# Patient Record
Sex: FEMALE | Race: WHITE | Employment: UNEMPLOYED | URBAN - METROPOLITAN AREA
[De-identification: names, ages, dates, MRNs, and addresses within clinical notes are randomized per-mention and may not be internally consistent; named-entity substitution may affect disease eponyms.]

---

## 2017-03-21 ENCOUNTER — OFFICE VISIT (OUTPATIENT)
Dept: FAMILY MEDICINE CLINIC | Age: 57
End: 2017-03-21
Payer: COMMERCIAL

## 2017-03-21 VITALS
RESPIRATION RATE: 22 BRPM | TEMPERATURE: 98.2 F | DIASTOLIC BLOOD PRESSURE: 80 MMHG | OXYGEN SATURATION: 96 % | BODY MASS INDEX: 24.8 KG/M2 | WEIGHT: 135.6 LBS | SYSTOLIC BLOOD PRESSURE: 110 MMHG | HEART RATE: 81 BPM

## 2017-03-21 DIAGNOSIS — Z13.220 SCREENING FOR LIPOID DISORDERS: ICD-10-CM

## 2017-03-21 DIAGNOSIS — G89.29 CHRONIC PAIN OF RIGHT KNEE: ICD-10-CM

## 2017-03-21 DIAGNOSIS — Z11.59 NEED FOR HEPATITIS C SCREENING TEST: ICD-10-CM

## 2017-03-21 DIAGNOSIS — M25.561 CHRONIC PAIN OF RIGHT KNEE: ICD-10-CM

## 2017-03-21 DIAGNOSIS — Z12.11 COLON CANCER SCREENING: ICD-10-CM

## 2017-03-21 DIAGNOSIS — Z86.59 HISTORY OF DEPRESSION: ICD-10-CM

## 2017-03-21 DIAGNOSIS — Z79.899 LONG TERM USE OF DRUG: ICD-10-CM

## 2017-03-21 DIAGNOSIS — F41.9 ANXIETY: Primary | ICD-10-CM

## 2017-03-21 PROCEDURE — 99213 OFFICE O/P EST LOW 20 MIN: CPT | Performed by: NURSE PRACTITIONER

## 2017-03-21 RX ORDER — DULOXETIN HYDROCHLORIDE 30 MG/1
CAPSULE, DELAYED RELEASE ORAL
Qty: 90 CAPSULE | Refills: 1 | Status: SHIPPED | OUTPATIENT
Start: 2017-03-21 | End: 2017-05-22 | Stop reason: SDUPTHER

## 2017-03-21 RX ORDER — ALPRAZOLAM 0.5 MG/1
0.5 TABLET ORAL NIGHTLY PRN
Qty: 30 TABLET | Refills: 1 | Status: SHIPPED | OUTPATIENT
Start: 2017-03-21 | End: 2017-06-21 | Stop reason: SDUPTHER

## 2017-03-21 RX ORDER — LEG BRACE
1 EACH MISCELLANEOUS DAILY
Qty: 1 EACH | Refills: 0 | Status: SHIPPED | OUTPATIENT
Start: 2017-03-21 | End: 2019-10-11 | Stop reason: ALTCHOICE

## 2017-03-21 ASSESSMENT — ENCOUNTER SYMPTOMS
NAUSEA: 0
DIARRHEA: 0
EYE PAIN: 0
EYE ITCHING: 0
WHEEZING: 0
COLOR CHANGE: 0
SORE THROAT: 0
SINUS PRESSURE: 0
CONSTIPATION: 0
COUGH: 0
SHORTNESS OF BREATH: 0
BLOOD IN STOOL: 0
CHEST TIGHTNESS: 0
ABDOMINAL DISTENTION: 0
ABDOMINAL PAIN: 0

## 2017-03-21 ASSESSMENT — PATIENT HEALTH QUESTIONNAIRE - PHQ9
2. FEELING DOWN, DEPRESSED OR HOPELESS: 0
SUM OF ALL RESPONSES TO PHQ9 QUESTIONS 1 & 2: 0
SUM OF ALL RESPONSES TO PHQ QUESTIONS 1-9: 0
1. LITTLE INTEREST OR PLEASURE IN DOING THINGS: 0

## 2017-03-22 ENCOUNTER — HOSPITAL ENCOUNTER (OUTPATIENT)
Age: 57
Setting detail: SPECIMEN
Discharge: HOME | End: 2017-03-22
Payer: COMMERCIAL

## 2017-03-22 DIAGNOSIS — Z79.899 LONG TERM USE OF DRUG: ICD-10-CM

## 2017-03-22 DIAGNOSIS — Z13.220 SCREENING FOR LIPOID DISORDERS: ICD-10-CM

## 2017-03-22 DIAGNOSIS — Z11.59 NEED FOR HEPATITIS C SCREENING TEST: ICD-10-CM

## 2017-03-22 LAB
ALBUMIN SERPL-MCNC: 4.7 G/DL (ref 3.5–5.2)
ALBUMIN/GLOBULIN RATIO: 1.8 (ref 1–2.5)
ALP BLD-CCNC: 94 U/L (ref 35–104)
ALT SERPL-CCNC: 13 U/L (ref 5–33)
ANION GAP SERPL CALCULATED.3IONS-SCNC: 12 MMOL/L (ref 9–17)
AST SERPL-CCNC: 12 U/L
BILIRUB SERPL-MCNC: 0.3 MG/DL (ref 0.3–1.2)
BUN BLDV-MCNC: 10 MG/DL (ref 6–20)
BUN/CREAT BLD: NORMAL (ref 9–20)
CALCIUM SERPL-MCNC: 9.3 MG/DL (ref 8.6–10.4)
CHLORIDE BLD-SCNC: 102 MMOL/L (ref 98–107)
CHOLESTEROL/HDL RATIO: 4.3
CHOLESTEROL: 221 MG/DL
CO2: 28 MMOL/L (ref 20–31)
CREAT SERPL-MCNC: 0.66 MG/DL (ref 0.5–0.9)
GFR AFRICAN AMERICAN: >60 ML/MIN
GFR NON-AFRICAN AMERICAN: >60 ML/MIN
GFR SERPL CREATININE-BSD FRML MDRD: NORMAL ML/MIN/{1.73_M2}
GFR SERPL CREATININE-BSD FRML MDRD: NORMAL ML/MIN/{1.73_M2}
GLUCOSE BLD-MCNC: 90 MG/DL (ref 70–99)
HDLC SERPL-MCNC: 52 MG/DL
HEPATITIS C ANTIBODY: NONREACTIVE
LDL CHOLESTEROL: 145 MG/DL (ref 0–130)
POTASSIUM SERPL-SCNC: 4.1 MMOL/L (ref 3.7–5.3)
SODIUM BLD-SCNC: 142 MMOL/L (ref 135–144)
TOTAL PROTEIN: 7.3 G/DL (ref 6.4–8.3)
TRIGL SERPL-MCNC: 118 MG/DL
VLDLC SERPL CALC-MCNC: ABNORMAL MG/DL (ref 1–30)

## 2017-06-21 ENCOUNTER — OFFICE VISIT (OUTPATIENT)
Dept: FAMILY MEDICINE CLINIC | Age: 57
End: 2017-06-21
Payer: COMMERCIAL

## 2017-06-21 VITALS
WEIGHT: 137.8 LBS | RESPIRATION RATE: 16 BRPM | DIASTOLIC BLOOD PRESSURE: 78 MMHG | HEART RATE: 78 BPM | TEMPERATURE: 98 F | OXYGEN SATURATION: 98 % | BODY MASS INDEX: 25.2 KG/M2 | SYSTOLIC BLOOD PRESSURE: 128 MMHG

## 2017-06-21 DIAGNOSIS — E03.9 ACQUIRED HYPOTHYROIDISM: Primary | ICD-10-CM

## 2017-06-21 DIAGNOSIS — B35.3 TINEA PEDIS OF RIGHT FOOT: ICD-10-CM

## 2017-06-21 DIAGNOSIS — Z86.59 HISTORY OF DEPRESSION: ICD-10-CM

## 2017-06-21 DIAGNOSIS — F41.9 ANXIETY: ICD-10-CM

## 2017-06-21 PROCEDURE — 99214 OFFICE O/P EST MOD 30 MIN: CPT | Performed by: NURSE PRACTITIONER

## 2017-06-21 RX ORDER — DULOXETIN HYDROCHLORIDE 30 MG/1
30 CAPSULE, DELAYED RELEASE ORAL DAILY
Qty: 90 CAPSULE | Refills: 1 | Status: SHIPPED | OUTPATIENT
Start: 2017-06-21 | End: 2018-02-06 | Stop reason: SDUPTHER

## 2017-06-21 RX ORDER — LEVOTHYROXINE SODIUM 175 UG/1
175 TABLET ORAL DAILY
Qty: 30 TABLET | Refills: 5 | Status: SHIPPED | OUTPATIENT
Start: 2017-06-21 | End: 2017-07-05 | Stop reason: SDUPTHER

## 2017-06-21 RX ORDER — NITROFURANTOIN 25; 75 MG/1; MG/1
CAPSULE ORAL
COMMUNITY
Start: 2017-06-16 | End: 2017-06-21 | Stop reason: ALTCHOICE

## 2017-06-21 RX ORDER — CLOTRIMAZOLE AND BETAMETHASONE DIPROPIONATE 10; .64 MG/G; MG/G
CREAM TOPICAL
Qty: 45 G | Refills: 0 | Status: SHIPPED | OUTPATIENT
Start: 2017-06-21 | End: 2018-07-20

## 2017-06-21 RX ORDER — ALPRAZOLAM 0.5 MG/1
0.5 TABLET ORAL NIGHTLY PRN
Qty: 90 TABLET | Refills: 1 | Status: SHIPPED | OUTPATIENT
Start: 2017-06-21 | End: 2018-02-06 | Stop reason: SDUPTHER

## 2017-07-04 ASSESSMENT — ENCOUNTER SYMPTOMS
SINUS PRESSURE: 0
EYE PAIN: 0
NAUSEA: 0
CHEST TIGHTNESS: 0
DIARRHEA: 0
COLOR CHANGE: 0
CONSTIPATION: 0
SORE THROAT: 0
SHORTNESS OF BREATH: 0
ABDOMINAL DISTENTION: 0
WHEEZING: 0
COUGH: 0
BLOOD IN STOOL: 0
ABDOMINAL PAIN: 0
EYE ITCHING: 0

## 2017-07-05 DIAGNOSIS — E03.9 ACQUIRED HYPOTHYROIDISM: ICD-10-CM

## 2017-07-05 RX ORDER — LEVOTHYROXINE SODIUM 175 UG/1
175 TABLET ORAL DAILY
Qty: 30 TABLET | Refills: 5 | Status: SHIPPED | OUTPATIENT
Start: 2017-07-05 | End: 2017-07-07 | Stop reason: SDUPTHER

## 2017-07-07 DIAGNOSIS — E03.9 ACQUIRED HYPOTHYROIDISM: ICD-10-CM

## 2017-07-07 RX ORDER — LEVOTHYROXINE SODIUM 175 UG/1
175 TABLET ORAL DAILY
Qty: 30 TABLET | Refills: 5 | Status: SHIPPED | OUTPATIENT
Start: 2017-07-07 | End: 2017-07-31 | Stop reason: SDUPTHER

## 2017-07-31 DIAGNOSIS — E03.9 ACQUIRED HYPOTHYROIDISM: ICD-10-CM

## 2017-07-31 DIAGNOSIS — E89.0 POSTOPERATIVE HYPOTHYROIDISM: Primary | ICD-10-CM

## 2017-07-31 RX ORDER — LEVOTHYROXINE SODIUM 175 UG/1
175 TABLET ORAL DAILY
Qty: 30 TABLET | Refills: 5 | Status: SHIPPED | OUTPATIENT
Start: 2017-07-31 | End: 2017-08-04 | Stop reason: DRUGHIGH

## 2017-08-04 ENCOUNTER — HOSPITAL ENCOUNTER (OUTPATIENT)
Age: 57
Setting detail: SPECIMEN
Discharge: HOME | End: 2017-08-04
Payer: COMMERCIAL

## 2017-08-04 DIAGNOSIS — E89.0 POSTOPERATIVE HYPOTHYROIDISM: ICD-10-CM

## 2017-08-04 LAB — TSH SERPL DL<=0.05 MIU/L-ACNC: <0.01 MIU/L (ref 0.3–5)

## 2017-08-04 RX ORDER — LEVOTHYROXINE SODIUM 0.12 MG/1
125 TABLET ORAL DAILY
Qty: 30 TABLET | Refills: 5 | Status: SHIPPED | OUTPATIENT
Start: 2017-08-04 | End: 2017-09-21 | Stop reason: SDUPTHER

## 2017-09-21 ENCOUNTER — OFFICE VISIT (OUTPATIENT)
Dept: FAMILY MEDICINE CLINIC | Age: 57
End: 2017-09-21
Payer: COMMERCIAL

## 2017-09-21 VITALS
OXYGEN SATURATION: 96 % | TEMPERATURE: 98 F | DIASTOLIC BLOOD PRESSURE: 76 MMHG | RESPIRATION RATE: 16 BRPM | BODY MASS INDEX: 25.62 KG/M2 | HEIGHT: 62 IN | WEIGHT: 139.2 LBS | HEART RATE: 80 BPM | SYSTOLIC BLOOD PRESSURE: 128 MMHG

## 2017-09-21 DIAGNOSIS — E89.0 POSTOPERATIVE HYPOTHYROIDISM: Primary | ICD-10-CM

## 2017-09-21 PROCEDURE — 99213 OFFICE O/P EST LOW 20 MIN: CPT | Performed by: NURSE PRACTITIONER

## 2017-09-21 RX ORDER — LEVOTHYROXINE SODIUM 0.12 MG/1
125 TABLET ORAL DAILY
Qty: 90 TABLET | Refills: 1 | Status: SHIPPED | OUTPATIENT
Start: 2017-09-21 | End: 2018-03-21 | Stop reason: SDUPTHER

## 2017-09-21 RX ORDER — LEVOTHYROXINE SODIUM 175 MCG
TABLET ORAL
COMMUNITY
Start: 2017-09-07 | End: 2017-09-21 | Stop reason: CLARIF

## 2017-09-21 ASSESSMENT — PATIENT HEALTH QUESTIONNAIRE - PHQ9
SUM OF ALL RESPONSES TO PHQ QUESTIONS 1-9: 0
1. LITTLE INTEREST OR PLEASURE IN DOING THINGS: 0
2. FEELING DOWN, DEPRESSED OR HOPELESS: 0
SUM OF ALL RESPONSES TO PHQ9 QUESTIONS 1 & 2: 0

## 2017-09-21 NOTE — PROGRESS NOTES
Visit Information    Have you changed or started any medications since your last visit including any over-the-counter medicines, vitamins, or herbal medicines? no   Are you having any side effects from any of your medications? -  no  Have you stopped taking any of your medications? Is so, why? -  no    Have you seen any other physician or provider since your last visit? No  Have you had any other diagnostic tests since your last visit? No  Have you been seen in the emergency room and/or had an admission to a hospital since we last saw you? No  Have you had your routine dental cleaning in the past 6 months? no    Have you activated your [employer] account? If not, what are your barriers?  No: declined      Patient Care Team:  Queenie Tijerina CNP as PCP - General (Nurse Practitioner)  Tosin Franklin DO as Consulting Physician (Obstetrics & Gynecology)    Medical History Review      Health Maintenance   Topic Date Due    Colon cancer screen colonoscopy  02/23/2010    Cervical cancer screen  12/21/2017 (Originally 5/20/2015)    Flu vaccine (1) 12/21/2017 (Originally 9/1/2017)    Pneumococcal med risk (1 of 1 - PPSV23) 12/21/2017 (Originally 2/23/1979)    DTaP/Tdap/Td vaccine (1 - Tdap) 12/21/2021 (Originally 2/23/1979)    HIV screen  12/21/2026 (Originally 2/23/1975)    Breast cancer screen  12/27/2018    Lipid screen  03/22/2022    Hepatitis C screen  Completed

## 2017-09-21 NOTE — MR AVS SNAPSHOT
your BMI, the greater your risk of heart disease, high blood pressure, type 2 diabetes, stroke, gallstones, arthritis, sleep apnea, and certain cancers. BMI is not perfect. It may overestimate body fat in athletes and people who are more muscular. If your body fat is high you can improve your BMI by decreasing your calorie intake and becoming more physically active. Learn more at: [employer]             Where to Get Your Medications      These medications were sent to [facility], [address] [phone] - f [phone]  [place], [address] [place], [place] [id number]     Phone:  [phone]     levothyroxine 125 MCG tablet         Your Current Medications Are              levothyroxine (LEVOTHROID) 125 MCG tablet Take 1 tablet by mouth daily    diclofenac (VOLTAREN) 50 MG EC tablet Take 1 tablet by mouth 3 times daily (with meals)    DULoxetine (CYMBALTA) 30 MG extended release capsule Take 1 capsule by mouth daily    ALPRAZolam (XANAX) 0.5 MG tablet Take 1 tablet by mouth nightly as needed for Sleep    clotrimazole-betamethasone (LOTRISONE) 1-0.05 % cream Apply topically 2 times daily.     Elastic Bandages & Supports ([address]) MISC 1 Units by Does not apply route daily      Allergies              Sulfa Antibiotics Other (See Comments)    bruise         Additional Information        Basic Information     Date Of Birth Sex Race Ethnicity Preferred Language    1960 Female White Non-/Non  English      Problem List as of 9/21/2017  Date Reviewed: 9/21/2017                Neck pain    Acute recurrent maxillary sinusitis    History of thyroid cancer    Anxiety    Panic attacks    History of depression      Preventive Care        Date Due    Colonoscopy 2/23/2010    Pap Smear 12/21/2017 (Originally 5/20/2015)    Yearly Flu Vaccine (1) 12/21/2017 (Originally 9/1/2017)

## 2017-10-01 ASSESSMENT — ENCOUNTER SYMPTOMS
SINUS PRESSURE: 0
EYE ITCHING: 0
CONSTIPATION: 0
ABDOMINAL PAIN: 0
NAUSEA: 0
DIARRHEA: 0
COLOR CHANGE: 0
SORE THROAT: 0
EYE PAIN: 0
SHORTNESS OF BREATH: 0
COUGH: 0
WHEEZING: 0
ABDOMINAL DISTENTION: 0
BLOOD IN STOOL: 0
CHEST TIGHTNESS: 0

## 2017-10-27 ENCOUNTER — OFFICE VISIT (OUTPATIENT)
Dept: FAMILY MEDICINE CLINIC | Age: 57
End: 2017-10-27
Payer: COMMERCIAL

## 2017-10-27 VITALS
BODY MASS INDEX: 26.13 KG/M2 | OXYGEN SATURATION: 96 % | HEART RATE: 84 BPM | RESPIRATION RATE: 20 BRPM | HEIGHT: 62 IN | WEIGHT: 142 LBS | SYSTOLIC BLOOD PRESSURE: 126 MMHG | DIASTOLIC BLOOD PRESSURE: 82 MMHG | TEMPERATURE: 98.3 F

## 2017-10-27 DIAGNOSIS — E89.0 POSTOPERATIVE HYPOTHYROIDISM: ICD-10-CM

## 2017-10-27 PROCEDURE — 99213 OFFICE O/P EST LOW 20 MIN: CPT | Performed by: NURSE PRACTITIONER

## 2017-10-27 RX ORDER — ARM BRACE
1 EACH MISCELLANEOUS CONTINUOUS
Qty: 1 EACH | Refills: 0 | Status: SHIPPED | OUTPATIENT
Start: 2017-10-27 | End: 2019-10-11 | Stop reason: ALTCHOICE

## 2017-10-27 RX ORDER — IBUPROFEN 800 MG/1
800 TABLET ORAL EVERY 8 HOURS PRN
Qty: 60 TABLET | Refills: 0 | Status: SHIPPED | OUTPATIENT
Start: 2017-10-27 | End: 2018-07-20 | Stop reason: ALTCHOICE

## 2017-10-27 NOTE — PROGRESS NOTES
Visit Information    Have you changed or started any medications since your last visit including any over-the-counter medicines, vitamins, or herbal medicines? no   Are you having any side effects from any of your medications? -  no  Have you stopped taking any of your medications? Is so, why? -  no    Have you seen any other physician or provider since your last visit? No  Have you had any other diagnostic tests since your last visit? No  Have you been seen in the emergency room and/or had an admission to a hospital since we last saw you? No  Have you had your routine dental cleaning in the past 6 months? no    Have you activated your [employer] account? If not, what are your barriers?  No: declined      Patient Care Team:  Rodriguez Harrison CNP as PCP - General (Nurse Practitioner)  Melia Gentile DO as Consulting Physician (Obstetrics & Gynecology)        Health Maintenance   Topic Date Due    Colon cancer screen colonoscopy  02/23/2010    Cervical cancer screen  12/21/2017 (Originally 5/20/2015)    Flu vaccine (1) 12/21/2017 (Originally 9/1/2017)    Pneumococcal med risk (1 of 1 - PPSV23) 12/21/2017 (Originally 2/23/1979)    DTaP/Tdap/Td vaccine (1 - Tdap) 12/21/2021 (Originally 2/23/1979)    HIV screen  12/21/2026 (Originally 2/23/1975)    Breast cancer screen  12/27/2018    Lipid screen  03/22/2022    Hepatitis C screen  Completed

## 2017-10-29 ASSESSMENT — ENCOUNTER SYMPTOMS
EYE ITCHING: 0
EYE PAIN: 0
ABDOMINAL PAIN: 0
COLOR CHANGE: 0
COUGH: 0
NAUSEA: 0
SINUS PRESSURE: 0
CHEST TIGHTNESS: 0
ABDOMINAL DISTENTION: 0
DIARRHEA: 0
BLOOD IN STOOL: 0
WHEEZING: 0
SHORTNESS OF BREATH: 0
CONSTIPATION: 0
SORE THROAT: 0

## 2017-10-30 NOTE — PROGRESS NOTES
Encounters:   10/27/17 126/82   17 128/76   17 128/78          (goal 120/80)    Past Medical History:   Diagnosis Date    Anxiety     History of depression     History of thyroid cancer     Panic attacks       Past Surgical History:   Procedure Laterality Date    BUNIONECTOMY       SECTION      LT x3    HERNIA REPAIR      THYROIDECTOMY      cancer       Family History   Problem Relation Age of Onset    Rheum Arthritis Mother     Hypertension Mother     Mult Sclerosis Father     Diabetes Father     Arthritis Sister     Diabetes Maternal Grandmother     Cancer Maternal Grandmother      unsure of type    Cancer Paternal Grandfather      unsure of type    Diabetes Paternal Grandfather        Social History   Substance Use Topics    Smoking status: Current Every Day Smoker     Packs/day: 1.00     Years: 25.00     Types: Cigarettes    Smokeless tobacco: Never Used      Comment: but will not take chantix    Alcohol use No      Current Outpatient Prescriptions   Medication Sig Dispense Refill    Elastic Bandages & Supports ([address]) MISC 1 each by Does not apply route continuous 1 each 0    ibuprofen (ADVIL;MOTRIN) 800 MG tablet Take 1 tablet by mouth every 8 hours as needed for Pain 60 tablet 0    levothyroxine (LEVOTHROID) 125 MCG tablet Take 1 tablet by mouth daily 90 tablet 1    diclofenac (VOLTAREN) 50 MG EC tablet Take 1 tablet by mouth 3 times daily (with meals) 270 tablet 1    DULoxetine (CYMBALTA) 30 MG extended release capsule Take 1 capsule by mouth daily 90 capsule 1    ALPRAZolam (XANAX) 0.5 MG tablet Take 1 tablet by mouth nightly as needed for Sleep 90 tablet 1    clotrimazole-betamethasone (LOTRISONE) 1-0.05 % cream Apply topically 2 times daily. 45 g 0    Elastic Bandages & Supports (FUTURO KNEE BRACE) MISC 1 Units by Does not apply route daily 1 each 0     No current facility-administered medications for this visit.       Allergies   Allergen Reactions    Sulfa Antibiotics Other (See Comments)     bruise    Flexeril [Cyclobenzaprine] Nausea And Vomiting       Health Maintenance   Topic Date Due    Colon cancer screen colonoscopy  02/23/2010    Cervical cancer screen  12/21/2017 (Originally 5/20/2015)    Flu vaccine (1) 12/21/2017 (Originally 9/1/2017)    Pneumococcal med risk (1 of 1 - PPSV23) 12/21/2017 (Originally 2/23/1979)    DTaP/Tdap/Td vaccine (1 - Tdap) 12/21/2021 (Originally 2/23/1979)    HIV screen  12/21/2026 (Originally 2/23/1975)    Breast cancer screen  12/27/2018    Lipid screen  03/22/2022    Hepatitis C screen  Completed       Subjective:      Review of Systems   Constitutional: Negative for activity change and appetite change. HENT: Negative for congestion, ear pain, hearing loss, sinus pressure, sore throat and tinnitus. Eyes: Negative for pain, itching and visual disturbance. Respiratory: Negative for cough, chest tightness, shortness of breath and wheezing. Cardiovascular: Negative for chest pain, palpitations and leg swelling. Gastrointestinal: Negative for abdominal distention, abdominal pain, blood in stool, constipation, diarrhea and nausea. Endocrine: Negative for cold intolerance, heat intolerance, polydipsia, polyphagia and polyuria. Genitourinary: Negative for dysuria, flank pain, frequency and urgency. Musculoskeletal: Positive for myalgias (left arm pain with movement). Negative for arthralgias and gait problem. Skin: Negative for color change, rash and wound. Allergic/Immunologic: Negative for environmental allergies and food allergies. Neurological: Negative for speech difficulty, weakness, light-headedness, numbness and headaches. Hematological: Does not bruise/bleed easily. Psychiatric/Behavioral: Negative for decreased concentration and sleep disturbance. The patient is not nervous/anxious.         Objective:     /82 (Site: Right Arm, Position: Sitting, Cuff Size: Medium

## 2017-10-31 ENCOUNTER — HOSPITAL ENCOUNTER (OUTPATIENT)
Age: 57
Setting detail: SPECIMEN
Discharge: HOME | End: 2017-10-31
Payer: COMMERCIAL

## 2017-10-31 DIAGNOSIS — E89.0 POSTOPERATIVE HYPOTHYROIDISM: ICD-10-CM

## 2017-10-31 LAB — TSH SERPL DL<=0.05 MIU/L-ACNC: 0.1 MIU/L (ref 0.3–5)

## 2018-02-06 DIAGNOSIS — F41.9 ANXIETY: ICD-10-CM

## 2018-02-06 DIAGNOSIS — Z86.59 HISTORY OF DEPRESSION: ICD-10-CM

## 2018-02-06 RX ORDER — DULOXETIN HYDROCHLORIDE 30 MG/1
30 CAPSULE, DELAYED RELEASE ORAL DAILY
Qty: 90 CAPSULE | Refills: 0 | Status: SHIPPED | OUTPATIENT
Start: 2018-02-06 | End: 2018-02-28 | Stop reason: SDUPTHER

## 2018-02-06 RX ORDER — ALPRAZOLAM 0.5 MG/1
0.5 TABLET ORAL NIGHTLY PRN
Qty: 90 TABLET | Refills: 0 | Status: SHIPPED | OUTPATIENT
Start: 2018-02-06 | End: 2018-05-07

## 2018-03-21 ENCOUNTER — OFFICE VISIT (OUTPATIENT)
Dept: FAMILY MEDICINE CLINIC | Age: 58
End: 2018-03-21
Payer: COMMERCIAL

## 2018-03-21 VITALS
TEMPERATURE: 98 F | DIASTOLIC BLOOD PRESSURE: 64 MMHG | HEIGHT: 62 IN | SYSTOLIC BLOOD PRESSURE: 122 MMHG | HEART RATE: 81 BPM | WEIGHT: 139 LBS | RESPIRATION RATE: 18 BRPM | OXYGEN SATURATION: 93 % | BODY MASS INDEX: 25.58 KG/M2

## 2018-03-21 DIAGNOSIS — E89.0 POSTOPERATIVE HYPOTHYROIDISM: Primary | ICD-10-CM

## 2018-03-21 DIAGNOSIS — Z79.899 LONG TERM USE OF DRUG: ICD-10-CM

## 2018-03-21 DIAGNOSIS — Z86.59 HISTORY OF DEPRESSION: ICD-10-CM

## 2018-03-21 DIAGNOSIS — Z13.1 SCREENING FOR DIABETES MELLITUS (DM): ICD-10-CM

## 2018-03-21 DIAGNOSIS — Z13.220 SCREENING FOR LIPOID DISORDERS: ICD-10-CM

## 2018-03-21 LAB — HBA1C MFR BLD: 5.5 %

## 2018-03-21 PROCEDURE — 99214 OFFICE O/P EST MOD 30 MIN: CPT | Performed by: NURSE PRACTITIONER

## 2018-03-21 PROCEDURE — 83036 HEMOGLOBIN GLYCOSYLATED A1C: CPT | Performed by: NURSE PRACTITIONER

## 2018-03-21 RX ORDER — LEVOTHYROXINE SODIUM 125 MCG
TABLET ORAL
Qty: 90 TABLET | Refills: 1 | Status: SHIPPED | OUTPATIENT
Start: 2018-03-21 | End: 2018-03-23 | Stop reason: DRUGHIGH

## 2018-03-21 RX ORDER — DULOXETIN HYDROCHLORIDE 60 MG/1
60 CAPSULE, DELAYED RELEASE ORAL DAILY
Qty: 30 CAPSULE | Refills: 5 | Status: SHIPPED | OUTPATIENT
Start: 2018-03-21 | End: 2018-03-29 | Stop reason: SDUPTHER

## 2018-03-21 ASSESSMENT — ENCOUNTER SYMPTOMS
CONSTIPATION: 0
DIARRHEA: 0
COUGH: 0
ABDOMINAL DISTENTION: 0
COLOR CHANGE: 0
CHEST TIGHTNESS: 0
ABDOMINAL PAIN: 0
NAUSEA: 0
EYE ITCHING: 0
BLOOD IN STOOL: 0
SORE THROAT: 0
SHORTNESS OF BREATH: 0
EYE PAIN: 0
WHEEZING: 0
SINUS PRESSURE: 0

## 2018-03-21 ASSESSMENT — PATIENT HEALTH QUESTIONNAIRE - PHQ9
2. FEELING DOWN, DEPRESSED OR HOPELESS: 0
1. LITTLE INTEREST OR PLEASURE IN DOING THINGS: 0
SUM OF ALL RESPONSES TO PHQ9 QUESTIONS 1 & 2: 0
SUM OF ALL RESPONSES TO PHQ QUESTIONS 1-9: 0

## 2018-03-21 NOTE — PROGRESS NOTES
[facility], [address] [address]  Dept: [phone]  Dept Fax: [phone]    Qiana Singh is a 62 y.o. female who presents today for her medical conditions/complaints as noted below. Qiana Singh is c/o of Thyroid Problem (6 month check up ) and Medication Refill    Tereso Darden received counseling on the following healthy behaviors: nutrition, exercise and medication adherence  Reviewed prior labs and health maintenance  Continue current medications, diet and exercise. Discussed use, benefit, and side effects of prescribed medications. Barriers to medication compliance addressed. Patient given educational materials - see patient instructions  Was a self-tracking handout given in paper form or via [employer]? Yes    Requested Prescriptions     Signed Prescriptions Disp Refills    DULoxetine (CYMBALTA) 60 MG extended release capsule 30 capsule 5     Sig: Take 1 capsule by mouth daily       All patient questions answered. Patient voiced understanding. Quality Measures    Body mass index is 25.42 kg/m². Elevated. Weight control planned discussed Healthy diet and regular exercise. BP: 122/64 Blood pressure is normal. Treatment plan consists of No treatment change needed. Lab Results   Component Value Date    LDLCHOLESTEROL 145 (H) 03/22/2017    (goal LDL reduction with dx if diabetes is 50% LDL reduction)      PHQ Scores 3/21/2018 10/27/2017 9/21/2017 3/21/2017 4/7/2015 3/10/2015   PHQ2 Score 0 0 0 0 2 6   PHQ9 Score 0 0 0 0 2 22     Interpretation of Total Score Depression Severity: 1-4 = Minimal depression, 5-9 = Mild depression, 10-14 = Moderate depression, 15-19 = Moderately severe depression, 20-27 = Severe depression          HPI:     Mental Health Problem   Primary symptoms comment: depression. The current episode started more than 1 month ago. This is a chronic problem.    The onset of the illness is precipitated by a stressful event and emotional stress Topics    Smoking status: Current Every Day Smoker     Packs/day: 1.00     Years: 25.00     Types: Cigarettes    Smokeless tobacco: Never Used      Comment: but will not take chantix    Alcohol use No      Current Outpatient Prescriptions   Medication Sig Dispense Refill    DULoxetine (CYMBALTA) 60 MG extended release capsule Take 1 capsule by mouth daily 30 capsule 5    ALPRAZolam (XANAX) 0.5 MG tablet Take 1 tablet by mouth nightly as needed for Sleep for up to 90 days. 90 tablet 0    Elastic Bandages & Supports (FUTURO [address] BRACE) MISC 1 each by Does not apply route continuous 1 each 0    ibuprofen (ADVIL;MOTRIN) 800 MG tablet Take 1 tablet by mouth every 8 hours as needed for Pain 60 tablet 0    diclofenac (VOLTAREN) 50 MG EC tablet Take 1 tablet by mouth 3 times daily (with meals) 270 tablet 1    clotrimazole-betamethasone (LOTRISONE) 1-0.05 % cream Apply topically 2 times daily. 45 g 0    Elastic Bandages & Supports (FUTURO KNEE BRACE) MISC 1 Units by Does not apply route daily 1 each 0    SYNTHROID 125 MCG tablet TAKE 1 TABLET DAILY 90 tablet 1     No current facility-administered medications for this visit. Allergies   Allergen Reactions    Sulfa Antibiotics Other (See Comments)     bruise    Flexeril [Cyclobenzaprine] Nausea And Vomiting       Health Maintenance   Topic Date Due    TSH testing  1960    Pneumococcal med risk (1 of 1 - PPSV23) 02/23/1979    Shingles Vaccine (1 of 2 - 2 Dose Series) 02/23/2010    Colon cancer screen colonoscopy  02/23/2010    Smoker: low dose lung CT screening  02/23/2015    Cervical cancer screen  05/20/2015    Flu vaccine (1) 09/01/2017    DTaP/Tdap/Td vaccine (1 - Tdap) 12/21/2021 (Originally 2/23/1979)    HIV screen  12/21/2026 (Originally 2/23/1975)    Breast cancer screen  12/27/2018    Lipid screen  03/22/2022    Hepatitis C screen  Completed       Subjective:      Review of Systems   Constitutional: Positive for fatigue.  Negative Future     Standing Expiration Date:   3/22/2019    Lipid Panel     Standing Status:   Future     Standing Expiration Date:   3/22/2019     Order Specific Question:   Is Patient Fasting?/# of Hours     Answer:   15    TSH without Reflex     Standing Status:   Future     Standing Expiration Date:   3/22/2019    POCT glycosylated hemoglobin (Hb A1C)     Orders Placed This Encounter   Medications    DULoxetine (CYMBALTA) 60 MG extended release capsule     Sig: Take 1 capsule by mouth daily     Dispense:  30 capsule     Refill:  5        Return for depression. Patient given educational materials - see patient instructions. Discussed use, benefit, and side effects of prescribed medications. All patient questions answered. Pt voiced understanding. Reviewed health maintenance. Instructed to continue current medications, diet and exercise. Patient agreed with treatment plan. Follow up as directed.      Electronically signed by Kavon Thakkar CNP on 3/21/2018

## 2018-03-23 ENCOUNTER — HOSPITAL ENCOUNTER (OUTPATIENT)
Age: 58
Setting detail: SPECIMEN
Discharge: HOME | End: 2018-03-23
Payer: COMMERCIAL

## 2018-03-23 DIAGNOSIS — E89.0 POSTOPERATIVE HYPOTHYROIDISM: ICD-10-CM

## 2018-03-23 DIAGNOSIS — Z79.899 LONG TERM USE OF DRUG: ICD-10-CM

## 2018-03-23 DIAGNOSIS — Z13.220 SCREENING FOR LIPOID DISORDERS: ICD-10-CM

## 2018-03-23 LAB
ALBUMIN SERPL-MCNC: 4.4 G/DL (ref 3.5–5.2)
ALBUMIN/GLOBULIN RATIO: 1.8 (ref 1–2.5)
ALP BLD-CCNC: 77 U/L (ref 35–104)
ALT SERPL-CCNC: 11 U/L (ref 5–33)
ANION GAP SERPL CALCULATED.3IONS-SCNC: 13 MMOL/L (ref 9–17)
AST SERPL-CCNC: 12 U/L
BILIRUB SERPL-MCNC: 0.34 MG/DL (ref 0.3–1.2)
BUN BLDV-MCNC: 12 MG/DL (ref 6–20)
BUN/CREAT BLD: NORMAL (ref 9–20)
CALCIUM SERPL-MCNC: 9.1 MG/DL (ref 8.6–10.4)
CHLORIDE BLD-SCNC: 104 MMOL/L (ref 98–107)
CHOLESTEROL/HDL RATIO: 4.3
CHOLESTEROL: 205 MG/DL
CO2: 27 MMOL/L (ref 20–31)
CREAT SERPL-MCNC: 0.6 MG/DL (ref 0.5–0.9)
GFR AFRICAN AMERICAN: >60 ML/MIN
GFR NON-AFRICAN AMERICAN: >60 ML/MIN
GFR SERPL CREATININE-BSD FRML MDRD: NORMAL ML/MIN/{1.73_M2}
GFR SERPL CREATININE-BSD FRML MDRD: NORMAL ML/MIN/{1.73_M2}
GLUCOSE BLD-MCNC: 84 MG/DL (ref 70–99)
HDLC SERPL-MCNC: 48 MG/DL
LDL CHOLESTEROL: 130 MG/DL (ref 0–130)
POTASSIUM SERPL-SCNC: 3.8 MMOL/L (ref 3.7–5.3)
SODIUM BLD-SCNC: 144 MMOL/L (ref 135–144)
TOTAL PROTEIN: 6.8 G/DL (ref 6.4–8.3)
TRIGL SERPL-MCNC: 133 MG/DL
TSH SERPL DL<=0.05 MIU/L-ACNC: 0.07 MIU/L (ref 0.3–5)
VLDLC SERPL CALC-MCNC: ABNORMAL MG/DL (ref 1–30)

## 2018-03-23 RX ORDER — LEVOTHYROXINE SODIUM 112 UG/1
112 TABLET ORAL DAILY
Qty: 90 TABLET | Refills: 1 | Status: SHIPPED | OUTPATIENT
Start: 2018-03-23 | End: 2018-03-26 | Stop reason: SDUPTHER

## 2018-03-26 RX ORDER — LEVOTHYROXINE SODIUM 112 UG/1
112 TABLET ORAL DAILY
Qty: 90 TABLET | Refills: 1 | Status: SHIPPED | OUTPATIENT
Start: 2018-03-26 | End: 2018-07-20 | Stop reason: SDUPTHER

## 2018-03-29 DIAGNOSIS — Z86.59 HISTORY OF DEPRESSION: ICD-10-CM

## 2018-03-29 RX ORDER — DULOXETIN HYDROCHLORIDE 60 MG/1
60 CAPSULE, DELAYED RELEASE ORAL DAILY
Qty: 90 CAPSULE | Refills: 1 | Status: CANCELLED | OUTPATIENT
Start: 2018-03-29

## 2018-03-29 RX ORDER — DULOXETIN HYDROCHLORIDE 60 MG/1
60 CAPSULE, DELAYED RELEASE ORAL DAILY
Qty: 90 CAPSULE | Refills: 1 | Status: SHIPPED | OUTPATIENT
Start: 2018-03-29 | End: 2018-05-08 | Stop reason: DRUGHIGH

## 2018-05-08 DIAGNOSIS — Z86.59 HISTORY OF DEPRESSION: ICD-10-CM

## 2018-05-08 RX ORDER — DULOXETIN HYDROCHLORIDE 30 MG/1
30 CAPSULE, DELAYED RELEASE ORAL DAILY
Qty: 90 CAPSULE | Refills: 0 | Status: SHIPPED | OUTPATIENT
Start: 2018-05-08 | End: 2018-07-20 | Stop reason: DRUGHIGH

## 2018-07-20 ENCOUNTER — OFFICE VISIT (OUTPATIENT)
Dept: FAMILY MEDICINE CLINIC | Age: 58
End: 2018-07-20
Payer: COMMERCIAL

## 2018-07-20 VITALS
DIASTOLIC BLOOD PRESSURE: 76 MMHG | HEIGHT: 62 IN | TEMPERATURE: 98.4 F | RESPIRATION RATE: 16 BRPM | OXYGEN SATURATION: 94 % | WEIGHT: 140.6 LBS | SYSTOLIC BLOOD PRESSURE: 122 MMHG | BODY MASS INDEX: 25.88 KG/M2 | HEART RATE: 80 BPM

## 2018-07-20 DIAGNOSIS — E89.0 POSTOPERATIVE HYPOTHYROIDISM: ICD-10-CM

## 2018-07-20 DIAGNOSIS — Z86.59 HISTORY OF DEPRESSION: Primary | ICD-10-CM

## 2018-07-20 PROCEDURE — 99213 OFFICE O/P EST LOW 20 MIN: CPT | Performed by: NURSE PRACTITIONER

## 2018-07-20 RX ORDER — LEVOTHYROXINE SODIUM 112 UG/1
112 TABLET ORAL DAILY
Qty: 90 TABLET | Refills: 1 | Status: SHIPPED | OUTPATIENT
Start: 2018-07-20 | End: 2019-01-25 | Stop reason: DRUGHIGH

## 2018-07-20 RX ORDER — DULOXETIN HYDROCHLORIDE 60 MG/1
CAPSULE, DELAYED RELEASE ORAL
COMMUNITY
Start: 2018-06-20 | End: 2018-07-20 | Stop reason: SDUPTHER

## 2018-07-20 RX ORDER — DULOXETIN HYDROCHLORIDE 60 MG/1
60 CAPSULE, DELAYED RELEASE ORAL DAILY
Qty: 90 CAPSULE | Refills: 1 | Status: SHIPPED | OUTPATIENT
Start: 2018-07-20 | End: 2018-08-06 | Stop reason: SDUPTHER

## 2018-07-20 ASSESSMENT — ENCOUNTER SYMPTOMS
SHORTNESS OF BREATH: 0
EYE PAIN: 0
BLOOD IN STOOL: 0
EYE ITCHING: 0
ABDOMINAL DISTENTION: 0
SINUS PRESSURE: 0
COLOR CHANGE: 0
WHEEZING: 0
COUGH: 0
DIARRHEA: 0
SORE THROAT: 0
ABDOMINAL PAIN: 0
CONSTIPATION: 0
CHEST TIGHTNESS: 0
NAUSEA: 0

## 2018-07-20 NOTE — PROGRESS NOTES
[facility], [address] [address]  Dept: [phone]  Dept Fax: [phone]    Kiesha Thompson is a 62 y.o. female who presents today for her medical conditions/complaints as noted below. Kiesha Thompson is c/o of Depression (4 month f/u )    Al Vyas received counseling on the following healthy behaviors: nutrition, exercise and medication adherence  Reviewed prior labs and health maintenance  Continue current medications, diet and exercise. Discussed use, benefit, and side effects of prescribed medications. Barriers to medication compliance addressed. Patient given educational materials - see patient instructions  Was a self-tracking handout given in paper form or via [employer]? Yes    Requested Prescriptions     Signed Prescriptions Disp Refills    DULoxetine (CYMBALTA) 60 MG extended release capsule 90 capsule 1     Sig: Take 1 capsule by mouth daily    levothyroxine (SYNTHROID) 112 MCG tablet 90 tablet 1     Sig: Take 1 tablet by mouth Daily       All patient questions answered. Patient voiced understanding. Quality Measures    Body mass index is 25.72 kg/m². Normal. Weight control planned discussed Healthy diet and regular exercise. BP: 122/76 Blood pressure is normal. Treatment plan consists of No treatment change needed. Lab Results   Component Value Date    LDLCHOLESTEROL 130 03/23/2018    (goal LDL reduction with dx if diabetes is 50% LDL reduction)      PHQ Scores 7/20/2018 3/21/2018 10/27/2017 9/21/2017 3/21/2017 4/7/2015 3/10/2015   PHQ2 Score 0 0 0 0 0 2 6   PHQ9 Score 0 0 0 0 0 2 22     Interpretation of Total Score Depression Severity: 1-4 = Minimal depression, 5-9 = Mild depression, 10-14 = Moderate depression, 15-19 = Moderately severe depression, 20-27 = Severe depression        HPI:     Mental Health Problem   Primary symptoms comment: depression. The current episode started more than 1 month ago. This is a chronic problem.    The onset of the illness is precipitated by a stressful event and emotional stress. Additional symptoms of the illness include fatigue, agitation and feelings of worthlessness. Additional symptoms of the illness do not include appetite change, headaches or abdominal pain. She does not admit to suicidal ideas. She does not have a plan to commit suicide. She does not contemplate harming herself. She has not already injured self. She does not contemplate injuring another person. She has not already  injured another person. States is feeling much better since increasing Cymbalta to 60mg daily. Says it has gotten her through her mother's quick sickness and death. Hypothyroidism  This is a chronic problem that has been ongoing for several years. Currently takes levothyroxine daily to keep thyroid hormones within normal range. Denies any muscle cramping, hair loss, increased fatigue, intolerance to cold, memory problems or dry skin.      Hemoglobin A1C (%)   Date Value   2018 5.5             ( goal A1C is < 7)   No results found for: LABMICR  LDL Cholesterol (mg/dL)   Date Value   2018 130   2017 145 (H)       (goal LDL is <100)   AST (U/L)   Date Value   2018 12     ALT (U/L)   Date Value   2018 11     BUN (mg/dL)   Date Value   2018 12     BP Readings from Last 3 Encounters:   18 122/76   18 122/64   10/27/17 126/82          (goal 120/80)    Past Medical History:   Diagnosis Date    Anxiety     History of depression     History of thyroid cancer     Panic attacks     Postoperative hypothyroidism 3/21/2018      Past Surgical History:   Procedure Laterality Date    BUNIONECTOMY       SECTION      LT x3    HERNIA REPAIR      THYROIDECTOMY      cancer       Family History   Problem Relation Age of Onset    Rheum Arthritis Mother     Hypertension Mother     Mult Sclerosis Father     Diabetes Father     Arthritis Sister     Diabetes Maternal Grandmother     Cancer sounds are normal. She exhibits no distension. There is no splenomegaly or hepatomegaly. There is no tenderness. No hernia. Musculoskeletal: Normal range of motion. She exhibits no edema or tenderness. Lymphadenopathy:     She has no cervical adenopathy. Neurological: She is alert and oriented to person, place, and time. Coordination and gait normal.   Skin: Skin is warm and dry. No rash noted. No erythema. Psychiatric: She has a normal mood and affect. Her speech is normal and behavior is normal. Judgment and thought content normal. Cognition and memory are normal.   Nursing note and vitals reviewed. Assessment:      1. History of depression    2. Postoperative hypothyroidism                     Plan:     Depression:  Take Cymbalta prescribed  Medication may take 3-6 weeks to get full benefit, most benefit occurs in 6 months  Learn ways to relax, such as yoga or meditation  Exercise for at least 30 minutes 3 times a week  Eat a healthy diet  Do not drink a lot of caffeine  Get 7-9 hours of sleep a night  Talk to family and friends  Do not abuse alcohol or drugs  Hypothyroidism:  Take levothyroxine as prescribed  Get TSH done to recheck since dosage change  Keep follow up appointment  If symptoms such as fatigue, dry skin, weight gain, muscle weakness, depression, slowed heart rate, or coarse dry hair occur, contact office  RTO as directed    Orders Placed This Encounter   Procedures    TSH without Reflex     Standing Status:   Future     Standing Expiration Date:   7/21/2019     Orders Placed This Encounter   Medications    DULoxetine (CYMBALTA) 60 MG extended release capsule     Sig: Take 1 capsule by mouth daily     Dispense:  90 capsule     Refill:  1    levothyroxine (SYNTHROID) 112 MCG tablet     Sig: Take 1 tablet by mouth Daily     Dispense:  90 tablet     Refill:  1        Return in about 6 months (around 1/20/2019) for depression.              Patient given educational materials - see

## 2018-07-27 ENCOUNTER — HOSPITAL ENCOUNTER (OUTPATIENT)
Age: 58
Setting detail: SPECIMEN
Discharge: HOME | End: 2018-07-27
Payer: COMMERCIAL

## 2018-07-27 DIAGNOSIS — E89.0 POSTOPERATIVE HYPOTHYROIDISM: ICD-10-CM

## 2018-07-27 LAB — TSH SERPL DL<=0.05 MIU/L-ACNC: 1.31 MIU/L (ref 0.3–5)

## 2019-01-24 ENCOUNTER — OFFICE VISIT (OUTPATIENT)
Dept: FAMILY MEDICINE CLINIC | Age: 59
End: 2019-01-24
Payer: COMMERCIAL

## 2019-01-24 ENCOUNTER — HOSPITAL ENCOUNTER (OUTPATIENT)
Age: 59
Discharge: HOME | End: 2019-01-24
Payer: COMMERCIAL

## 2019-01-24 VITALS
HEART RATE: 100 BPM | DIASTOLIC BLOOD PRESSURE: 88 MMHG | WEIGHT: 138 LBS | SYSTOLIC BLOOD PRESSURE: 120 MMHG | TEMPERATURE: 97.8 F | BODY MASS INDEX: 23.56 KG/M2 | HEIGHT: 64 IN

## 2019-01-24 DIAGNOSIS — R29.898 WEAKNESS OF BOTH LEGS: ICD-10-CM

## 2019-01-24 DIAGNOSIS — E89.0 POSTOPERATIVE HYPOTHYROIDISM: ICD-10-CM

## 2019-01-24 DIAGNOSIS — F32.4 MAJOR DEPRESSIVE DISORDER WITH SINGLE EPISODE, IN PARTIAL REMISSION (HCC): ICD-10-CM

## 2019-01-24 DIAGNOSIS — G89.29 CHRONIC LEFT SHOULDER PAIN: Primary | ICD-10-CM

## 2019-01-24 DIAGNOSIS — M25.512 CHRONIC LEFT SHOULDER PAIN: Primary | ICD-10-CM

## 2019-01-24 LAB — TSH SERPL DL<=0.05 MIU/L-ACNC: 0.07 MIU/L (ref 0.3–5)

## 2019-01-24 PROCEDURE — 36415 COLL VENOUS BLD VENIPUNCTURE: CPT

## 2019-01-24 PROCEDURE — 84443 ASSAY THYROID STIM HORMONE: CPT

## 2019-01-24 PROCEDURE — 99214 OFFICE O/P EST MOD 30 MIN: CPT | Performed by: FAMILY MEDICINE

## 2019-01-24 RX ORDER — ALPRAZOLAM 0.5 MG/1
0.5 TABLET ORAL NIGHTLY PRN
COMMUNITY
End: 2019-01-24 | Stop reason: ALTCHOICE

## 2019-01-24 ASSESSMENT — ENCOUNTER SYMPTOMS
NAUSEA: 0
ABDOMINAL PAIN: 0
SHORTNESS OF BREATH: 0
SORE THROAT: 0
CONSTIPATION: 0

## 2019-01-25 RX ORDER — LEVOTHYROXINE SODIUM 0.1 MG/1
100 TABLET ORAL DAILY
Qty: 30 TABLET | Refills: 2 | OUTPATIENT
Start: 2019-01-25 | End: 2019-04-26 | Stop reason: SDUPTHER

## 2019-03-13 ENCOUNTER — OFFICE VISIT (OUTPATIENT)
Dept: FAMILY MEDICINE CLINIC | Age: 59
End: 2019-03-13
Payer: COMMERCIAL

## 2019-03-13 VITALS
SYSTOLIC BLOOD PRESSURE: 137 MMHG | HEIGHT: 62 IN | OXYGEN SATURATION: 99 % | DIASTOLIC BLOOD PRESSURE: 85 MMHG | WEIGHT: 140.2 LBS | BODY MASS INDEX: 25.8 KG/M2 | HEART RATE: 90 BPM

## 2019-03-13 DIAGNOSIS — Z12.31 ENCOUNTER FOR SCREENING MAMMOGRAM FOR BREAST CANCER: ICD-10-CM

## 2019-03-13 DIAGNOSIS — H66.001 ACUTE SUPPURATIVE OTITIS MEDIA OF RIGHT EAR WITHOUT SPONTANEOUS RUPTURE OF TYMPANIC MEMBRANE, RECURRENCE NOT SPECIFIED: Primary | ICD-10-CM

## 2019-03-13 DIAGNOSIS — H90.11 CONDUCTIVE HEARING LOSS OF RIGHT EAR, UNSPECIFIED HEARING STATUS ON CONTRALATERAL SIDE: ICD-10-CM

## 2019-03-13 DIAGNOSIS — F40.01 AGORAPHOBIA WITH PANIC ATTACKS: ICD-10-CM

## 2019-03-13 PROCEDURE — 99213 OFFICE O/P EST LOW 20 MIN: CPT | Performed by: FAMILY MEDICINE

## 2019-03-13 RX ORDER — ALPRAZOLAM 0.5 MG/1
0.5 TABLET ORAL NIGHTLY PRN
COMMUNITY
End: 2019-03-18 | Stop reason: ALTCHOICE

## 2019-03-13 RX ORDER — BUSPIRONE HYDROCHLORIDE 10 MG/1
10 TABLET ORAL 3 TIMES DAILY
Qty: 90 TABLET | Refills: 0 | Status: SHIPPED | OUTPATIENT
Start: 2019-03-13 | End: 2019-04-04 | Stop reason: SDUPTHER

## 2019-03-13 RX ORDER — CEFUROXIME AXETIL 500 MG/1
500 TABLET ORAL EVERY 12 HOURS
Qty: 20 TABLET | Refills: 0 | Status: SHIPPED | OUTPATIENT
Start: 2019-03-13 | End: 2019-03-23

## 2019-03-13 ASSESSMENT — ENCOUNTER SYMPTOMS
COUGH: 0
SINUS PRESSURE: 0
SINUS PAIN: 0
SHORTNESS OF BREATH: 0

## 2019-03-18 PROBLEM — H90.11 CONDUCTIVE HEARING LOSS OF RIGHT EAR: Status: ACTIVE | Noted: 2019-03-18

## 2019-03-18 PROBLEM — H66.001 ACUTE SUPPURATIVE OTITIS MEDIA OF RIGHT EAR WITHOUT SPONTANEOUS RUPTURE OF TYMPANIC MEMBRANE: Status: ACTIVE | Noted: 2019-03-18

## 2019-03-18 PROBLEM — F40.01 AGORAPHOBIA WITH PANIC ATTACKS: Status: ACTIVE | Noted: 2019-03-18

## 2019-03-18 ASSESSMENT — ENCOUNTER SYMPTOMS: VOICE CHANGE: 1

## 2019-04-04 DIAGNOSIS — F40.01 AGORAPHOBIA WITH PANIC ATTACKS: ICD-10-CM

## 2019-04-04 RX ORDER — BUSPIRONE HYDROCHLORIDE 10 MG/1
TABLET ORAL
Qty: 90 TABLET | Refills: 0 | Status: SHIPPED | OUTPATIENT
Start: 2019-04-04 | End: 2019-05-07 | Stop reason: SDUPTHER

## 2019-04-04 NOTE — TELEPHONE ENCOUNTER
Please Approve or Refuse.   Send to Pharmacy per Pt's Request:      Next Visit Date:  5/3/2019   Last Visit Date: 3/13/2019    Hemoglobin A1C (%)   Date Value   03/21/2018 5.5             ( goal A1C is < 7)   BP Readings from Last 3 Encounters:   03/13/19 137/85   01/24/19 120/88   07/20/18 122/76          (goal 120/80)  BUN   Date Value Ref Range Status   03/23/2018 12 6 - 20 mg/dL Final     CREATININE   Date Value Ref Range Status   03/23/2018 0.60 0.50 - 0.90 mg/dL Final     Potassium   Date Value Ref Range Status   03/23/2018 3.8 3.7 - 5.3 mmol/L Final

## 2019-04-22 DIAGNOSIS — E89.0 POSTOPERATIVE HYPOTHYROIDISM: Primary | ICD-10-CM

## 2019-04-26 DIAGNOSIS — E89.0 POSTOPERATIVE HYPOTHYROIDISM: ICD-10-CM

## 2019-04-26 DIAGNOSIS — Z85.850 HISTORY OF THYROID CANCER: Primary | ICD-10-CM

## 2019-04-26 RX ORDER — LEVOTHYROXINE SODIUM 0.05 MG/1
50 TABLET ORAL
Qty: 30 TABLET | Refills: 0 | Status: SHIPPED | OUTPATIENT
Start: 2019-04-26 | End: 2019-05-03 | Stop reason: SDUPTHER

## 2019-04-26 NOTE — TELEPHONE ENCOUNTER
Me   to [id number]          9:42 AM   Note      I'll place new order for TSH, needs to do it asap.     How much Synthroid is she taking? (was supposed to take 1/2 tab=50 mcg!)     FYI    Notes recorded by Zachary Curran MD on 1/25/2019 at 1:18 PM EST  call patient and tell her that she needs to cut her Synthroid 100 µg call in the prescription and in 2 months she needs to repeat the TSH

## 2019-04-30 ENCOUNTER — HOSPITAL ENCOUNTER (OUTPATIENT)
Age: 59
Setting detail: SPECIMEN
Discharge: HOME | End: 2019-04-30
Payer: COMMERCIAL

## 2019-04-30 DIAGNOSIS — E89.0 POSTOPERATIVE HYPOTHYROIDISM: ICD-10-CM

## 2019-05-01 LAB — TSH SERPL DL<=0.05 MIU/L-ACNC: 0.4 MIU/L (ref 0.3–5)

## 2019-05-01 NOTE — RESULT ENCOUNTER NOTE
Mychart comment sent to patient.   Normal TSH continue current treatment we will discuss at her appointment 5/3/19

## 2019-05-03 ENCOUNTER — HOSPITAL ENCOUNTER (OUTPATIENT)
Age: 59
Setting detail: SPECIMEN
Discharge: HOME | End: 2019-05-03
Payer: COMMERCIAL

## 2019-05-03 ENCOUNTER — OFFICE VISIT (OUTPATIENT)
Dept: FAMILY MEDICINE CLINIC | Age: 59
End: 2019-05-03
Payer: COMMERCIAL

## 2019-05-03 VITALS
HEART RATE: 87 BPM | BODY MASS INDEX: 25.95 KG/M2 | OXYGEN SATURATION: 98 % | SYSTOLIC BLOOD PRESSURE: 125 MMHG | WEIGHT: 141 LBS | DIASTOLIC BLOOD PRESSURE: 83 MMHG | HEIGHT: 62 IN

## 2019-05-03 DIAGNOSIS — R22.33 NODULE OF FINGER OF BOTH HANDS: ICD-10-CM

## 2019-05-03 DIAGNOSIS — Z12.31 ENCOUNTER FOR SCREENING MAMMOGRAM FOR BREAST CANCER: ICD-10-CM

## 2019-05-03 DIAGNOSIS — Z85.850 HISTORY OF THYROID CANCER: ICD-10-CM

## 2019-05-03 DIAGNOSIS — Z12.11 SCREENING FOR COLON CANCER: ICD-10-CM

## 2019-05-03 DIAGNOSIS — E78.5 HYPERLIPIDEMIA WITH TARGET LDL LESS THAN 100: ICD-10-CM

## 2019-05-03 DIAGNOSIS — F33.1 MODERATE EPISODE OF RECURRENT MAJOR DEPRESSIVE DISORDER (HCC): ICD-10-CM

## 2019-05-03 DIAGNOSIS — E89.0 POSTOPERATIVE HYPOTHYROIDISM: Primary | ICD-10-CM

## 2019-05-03 DIAGNOSIS — R53.82 CHRONIC FATIGUE: ICD-10-CM

## 2019-05-03 DIAGNOSIS — R06.09 DOE (DYSPNEA ON EXERTION): ICD-10-CM

## 2019-05-03 DIAGNOSIS — F41.9 ANXIETY: ICD-10-CM

## 2019-05-03 DIAGNOSIS — Z82.61 FAMILY HISTORY OF RHEUMATOID ARTHRITIS: ICD-10-CM

## 2019-05-03 DIAGNOSIS — Z13.31 POSITIVE DEPRESSION SCREENING: ICD-10-CM

## 2019-05-03 DIAGNOSIS — Z23 NEED FOR PROPHYLACTIC VACCINATION AND INOCULATION AGAINST VARICELLA: ICD-10-CM

## 2019-05-03 LAB
ALBUMIN SERPL-MCNC: 4.8 G/DL (ref 3.5–5.2)
ALBUMIN/GLOBULIN RATIO: NORMAL (ref 1–2.5)
ALP BLD-CCNC: 75 U/L (ref 35–104)
ALT SERPL-CCNC: 11 U/L (ref 5–33)
ANION GAP SERPL CALCULATED.3IONS-SCNC: 12 MMOL/L (ref 9–17)
AST SERPL-CCNC: 13 U/L
BILIRUB SERPL-MCNC: 0.32 MG/DL (ref 0.3–1.2)
BUN BLDV-MCNC: 9 MG/DL (ref 6–20)
BUN/CREAT BLD: NORMAL (ref 9–20)
CALCIUM SERPL-MCNC: 9.2 MG/DL (ref 8.6–10.4)
CCP IGG ANTIBODIES: <1.5 U/ML
CHLORIDE BLD-SCNC: 103 MMOL/L (ref 98–107)
CHOLESTEROL/HDL RATIO: 4.1
CHOLESTEROL: 227 MG/DL
CO2: 26 MMOL/L (ref 20–31)
CREAT SERPL-MCNC: 0.57 MG/DL (ref 0.5–0.9)
GFR AFRICAN AMERICAN: >60 ML/MIN
GFR NON-AFRICAN AMERICAN: >60 ML/MIN
GFR SERPL CREATININE-BSD FRML MDRD: NORMAL ML/MIN/{1.73_M2}
GFR SERPL CREATININE-BSD FRML MDRD: NORMAL ML/MIN/{1.73_M2}
GLUCOSE BLD-MCNC: 88 MG/DL (ref 70–99)
HCT VFR BLD CALC: 45.9 % (ref 36–46)
HDLC SERPL-MCNC: 56 MG/DL
HEMOGLOBIN: 15.4 G/DL (ref 12–16)
LDL CHOLESTEROL: 148 MG/DL (ref 0–130)
MCH RBC QN AUTO: 30.9 PG (ref 26–34)
MCHC RBC AUTO-ENTMCNC: 33.6 G/DL (ref 31–37)
MCV RBC AUTO: 91.8 FL (ref 80–100)
NRBC AUTOMATED: NORMAL PER 100 WBC
PDW BLD-RTO: 13.4 % (ref 11.5–14.9)
PLATELET # BLD: 256 K/UL (ref 150–450)
PMV BLD AUTO: 8.7 FL (ref 6–12)
POTASSIUM SERPL-SCNC: 4.3 MMOL/L (ref 3.7–5.3)
RBC # BLD: 5 M/UL (ref 4–5.2)
RHEUMATOID FACTOR: <10 IU/ML
SODIUM BLD-SCNC: 141 MMOL/L (ref 135–144)
TOTAL PROTEIN: 7.5 G/DL (ref 6.4–8.3)
TRIGL SERPL-MCNC: 113 MG/DL
URIC ACID: 3.5 MG/DL (ref 2.4–5.7)
VITAMIN D 25-HYDROXY: 8.3 NG/ML (ref 30–100)
VLDLC SERPL CALC-MCNC: ABNORMAL MG/DL (ref 1–30)
WBC # BLD: 6.1 K/UL (ref 3.5–11)

## 2019-05-03 PROCEDURE — 36415 COLL VENOUS BLD VENIPUNCTURE: CPT

## 2019-05-03 PROCEDURE — G8431 POS CLIN DEPRES SCRN F/U DOC: HCPCS | Performed by: FAMILY MEDICINE

## 2019-05-03 PROCEDURE — 80053 COMPREHEN METABOLIC PANEL: CPT

## 2019-05-03 PROCEDURE — 86200 CCP ANTIBODY: CPT

## 2019-05-03 PROCEDURE — 86235 NUCLEAR ANTIGEN ANTIBODY: CPT

## 2019-05-03 PROCEDURE — 82306 VITAMIN D 25 HYDROXY: CPT

## 2019-05-03 PROCEDURE — 86431 RHEUMATOID FACTOR QUANT: CPT

## 2019-05-03 PROCEDURE — 86225 DNA ANTIBODY NATIVE: CPT

## 2019-05-03 PROCEDURE — 84550 ASSAY OF BLOOD/URIC ACID: CPT

## 2019-05-03 PROCEDURE — 85027 COMPLETE CBC AUTOMATED: CPT

## 2019-05-03 PROCEDURE — G0444 DEPRESSION SCREEN ANNUAL: HCPCS | Performed by: FAMILY MEDICINE

## 2019-05-03 PROCEDURE — 80061 LIPID PANEL: CPT

## 2019-05-03 PROCEDURE — 99214 OFFICE O/P EST MOD 30 MIN: CPT | Performed by: FAMILY MEDICINE

## 2019-05-03 PROCEDURE — 86038 ANTINUCLEAR ANTIBODIES: CPT

## 2019-05-03 RX ORDER — LEVOTHYROXINE SODIUM 0.1 MG/1
100 TABLET ORAL
Qty: 30 TABLET | Refills: 1 | Status: SHIPPED | OUTPATIENT
Start: 2019-05-03 | End: 2019-05-29 | Stop reason: SDUPTHER

## 2019-05-03 RX ORDER — DULOXETIN HYDROCHLORIDE 30 MG/1
30 CAPSULE, DELAYED RELEASE ORAL DAILY
Qty: 90 CAPSULE | Refills: 3 | Status: SHIPPED | OUTPATIENT
Start: 2019-05-03 | End: 2019-10-11 | Stop reason: DRUGHIGH

## 2019-05-03 RX ORDER — TRAZODONE HYDROCHLORIDE 50 MG/1
50 TABLET ORAL NIGHTLY PRN
Qty: 30 TABLET | Refills: 0 | Status: SHIPPED | OUTPATIENT
Start: 2019-05-03 | End: 2019-05-29 | Stop reason: SDUPTHER

## 2019-05-03 ASSESSMENT — ENCOUNTER SYMPTOMS
CHEST TIGHTNESS: 0
CONSTIPATION: 0
SHORTNESS OF BREATH: 1
NAUSEA: 0
WHEEZING: 0
ABDOMINAL PAIN: 0
COUGH: 1
ABDOMINAL DISTENTION: 0
DIARRHEA: 0
VOMITING: 0

## 2019-05-03 ASSESSMENT — ANXIETY QUESTIONNAIRES
GAD7 TOTAL SCORE: 7
4. TROUBLE RELAXING: 1-SEVERAL DAYS
5. BEING SO RESTLESS THAT IT IS HARD TO SIT STILL: 1-SEVERAL DAYS
6. BECOMING EASILY ANNOYED OR IRRITABLE: 2-OVER HALF THE DAYS
1. FEELING NERVOUS, ANXIOUS, OR ON EDGE: 1-SEVERAL DAYS
3. WORRYING TOO MUCH ABOUT DIFFERENT THINGS: 1-SEVERAL DAYS
2. NOT BEING ABLE TO STOP OR CONTROL WORRYING: 1-SEVERAL DAYS
7. FEELING AFRAID AS IF SOMETHING AWFUL MIGHT HAPPEN: 0-NOT AT ALL SURE

## 2019-05-03 ASSESSMENT — PATIENT HEALTH QUESTIONNAIRE - PHQ9
5. POOR APPETITE OR OVEREATING: 3
1. LITTLE INTEREST OR PLEASURE IN DOING THINGS: 1
2. FEELING DOWN, DEPRESSED OR HOPELESS: 1
3. TROUBLE FALLING OR STAYING ASLEEP: 3
SUM OF ALL RESPONSES TO PHQ QUESTIONS 1-9: 13
9. THOUGHTS THAT YOU WOULD BE BETTER OFF DEAD, OR OF HURTING YOURSELF: 0
8. MOVING OR SPEAKING SO SLOWLY THAT OTHER PEOPLE COULD HAVE NOTICED. OR THE OPPOSITE, BEING SO FIGETY OR RESTLESS THAT YOU HAVE BEEN MOVING AROUND A LOT MORE THAN USUAL: 0
6. FEELING BAD ABOUT YOURSELF - OR THAT YOU ARE A FAILURE OR HAVE LET YOURSELF OR YOUR FAMILY DOWN: 1
7. TROUBLE CONCENTRATING ON THINGS, SUCH AS READING THE NEWSPAPER OR WATCHING TELEVISION: 1
SUM OF ALL RESPONSES TO PHQ QUESTIONS 1-9: 13
SUM OF ALL RESPONSES TO PHQ9 QUESTIONS 1 & 2: 2
10. IF YOU CHECKED OFF ANY PROBLEMS, HOW DIFFICULT HAVE THESE PROBLEMS MADE IT FOR YOU TO DO YOUR WORK, TAKE CARE OF THINGS AT HOME, OR GET ALONG WITH OTHER PEOPLE: 2
4. FEELING TIRED OR HAVING LITTLE ENERGY: 3

## 2019-05-03 NOTE — PROGRESS NOTES
Chief Complaint   Patient presents with    Thyroid Problem     100 mcg Synthroid     Anxiety    Nodule     on hands for a few years getting bigger now    Fatigue    Hyperlipidemia         Bernardino Cagle    here today for follow up on chronic medical problems, go over labs and/or diagnostic studies, and medication refills. Thyroid Problem (100 mcg Synthroid ); Anxiety; Nodule (on hands for a few years getting bigger now); Fatigue; and Hyperlipidemia      HPI      Hypothyroidism: Recent symptoms: fatigue and anxiety. She denies weight gain, weight loss, cold intolerance, heat intolerance, hair loss, dry skin, constipation, diarrhea, edema, tremor, palpitations and dysphagia. Patient is  taking her medication consistently on an empty stomach. Was taking 112 mcg , then was cut down to 50 mcg, but kept taking 100 mcg  [facility] [facility]      Had thyroid cancer in 2012, had total thyroidectomy . He is agreeable to see Endocrinology     No results found for: [facility]  Lab Results   Component Value Date    TSH 0.40 04/30/2019    TSH 0.07 (L) 01/24/2019    TSH 1.31 07/27/2018      unintentional weight gain     Wt Readings from Last 3 Encounters:   05/03/19 141 lb (64 kg)   03/13/19 140 lb 3.2 oz (63.6 kg)   01/24/19 138 lb (62.6 kg)       Depression and Anxiety. Patient complains of depression. She complains of anhedonia, depressed mood, difficulty concentrating, fatigue, feelings of worthlessness/guilt, hopelessness, insomnia and weight gain. Patient complains of anxiety disorder, panic attacks and sleep disturbance. She has the following anxiety symptoms: difficulty concentrating, fatigue, insomnia, irritable, racing thoughts, shortness of breath. Has difficulty falling asleep and staying asleep. Just started Buspar and not sure if it's helping or not. Denies suicidal ideation, plan or intent.      10-14 = Moderate depression       PHQ Scores 5/3/2019 7/20/2018 3/21/2018 10/27/2017 9/21/2017 3/21/2017 4/7/2015   PHQ2 Score 2 0 0 0 0 0 2   PHQ9 Score 13 0 0 0 0 0 2     Mild anxiety    PETE 7 SCORE 5/3/2019   PETE-7 Total Score 7     Interpretation of PETE-7 score: 5-9 = mild anxiety, 10-14 = moderate anxiety, 15+ = severe anxiety. Recommend referral to behavioral health for scores 10 or greater. [facility] PETE-7 5/3/2019   Feeling nervous, anxious, or on edge 1-Several days   Not able to stop or control worrying 1-Several days   Worrying too much about different things 1-Several days   Trouble relaxing 1-Several days   Being so restless that it's hard to sit still 1-Several days   Becoming easily annoyed or irritable 2-Over half the days   Feeling afraid as if something awful might happen 0-Not at all sure   PETE-7 Total Score 7     Calixto Montague complains of of nodules on her fingers and she thinks she has osteoarthritis. The nodules are on the Right dorsum of the hand nodule, this has been there \"for  Larrie Mohs"  Has a newer one on the Left thumb, on the dorsal aspect, for about  1 yr. The nodules seem to get bigger. They don't hurt. She says she feels tired all the time, not getting better, onset several months ago or longer. She reports to Stiffness in her hands for 30 mins or less in the mornings. Denies color changes in her hands. Denies unusual rashes   She reminds me her Sister and her mother  Both have Rheumatoid Arthritis/ RA. Calixto Montague complains of dyspnea on exertion and Cough since had a the cold. I've seen her on 3/13/19, and we gave her Cefuroxime. Denies hemoptysis or chest pain     Nicotine dependence. Smoker, counseling given to quit smoking. Ready to quit: No  Counseling given: Yes  Comment: but will not take chantix      Hyperlipidemia:  Patient is not following a low fat, low cholesterol diet. She is not exercising regularly, but staying active, . OTC Supplements: none.     Lab Results   Component Value Date    CHOL 227 (H) 05/03/2019    TRIG 113 05/03/2019    HDL 56 activity:     Days per week: Not on file     Minutes per session: Not on file    Stress: Not on file   Relationships    Social connections:     Talks on phone: Not on file     Gets together: Not on file     Attends Synagogue service: Not on file     Active member of club or organization: Not on file     Attends meetings of clubs or organizations: Not on file     Relationship status: Not on file    Intimate partner violence:     Fear of current or ex partner: Not on file     Emotionally abused: Not on file     Physically abused: Not on file     Forced sexual activity: Not on file   Other Topics Concern    Not on file   Social History Narrative    Not on file     Ready to quit: No  Counseling given: Yes  Comment: but will not take chantix        Family History   Problem Relation Age of Onset    Rheum Arthritis Mother     Hypertension Mother    Moore Mult Sclerosis Father     Diabetes Father     Arthritis Sister     Rheum Arthritis Sister     Diabetes Maternal Grandmother     Cancer Maternal Grandmother         unsure of type    Cancer Paternal Grandfather         unsure of type    Diabetes Paternal Grandfather              -rest of complaints with corresponding details per ROS    The patient's past medical,surgical, social, and family history as well as her current medications and allergies were reviewed as documented in today's encounter. Review of Systems   Constitutional: Positive for fatigue and unexpected weight change. Negative for activity change, appetite change, chills, diaphoresis and fever. Respiratory: Positive for cough (mild) and shortness of breath (RIZVI). Negative for chest tightness and wheezing. Cardiovascular: Negative for chest pain, palpitations and leg swelling. Gastrointestinal: Negative for abdominal distention, abdominal pain, constipation, diarrhea, nausea and vomiting. Endocrine: Negative for cold intolerance, heat intolerance, polydipsia, polyphagia and polyuria. Musculoskeletal: Positive for arthralgias (hands) and joint swelling. Neurological: Negative for weakness and numbness. Psychiatric/Behavioral: Positive for decreased concentration, dysphoric mood and sleep disturbance. Negative for self-injury and suicidal ideas. The patient is nervous/anxious.            -vital signs stable and within normal limits except mildly Overweight per BMI. /83   Pulse 87   Ht 5' 2\" (1.575 m)   Wt 141 lb (64 kg)   LMP 06/04/2015   SpO2 98%   BMI 25.79 kg/m²      Physical Exam   Constitutional: She is oriented to person, place, and time. She appears well-developed and well-nourished. No distress. HENT:   Head: Normocephalic and atraumatic. Right Ear: External ear normal.   Left Ear: External ear normal.   Nose: Nose normal.   Mouth/Throat: Oropharynx is clear and moist. No oropharyngeal exudate. Eyes: Conjunctivae and EOM are normal. Right eye exhibits no discharge. Left eye exhibits no discharge. No scleral icterus. Neck: Normal range of motion. Neck supple. No thyromegaly present. Cardiovascular: Normal rate, regular rhythm, normal heart sounds and intact distal pulses. Pulmonary/Chest: Effort normal. No respiratory distress. She has decreased breath sounds in the right lower field and the left lower field. She has no wheezes. She has no rales. She exhibits no tenderness. Abdominal: Soft. Bowel sounds are normal. She exhibits no distension. There is no tenderness. Musculoskeletal: Normal range of motion. She exhibits tenderness and deformity. She exhibits no edema. On the right dorsum of the hand, there is a nodule, 8 mm, hard, nontender, irregular surface  On the dorsum of the Left thumb, there is a similar  nodule 1.5 cm, hard, lobulated surface, nontender   Neurological: She is alert and oriented to person, place, and time. No cranial nerve deficit. She exhibits normal muscle tone. Skin: Skin is warm and dry.  Capillary refill takes less than 2 seconds. No rash noted. She is not diaphoretic. Psychiatric: Her behavior is normal. Judgment and thought content normal. Her mood appears anxious. Nursing note and vitals reviewed. I personally reviewed testing . Discussed testing with the patient and all questions fully answered. Mild Polycythemia   hyperlipidemia     Otherwise labs within normal limits    No results found for: Derek     No results found for: [facility] Outpatient Visit on 04/30/2019   Component Date Value Ref Range Status    TSH 04/30/2019 0.40  0.30 - 5.00 mIU/L Final       Lab Results   Component Value Date    WBC 5.9 09/12/2014    HGB 15.8 10/09/2015    HCT 48.3 (A) 10/09/2015    MCV 87.9 09/12/2014     09/12/2014       Lab Results   Component Value Date     03/23/2018    K 3.8 03/23/2018     03/23/2018    CO2 27 03/23/2018    BUN 12 03/23/2018    CREATININE 0.60 03/23/2018    GLUCOSE 84 03/23/2018    CALCIUM 9.1 03/23/2018        Lab Results   Component Value Date    ALT 11 03/23/2018    AST 12 03/23/2018    ALKPHOS 77 03/23/2018    BILITOT 0.34 03/23/2018       Lab Results   Component Value Date    TSH 0.40 04/30/2019       Lab Results   Component Value Date    CHOL 205 (H) 03/23/2018    CHOL 221 (H) 03/22/2017     Lab Results   Component Value Date    TRIG 133 03/23/2018    TRIG 118 03/22/2017     Lab Results   Component Value Date    HDL 48 03/23/2018    HDL 52 03/22/2017     Lab Results   Component Value Date    LDLCHOLESTEROL 130 03/23/2018    LDLCHOLESTEROL 145 (H) 03/22/2017       Lab Results   Component Value Date    CHOLHDLRATIO 4.3 03/23/2018    CHOLHDLRATIO 4.3 03/22/2017       Lab Results   Component Value Date    LABA1C 5.5 03/21/2018       No results found for: IQEBDUKM29    No results found for: FOLATE    No results found for: IRON, TIBC, FERRITIN    No results found for: VITD25        ASSESSMENT AND PLAN      1.  Postoperative hypothyroidism  Well controlled  Has history of thyroid cancer, needs to see Endocrinology   Lab Results   Component Value Date    TSH 0.40 04/30/2019       - levothyroxine (SYNTHROID) 100 MCG tablet; Take 1 tablet by mouth every morning (before breakfast)  Dispense: 30 tablet; Refill: 1  - [facility]    2. Moderate episode of recurrent major depressive disorder (HCC)  worsening     PHQ Scores 5/3/2019 7/20/2018 3/21/2018 10/27/2017 9/21/2017 3/21/2017 4/7/2015   PHQ2 Score 2 0 0 0 0 0 2   PHQ9 Score 13 0 0 0 0 0 2     Interpretation of Total Score Depression Severity: 1-4 = Minimal depression, 5-9 = Mild depression, 10-14 = Moderate depression, 15-19 = Moderately severe depression, 20-27 = Severe depression    -continue DULoxetine (CYMBALTA) 30 MG extended release capsule; Take 1 capsule by mouth daily  Dispense: 90 capsule; Refill: 3  - start traZODone (DESYREL) 50 MG tablet; Take 1 tablet by mouth nightly as needed for Sleep Call for refills or dose adjustment. Dispense: 30 tablet; Refill: 0    3. Nodule of finger of both hands  worsening  We need to rule out rheumatologic conditions or gout    - Cyclic Citrul Peptide Antibody, IgG; Future  - Rheumatoid Factor; Future  - AMAIRANI Screen with Reflex; Future  - Uric Acid; Future    4. Chronic fatigue  Failing to change as expected. - CBC; Future  - Comprehensive Metabolic Panel; Future  - Vitamin D 25 Hydroxy; Future    5. History of thyroid cancer    - levothyroxine (SYNTHROID) 100 MCG tablet; Take 1 tablet by mouth every morning (before breakfast)  Dispense: 30 tablet; Refill: 1  - [facility]    6. Family history of rheumatoid arthritis  We need to rule out Rheumatologic disorders   - Cyclic Citrul Peptide Antibody, IgG; Future  - Rheumatoid Factor; Future  - AMAIRANI Screen with Reflex; Future    7. RIZVI (dyspnea on exertion)  Failing to change as expected. We need to rule out pulmonary or cardiac conditions    - EKG 12 lead unit performed;  Future  - XR CHEST STANDARD (2 VW); Future  Smoking cessation counseling given. 8. Anxiety  Mild   Failing to change as expected. PETE 7 SCORE 5/3/2019   PETE-7 Total Score 7     Interpretation of PETE-7 score: 5-9 = mild anxiety, 10-14 = moderate anxiety, 15+ = severe anxiety. Recommend referral to behavioral health for scores 10 or greater.    - DULoxetine (CYMBALTA) 30 MG extended release capsule; Take 1 capsule by mouth daily  Dispense: 90 capsule; Refill: 3  - start traZODone (DESYREL) 50 MG tablet; Take 1 tablet by mouth nightly as needed for Sleep Call for refills or dose adjustment. Dispense: 30 tablet; Refill: 0  I suggested to start cognitive behavioral therapy with our psychologist    9. Hyperlipidemia with target LDL less than 100  Inadequately controlled   - Lipid Panel; Future  If still high, will need statin  Low carb, low fat diet, increase fruits and vegetables, and exercise 4-5 times a week 30-40 minutes a day, or walk 1-2 hours per day, or wear a pedometer and get at least 10,000 steps per day. 10. Encounter for screening mammogram for breast cancer  - [facility] Digital Screen Bilateral [[id number]]; Future    11. Screening for colon cancer    - POCT FECAL IMMUNOCHEMICAL TEST (FIT); Future    12. Need for prophylactic vaccination and inoculation against varicella    - zoster recombinant adjuvanted vaccine [facility]) 50 MCG/0.5ML SUSR injection; Inject 0.5 mLs into the muscle once for 1 dose 50 MCG IM then repeat 2-6 months. Dispense: 1 each; Refill: 1    13. Positive depression screening  - Positive Screen for Clinical Depression with a Documented Follow-up Plan       On the basis of positive PHQ-9 screening (PHQ-9 Total Score: 13), the following plan was implemented: referral for psychotherapy provided to address external stressors and medication prescribed: Cymbalta- 30 mg and sleep aid- Trazodone- patient will call for any significant medication side effects or worsening symptoms of depression.   Patient will follow-up in 3 month(s) with PCP.         Place on wait list for psychologist for anxiety and depression     Orders Placed This Encounter   Procedures    [facility] Digital Screen Bilateral [[id number]]     Standing Status:   Future     Standing Expiration Date:   5/2/2020    XR CHEST STANDARD (2 VW)     Standing Status:   Future     Standing Expiration Date:   5/2/2020     Order Specific Question:   Reason for exam:     Answer:   RIZVI    CBC     Standing Status:   Future     Number of Occurrences:   1     Standing Expiration Date:   12/3/2019    Comprehensive Metabolic Panel     Standing Status:   Future     Number of Occurrences:   1     Standing Expiration Date:   12/3/2019    Vitamin D 25 Hydroxy     Standing Status:   Future     Number of Occurrences:   1     Standing Expiration Date:   12/3/2019    Lipid Panel     Standing Status:   Future     Number of Occurrences:   1     Standing Expiration Date:   12/3/2019     Order Specific Question:   Is Patient Fasting?/# of Hours     Answer:   yes, 2-24 hours    Cyclic Citrul Peptide Antibody, IgG     Standing Status:   Future     Number of Occurrences:   1     Standing Expiration Date:   12/3/2019    Rheumatoid Factor     Standing Status:   Future     Number of Occurrences:   1     Standing Expiration Date:   12/3/2019    AMAIRANI Screen with Reflex     Standing Status:   Future     Number of Occurrences:   1     Standing Expiration Date:   12/3/2019    Uric Acid     Standing Status:   Future     Number of Occurrences:   1     Standing Expiration Date:   12/3/2019   Brenda Rollins MD, Endocrinology, [facility]     Referral Priority:   Routine     Referral Type:   Eval and Treat     Referral Reason:   Specialty Services Required     Referred to Provider:   Macey Soliz MD     Requested Specialty:   Endocrinology     Number of Visits Requested:   1    POCT FECAL IMMUNOCHEMICAL TEST (FIT)     Standing Status:   Future     Standing Expiration Date:   5/3/2020    EKG 12

## 2019-05-03 NOTE — PROGRESS NOTES
Visit Information    Have you changed or started any medications since your last visit including any over-the-counter medicines, vitamins, or herbal medicines? no   Are you having any side effects from any of your medications? -  no  Have you stopped taking any of your medications? Is so, why? -  no    Have you seen any other physician or provider since your last visit? No  Have you had any other diagnostic tests since your last visit? Yes - Records Obtained  Have you been seen in the emergency room and/or had an admission to a hospital since we last saw you? No  Have you had your routine dental cleaning in the past 6 months? no    Have you activated your [employer] account? If not, what are your barriers?  Yes     Patient Care Team:  Melissa Becerril MD as PCP - General (Family Medicine)  Chinmay Reynolds DO as Consulting Physician (Obstetrics & Gynecology)  Kristi Allison (Certified Nurse Practitioner)    Medical History Review  Past Medical, Family, and Social History reviewed and does contribute to the patient presenting condition    Health Maintenance   Topic Date Due    Shingles Vaccine (1 of 2) 02/23/2010    Colon cancer screen colonoscopy  02/23/2010    Cervical cancer screen  05/20/2015    Breast cancer screen  12/27/2018    Pneumococcal 0-64 years Vaccine (1 of 1 - PPSV23) 05/10/2019 (Originally 2/23/1966)    Flu vaccine (Season Ended) 03/13/2020 (Originally 9/1/2019)    HIV screen  12/21/2026 (Originally 2/23/1975)    TSH testing  04/30/2020    Lipid screen  03/23/2023    DTaP/Tdap/Td vaccine (2 - Td) 07/31/2027    Hepatitis C screen  Completed

## 2019-05-03 NOTE — PATIENT INSTRUCTIONS

## 2019-05-05 DIAGNOSIS — E55.9 VITAMIN D DEFICIENCY: Primary | ICD-10-CM

## 2019-05-05 RX ORDER — ERGOCALCIFEROL 1.25 MG/1
50000 CAPSULE ORAL WEEKLY
Qty: 12 CAPSULE | Refills: 0 | Status: SHIPPED | OUTPATIENT
Start: 2019-05-05 | End: 2019-07-27 | Stop reason: SDUPTHER

## 2019-05-06 DIAGNOSIS — R76.8 ANA POSITIVE: Primary | ICD-10-CM

## 2019-05-06 DIAGNOSIS — E78.5 HYPERLIPIDEMIA WITH TARGET LDL LESS THAN 100: Primary | ICD-10-CM

## 2019-05-06 PROBLEM — Z82.61 FAMILY HISTORY OF RHEUMATOID ARTHRITIS: Status: ACTIVE | Noted: 2019-05-06

## 2019-05-06 PROBLEM — R06.09 DOE (DYSPNEA ON EXERTION): Status: ACTIVE | Noted: 2019-05-06

## 2019-05-06 PROBLEM — R22.33 NODULE OF FINGER OF BOTH HANDS: Status: ACTIVE | Noted: 2019-05-06

## 2019-05-06 PROBLEM — H66.001 ACUTE SUPPURATIVE OTITIS MEDIA OF RIGHT EAR WITHOUT SPONTANEOUS RUPTURE OF TYMPANIC MEMBRANE: Status: RESOLVED | Noted: 2019-03-18 | Resolved: 2019-05-06

## 2019-05-06 PROBLEM — F33.1 MODERATE EPISODE OF RECURRENT MAJOR DEPRESSIVE DISORDER (HCC): Status: ACTIVE | Noted: 2019-05-06

## 2019-05-06 PROBLEM — R53.82 CHRONIC FATIGUE: Status: ACTIVE | Noted: 2019-05-06

## 2019-05-06 LAB
ANA REFERENCE RANGE:: ABNORMAL
ANTI DNA DOUBLE STRANDED: 19 IU/ML
ANTI JO-1 IGG: 16 U/ML
ANTI RNP AB: 237 U/ML
ANTI SSA: 385 U/ML
ANTI SSB: 23 U/ML
ANTI-CENTROMERE: 16 U/ML
ANTI-NUCLEAR ANTIBODY (ANA): POSITIVE
ANTI-SCLERODERMA: 21 U/ML
ANTI-SMITH: 41 U/ML
HISTONE ANTIBODY: 43 U/ML

## 2019-05-06 RX ORDER — PRAVASTATIN SODIUM 40 MG
40 TABLET ORAL EVERY EVENING
Qty: 90 TABLET | Refills: 3 | Status: SHIPPED | OUTPATIENT
Start: 2019-05-06 | End: 2019-10-11 | Stop reason: SDUPTHER

## 2019-05-07 DIAGNOSIS — F40.01 AGORAPHOBIA WITH PANIC ATTACKS: ICD-10-CM

## 2019-05-07 RX ORDER — BUSPIRONE HYDROCHLORIDE 10 MG/1
TABLET ORAL
Qty: 90 TABLET | Refills: 5 | Status: SHIPPED | OUTPATIENT
Start: 2019-05-07 | End: 2019-10-11 | Stop reason: SDUPTHER

## 2019-05-07 NOTE — TELEPHONE ENCOUNTER
Please Approve or Refuse.   Send to Pharmacy per Pt's Request:      Next Visit Date:  8/8/2019   Last Visit Date: 5/3/2019    Hemoglobin A1C (%)   Date Value   03/21/2018 5.5             ( goal A1C is < 7)   BP Readings from Last 3 Encounters:   05/03/19 125/83   03/13/19 137/85   01/24/19 120/88          (goal 120/80)  BUN   Date Value Ref Range Status   05/03/2019 9 6 - 20 mg/dL Final     CREATININE   Date Value Ref Range Status   05/03/2019 0.57 0.50 - 0.90 mg/dL Final     Potassium   Date Value Ref Range Status   05/03/2019 4.3 3.7 - 5.3 mmol/L Final

## 2019-05-29 DIAGNOSIS — F41.9 ANXIETY: ICD-10-CM

## 2019-05-29 DIAGNOSIS — F33.1 MODERATE EPISODE OF RECURRENT MAJOR DEPRESSIVE DISORDER (HCC): ICD-10-CM

## 2019-05-29 DIAGNOSIS — Z85.850 HISTORY OF THYROID CANCER: ICD-10-CM

## 2019-05-29 DIAGNOSIS — E89.0 POSTOPERATIVE HYPOTHYROIDISM: ICD-10-CM

## 2019-05-29 RX ORDER — TRAZODONE HYDROCHLORIDE 50 MG/1
50 TABLET ORAL NIGHTLY PRN
Qty: 90 TABLET | Refills: 1 | Status: SHIPPED | OUTPATIENT
Start: 2019-05-29 | End: 2019-10-11 | Stop reason: ALTCHOICE

## 2019-05-29 RX ORDER — LEVOTHYROXINE SODIUM 0.1 MG/1
TABLET ORAL
Qty: 90 TABLET | Refills: 1 | Status: SHIPPED | OUTPATIENT
Start: 2019-05-29 | End: 2019-10-11 | Stop reason: SDUPTHER

## 2019-06-12 ENCOUNTER — HOSPITAL ENCOUNTER (OUTPATIENT)
Age: 59
Discharge: HOME | End: 2019-06-12
Payer: COMMERCIAL

## 2019-06-12 LAB
THYROXINE, FREE: 1.52 NG/DL (ref 0.93–1.7)
TSH SERPL DL<=0.05 MIU/L-ACNC: 0.38 MIU/L (ref 0.3–5)

## 2019-06-12 PROCEDURE — 84432 ASSAY OF THYROGLOBULIN: CPT

## 2019-06-12 PROCEDURE — 36415 COLL VENOUS BLD VENIPUNCTURE: CPT

## 2019-06-12 PROCEDURE — 84443 ASSAY THYROID STIM HORMONE: CPT

## 2019-06-12 PROCEDURE — 86800 THYROGLOBULIN ANTIBODY: CPT

## 2019-06-12 PROCEDURE — 84439 ASSAY OF FREE THYROXINE: CPT

## 2019-06-13 LAB
THYROGLOBULIN AB: <20 IU/ML (ref 0–40)
THYROGLOBULIN: <0.2 NG/ML (ref 0–63.4)

## 2019-06-20 ENCOUNTER — HOSPITAL ENCOUNTER (OUTPATIENT)
Dept: WOMENS IMAGING | Age: 59
Discharge: HOME | End: 2019-06-22
Payer: COMMERCIAL

## 2019-06-20 DIAGNOSIS — C73 THYROID CANCER (HCC): ICD-10-CM

## 2019-06-20 DIAGNOSIS — Z85.850 HX OF THYROID CANCER: ICD-10-CM

## 2019-06-20 DIAGNOSIS — Z12.31 ENCOUNTER FOR SCREENING MAMMOGRAM FOR BREAST CANCER: ICD-10-CM

## 2019-06-20 PROCEDURE — 76536 US EXAM OF HEAD AND NECK: CPT

## 2019-06-20 PROCEDURE — 77080 DXA BONE DENSITY AXIAL: CPT

## 2019-06-20 PROCEDURE — 77063 BREAST TOMOSYNTHESIS BI: CPT

## 2019-07-05 PROBLEM — M35.01 SJOGREN'S SYNDROME WITH KERATOCONJUNCTIVITIS SICCA (HCC): Status: ACTIVE | Noted: 2019-07-05

## 2019-07-05 PROBLEM — R76.8 ANA POSITIVE: Status: RESOLVED | Noted: 2019-05-06 | Resolved: 2019-07-05

## 2019-07-27 DIAGNOSIS — E55.9 VITAMIN D DEFICIENCY: ICD-10-CM

## 2019-07-29 RX ORDER — ERGOCALCIFEROL 1.25 MG/1
CAPSULE ORAL
Qty: 12 CAPSULE | Refills: 0 | Status: SHIPPED | OUTPATIENT
Start: 2019-07-29 | End: 2019-10-11 | Stop reason: SDUPTHER

## 2019-10-11 ENCOUNTER — OFFICE VISIT (OUTPATIENT)
Dept: FAMILY MEDICINE CLINIC | Age: 59
End: 2019-10-11
Payer: COMMERCIAL

## 2019-10-11 VITALS
SYSTOLIC BLOOD PRESSURE: 138 MMHG | HEIGHT: 62 IN | DIASTOLIC BLOOD PRESSURE: 86 MMHG | WEIGHT: 140 LBS | BODY MASS INDEX: 25.76 KG/M2 | HEART RATE: 95 BPM | OXYGEN SATURATION: 99 %

## 2019-10-11 DIAGNOSIS — Z12.11 SCREENING FOR COLON CANCER: ICD-10-CM

## 2019-10-11 DIAGNOSIS — F40.01 AGORAPHOBIA WITH PANIC ATTACKS: ICD-10-CM

## 2019-10-11 DIAGNOSIS — E55.9 VITAMIN D DEFICIENCY: ICD-10-CM

## 2019-10-11 DIAGNOSIS — F41.9 ANXIETY: ICD-10-CM

## 2019-10-11 DIAGNOSIS — Z85.850 HISTORY OF THYROID CANCER: ICD-10-CM

## 2019-10-11 DIAGNOSIS — Z23 NEED FOR SHINGLES VACCINE: ICD-10-CM

## 2019-10-11 DIAGNOSIS — E78.5 HYPERLIPIDEMIA WITH TARGET LDL LESS THAN 100: ICD-10-CM

## 2019-10-11 DIAGNOSIS — Z11.4 ENCOUNTER FOR SCREENING FOR HIV: ICD-10-CM

## 2019-10-11 DIAGNOSIS — M35.01 SJOGREN'S SYNDROME WITH KERATOCONJUNCTIVITIS SICCA (HCC): Primary | ICD-10-CM

## 2019-10-11 DIAGNOSIS — F33.0 MILD EPISODE OF RECURRENT MAJOR DEPRESSIVE DISORDER (HCC): ICD-10-CM

## 2019-10-11 DIAGNOSIS — Z28.21 INFLUENZA VACCINATION DECLINED: ICD-10-CM

## 2019-10-11 DIAGNOSIS — E89.0 POSTOPERATIVE HYPOTHYROIDISM: ICD-10-CM

## 2019-10-11 PROCEDURE — 99214 OFFICE O/P EST MOD 30 MIN: CPT | Performed by: FAMILY MEDICINE

## 2019-10-11 RX ORDER — BUSPIRONE HYDROCHLORIDE 10 MG/1
TABLET ORAL
Qty: 90 TABLET | Refills: 5 | Status: CANCELLED | OUTPATIENT
Start: 2019-10-11

## 2019-10-11 RX ORDER — SORBITOL/SALIVA 1/MALIC/C.PHOS 0.3 G
1 LOZENGE MUCOUS MEMBRANE
Qty: 90 LOZENGE | Refills: 3 | Status: SHIPPED | OUTPATIENT
Start: 2019-10-11 | End: 2021-04-08

## 2019-10-11 RX ORDER — PRAVASTATIN SODIUM 40 MG
40 TABLET ORAL EVERY EVENING
Qty: 90 TABLET | Refills: 3 | Status: SHIPPED | OUTPATIENT
Start: 2019-10-11 | End: 2020-06-19 | Stop reason: SDUPTHER

## 2019-10-11 RX ORDER — BUSPIRONE HYDROCHLORIDE 10 MG/1
20 TABLET ORAL 2 TIMES DAILY
Qty: 120 TABLET | Refills: 5 | Status: SHIPPED | OUTPATIENT
Start: 2019-10-11 | End: 2020-04-13

## 2019-10-11 RX ORDER — ERGOCALCIFEROL 1.25 MG/1
CAPSULE ORAL
Qty: 12 CAPSULE | Refills: 3 | Status: SHIPPED | OUTPATIENT
Start: 2019-10-11 | End: 2020-01-07 | Stop reason: SDUPTHER

## 2019-10-11 RX ORDER — TRAZODONE HYDROCHLORIDE 50 MG/1
50 TABLET ORAL NIGHTLY PRN
Qty: 90 TABLET | Refills: 1 | Status: CANCELLED | OUTPATIENT
Start: 2019-10-11

## 2019-10-11 RX ORDER — LEVOTHYROXINE SODIUM 0.1 MG/1
TABLET ORAL
Qty: 90 TABLET | Refills: 3 | Status: SHIPPED | OUTPATIENT
Start: 2019-10-11 | End: 2019-11-29 | Stop reason: SDUPTHER

## 2019-10-11 RX ORDER — DULOXETIN HYDROCHLORIDE 60 MG/1
60 CAPSULE, DELAYED RELEASE ORAL DAILY
Qty: 90 CAPSULE | Refills: 3 | Status: SHIPPED | OUTPATIENT
Start: 2019-10-11 | End: 2020-06-19 | Stop reason: SDUPTHER

## 2019-10-11 ASSESSMENT — PATIENT HEALTH QUESTIONNAIRE - PHQ9
SUM OF ALL RESPONSES TO PHQ QUESTIONS 1-9: 1
1. LITTLE INTEREST OR PLEASURE IN DOING THINGS: 0
SUM OF ALL RESPONSES TO PHQ QUESTIONS 1-9: 1
2. FEELING DOWN, DEPRESSED OR HOPELESS: 1
SUM OF ALL RESPONSES TO PHQ9 QUESTIONS 1 & 2: 1

## 2019-10-11 ASSESSMENT — ENCOUNTER SYMPTOMS
CONSTIPATION: 0
ABDOMINAL PAIN: 0
WHEEZING: 0
DIARRHEA: 0
VOMITING: 0
COUGH: 0
ABDOMINAL DISTENTION: 0
NAUSEA: 0
CHEST TIGHTNESS: 0

## 2019-10-13 PROBLEM — Z28.21 INFLUENZA VACCINATION DECLINED: Status: ACTIVE | Noted: 2019-10-13

## 2019-10-13 ASSESSMENT — ENCOUNTER SYMPTOMS
SORE THROAT: 0
SHORTNESS OF BREATH: 1

## 2019-11-29 DIAGNOSIS — Z85.850 HISTORY OF THYROID CANCER: ICD-10-CM

## 2019-11-29 DIAGNOSIS — E89.0 POSTOPERATIVE HYPOTHYROIDISM: ICD-10-CM

## 2019-11-29 DIAGNOSIS — F33.1 MODERATE EPISODE OF RECURRENT MAJOR DEPRESSIVE DISORDER (HCC): ICD-10-CM

## 2019-11-29 DIAGNOSIS — F41.9 ANXIETY: ICD-10-CM

## 2019-11-29 RX ORDER — LEVOTHYROXINE SODIUM 0.1 MG/1
TABLET ORAL
Qty: 90 TABLET | Refills: 1 | Status: SHIPPED | OUTPATIENT
Start: 2019-11-29 | End: 2020-06-19 | Stop reason: SDUPTHER

## 2019-11-29 RX ORDER — TRAZODONE HYDROCHLORIDE 50 MG/1
50 TABLET ORAL NIGHTLY PRN
Qty: 90 TABLET | Refills: 1 | OUTPATIENT
Start: 2019-11-29

## 2020-01-06 ENCOUNTER — HOSPITAL ENCOUNTER (OUTPATIENT)
Age: 60
Setting detail: SPECIMEN
Discharge: HOME | End: 2020-01-06
Payer: COMMERCIAL

## 2020-01-06 LAB
ALT SERPL-CCNC: 11 U/L (ref 5–33)
AST SERPL-CCNC: 14 U/L
CHOLESTEROL/HDL RATIO: 4
CHOLESTEROL: 219 MG/DL
HDLC SERPL-MCNC: 55 MG/DL
HIV AG/AB: NONREACTIVE
LDL CHOLESTEROL: 142 MG/DL (ref 0–130)
TRIGL SERPL-MCNC: 109 MG/DL
VLDLC SERPL CALC-MCNC: ABNORMAL MG/DL (ref 1–30)

## 2020-01-07 LAB — VITAMIN D 25-HYDROXY: 12.4 NG/ML (ref 30–100)

## 2020-01-07 RX ORDER — ERGOCALCIFEROL 1.25 MG/1
CAPSULE ORAL
Qty: 12 CAPSULE | Refills: 3 | Status: SHIPPED | OUTPATIENT
Start: 2020-01-07 | End: 2020-01-09 | Stop reason: SDUPTHER

## 2020-01-09 ENCOUNTER — PATIENT MESSAGE (OUTPATIENT)
Dept: FAMILY MEDICINE CLINIC | Age: 60
End: 2020-01-09

## 2020-01-09 RX ORDER — ERGOCALCIFEROL 1.25 MG/1
CAPSULE ORAL
Qty: 12 CAPSULE | Refills: 3 | Status: SHIPPED | OUTPATIENT
Start: 2020-01-09 | End: 2020-01-15 | Stop reason: SDUPTHER

## 2020-01-09 NOTE — TELEPHONE ENCOUNTER
From: Andrew Beal  To: Peg Bear MD  Sent: 1/9/2020 12:51 PM EST  Subject: Prescription Question    Mail order will reject my vitamin D it has to be done through a local [facility] pharmacy.

## 2020-01-15 ENCOUNTER — OFFICE VISIT (OUTPATIENT)
Dept: FAMILY MEDICINE CLINIC | Age: 60
End: 2020-01-15
Payer: COMMERCIAL

## 2020-01-15 ENCOUNTER — TELEPHONE (OUTPATIENT)
Dept: FAMILY MEDICINE CLINIC | Age: 60
End: 2020-01-15

## 2020-01-15 ENCOUNTER — HOSPITAL ENCOUNTER (OUTPATIENT)
Age: 60
Setting detail: SPECIMEN
Discharge: HOME | End: 2020-01-15
Payer: COMMERCIAL

## 2020-01-15 VITALS
HEART RATE: 98 BPM | SYSTOLIC BLOOD PRESSURE: 136 MMHG | DIASTOLIC BLOOD PRESSURE: 86 MMHG | WEIGHT: 140.6 LBS | BODY MASS INDEX: 25.88 KG/M2 | OXYGEN SATURATION: 95 % | HEIGHT: 62 IN

## 2020-01-15 PROCEDURE — 99396 PREV VISIT EST AGE 40-64: CPT | Performed by: FAMILY MEDICINE

## 2020-01-15 PROCEDURE — 99406 BEHAV CHNG SMOKING 3-10 MIN: CPT | Performed by: FAMILY MEDICINE

## 2020-01-15 RX ORDER — VARENICLINE TARTRATE 1 MG/1
1 TABLET, FILM COATED ORAL 2 TIMES DAILY
Qty: 60 TABLET | Refills: 3 | Status: SHIPPED | OUTPATIENT
Start: 2020-01-15 | End: 2020-01-15

## 2020-01-15 RX ORDER — NICOTINE 21 MG/24HR
1 PATCH, TRANSDERMAL 24 HOURS TRANSDERMAL EVERY 24 HOURS
Qty: 30 PATCH | Refills: 0 | Status: SHIPPED | OUTPATIENT
Start: 2020-01-15 | End: 2020-06-19 | Stop reason: ALTCHOICE

## 2020-01-15 RX ORDER — ERGOCALCIFEROL 1.25 MG/1
CAPSULE ORAL
Qty: 12 CAPSULE | Refills: 3 | Status: SHIPPED | OUTPATIENT
Start: 2020-01-15 | End: 2020-06-19 | Stop reason: SDUPTHER

## 2020-01-15 RX ORDER — VARENICLINE TARTRATE 25 MG
KIT ORAL
Qty: 1 BOX | Refills: 0 | Status: SHIPPED | OUTPATIENT
Start: 2020-01-15 | End: 2020-01-15

## 2020-01-15 ASSESSMENT — ENCOUNTER SYMPTOMS
SORE THROAT: 0
CONSTIPATION: 0
ABDOMINAL PAIN: 1
BACK PAIN: 0
CHEST TIGHTNESS: 0
COUGH: 0
BLOOD IN STOOL: 0
NAUSEA: 0
VOMITING: 0
WHEEZING: 0
ABDOMINAL DISTENTION: 0
DIARRHEA: 0

## 2020-01-15 NOTE — PROGRESS NOTES
Visit Information    Have you changed or started any medications since your last visit including any over-the-counter medicines, vitamins, or herbal medicines? no   Are you having any side effects from any of your medications? -  no  Have you stopped taking any of your medications? Is so, why? -  no    Have you seen any other physician or provider since your last visit? No  Have you had any other diagnostic tests since your last visit? No  Have you been seen in the emergency room and/or had an admission to a hospital since we last saw you? No  Have you had your routine dental cleaning in the past 6 months? no    Have you activated your [employer] account? If not, what are your barriers?  Yes     Patient Care Team:  John Morfin MD as PCP - General (Family Medicine)  John Morfin MD as PCP - [facility]  Shona Hinton DO as Consulting Physician (Obstetrics & Gynecology)  Tea Salazar (Certified Nurse Practitioner)  Maryjo Meehan MD as Consulting Physician (Internal Medicine)  Biju Sheehan MD as Consulting Physician (Endocrinology)    Medical History Review  Past Medical, Family, and Social History reviewed and does contribute to the patient presenting condition    Health Maintenance   Topic Date Due    Shingles Vaccine (1 of 2) 02/23/2010    Colon cancer screen colonoscopy  02/23/2010    Cervical cancer screen  05/20/2017    Flu vaccine (1) 09/01/2019    Pneumococcal 0-64 years Vaccine (1 of 1 - PPSV23) 10/23/2020 (Originally 2/23/1966)    TSH testing  06/12/2020    Lipid screen  01/06/2021    Breast cancer screen  06/20/2021    DTaP/Tdap/Td vaccine (2 - Td) 07/31/2027    Hepatitis C screen  Completed    HIV screen  Completed

## 2020-01-15 NOTE — PROGRESS NOTES
Chief Complaint   Patient presents with    Gynecologic Exam    Discuss Medications     still havent got vit d     Other     did not do cxr ekg     Shortness of Breath     still occurs off and on        Here for annual exam.    Amira Edge is due for Cervical cancer screening. Patient's last menstrual period was 06/04/2015. Vaginal discharge: yes -small amount, denies abnormal vaginal bleeding, denies itching, for at least several months, not getting better      Urinary symptoms:no    Sexually active: Yes ,\" hurting a lot\" during intercourse, lubrication doesn't help with dyspareunia. Patient thinks she has a hemorrhoid inflamed. Patient also reports right lower quadrant pain for about 6 months, not getting better. All of these have been contributing to difficulty with sexual intercourse and she says she did not have sexual intercourse in more than 1 year. G 3  P 3     last pap: was normal on 5/20/2014  The patient has NO history of abnormal PAP    Last mammogram: June 2019  The patient has NO family history of breast cancer    Wears seatbelts: yes  Regular exercise: yes  Ever been transfused or tattooed?: yes  The patient reports that domestic violence in her life is absent. Eye exam: up to date -Yes    Hearing:normal- NO, had hearing testing, mild hearing loss, did not qualify for hearing aids    Dental exam and preventative dental cleaning: up to date -Yes, has dentures    Nutritional assessment: BMI high mildly overweight per BMI    Body mass index is 25.72 kg/m². Weight has been increasing    Wt Readings from Last 3 Encounters:   01/15/20 140 lb 9.6 oz (63.8 kg)   10/11/19 140 lb (63.5 kg)   05/03/19 141 lb (64 kg)       Healthful diet and Physical activity counseling to prevent CVD- low carb, low fat diet, increase fruits and vegetables, and exercise 4-5 times a day 30-40 minutes a day discussed      Nicotine dependence. Yes  Smoker, counseling given to quit smoking.   Smokes 1 Value Date    CHOLHDLRATIO 4.0 01/06/2020    CHOLHDLRATIO 4.1 05/03/2019    CHOLHDLRATIO 4.3 03/23/2018       The 10-year ASCVD risk score (Angelo Bolton, et al., 2013) is: 7.7%    Values used to calculate the score:      Age: 61 years      Sex: Female      Is Non- : No      Diabetic: No      Tobacco smoker: Yes      Systolic Blood Pressure: 320 mmHg      Is BP treated: No      HDL Cholesterol: 55 mg/dL      Total Cholesterol: 219 mg/dL    Hepatitis C screening-adults at high riskand adults born between [id number]    Lab Results   Component Value Date    HEPCAB NONREACTIVE 03/22/2017     Health Maintenance reviewed - PAP smear done, smoking cessation, patient was told to bring FIT back in a week, she has them at home        Current Outpatient Medications   Medication Sig Dispense Refill    vitamin D (ERGOCALCIFEROL) 1.25 MG (39268 UT) CAPS capsule TAKE ONE CAPSULE BY MOUTH ONE TIME PER WEEK 12 capsule 3    levothyroxine (SYNTHROID) 100 MCG tablet TAKE 1 TABLET BY MOUTH EVERY DAY BEFORE BREAKFAST 90 tablet 1    pravastatin (PRAVACHOL) 40 MG tablet Take 1 tablet by mouth every evening 90 tablet 3    DULoxetine (CYMBALTA) 60 MG extended release capsule Take 1 capsule by mouth daily 90 capsule 3    busPIRone (BUSPAR) 10 MG tablet Take 2 tablets by mouth 2 times daily 120 tablet 5    zoster recombinant adjuvanted vaccine (SHINGRIX) 50 MCG/0.5ML SUSR injection Inject 0.5 mLs into the muscle See Admin Instructions 1 dose now and repeat in 2-6 months (Patient not taking: Reported on 1/15/2020) 0.5 mL 0    saliva substitute (NUMOISYN) LOZG Take 1 lozenge by mouth every 2 hours as needed (dry mouth) OK to substitute (Patient not taking: Reported on 1/15/2020) 90 lozenge 3    tears naturale II (CELLUGEL) SOLN ophthalmic solution Place 1 drop into both eyes 4 times daily as needed for Dry Eyes (Patient not taking: Reported on 1/15/2020) 15 mL 0     No current facility-administered medications for this visit.           Patient Active Problem List    Diagnosis Date Noted    Sjogren's syndrome with keratoconjunctivitis sicca ([facility] [address] 2019     Priority: High    Hyperlipidemia with target LDL less than 100 2019     Priority: High    Postoperative hypothyroidism 2018     Priority: High    History of thyroid cancer      Priority: High    Agoraphobia with panic attacks 2019     Priority: Medium    Major depressive disorder with single episode, in partial remission ([facility] [address] 2019     Priority: Medium    Influenza vaccination declined 10/13/2019    RIZVI (dyspnea on exertion) 2019    Family history of rheumatoid arthritis 2019    Moderate episode of recurrent major depressive disorder ([facility] [address] 2019    Nodule of finger of both hands 2019    Chronic fatigue 2019    Vitamin D deficiency 2019    Conductive hearing loss of right ear 2019    Anxiety     Panic attacks          Past Medical History:   Diagnosis Date    Acute suppurative otitis media of right ear without spontaneous rupture of tympanic membrane 3/18/2019    AMAIRANI positive 2019    Anxiety     History of depression     History of thyroid cancer     Major depressive disorder with single episode, in partial remission ([facility] [address] 2019    Panic attacks     Postoperative hypothyroidism 3/21/2018    Sjogren's syndrome with keratoconjunctivitis sicca ([facility] [address] 2019    Per rheumatology     Past Surgical History:   Procedure Laterality Date    BUNIONECTOMY       SECTION      LT x3    HERNIA REPAIR      THYROIDECTOMY      cancer     Family History   Problem Relation Age of Onset    Rheum Arthritis Mother     Hypertension Mother     Mult Sclerosis Father     Diabetes Father     Arthritis Sister     Rheum Arthritis Sister     Diabetes Maternal Grandmother     Cancer Maternal Grandmother         unsure of type    Cancer Paternal Grandfather         unsure Negative for self-injury and suicidal ideas. The patient is nervous/anxious.          -vital signs stable and within normal limits except overweight per BMI and mildly low pulse ox  /86   Pulse 98   Ht 5' 2\" (1.575 m)   Wt 140 lb 9.6 oz (63.8 kg)   LMP 06/04/2015   SpO2 95%   BMI 25.72 kg/m²      Physical Exam  Vitals signs and nursing note reviewed. Constitutional:       General: She is not in acute distress. Appearance: She is well-developed. She is not diaphoretic. HENT:      Head: Normocephalic and atraumatic. Right Ear: Tympanic membrane, ear canal and external ear normal. Decreased hearing (mild) noted. Left Ear: Tympanic membrane, ear canal and external ear normal. Decreased hearing (mild) noted. Nose: Nose normal. No mucosal edema. Mouth/Throat:      Pharynx: No oropharyngeal exudate or posterior oropharyngeal erythema. Eyes:      General: Lids are normal. No scleral icterus. Right eye: No discharge. Left eye: No discharge. Conjunctiva/sclera: Conjunctivae normal.   Neck:      Musculoskeletal: Normal range of motion and neck supple. Thyroid: No thyromegaly. Cardiovascular:      Rate and Rhythm: Normal rate and regular rhythm. Heart sounds: Normal heart sounds. No murmur. Pulmonary:      Effort: Pulmonary effort is normal. No respiratory distress. Breath sounds: Normal breath sounds. No wheezing or rales. Abdominal:      General: Bowel sounds are normal. There is no distension. Palpations: Abdomen is soft. There is mass. There is no hepatomegaly or splenomegaly. Tenderness: There is tenderness in the right lower quadrant. There is no guarding or rebound. Genitourinary:     Vagina: Vaginal discharge present. Comments: Breasts: breasts appear normal, no suspicious masses, no skin or nipple changes or axillary nodes.     Pelvic exam:   VULVA: Vulvar atrophy  URETHRA:normal     VAGINA: atrophic, vaginal discharge - white, thin and small amount  CERVIX: Atrophic, displaced, shortened, no lesions    UTERUS: Difficult to evaluate the uterus, which is apparently displaced by a large cystic right adnexal mass, tender,  I was unable to palpate her cervix which is short and high situated     ADNEXA: mass present right side,approximately 8 cm in size, mobile, tender to palpation patient says she emptied her bladder before the exam.  There is no mass in the left adnexal area    RECTAL: declined by the patient  PERIANAL: Large external hemorrhoids, erythematous violaceous, mildly tender  Exam chaperoned by Lacy Gregorio  Musculoskeletal: Normal range of motion. General: No tenderness. Right lower leg: No edema. Left lower leg: No edema. Lymphadenopathy:      Cervical: No cervical adenopathy. Skin:     General: Skin is warm and dry. Capillary Refill: Capillary refill takes less than 2 seconds. Findings: No rash. Neurological:      Mental Status: She is alert and oriented to person, place, and time. Cranial Nerves: No cranial nerve deficit. Motor: No abnormal muscle tone. Coordination: Coordination normal.      Deep Tendon Reflexes: Reflexes are normal and symmetric. Psychiatric:         Mood and Affect: Mood is anxious. Behavior: Behavior normal.         Thought Content: Thought content normal.         Judgment: Judgment normal.         ASSESSMENT AND PLAN      1. Well female exam with routine gynecological exam  - PAP Smear; Future  - VAGINITIS DNA PROBE; Future  - C.trachomatis N.gonorrhoeae DNA, Thin Prep; Future    Low carb, low fat diet, increase fruits and vegetables, and exercise 4-5 times a week 30-40 minutes a day, or walk 1-2 hours per day, or wear a pedometer and get at least 10,000 steps per day. Dental exam 2-3 times /year advised.   Annual Mammogram   Immunizations reviewed and she declines    Health Maintenance reviewed -  PAP smear done, smoking cessation, patient was 1.25 MG (35067 UT) CAPS capsule; TAKE ONE CAPSULE BY MOUTH ONE TIME PER WEEK  Dispense: 12 capsule; Refill: 3    8. Chronic RLQ pain  Failing to change as expected. We need to evaluate for ovarian tumor or cyst  - US PELVIS COMPLETE; Future  - US NON OB TRANSVAGINAL; Future    Patient was told to bring FIT back in 1 week  Patient to do the chest x-ray and EKG already ordered on 5/3/2019      Data Unavailable    Orders Placed This Encounter   Procedures    VAGINITIS DNA PROBE     Standing Status:   Future     Number of Occurrences:   1     Standing Expiration Date:   12/15/2020    C.trachomatis N.gonorrhoeae DNA, Thin Prep     Standing Status:   Future     Number of Occurrences:   1     Standing Expiration Date:   12/15/2020     PELVIS COMPLETE     Standing Status:   Future     Standing Expiration Date:   1/15/2021     NON OB TRANSVAGINAL     Standing Status:   Future     Standing Expiration Date:   1/15/2021    PAP Smear     Patient History:    Patient's last menstrual period was 2015. OBGYN Status: Postmenopausal  Past Surgical History:  No date: BUNIONECTOMY  No date:  SECTION      Comment:  LT x3  No date: HERNIA REPAIR  No date: THYROIDECTOMY      Comment:  cancer      Social History    Tobacco Use      Smoking status: Current Every Day Smoker        Packs/day: 1.00        Years: 25.00        Pack years: 25        Types: Cigarettes      Smokeless tobacco: Never Used      Tobacco comment: but will not take chantix       Standing Status:   Future     Standing Expiration Date:   12/15/2020     Order Specific Question:   Collection Type     Answer: Thin Prep     Order Specific Question:   Prior Abnormal Pap Test     Answer:   No     Order Specific Question:   Screening or Diagnostic     Answer:   Screening     Order Specific Question:   HPV Requested?      Answer:   Yes     Order Specific Question:   High Risk Patient     Answer:   N/A    NV TOBACCO USE CESSATION INTERMEDIATE 3-10 MINUTES Scores 10/11/2019 5/3/2019 7/20/2018 3/21/2018 10/27/2017 9/21/2017 3/21/2017   PHQ2 Score 1 2 0 0 0 0 0   PHQ9 Score 1 13 0 0 0 0 0       The patient's past medical, surgical, social, and family history as well as her   current medications and allergies were reviewed as documented in today'sencounter. Medications, labs, diagnostic studies, consultations and follow-up as documented in this encounter. Return in about 3 months (around 4/15/2020) for F/U Xrays, F/U cardiac w/u, CHRONIC PROBLEMS. Patient was seen with total face to face time of 30 minutes. More than 50% of this visit was counseling and education. Future Appointments   Date Time Provider Ulysses Hickman   4/30/2020 11:30 AM Raul Scruggs MD [facility]       This note was completed by using the assistance of a speech-recognition program. However, inadvertent computerized transcription errors may be present. Although every effort was made to ensure accuracy, no guarantees can be provided that every mistake has been identified and corrected by editing.     Electronicallysigned by Raul Scruggs MD on 1/20/2020 at 1:59 PM

## 2020-01-16 LAB
DIRECT EXAM: NORMAL
Lab: NORMAL
SPECIMEN DESCRIPTION: NORMAL

## 2020-01-16 NOTE — RESULT ENCOUNTER NOTE
Mychart comment sent to patient.   First vaginal culture normal   Future Appointments  4/30/2020  11:30 AM   [facility]iz[facility] Mendoza [facility]     [facility]

## 2020-01-17 LAB
CHLAMYDIA BY THIN PREP: NEGATIVE
N. GONORRHOEAE DNA, THIN PREP: NEGATIVE
SPECIMEN DESCRIPTION: NORMAL

## 2020-01-17 NOTE — RESULT ENCOUNTER NOTE
Mychart comment sent to patient.   Normal vaginal culture  Future Appointments  4/30/2020  11:30 AM   MD simona Valverde          [facility]

## 2020-01-19 LAB
HPV SAMPLE: NORMAL
HPV, GENOTYPE 16: NOT DETECTED
HPV, GENOTYPE 18: NOT DETECTED
HPV, HIGH RISK OTHER: NOT DETECTED
HPV, INTERPRETATION: NORMAL
SPECIMEN DESCRIPTION: NORMAL

## 2020-01-19 NOTE — RESULT ENCOUNTER NOTE
Arely comment sent to patient.   Negative HPV, normal  Future Appointments  4/30/2020  11:30 AM   Mariah Acevedo MD     fp [facility]          [facility]

## 2020-01-20 PROBLEM — G89.29 CHRONIC RLQ PAIN: Status: ACTIVE | Noted: 2020-01-20

## 2020-01-20 PROBLEM — Z87.891 PERSONAL HISTORY OF SMOKING: Status: ACTIVE | Noted: 2020-01-20

## 2020-01-20 PROBLEM — K64.9 ACUTE HEMORRHOID: Status: ACTIVE | Noted: 2020-01-20

## 2020-01-20 PROBLEM — R10.31 CHRONIC RLQ PAIN: Status: ACTIVE | Noted: 2020-01-20

## 2020-01-20 ASSESSMENT — ENCOUNTER SYMPTOMS: SHORTNESS OF BREATH: 1

## 2020-01-20 NOTE — PATIENT INSTRUCTIONS
Patient Education        Well Visit, Men 48 to 72: Care Instructions  Your Care Instructions    Physical exams can help you stay healthy. Your doctor has checked your overall health and may have suggested ways to take good care of yourself. He or she also may have recommended tests. At home, you can help prevent illness with healthy eating, regular exercise, and other steps. Follow-up care is a key part of your treatment and safety. Be sure to make and go to all appointments, and call your doctor if you are having problems. It's also a good idea to know your test results and keep a list of the medicines you take. How can you care for yourself at home? · Reach and stay at a healthy weight. This will lower your risk for many problems, such as obesity, diabetes, heart disease, and high blood pressure. · Get at least 30 minutes of exercise on most days of the week. Walking is a good choice. You also may want to do other activities, such as running, swimming, cycling, or playing tennis or team sports. · Do not smoke. Smoking can make health problems worse. If you need help quitting, talk to your doctor about stop-smoking programs and medicines. These can increase your chances of quitting for good. · Protect your skin from too much sun. When you're outdoors from 10 a.m. to 4 p.m., stay in the shade or cover up with clothing and a hat with a wide brim. Wear sunglasses that block UV rays. Even when it's cloudy, put broad-spectrum sunscreen (SPF 30 or higher) on any exposed skin. · See a dentist one or two times a year for checkups and to have your teeth cleaned. · Wear a seat belt in the car. Follow your doctor's advice about when to have certain tests. These tests can spot problems early. · Cholesterol. Your doctor will tell you how often to have this done based on your overall health and other things that can increase your risk for heart attack and stroke. · Blood pressure.  Have your blood pressure checked during a routine doctor visit. Your doctor will tell you how often to check your blood pressure based on your age, your blood pressure results, and other factors. · Prostate exam. Talk to your doctor about whether you should have a blood test (called a PSA test) for prostate cancer. Experts recommend that you discuss the benefits and risks of the test with your doctor before you decide whether to have this test.  · Diabetes. Ask your doctor whether you should have tests for diabetes. · Vision. Some experts recommend that you have yearly exams for glaucoma and other age-related eye problems starting at age 48. · Hearing. Tell your doctor if you notice any change in your hearing. You can have tests to find out how well you hear. · Colorectal cancer. Your risk for colorectal cancer gets higher as you get older. Some experts say that adults should start regular screening at age 48 and stop at age 76. Others say to start before age 48 or continue after age 76. Talk with your doctor about your risk and when to start and stop screening. · Heart attack and stroke risk. At least every 4 to 6 years, you should have your risk for heart attack and stroke assessed. Your doctor uses factors such as your age, blood pressure, cholesterol, and whether you smoke or have diabetes to show what your risk for a heart attack or stroke is over the next 10 years. · Abdominal aortic aneurysm. Ask your doctor whether you should have a test to check for an aneurysm. You may need a test if you ever smoked or if your parent, brother, sister, or child has had an aneurysm. When should you call for help? Watch closely for changes in your health, and be sure to contact your doctor if you have any problems or symptoms that concern you. Where can you learn more? Go to https://anayl.[employer]. org and sign in to your [employer] account.  Enter Q240 in the [facility] box to learn more about \"Well Visit, Men 48 to 72: Care

## 2020-01-24 LAB — CYTOLOGY REPORT: NORMAL

## 2020-02-03 ENCOUNTER — TELEPHONE (OUTPATIENT)
Dept: FAMILY MEDICINE CLINIC | Age: 60
End: 2020-02-03

## 2020-02-21 ENCOUNTER — APPOINTMENT (OUTPATIENT)
Dept: GENERAL RADIOLOGY | Age: 60
End: 2020-02-21
Payer: COMMERCIAL

## 2020-02-21 ENCOUNTER — HOSPITAL ENCOUNTER (EMERGENCY)
Age: 60
Discharge: HOME | End: 2020-02-22
Attending: EMERGENCY MEDICINE
Payer: COMMERCIAL

## 2020-02-21 LAB
ABSOLUTE EOS #: 0.3 K/UL (ref 0–0.4)
ABSOLUTE IMMATURE GRANULOCYTE: ABNORMAL K/UL (ref 0–0.3)
ABSOLUTE LYMPH #: 3.3 K/UL (ref 1–4.8)
ABSOLUTE MONO #: 0.7 K/UL (ref 0.1–1.3)
ALBUMIN SERPL-MCNC: 4.5 G/DL (ref 3.5–5.2)
ALBUMIN/GLOBULIN RATIO: ABNORMAL (ref 1–2.5)
ALP BLD-CCNC: 77 U/L (ref 35–104)
ALT SERPL-CCNC: 11 U/L (ref 5–33)
ANION GAP SERPL CALCULATED.3IONS-SCNC: 13 MMOL/L (ref 9–17)
AST SERPL-CCNC: 15 U/L
BASOPHILS # BLD: 1 % (ref 0–2)
BASOPHILS ABSOLUTE: 0.1 K/UL (ref 0–0.2)
BILIRUB SERPL-MCNC: <0.15 MG/DL (ref 0.3–1.2)
BNP INTERPRETATION: NORMAL
BUN BLDV-MCNC: 16 MG/DL (ref 6–20)
BUN/CREAT BLD: ABNORMAL (ref 9–20)
CALCIUM SERPL-MCNC: 9.9 MG/DL (ref 8.6–10.4)
CHLORIDE BLD-SCNC: 105 MMOL/L (ref 98–107)
CO2: 22 MMOL/L (ref 20–31)
CREAT SERPL-MCNC: 0.67 MG/DL (ref 0.5–0.9)
D-DIMER QUANTITATIVE: 0.28 MG/L FEU (ref 0–0.59)
DIFFERENTIAL TYPE: ABNORMAL
EOSINOPHILS RELATIVE PERCENT: 4 % (ref 0–4)
GFR AFRICAN AMERICAN: >60 ML/MIN
GFR NON-AFRICAN AMERICAN: >60 ML/MIN
GFR SERPL CREATININE-BSD FRML MDRD: ABNORMAL ML/MIN/{1.73_M2}
GFR SERPL CREATININE-BSD FRML MDRD: ABNORMAL ML/MIN/{1.73_M2}
GLUCOSE BLD-MCNC: 95 MG/DL (ref 70–99)
HCT VFR BLD CALC: 44 % (ref 36–46)
HEMOGLOBIN: 15 G/DL (ref 12–16)
IMMATURE GRANULOCYTES: ABNORMAL %
LYMPHOCYTES # BLD: 43 % (ref 24–44)
MCH RBC QN AUTO: 30.9 PG (ref 26–34)
MCHC RBC AUTO-ENTMCNC: 34.1 G/DL (ref 31–37)
MCV RBC AUTO: 90.7 FL (ref 80–100)
MONOCYTES # BLD: 9 % (ref 1–7)
NRBC AUTOMATED: ABNORMAL PER 100 WBC
PDW BLD-RTO: 13.1 % (ref 11.5–14.9)
PLATELET # BLD: 258 K/UL (ref 150–450)
PLATELET ESTIMATE: ABNORMAL
PMV BLD AUTO: 8.5 FL (ref 6–12)
POTASSIUM SERPL-SCNC: 3.7 MMOL/L (ref 3.7–5.3)
PRO-BNP: 42 PG/ML
RBC # BLD: 4.84 M/UL (ref 4–5.2)
RBC # BLD: ABNORMAL 10*6/UL
SEG NEUTROPHILS: 43 % (ref 36–66)
SEGMENTED NEUTROPHILS ABSOLUTE COUNT: 3.3 K/UL (ref 1.3–9.1)
SODIUM BLD-SCNC: 140 MMOL/L (ref 135–144)
TOTAL PROTEIN: 7.2 G/DL (ref 6.4–8.3)
TROPONIN INTERP: NORMAL
TROPONIN T: NORMAL NG/ML
TROPONIN, HIGH SENSITIVITY: <6 NG/L (ref 0–14)
TSH SERPL DL<=0.05 MIU/L-ACNC: 1.19 MIU/L (ref 0.3–5)
WBC # BLD: 7.7 K/UL (ref 3.5–11)
WBC # BLD: ABNORMAL 10*3/UL

## 2020-02-21 PROCEDURE — 93005 ELECTROCARDIOGRAM TRACING: CPT | Performed by: EMERGENCY MEDICINE

## 2020-02-21 PROCEDURE — 99285 EMERGENCY DEPT VISIT HI MDM: CPT

## 2020-02-21 PROCEDURE — 84484 ASSAY OF TROPONIN QUANT: CPT

## 2020-02-21 PROCEDURE — 84443 ASSAY THYROID STIM HORMONE: CPT

## 2020-02-21 PROCEDURE — 83880 ASSAY OF NATRIURETIC PEPTIDE: CPT

## 2020-02-21 PROCEDURE — 71045 X-RAY EXAM CHEST 1 VIEW: CPT

## 2020-02-21 PROCEDURE — 80053 COMPREHEN METABOLIC PANEL: CPT

## 2020-02-21 PROCEDURE — 85379 FIBRIN DEGRADATION QUANT: CPT

## 2020-02-21 PROCEDURE — 85025 COMPLETE CBC W/AUTO DIFF WBC: CPT

## 2020-02-21 PROCEDURE — 36415 COLL VENOUS BLD VENIPUNCTURE: CPT

## 2020-02-21 ASSESSMENT — PAIN SCALES - GENERAL: PAINLEVEL_OUTOF10: 1

## 2020-02-22 VITALS
HEART RATE: 86 BPM | TEMPERATURE: 97.8 F | HEIGHT: 62 IN | DIASTOLIC BLOOD PRESSURE: 78 MMHG | RESPIRATION RATE: 18 BRPM | BODY MASS INDEX: 25.76 KG/M2 | SYSTOLIC BLOOD PRESSURE: 128 MMHG | OXYGEN SATURATION: 98 % | WEIGHT: 140 LBS

## 2020-02-22 LAB
EKG ATRIAL RATE: 95 BPM
EKG P AXIS: 117 DEGREES
EKG P-R INTERVAL: 150 MS
EKG Q-T INTERVAL: 334 MS
EKG QRS DURATION: 82 MS
EKG QTC CALCULATION (BAZETT): 419 MS
EKG R AXIS: -160 DEGREES
EKG T AXIS: 140 DEGREES
EKG VENTRICULAR RATE: 95 BPM
TROPONIN INTERP: NORMAL
TROPONIN T: NORMAL NG/ML
TROPONIN, HIGH SENSITIVITY: <6 NG/L (ref 0–14)

## 2020-02-22 PROCEDURE — 93010 ELECTROCARDIOGRAM REPORT: CPT | Performed by: INTERNAL MEDICINE

## 2020-02-22 NOTE — ED TRIAGE NOTES
Pt states for the past 2 days she's had intermittent palpitations where she feels like her heart is beating hard and fast. EKG done in triage shows PVCs. States she felt a small palpitation during EKG. States she has been told she has hx of PVCs. EKG given to Dr. Lupe Caldwell.  Pt stable, A&Ox4, PWD, eupneic, GCS-15

## 2020-02-24 ENCOUNTER — TELEPHONE (OUTPATIENT)
Dept: FAMILY MEDICINE CLINIC | Age: 60
End: 2020-02-24

## 2020-02-24 NOTE — TELEPHONE ENCOUNTER
[facility] ([facility]) ED Follow up Call    Reason for ED visit:  palpitations         Hi Ryder Beverage , this is Ania from Dr. Candice Puentes office, just calling to see how you are doing after your recent ED visit. Did you receive discharge instructions? Yes  Do you understand the discharge instructions? Yes  Did the ED give you any new prescriptions? No:   Were you able to fill your prescriptions? No:       Do you have one of our red, yellow and green  Zone sheets that help you to determine when you should go to the ED? No      Do you need or want to make a follow up appt with your PCP? No    Do you have any further needs in the home i.e. Equipment?   No        FU appts/Provider:    Future Appointments   Date Time Provider Ulysses Hickman   4/30/2020 11:30 AM George Tate MD fp [facility]

## 2020-02-25 LAB
EKG ATRIAL RATE: 95 BPM
EKG P AXIS: 61 DEGREES
EKG P-R INTERVAL: 198 MS
EKG Q-T INTERVAL: 346 MS
EKG QRS DURATION: 78 MS
EKG QTC CALCULATION (BAZETT): 434 MS
EKG R AXIS: -20 DEGREES
EKG T AXIS: 43 DEGREES
EKG VENTRICULAR RATE: 95 BPM

## 2020-02-25 PROCEDURE — 93010 ELECTROCARDIOGRAM REPORT: CPT | Performed by: INTERNAL MEDICINE

## 2020-02-25 NOTE — RESULT ENCOUNTER NOTE
Patient needs follow-up post emergency room, the EKG was mildly abnormal, she will need a referral to cardiologist  Please schedule patient for follow-up      Future Appointments  4/30/2020  11:30 AM   [facility]

## 2020-03-04 ENCOUNTER — OFFICE VISIT (OUTPATIENT)
Dept: FAMILY MEDICINE CLINIC | Age: 60
End: 2020-03-04
Payer: COMMERCIAL

## 2020-03-04 VITALS
RESPIRATION RATE: 22 BRPM | HEART RATE: 97 BPM | WEIGHT: 141.8 LBS | SYSTOLIC BLOOD PRESSURE: 127 MMHG | OXYGEN SATURATION: 99 % | DIASTOLIC BLOOD PRESSURE: 91 MMHG | BODY MASS INDEX: 25.94 KG/M2

## 2020-03-04 PROBLEM — R00.2 INTERMITTENT PALPITATIONS: Status: ACTIVE | Noted: 2020-03-04

## 2020-03-04 PROBLEM — R94.31 ABNORMAL EKG: Status: ACTIVE | Noted: 2020-03-04

## 2020-03-04 PROBLEM — F17.200 CURRENT SMOKER: Status: ACTIVE | Noted: 2020-03-04

## 2020-03-04 PROBLEM — F51.04 PSYCHOPHYSIOLOGICAL INSOMNIA: Status: ACTIVE | Noted: 2020-03-04

## 2020-03-04 PROCEDURE — 99214 OFFICE O/P EST MOD 30 MIN: CPT | Performed by: FAMILY MEDICINE

## 2020-03-04 ASSESSMENT — ENCOUNTER SYMPTOMS
SHORTNESS OF BREATH: 0
CONSTIPATION: 0
TROUBLE SWALLOWING: 0
COUGH: 0
NAUSEA: 0
COLOR CHANGE: 0
APNEA: 0
EYE PAIN: 0
CHEST TIGHTNESS: 0
SORE THROAT: 0
ABDOMINAL PAIN: 0
RESPIRATORY NEGATIVE: 1
VOMITING: 0
DIARRHEA: 0

## 2020-03-04 NOTE — PATIENT INSTRUCTIONS

## 2020-03-04 NOTE — PROGRESS NOTES
ventricular complexes  Low Voltage  Nonspecific T wave abnormality  Abnormal ECG     No data recorded   Ready to quit: Not Answered  Counseling given: Not Answered    Review of Systems   Constitutional: Positive for activity change. Negative for chills, fatigue and fever. HENT: Negative for congestion, ear pain, sore throat and trouble swallowing. Eyes: Negative for pain and visual disturbance. Respiratory: Negative. Negative for apnea, cough, chest tightness and shortness of breath. Cardiovascular: Positive for chest pain and palpitations. Gastrointestinal: Negative for abdominal pain, constipation, diarrhea, nausea and vomiting. Endocrine: Negative for cold intolerance and heat intolerance. Genitourinary: Negative for difficulty urinating, dysuria, frequency and genital sores. Musculoskeletal: Negative for arthralgias, gait problem and myalgias. Skin: Negative for color change and rash. Neurological: Positive for dizziness. Negative for weakness, light-headedness and headaches. Psychiatric/Behavioral: Positive for dysphoric mood and sleep disturbance. Negative for agitation, behavioral problems and decreased concentration. The patient is nervous/anxious. Prior to Visit Medications    Medication Sig Taking?  Authorizing Provider   melatonin ER 1 MG TBCR tablet Take 1 tablet by mouth nightly as needed (insomnia) Yes Renu Dodge, APRN - CNP   vitamin D (ERGOCALCIFEROL) 1.25 MG (44967 UT) CAPS capsule TAKE ONE CAPSULE BY MOUTH ONE TIME PER WEEK Yes Elise Mills MD   hydrocortisone (ANUSOL-HC) 2.5 % rectal cream Place rectally 2 times daily X 10 DAYS Yes Elise Mills MD   nicotine (NICODERM CQ) 14 MG/24HR Place 1 patch onto the skin every 24 hours **Step 2** Yes Elise Mills MD   nicotine (NICODERM CQ) 7 MG/24HR Place 1 patch onto the skin every 24 hours **Step 3** Yes Elise Mills MD   nicotine (NICODERM CQ) 21 MG/24HR Place 1 patch onto the skin every 24 hours Yes Sosa Tai MD   levothyroxine (SYNTHROID) 100 MCG tablet TAKE 1 TABLET BY MOUTH EVERY DAY BEFORE BREAKFAST Yes Sosa Tai MD   zoster recombinant adjuvanted vaccine (SHINGRIX) 50 MCG/0.5ML SUSR injection Inject 0.5 mLs into the muscle See Admin Instructions 1 dose now and repeat in 2-6 months Yes Sosa Tai MD   pravastatin (PRAVACHOL) 40 MG tablet Take 1 tablet by mouth every evening Yes Sosa Tai MD   DULoxetine (CYMBALTA) 60 MG extended release capsule Take 1 capsule by mouth daily Yes Sosa Tai MD   busPIRone (BUSPAR) 10 MG tablet Take 2 tablets by mouth 2 times daily Yes Sosa Tai MD   saliva substitute (NUMOISYN) LOZG Take 1 lozenge by mouth every 2 hours as needed (dry mouth) OK to substitute Yes Sosa Tai MD   tears naturale II (CELLUGEL) SOLN ophthalmic solution Place 1 drop into both eyes 4 times daily as needed for Dry Eyes Yes Sosa Tai MD      Social History     Tobacco Use    Smoking status: Current Every Day Smoker     Packs/day: 1.00     Years: 25.00     Pack years: 25.00     Types: Cigarettes    Smokeless tobacco: Never Used   Substance Use Topics    Alcohol use: No      Vitals:    03/04/20 1112   BP: (!) 127/91   Pulse: 97   Resp: 22   SpO2: 99%   Weight: 141 lb 12.8 oz (64.3 kg)     Estimated body mass index is 25.94 kg/m² as calculated from the following:    Height as of 2/21/20: 5' 2\" (1.575 m). Weight as of this encounter: 141 lb 12.8 oz (64.3 kg). Physical Exam  Vitals signs and nursing note reviewed. Constitutional:       Appearance: She is well-developed and normal weight. HENT:      Head: Normocephalic. Right Ear: External ear normal.      Left Ear: External ear normal.      Nose: Nose normal.   Eyes:      General: No scleral icterus. Conjunctiva/sclera: Conjunctivae normal.      Pupils: Pupils are equal, round, and reactive to light.    Neck:      Musculoskeletal: Normal range of cause sleep problems. 5. Psychophysiological insomnia  Ongoing  Start Melatonin  Tried Trazodone in the past  Cut down intake of drinks with caffeine, such as coffee or black tea, for 8 hours before bed. Cut down on smoking or use other types of tobacco near bedtime. Cut down on Nicotine and alcohol   Advised to stop eating a big meal close to bedtime. Advised to stop drinking a lot of  fluids close to bedtime.    - melatonin ER 1 MG TBCR tablet; Take 1 tablet by mouth nightly as needed (insomnia)  Dispense: 30 tablet; Refill: 2    6. Current smoker  Ongoing  Declined wellbutrin  Chantix not approved by insurance. Will use patch  Counseling given to quit smoking. Support offered. Hand out given. Educational material given    Due to complexity of care and multiple co morbidities of this patient, she  was seen with total face to face time of 40 minutes. More than 50% of this visit was counseling. coordination and education. Health Maintenance Due   Topic Date Due    Shingles Vaccine (1 of 2) 02/23/2010    Colon cancer screen colonoscopy  02/23/2010    Flu vaccine (1) 09/01/2019      Return in about 4 weeks (around 4/1/2020) for Keep Appt w/ Dr. Jayden Freeman, review results on holter and echo. Inez Yeager received counseling on the following healthy behaviors: nutrition, exercise and medication adherence  Reviewed prior labs and health maintenance  Continue current medications, diet and exercise. Discussed use, benefit, and side effects of prescribed medications. Barriers to medication compliance addressed. Patient given educational materials - see patient instructions  Was a self-tracking handout given in paper form or via [employer]? Yes    Requested Prescriptions     Signed Prescriptions Disp Refills    melatonin ER 1 MG TBCR tablet 30 tablet 2     Sig: Take 1 tablet by mouth nightly as needed (insomnia)       All patient questions answered. Patient voiced understanding.     Quality Measures    Body

## 2020-04-13 RX ORDER — BUSPIRONE HYDROCHLORIDE 10 MG/1
TABLET ORAL
Qty: 270 TABLET | Refills: 1 | Status: SHIPPED | OUTPATIENT
Start: 2020-04-13 | End: 2020-06-19 | Stop reason: SDUPTHER

## 2020-06-05 ENCOUNTER — E-VISIT (OUTPATIENT)
Dept: FAMILY MEDICINE CLINIC | Age: 60
End: 2020-06-05
Payer: COMMERCIAL

## 2020-06-05 PROCEDURE — 99422 OL DIG E/M SVC 11-20 MIN: CPT | Performed by: FAMILY MEDICINE

## 2020-06-05 RX ORDER — METHYLPREDNISOLONE 4 MG/1
TABLET ORAL
Qty: 1 KIT | Refills: 0 | Status: SHIPPED | OUTPATIENT
Start: 2020-06-05 | End: 2020-06-19 | Stop reason: ALTCHOICE

## 2020-06-05 RX ORDER — CEPHALEXIN 500 MG/1
500 CAPSULE ORAL 4 TIMES DAILY
Qty: 40 CAPSULE | Refills: 0 | Status: SHIPPED | OUTPATIENT
Start: 2020-06-05 | End: 2020-06-19 | Stop reason: ALTCHOICE

## 2020-06-19 ENCOUNTER — HOSPITAL ENCOUNTER (OUTPATIENT)
Age: 60
Setting detail: SPECIMEN
Discharge: HOME | End: 2020-06-19
Payer: COMMERCIAL

## 2020-06-19 ENCOUNTER — OFFICE VISIT (OUTPATIENT)
Dept: FAMILY MEDICINE CLINIC | Age: 60
End: 2020-06-19
Payer: COMMERCIAL

## 2020-06-19 VITALS
HEIGHT: 62 IN | BODY MASS INDEX: 26.13 KG/M2 | OXYGEN SATURATION: 98 % | WEIGHT: 142 LBS | SYSTOLIC BLOOD PRESSURE: 120 MMHG | DIASTOLIC BLOOD PRESSURE: 86 MMHG | HEART RATE: 97 BPM

## 2020-06-19 LAB
ABSOLUTE EOS #: 0.1 K/UL (ref 0–0.4)
ABSOLUTE IMMATURE GRANULOCYTE: ABNORMAL K/UL (ref 0–0.3)
ABSOLUTE LYMPH #: 2 K/UL (ref 1–4.8)
ABSOLUTE MONO #: 0.4 K/UL (ref 0.1–1.3)
ALBUMIN SERPL-MCNC: 4.5 G/DL (ref 3.5–5.2)
ALBUMIN/GLOBULIN RATIO: NORMAL (ref 1–2.5)
ALP BLD-CCNC: 77 U/L (ref 35–104)
ALT SERPL-CCNC: 12 U/L (ref 5–33)
ANION GAP SERPL CALCULATED.3IONS-SCNC: 12 MMOL/L (ref 9–17)
AST SERPL-CCNC: 15 U/L
BASOPHILS # BLD: 1 % (ref 0–2)
BASOPHILS ABSOLUTE: 0.1 K/UL (ref 0–0.2)
BILIRUB SERPL-MCNC: 0.3 MG/DL (ref 0.3–1.2)
BUN BLDV-MCNC: 8 MG/DL (ref 8–23)
BUN/CREAT BLD: NORMAL (ref 9–20)
CALCIUM SERPL-MCNC: 9.4 MG/DL (ref 8.6–10.4)
CHLORIDE BLD-SCNC: 104 MMOL/L (ref 98–107)
CHOLESTEROL/HDL RATIO: 3.6
CHOLESTEROL: 206 MG/DL
CO2: 26 MMOL/L (ref 20–31)
CREAT SERPL-MCNC: 0.69 MG/DL (ref 0.5–0.9)
DIFFERENTIAL TYPE: ABNORMAL
EOSINOPHILS RELATIVE PERCENT: 2 % (ref 0–4)
GFR AFRICAN AMERICAN: >60 ML/MIN
GFR NON-AFRICAN AMERICAN: >60 ML/MIN
GFR SERPL CREATININE-BSD FRML MDRD: NORMAL ML/MIN/{1.73_M2}
GFR SERPL CREATININE-BSD FRML MDRD: NORMAL ML/MIN/{1.73_M2}
GLUCOSE BLD-MCNC: 92 MG/DL (ref 70–99)
HCT VFR BLD CALC: 46.7 % (ref 36–46)
HDLC SERPL-MCNC: 57 MG/DL
HEMOGLOBIN: 16 G/DL (ref 12–16)
IMMATURE GRANULOCYTES: ABNORMAL %
LDL CHOLESTEROL: 129 MG/DL (ref 0–130)
LYMPHOCYTES # BLD: 35 % (ref 24–44)
MAGNESIUM: 2.1 MG/DL (ref 1.6–2.6)
MCH RBC QN AUTO: 31.4 PG (ref 26–34)
MCHC RBC AUTO-ENTMCNC: 34.3 G/DL (ref 31–37)
MCV RBC AUTO: 91.7 FL (ref 80–100)
MONOCYTES # BLD: 7 % (ref 1–7)
NRBC AUTOMATED: ABNORMAL PER 100 WBC
PDW BLD-RTO: 13.9 % (ref 11.5–14.9)
PLATELET # BLD: 298 K/UL (ref 150–450)
PLATELET ESTIMATE: ABNORMAL
PMV BLD AUTO: 8.9 FL (ref 6–12)
POTASSIUM SERPL-SCNC: 4.1 MMOL/L (ref 3.7–5.3)
RBC # BLD: 5.09 M/UL (ref 4–5.2)
RBC # BLD: ABNORMAL 10*6/UL
SEG NEUTROPHILS: 55 % (ref 36–66)
SEGMENTED NEUTROPHILS ABSOLUTE COUNT: 3.1 K/UL (ref 1.3–9.1)
SODIUM BLD-SCNC: 142 MMOL/L (ref 135–144)
THYROXINE, FREE: 1.53 NG/DL (ref 0.93–1.7)
TOTAL PROTEIN: 7.6 G/DL (ref 6.4–8.3)
TRIGL SERPL-MCNC: 98 MG/DL
TSH SERPL DL<=0.05 MIU/L-ACNC: 2.78 MIU/L (ref 0.3–5)
VITAMIN D 25-HYDROXY: 32.7 NG/ML (ref 30–100)
VLDLC SERPL CALC-MCNC: ABNORMAL MG/DL (ref 1–30)
WBC # BLD: 5.7 K/UL (ref 3.5–11)
WBC # BLD: ABNORMAL 10*3/UL

## 2020-06-19 PROCEDURE — 82306 VITAMIN D 25 HYDROXY: CPT

## 2020-06-19 PROCEDURE — 80053 COMPREHEN METABOLIC PANEL: CPT

## 2020-06-19 PROCEDURE — 83735 ASSAY OF MAGNESIUM: CPT

## 2020-06-19 PROCEDURE — 84439 ASSAY OF FREE THYROXINE: CPT

## 2020-06-19 PROCEDURE — 99214 OFFICE O/P EST MOD 30 MIN: CPT | Performed by: FAMILY MEDICINE

## 2020-06-19 PROCEDURE — 36415 COLL VENOUS BLD VENIPUNCTURE: CPT

## 2020-06-19 PROCEDURE — 80061 LIPID PANEL: CPT

## 2020-06-19 PROCEDURE — 85025 COMPLETE CBC W/AUTO DIFF WBC: CPT

## 2020-06-19 PROCEDURE — 84443 ASSAY THYROID STIM HORMONE: CPT

## 2020-06-19 RX ORDER — ERGOCALCIFEROL 1.25 MG/1
CAPSULE ORAL
Qty: 12 CAPSULE | Refills: 3 | Status: SHIPPED | OUTPATIENT
Start: 2020-06-19 | End: 2021-02-22 | Stop reason: DRUGHIGH

## 2020-06-19 RX ORDER — LEVOTHYROXINE SODIUM 0.1 MG/1
TABLET ORAL
Qty: 90 TABLET | Refills: 3 | Status: SHIPPED | OUTPATIENT
Start: 2020-06-19 | End: 2020-07-13

## 2020-06-19 RX ORDER — DULOXETIN HYDROCHLORIDE 60 MG/1
60 CAPSULE, DELAYED RELEASE ORAL EVERY MORNING
Qty: 90 CAPSULE | Refills: 3 | Status: SHIPPED | OUTPATIENT
Start: 2020-06-19 | End: 2020-09-23 | Stop reason: SDUPTHER

## 2020-06-19 RX ORDER — PRAVASTATIN SODIUM 40 MG
40 TABLET ORAL EVERY EVENING
Qty: 90 TABLET | Refills: 3 | Status: SHIPPED | OUTPATIENT
Start: 2020-06-19 | End: 2020-09-23 | Stop reason: SDUPTHER

## 2020-06-19 RX ORDER — BUSPIRONE HYDROCHLORIDE 10 MG/1
TABLET ORAL
Qty: 270 TABLET | Refills: 3 | Status: SHIPPED | OUTPATIENT
Start: 2020-06-19 | End: 2021-02-10

## 2020-06-19 ASSESSMENT — ENCOUNTER SYMPTOMS
WHEEZING: 0
COUGH: 0
ABDOMINAL DISTENTION: 0
CHEST TIGHTNESS: 0
SHORTNESS OF BREATH: 0
CONSTIPATION: 0
NAUSEA: 0
TROUBLE SWALLOWING: 0
ABDOMINAL PAIN: 0
VOMITING: 0
DIARRHEA: 1

## 2020-06-19 ASSESSMENT — PATIENT HEALTH QUESTIONNAIRE - PHQ9
SUM OF ALL RESPONSES TO PHQ QUESTIONS 1-9: 1
2. FEELING DOWN, DEPRESSED OR HOPELESS: 1
SUM OF ALL RESPONSES TO PHQ QUESTIONS 1-9: 1
1. LITTLE INTEREST OR PLEASURE IN DOING THINGS: 0
SUM OF ALL RESPONSES TO PHQ9 QUESTIONS 1 & 2: 1

## 2020-06-19 NOTE — PROGRESS NOTES
Visit Information    Have you changed or started any medications since your last visit including any over-the-counter medicines, vitamins, or herbal medicines? no   Are you having any side effects from any of your medications? -  no  Have you stopped taking any of your medications? Is so, why? -  no    Have you seen any other physician or provider since your last visit? No  Have you had any other diagnostic tests since your last visit? No  Have you been seen in the emergency room and/or had an admission to a hospital since we last saw you? No  Have you had your routine dental cleaning in the past 6 months? no    Have you activated your [employer] account? If not, what are your barriers?  Yes     Patient Care Team:  Erika Leos MD as PCP - General (Family Medicine)  Erika Leos MD as PCP - [facility]  Michelle Harris DO as Consulting Physician (Obstetrics & Gynecology)  [place] Gemma (Certified Nurse Practitioner)  Ania Villanueva MD as Consulting Physician (Internal Medicine)  Deonte Lucio MD as Consulting Physician (Endocrinology)    Medical History Review  Past Medical, Family, and Social History reviewed and does contribute to the patient presenting condition    Health Maintenance   Topic Date Due    Shingles Vaccine (1 of 2) 02/23/2010    Colon cancer screen colonoscopy  02/23/2010    Pneumococcal 0-64 years Vaccine (1 of 1 - PPSV23) 10/23/2020 (Originally 2/23/1966)    Flu vaccine (Season Ended) 09/01/2020    Lipid screen  01/06/2021    TSH testing  02/21/2021    Breast cancer screen  06/20/2021    Cervical cancer screen  01/15/2023    DTaP/Tdap/Td vaccine (2 - Td) 07/31/2027    Hepatitis C screen  Completed    HIV screen  Completed    Hepatitis A vaccine  Aged Out    Hepatitis B vaccine  Aged Out    Hib vaccine  Aged Out    Meningococcal (ACWY) vaccine  Aged Out

## 2020-06-19 NOTE — PROGRESS NOTES
Chief Complaint   Patient presents with    Thyroid Problem     follow up on chest xray ekg    Other     brusing easy     Hyperlipidemia    Palpitations       Patient is here to follow-up on chronic medical problems. HPI     Hypothyroidism: Recent symptoms: fatigue, heat intolerance, diarrhea , palpitations, and anxiety, some palpitations . She denies weight gain, weight loss, cold intolerance, hair loss, dry skin, constipation, edema, tremor and dysphagia. Patient is  taking her medication consistently on an empty stomach. She does have history of thyroid cancer.     TSH is Normal.     No results found for: [facility]        Lab Results   Component Value Date     TSH 1.19 02/21/2020     TSH 0.38 06/12/2019     TSH 0.40 04/30/2019      Hyperlipidemia:  No new myalgias or GI upset on pravastatin (Pravachol). Medication compliance: compliant all of the time. Patient is  following a low fat, low cholesterol diet.     LDL is HIGH        Lab Results   Component Value Date     LDLCHOLESTEROL 142 (H) 01/06/2020            Lab Results   Component Value Date     TRIG 109 01/06/2020     TRIG 113 05/03/2019     TRIG 133 03/23/2018         Pawel Sanon has Vitamin D deficiency. Pawel Sanon  is  taking Vitamin D supplementation   she feels tired.           Lab Results   Component Value Date     VITD25 12.4 (L) 01/06/2020      Palpitations. Less than before since she stopped caffeine. They last for a few seconds, she thinks they are due to the anxiety  Denies chest pain , lightheaded. 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 He used to go to  [employer] before the coronavirus pandemic. Started, but now he stays at home the whole time.   She says she is under a lot of stress due to skin every 24 hours **Step 2** (Patient not taking: Reported on 6/19/2020) 30 patch 0    nicotine (NICODERM CQ) 7 MG/24HR Place 1 patch onto the skin every 24 hours **Step 3** (Patient not taking: Reported on 6/19/2020) 30 patch 1    nicotine (NICODERM CQ) 21 MG/24HR Place 1 patch onto the skin every 24 hours (Patient not taking: Reported on 6/19/2020) 30 patch 0    zoster recombinant adjuvanted vaccine Psychiatric) 50 MCG/0.5ML SUSR injection Inject 0.5 mLs into the muscle See Admin Instructions 1 dose now and repeat in 2-6 months (Patient not taking: Reported on 6/19/2020) 0.5 mL 0     No current facility-administered medications for this visit. Social History     Tobacco Use    Smoking status: Current Every Day Smoker     Packs/day: 1.00     Years: 25.00     Pack years: 25.00     Types: Cigarettes    Smokeless tobacco: Never Used   Substance Use Topics    Alcohol use: No    Drug use: No       Ready to quit: No  Counseling given: Yes                  -rest of complaints with corresponding details per ROS    The patient's past medical,surgical, social, and family history as well as her current medications and allergies were reviewed as documented in today's encounter. Review of Systems   Constitutional: Positive for fatigue. Negative for activity change, appetite change, chills, diaphoresis, fever and unexpected weight change. HENT: Negative for trouble swallowing. Respiratory: Negative for cough, chest tightness, shortness of breath and wheezing. Cardiovascular: Positive for palpitations. Negative for chest pain and leg swelling. Gastrointestinal: Positive for diarrhea. Negative for abdominal distention, abdominal pain, constipation, nausea and vomiting. Endocrine: Positive for heat intolerance. Negative for cold intolerance, polydipsia, polyphagia and polyuria. Hematological: Bruises/bleeds easily. Psychiatric/Behavioral: Positive for dysphoric mood and sleep disturbance. Negative for decreased concentration, self-injury and suicidal ideas. The patient is nervous/anxious.            -vital signs stable and within normal limits except mildly Overweight per BMI. /86   Pulse 97   Ht 5' 2\" (1.575 m)   Wt 142 lb (64.4 kg)   LMP 06/04/2015   SpO2 98%   BMI 25.97 kg/m²      Physical Exam  Vitals signs and nursing note reviewed. Constitutional:       General: She is not in acute distress. Appearance: She is well-developed. She is not diaphoretic. HENT:      Head: Normocephalic and atraumatic. Right Ear: External ear normal.      Left Ear: External ear normal.      Nose: Nose normal. No mucosal edema. Mouth/Throat:      Mouth: Mucous membranes are moist.      Pharynx: No posterior oropharyngeal erythema. Eyes:      General: Lids are normal. No scleral icterus. Right eye: No discharge. Left eye: No discharge. Conjunctiva/sclera: Conjunctivae normal.   Neck:      Musculoskeletal: Normal range of motion and neck supple. Thyroid: No thyromegaly. Cardiovascular:      Rate and Rhythm: Normal rate and regular rhythm. Heart sounds: Normal heart sounds. No murmur. Pulmonary:      Effort: Pulmonary effort is normal. No respiratory distress. Breath sounds: Normal breath sounds. No wheezing or rales. Chest:      Chest wall: No tenderness. Abdominal:      General: Bowel sounds are normal. There is no distension. Palpations: Abdomen is soft. There is no hepatomegaly or splenomegaly. Tenderness: There is no abdominal tenderness. Musculoskeletal: Normal range of motion. General: No tenderness. Right lower leg: No edema. Left lower leg: No edema. Skin:     General: Skin is warm and dry. Capillary Refill: Capillary refill takes less than 2 seconds. Findings: No rash. Neurological:      Mental Status: She is alert and oriented to person, place, and time.       Cranial Nerves: No cranial nerve deficit. Motor: No abnormal muscle tone. Psychiatric:         Mood and Affect: Mood is anxious. Speech: Speech is rapid and pressured. Behavior: Behavior normal.         Thought Content: Thought content normal.         Judgment: Judgment normal.         I personally reviewed testing . Discussed testing with the patient and all questions fully answered. Hyperlipidemia   Vitamin D deficiency   Otherwise labs within normal limits        Lab Results   Component Value Date    WBC 7.7 02/21/2020    HGB 15.0 02/21/2020    HCT 44.0 02/21/2020    MCV 90.7 02/21/2020     02/21/2020       Lab Results   Component Value Date     02/21/2020    K 3.7 02/21/2020     02/21/2020    CO2 22 02/21/2020    BUN 16 02/21/2020    CREATININE 0.67 02/21/2020    GLUCOSE 95 02/21/2020    CALCIUM 9.9 02/21/2020        Lab Results   Component Value Date    ALT 11 02/21/2020    AST 15 02/21/2020    ALKPHOS 77 02/21/2020    BILITOT <0.15 (L) 02/21/2020       Lab Results   Component Value Date    TSH 1.19 02/21/2020       Lab Results   Component Value Date    CHOL 219 (H) 01/06/2020    CHOL 227 (H) 05/03/2019    CHOL 205 (H) 03/23/2018     Lab Results   Component Value Date    TRIG 109 01/06/2020    TRIG 113 05/03/2019    TRIG 133 03/23/2018     Lab Results   Component Value Date    HDL 55 01/06/2020    HDL 56 05/03/2019    HDL 48 03/23/2018     Lab Results   Component Value Date    LDLCHOLESTEROL 142 (H) 01/06/2020    LDLCHOLESTEROL 148 (H) 05/03/2019    LDLCHOLESTEROL 130 03/23/2018       Lab Results   Component Value Date    CHOLHDLRATIO 4.0 01/06/2020    CHOLHDLRATIO 4.1 05/03/2019    CHOLHDLRATIO 4.3 03/23/2018       Lab Results   Component Value Date    LABA1C 5.5 03/21/2018       Lab Results   Component Value Date    VITD25 12.4 (L) 01/06/2020           ASSESSMENT AND PLAN    1. Postoperative hypothyroidism  Stable  Continue current treatment.      - levothyroxine (SYNTHROID) 100 MCG tablet; TAKE 1 recombinant adjuvanted vaccine [facility]) 50 MCG/0.5ML SUSR injection Therapy completed    vitamin D (ERGOCALCIFEROL) 1.25 MG (28051 UT) CAPS capsule REORDER    pravastatin (PRAVACHOL) 40 MG tablet REORDER    levothyroxine (SYNTHROID) 100 MCG tablet REORDER    DULoxetine (CYMBALTA) 60 MG extended release capsule REORDER    busPIRone (BUSPAR) 10 MG tablet REORDER       Scot Boer received counseling on the following healthy behaviors: nutrition, exercise, medication adherence, tobacco cessation and weight loss  Reviewed prior labs and health maintenance  Continue current medications, diet and exercise. Discussed use, benefit, and side effects of prescribed medications. Barriers to medication compliance addressed. Patient given educational materials - see patient instructions  Was a self-tracking handout given in paper form or via [employer]? No    Requested Prescriptions     Signed Prescriptions Disp Refills    zoster recombinant adjuvanted vaccine (SHINGRIX) 50 MCG/0.5ML SUSR injection 0.5 mL 0     Sig: Inject 0.5 mLs into the muscle See Admin Instructions 1 dose now and repeat in 2-6 months    vitamin D (ERGOCALCIFEROL) 1.25 MG (85134 UT) CAPS capsule 12 capsule 3     Sig: TAKE ONE CAPSULE BY MOUTH ONE TIME PER WEEK    pravastatin (PRAVACHOL) 40 MG tablet 90 tablet 3     Sig: Take 1 tablet by mouth every evening    levothyroxine (SYNTHROID) 100 MCG tablet 90 tablet 3     Sig: TAKE 1 TABLET BY MOUTH EVERY DAY BEFORE BREAKFAST    DULoxetine (CYMBALTA) 60 MG extended release capsule 90 capsule 3     Sig: Take 1 capsule by mouth every morning With breakfast    busPIRone (BUSPAR) 10 MG tablet 270 tablet 3     Sig: TAKE 1 TABLET BY MOUTH THREE TIMES A DAY       All patient questions answered. Patient voiced understanding. Quality Measures    Body mass index is 25.97 kg/m². Elevated. Weight control planned discussed conventional weight loss and Healthy diet and regular exercise.     BP: 120/86 Blood pressure is normal. Treatment plan consists of No treatment change needed. The patient's past medical, surgical, social, and family history as well as her   current medicationsand allergies were reviewed as documented in today's encounter. Medications, labs, diagnostic studies, consultations and follow-up as documented in this encounter. Return in about 3 months (around 9/19/2020) for LABS F/U, FATIGUE. Patient was seen with total face to face time of  25 minutes. More than 50% of this visit was counseling and education. Future Appointments   Date Time Provider Ulysses Hickman   9/23/2020  9:00 AM Angelito Mccracken MD [email or web address]     This note was completed by using the assistance of a speech-recognition program. However, inadvertent computerized transcription errors may be present. Although every effort was made to ensure accuracy, no guarantees can be provided that every mistake has been identified and corrected by editing.   Electronically signed by Angelito Mccracken MD on 6/20/2020  10:59 PM

## 2020-06-20 PROBLEM — F33.0 MILD EPISODE OF RECURRENT MAJOR DEPRESSIVE DISORDER (HCC): Status: ACTIVE | Noted: 2020-06-20

## 2020-06-20 PROBLEM — R23.3 EASY BRUISING: Status: ACTIVE | Noted: 2020-06-20

## 2020-07-13 RX ORDER — LEVOTHYROXINE SODIUM 0.1 MG/1
TABLET ORAL
Qty: 90 TABLET | Refills: 1 | Status: SHIPPED | OUTPATIENT
Start: 2020-07-13 | End: 2021-04-08 | Stop reason: SDUPTHER

## 2020-08-07 ENCOUNTER — TELEPHONE (OUTPATIENT)
Dept: FAMILY MEDICINE CLINIC | Age: 60
End: 2020-08-07

## 2020-09-23 ENCOUNTER — TELEMEDICINE (OUTPATIENT)
Dept: FAMILY MEDICINE CLINIC | Age: 60
End: 2020-09-23
Payer: COMMERCIAL

## 2020-09-23 PROBLEM — K52.9 CHRONIC DIARRHEA: Status: ACTIVE | Noted: 2020-09-23

## 2020-09-23 PROBLEM — K21.9 GASTROESOPHAGEAL REFLUX DISEASE WITHOUT ESOPHAGITIS: Status: ACTIVE | Noted: 2020-09-23

## 2020-09-23 PROCEDURE — 99214 OFFICE O/P EST MOD 30 MIN: CPT | Performed by: FAMILY MEDICINE

## 2020-09-23 PROCEDURE — 96160 PT-FOCUSED HLTH RISK ASSMT: CPT | Performed by: FAMILY MEDICINE

## 2020-09-23 RX ORDER — ZOLPIDEM TARTRATE 5 MG/1
5 TABLET ORAL NIGHTLY PRN
Qty: 30 TABLET | Refills: 0 | Status: SHIPPED | OUTPATIENT
Start: 2020-09-23 | End: 2020-10-23

## 2020-09-23 RX ORDER — DULOXETIN HYDROCHLORIDE 30 MG/1
30 CAPSULE, DELAYED RELEASE ORAL
Qty: 90 CAPSULE | Refills: 3 | Status: SHIPPED | OUTPATIENT
Start: 2020-09-23 | End: 2021-04-20 | Stop reason: SDUPTHER

## 2020-09-23 RX ORDER — NICOTINE POLACRILEX 2 MG/1
9000 LOZENGE ORAL
Qty: 270 TABLET | Refills: 3 | Status: SHIPPED | OUTPATIENT
Start: 2020-09-23 | End: 2021-08-27 | Stop reason: ALTCHOICE

## 2020-09-23 RX ORDER — FAMOTIDINE 20 MG/1
20 TABLET, FILM COATED ORAL 2 TIMES DAILY
Qty: 180 TABLET | Refills: 1 | Status: SHIPPED | OUTPATIENT
Start: 2020-09-23 | End: 2021-08-27 | Stop reason: ALTCHOICE

## 2020-09-23 RX ORDER — DULOXETIN HYDROCHLORIDE 60 MG/1
60 CAPSULE, DELAYED RELEASE ORAL EVERY MORNING
Qty: 90 CAPSULE | Refills: 3 | Status: SHIPPED | OUTPATIENT
Start: 2020-09-23 | End: 2021-04-20 | Stop reason: SDUPTHER

## 2020-09-23 RX ORDER — PRAVASTATIN SODIUM 40 MG
40 TABLET ORAL EVERY EVENING
Qty: 90 TABLET | Refills: 3 | Status: SHIPPED | OUTPATIENT
Start: 2020-09-23 | End: 2021-02-05 | Stop reason: SDUPTHER

## 2020-09-23 RX ORDER — GREEN TEA/HOODIA GORDONII 315-12.5MG
1 CAPSULE ORAL 2 TIMES DAILY
Qty: 180 TABLET | Refills: 0 | Status: SHIPPED | OUTPATIENT
Start: 2020-09-23 | End: 2020-12-22

## 2020-09-23 ASSESSMENT — ENCOUNTER SYMPTOMS
SHORTNESS OF BREATH: 0
CONSTIPATION: 0
COUGH: 0
ABDOMINAL DISTENTION: 0
DIARRHEA: 1
CHEST TIGHTNESS: 0
VOMITING: 0
TROUBLE SWALLOWING: 1
ABDOMINAL PAIN: 1
WHEEZING: 0
NAUSEA: 0

## 2020-09-23 ASSESSMENT — PATIENT HEALTH QUESTIONNAIRE - PHQ9
1. LITTLE INTEREST OR PLEASURE IN DOING THINGS: 3
4. FEELING TIRED OR HAVING LITTLE ENERGY: 3
8. MOVING OR SPEAKING SO SLOWLY THAT OTHER PEOPLE COULD HAVE NOTICED. OR THE OPPOSITE, BEING SO FIGETY OR RESTLESS THAT YOU HAVE BEEN MOVING AROUND A LOT MORE THAN USUAL: 0
6. FEELING BAD ABOUT YOURSELF - OR THAT YOU ARE A FAILURE OR HAVE LET YOURSELF OR YOUR FAMILY DOWN: 2
10. IF YOU CHECKED OFF ANY PROBLEMS, HOW DIFFICULT HAVE THESE PROBLEMS MADE IT FOR YOU TO DO YOUR WORK, TAKE CARE OF THINGS AT HOME, OR GET ALONG WITH OTHER PEOPLE: 2
7. TROUBLE CONCENTRATING ON THINGS, SUCH AS READING THE NEWSPAPER OR WATCHING TELEVISION: 3
5. POOR APPETITE OR OVEREATING: 3
SUM OF ALL RESPONSES TO PHQ9 QUESTIONS 1 & 2: 5
3. TROUBLE FALLING OR STAYING ASLEEP: 3
SUM OF ALL RESPONSES TO PHQ QUESTIONS 1-9: 19
SUM OF ALL RESPONSES TO PHQ QUESTIONS 1-9: 19
9. THOUGHTS THAT YOU WOULD BE BETTER OFF DEAD, OR OF HURTING YOURSELF: 0
2. FEELING DOWN, DEPRESSED OR HOPELESS: 2

## 2020-09-23 ASSESSMENT — COLUMBIA-SUICIDE SEVERITY RATING SCALE - C-SSRS
2. HAVE YOU ACTUALLY HAD ANY THOUGHTS OF KILLING YOURSELF?: NO
1. WITHIN THE PAST MONTH, HAVE YOU WISHED YOU WERE DEAD OR WISHED YOU COULD GO TO SLEEP AND NOT WAKE UP?: YES
6. HAVE YOU EVER DONE ANYTHING, STARTED TO DO ANYTHING, OR PREPARED TO DO ANYTHING TO END YOUR LIFE?: NO

## 2020-09-23 NOTE — PATIENT INSTRUCTIONS
Patient Education        Anxiety Disorder: Care Instructions  Your Care Instructions     Anxiety is a normal reaction to stress. Difficult situations can cause you to have symptoms such as sweaty palms and a nervous feeling. In an anxiety disorder, the symptoms are far more severe. Constant worry, muscle tension, trouble sleeping, nausea and diarrhea, and other symptoms can make normal daily activities difficult or impossible. These symptoms may occur for no reason, and they can affect your work, school, or social life. Medicines, counseling, and self-care can all help. Follow-up care is a key part of your treatment and safety. Be sure to make and go to all appointments, and call your doctor if you are having problems. It's also a good idea to know your test results and keep a list of the medicines you take. How can you care for yourself at home? · Take medicines exactly as directed. Call your doctor if you think you are having a problem with your medicine. · Go to your counseling sessions and follow-up appointments. · Recognize and accept your anxiety. Then, when you are in a situation that makes you anxious, say to yourself, \"This is not an emergency. I feel uncomfortable, but I am not in danger. I can keep going even if I feel anxious. \"  · Be kind to your body:  ? Relieve tension with exercise or a massage. ? Get enough rest.  ? Avoid alcohol, caffeine, nicotine, and illegal drugs. They can increase your anxiety level and cause sleep problems. ? Learn and do relaxation techniques. See below for more about these techniques. · Engage your mind. Get out and do something you enjoy. Go to a funny movie, or take a walk or hike. Plan your day. Having too much or too little to do can make you anxious. · Keep a record of your symptoms. Discuss your fears with a good friend or family member, or join a support group for people with similar problems. Talking to others sometimes relieves stress.   · Get involved in social groups, or volunteer to help others. Being alone sometimes makes things seem worse than they are. · Get at least 30 minutes of exercise on most days of the week to relieve stress. Walking is a good choice. You also may want to do other activities, such as running, swimming, cycling, or playing tennis or team sports. Relaxation techniques  Do relaxation exercises 10 to 20 minutes a day. You can play soothing, relaxing music while you do them, if you wish. · Tell others in your house that you are going to do your relaxation exercises. Ask them not to disturb you. · Find a comfortable place, away from all distractions and noise. · Lie down on your back, or sit with your back straight. · Focus on your breathing. Make it slow and steady. · Breathe in through your nose. Breathe out through either your nose or mouth. · Breathe deeply, filling up the area between your navel and your rib cage. Breathe so that your belly goes up and down. · Do not hold your breath. · Breathe like this for 5 to 10 minutes. Notice the feeling of calmness throughout your whole body. As you continue to breathe slowly and deeply, relax by doing the following for another 5 to 10 minutes:  · Tighten and relax each muscle group in your body. You can begin at your toes and work your way up to your head. · Imagine your muscle groups relaxing and becoming heavy. · Empty your mind of all thoughts. · Let yourself relax more and more deeply. · Become aware of the state of calmness that surrounds you. · When your relaxation time is over, you can bring yourself back to alertness by moving your fingers and toes and then your hands and feet and then stretching and moving your entire body. Sometimes people fall asleep during relaxation, but they usually wake up shortly afterward. · Always give yourself time to return to full alertness before you drive a car or do anything that might cause an accident if you are not fully alert.  Never play a relaxation tape while you drive a car. When should you call for help? UIWS119 anytime you think you may need emergency care. For example, call if:  · You feel you cannot stop from hurting yourself or someone else. Keep the numbers for these national suicide hotlines: 4-406-947-TALK (1-103.364.6407) and 1-540-ZULHBRF (0-879.798.8909). If you or someone you know talks about suicide or feeling hopeless, get help right away. Watch closely for changes in your health, and be sure to contact your doctor if:  · You have anxiety or fear that affects your life. · You have symptoms of anxiety that are new or different from those you had before. Where can you learn more? Go to https://E[employer] MXR.Schoolwires. org and sign in to your CareLuLu account. Enter P754 in the FohBoh box to learn more about \"Anxiety Disorder: Care Instructions. \"     If you do not have an account, please click on the \"Sign Up Now\" link. Current as of: January 31, 2020               Content Version: 12.5  © 5709-9885 [employer]. Care instructions adapted under license by [facility] ([facility]). If you have questions about a medical condition or this instruction, always ask your healthcare professional. [place] [address] any warranty or liability for your use of this information. Patient Education        Preventing a Relapse of Depression: Care Instructions  Your Care Instructions     A relapse of depression means your symptoms have come back after you have gotten better. This illness often comes and goes during a lifetime. But there are many things you can do to keep it from coming back. Follow-up care is a key part of your treatment and safety. Be sure to make and go to all appointments, and call your doctor if you are having problems. It's also a good idea to know your test results and keep a list of the medicines you take. What do you need to know?   Know your risk of relapse  Talk to your doctor to find out if you are at risk of relapse. Many things can make a person more likely to relapse into depression. These include having a family member with depression, dealing with serious problems in a relationship or a job, having a serious medical condition, or substance use disorder. It is important to know your risk and to recognize warning signs of relapse. Once you know these things, you will be better able to keep it from happening to you. Know the warning signs of relapse  The two most common signs of relapse are:  · Feeling sad or hopeless. · Losing interest in your daily activities. You may have other symptoms, such as:  · You lose or gain weight. · You sleep too much or not enough. · You feel restless and unable to sit still. · You feel unable to move. · You feel tired all the time. · You feel unworthy or guilty without an obvious reason. · You have problems concentrating, remembering, or making decisions. · You think often about death or suicide. · You feel angry or have panic attacks. How can you care for yourself at home? · Take your medicine as prescribed. Call your doctor if you have any problems with your medicine. Many people take their medicines for at least 6 months after they have recovered. This often helps keep symptoms from coming back. However, if your depression keeps coming back, you may have to take medicine for the rest of your life. · Continue counseling even after you have stopped taking medicine. · Eat healthy foods. Include fruits, vegetables, beans, and whole grains in your diet each day. · Get at least 30 minutes of exercise on most days of the week. Walking is a good choice. You also may want to do other activities, such as running, swimming, cycling, or playing tennis or team sports. · See your doctor right away if you have new symptoms or feel that your depression is coming back. · Keep a regular sleep schedule.  Try for 8 hours of sleep a night.  · Avoid alcohol and illegal drugs. · Keep the numbers for these national suicide hotlines: 4-277-628-TALK (9-810.713.5108) and 8-288-MEKBXZP (0-750.289.9362). If you or someone you know talks about suicide or feeling hopeless, get help right away. When should you call for help? WXTF300 anytime you think you may need emergency care. For example, call if:  · You are thinking about suicide or are threatening suicide. · You feel you cannot stop from hurting yourself or someone else. · You hear or see things that aren't real.  · You think or speak in a bizarre way that is not like your usual behavior. Call your doctor now or seek immediate medical care if:  · You are drinking a lot of alcohol or using illegal drugs. · You are talking or writing about death. Watch closely for changes in your health, and be sure to contact your doctor if:  · You find it hard or it's getting harder to deal with school, a job, family, or friends. · You think your treatment is not helping or you are not getting better. · Your symptoms get worse or you get new symptoms. · You have any problems with your antidepressant medicines, such as side effects, or you are thinking about stopping your medicine. · You are having manic behavior, such as having very high energy, needing less sleep than normal, or showing risky behavior such as spending money you don't have or abusing others verbally or physically. Where can you learn more? Go to https://[employer] Technologyparvez.Planning Media. org and sign in to your Backupify account. Enter Q968 in the Screamin Daily Deals box to learn more about \"Preventing a Relapse of Depression: Care Instructions. \"     If you do not have an account, please click on the \"Sign Up Now\" link. Current as of: January 31, 2020               Content Version: 12.5  © 2717-5579 Healthwise, Incorporated. Care instructions adapted under license by [facility] ([facility]).  If you have questions about a medical condition or this instruction, always ask your healthcare professional. [place] [address] any warranty or liability for your use of this information.

## 2020-09-23 NOTE — PROGRESS NOTES
wakes up multiple times at night. I have disabled son since March, taking care of him. Also her daughter moved in  Patient says she reports compliance with her Cymbalta and she would like it increased and not changed to another medication. She denies side effects. She reports horrible symptoms GI symptoms from trazodone which she has tried for sleep and did not help her    Denies suicidal ideation, plan or intent. She says only 1 time in the past month that she thought that nobody would care if she dies   PHQ-2 Over the past 2 weeks, how often have you been bothered by any of the following problems? Little interest or pleasure in doing things: Nearly every day  Feeling down, depressed, or hopeless: More than half the days  PHQ-2 Score: 5  PHQ-9 Over the past 2 weeks, how often have you been bothered by any of the following problems? Trouble falling or staying asleep, or sleeping too much: Nearly every day  Feeling tired or having little energy: Nearly every day  Poor appetite or overeating: Nearly every day(always)  Feeling bad about yourself - or that you are a failure or have let yourself or your family down: More than half the days  Trouble concentrating on things, such as reading the newspaper or watching television: Nearly every day  Moving or speaking so slowly that other people could have noticed. Or the opposite - being so fidgety or restless that you have been moving around a lot more than usual: Not at all  Thoughts that you would be better off dead, or of hurting yourself in some way: Not at all  If you checked off any problems, how difficult have these problems made it for you to do your work, take care of things at home, or get along with other people?: Very difficult  PHQ-9 Completed?: Complete  PHQ-9 Total Score: 19    1) Within the past month, have you wished you were dead or wished you could go to sleep and not wake up?  YES1) Within the past month, have you wished you were dead or wished [] Abnormal-       Neurological:        [x] No Facial Asymmetry (Cranial nerve 7 motor function) (limited exam to video visit)          [x] No gaze palsy        [] Abnormal-            Skin:        [x] No significant exanthematous lesions or discoloration noted on facial skin         [] Abnormal-            Psychiatric:      [x] No Hallucinations       []Mood is normal      [x]Behavior is normal      [x]Judgment is normal      [x]Thought content is normal       [x] Abnormal- anxious    Other pertinent observable physical exam findings- NONE    Due to this being a TeleHealth encounter, evaluation of the following organ systems is limited: Vitals/Constitutional/EENT/Resp/CV/GI//MS/Neuro/Skin/Heme-Lymph-Imm. Most recent labs reviewed with the patient and all questions fully answered.    Mild increased hematocrit  Hyperlipidemia improving  Borderline low vitamin D  Otherwise labs within normal limits        Lab Results   Component Value Date    WBC 5.7 06/19/2020    HGB 16.0 06/19/2020    HCT 46.7 (H) 06/19/2020    MCV 91.7 06/19/2020     06/19/2020       Lab Results   Component Value Date     06/19/2020    K 4.1 06/19/2020     06/19/2020    CO2 26 06/19/2020    BUN 8 06/19/2020    CREATININE 0.69 06/19/2020    GLUCOSE 92 06/19/2020    CALCIUM 9.4 06/19/2020        Lab Results   Component Value Date    ALT 12 06/19/2020    AST 15 06/19/2020    ALKPHOS 77 06/19/2020    BILITOT 0.30 06/19/2020       Lab Results   Component Value Date    TSH 2.78 06/19/2020       Lab Results   Component Value Date    CHOL 206 (H) 06/19/2020    CHOL 219 (H) 01/06/2020    CHOL 227 (H) 05/03/2019     Lab Results   Component Value Date    TRIG 98 06/19/2020    TRIG 109 01/06/2020    TRIG 113 05/03/2019     Lab Results   Component Value Date    HDL 57 06/19/2020    HDL 55 01/06/2020    HDL 56 05/03/2019     Lab Results   Component Value Date    LDLCHOLESTEROL 129 06/19/2020    LDLCHOLESTEROL 142 (H) 01/06/2020 LDLCHOLESTEROL 148 (H) 05/03/2019       Lab Results   Component Value Date    CHOLHDLRATIO 3.6 06/19/2020    CHOLHDLRATIO 4.0 01/06/2020    CHOLHDLRATIO 4.1 05/03/2019       Lab Results   Component Value Date    LABA1C 5.5 03/21/2018         Lab Results   Component Value Date    VITD25 32.7 06/19/2020       ASSESSMENT/PLAN:    1. Gastroesophageal reflux disease without esophagitis  Worsening  -We will start famotidine (PEPCID) 20 MG tablet; Take 1 tablet by mouth 2 times daily  Dispense: 180 tablet; Refill: 1  Call or return if symptoms persist or fail to improve. She declines referral for EGD  2. Chronic diarrhea  Failing to change as expected. Avoid lactose-based foods  -start Probiotic Acidophilus (FLORANEX) TABS; Take 1 tablet by mouth 2 times daily  Dispense: 180 tablet; Refill: 0  - start  lactase (LACTOSE FAST ACTING RELIEF) 9000 units CHEW chewable tablet; Take 1 tablet by mouth 3 times daily (with meals)  Dispense: 270 tablet; Refill: 3    declines colonoscopy despite risks  Has Cologuard at home she promises me she will return that  3. Postoperative hypothyroidism  Stable  Continue Synthroid 100 MCG daily    4. Hyperlipidemia with target LDL less than 100  Improving  -Continue current treatment pravastatin (PRAVACHOL) 40 MG tablet; Take 1 tablet by mouth every evening For high cholesterol  Dispense: 90 tablet; Refill: 3  Low-carb, low-fat diet, exercise  5. Moderate episode of recurrent major depressive disorder (HCC)  Worsening  Improve sleep, increase dosage of Cymbalta  - DULoxetine (CYMBALTA) 60 MG extended release capsule; Take 1 capsule by mouth every morning With breakfast. Total dosage 90 mg /day  Dispense: 90 capsule; Refill: 3  - DULoxetine (CYMBALTA) 30 MG extended release capsule; Take 1 capsule by mouth Daily with supper Total dosage 90 mg /day  Dispense: 90 capsule; Refill: 3  - [address]  - start zolpidem (AMBIEN) 5 MG tablet;  Take 1 tablet by mouth nightly as needed for Sleep for up to 30 days. Dispense: 30 tablet; Refill: 0    6. Psychophysiological insomnia  Improve sleep  Sleep hygiene discussed  She would benefit from zolpidem, she could not tolerate trazodone, would help her sleep better and improve her depression  - zolpidem (AMBIEN) 5 MG tablet; Take 1 tablet by mouth nightly as needed for Sleep for up to 30 days. Dispense: 30 tablet; Refill: 0      Controlled Substance Monitoring:    Acute and Chronic Pain Monitoring:   RX Monitoring 9/23/2020   Attestation -   Periodic Controlled Substance Monitoring Possible medication side effects, risk of tolerance/dependence & alternative treatments discussed. ;No signs of potential drug abuse or diversion identified. ;Assessed functional status. Denny Dotson received counseling on the following healthy behaviors: nutrition, exercise and medication adherence  Reviewed prior labs and health maintenance. Continue current medications, diet and exercise. Discussed use, benefit, and side effects of prescribed medications. Barriers to medication compliance addressed. Patient given educational materials - see patient instructions. All patient questions answered. Patient voiced understanding. Return in about 3 months (around 12/23/2020) for anxiety-DO PETE 7 in EPIC, depression-DO PHQ-9 in EPIC, insomnia, started Ambien. Data Unavailable      Future Appointments   Date Time Provider Ulysses Hickman   12/29/2020 11:15 AM Iglesia Haynes MD fp [facility]        Total time spent during this visit 25 minutes including face-to-face, counseling, charting review, and non-face-to-face time. Fidencio Mckenna is a 61 y.o. female being evaluated by a Virtual Visit (video visit) encounter to address concerns as mentioned above.     Due to this being a TeleHealth encounter (During [facility] public health emergency), evaluation of the following organ systems was limited: Vitals/Constitutional/EENT/Resp/CV/GI//MS/Neuro/Skin/Heme-Lymph-Imm. Pursuant to the emergency declaration under the [address] [place] [address], [address] [place] [address] and the [employer] Act, this Virtual Visit was conducted with patient's (and/or legal guardian's) consent, to reduce the patient's risk of exposure to COVID-19 and provide necessary medical care. The patient (and/or legal guardian) has also been advised to contact this office for worsening conditions or problems, and seek emergency medical treatment and/or call 911 if deemed necessary. Services were provided through a video synchronous discussion virtually to substitute for in-person clinic visit. Patient was located at his home, provider was located in the office, at the primary practice location. Patient identification was verified at the start of the visit: Yes    Total time spent for this encounter: 25 minutes    --Tariq Núñez MD on 9/23/2020 at 9:40 PM    An electronic signature was used to authenticate this note.

## 2020-10-07 ENCOUNTER — TELEMEDICINE (OUTPATIENT)
Dept: PSYCHOLOGY | Age: 60
End: 2020-10-07
Payer: COMMERCIAL

## 2020-10-07 PROCEDURE — 90791 PSYCH DIAGNOSTIC EVALUATION: CPT | Performed by: PSYCHOLOGIST

## 2020-10-14 NOTE — PROGRESS NOTES
Kailey Baez M.A. Psychology Doctoral Trainee    Supervising Clinical Psychologists:  Ambar Bailey, Ph.D. Charbel Singleton,  Ph.D. Beatriz Pickard    Visit Date: 10/7/2020   Time of appointment: 1:00pm-2:00pm  Time spent with Patient: 60 minutes. This is patient's first appointment. Reason for Consult:  Depression     Referring Provider/PCP:    MD Tiffanie Hernandez MD      Pt provided verbal consent to engage in telehealth psychotherapy with a supervised clinician due to contact restrictions related to the COVID-19 pandemic. This visit was completed virtually using [employer]. Session was held in patient's home, and she reported that she was alone. She identified Alea Anand (spouse), as an emergency contact ([phone]) and gave verbal consent to contact him if needed. [facility]/Clinician Location: [address] [place] [address],[address]; [place], [place]    Pt provided informed consent for the behavioral health program. Discussed with patient model of service to include the limits of confidentiality (i.e. abuse reporting, suicide intervention, etc.) and short-term intervention focused approach. Also discussed with patient that the service provider is a supervised clinician and in particular, is being supervised by Dr. Lorenza Keller and/or Dr. Feroz Griffin. Pt indicated understanding. Pt also signed the consent form agreeing to be seen by a supervised clinician. PRESENTING PROBLEM AND HISTORY  Colton Rodarte is a 61 y.o. female who presents for new evaluation and treatment of  depression. She has the following symptoms: depressed mood, anhedonia, decreased sleep, fatigue/lack of energy, lack of motivation and low self-esteem. Onset of symptoms was approximately 2 years ago. Symptoms have been gradually worsening since that time. She denies current suicidal and homicidal ideation. Family history significant for no psychiatric illness.   Risk factors: previous episode of depression. Previous treatment includes BuSpar. She complains of the following medication side effects: none. Pt reported first having difficulties with depression approximately 15 years ago. Pt reported seeking therapy because Robert Bowser was told to\" and because she feels she is stuck in a rut. Pt reported having difficulties with worsening depression starting approximately two years ago, and feels the pandemic has exacerbated symptoms further. Pt reported things started \"snowballing\" approximately two years ago when her oldest daughter got , due to the demands of the daughter on the pt. Pt reported her younger daughter is involved with a person the pt believes is not good for her, and the daughter often makes threats of self-harm if things do not go her way. Pt reported that until the pandemic, this was a primary source of stress and sadness for her. Pt reported her youngest daughter is currently in therapy, and that the pt has set boundaries with her daughters. Pt is currently the primary caretaker for her son. She reported her son previously lived in a rehabilitation home but was recently lost at the mall without her knowing. Pt reported enjoying taking care of her son. 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 Pt reported this has lead to an increase in depressive symptoms. Pt reported she receives help and support from her , but that support ends in the evening. Pt reported her  will retire to the room to play games, while she spends the evening and night caring for her son. Pt reported she sleeps for only about 10 minutes around midnight but then spends the rest of the night awake anticipating caring for her son. Pt reported not having any other friends or family that she can rely on. Pt reported the most distressing or impairing symptoms she experiences are anhedonia, lack of motivation, and irritability.  Pt reported tablet by mouth 2 times daily, Disp: 180 tablet, Rfl: 1    pravastatin (PRAVACHOL) 40 MG tablet, Take 1 tablet by mouth every evening For high cholesterol, Disp: 90 tablet, Rfl: 3    DULoxetine (CYMBALTA) 60 MG extended release capsule, Take 1 capsule by mouth every morning With breakfast. Total dosage 90 mg /day, Disp: 90 capsule, Rfl: 3    DULoxetine (CYMBALTA) 30 MG extended release capsule, Take 1 capsule by mouth Daily with supper Total dosage 90 mg /day, Disp: 90 capsule, Rfl: 3    zolpidem (AMBIEN) 5 MG tablet, Take 1 tablet by mouth nightly as needed for Sleep for up to 30 days. , Disp: 30 tablet, Rfl: 0    levothyroxine (SYNTHROID) 100 MCG tablet, TAKE 1 TABLET BY MOUTH EVERY DAY BEFORE BREAKFAST, Disp: 90 tablet, Rfl: 1    vitamin D (ERGOCALCIFEROL) 1.25 MG (06147 UT) CAPS capsule, TAKE ONE CAPSULE BY MOUTH ONE TIME PER WEEK, Disp: 12 capsule, Rfl: 3    busPIRone (BUSPAR) 10 MG tablet, TAKE 1 TABLET BY MOUTH THREE TIMES A DAY, Disp: 270 tablet, Rfl: 3    saliva substitute (NUMOISYN) LOZG, Take 1 lozenge by mouth every 2 hours as needed (dry mouth) OK to substitute (Patient not taking: Reported on 9/22/2020), Disp: 90 lozenge, Rfl: 3    tears naturale II (CELLUGEL) SOLN ophthalmic solution, Place 1 drop into both eyes 4 times daily as needed for Dry Eyes (Patient not taking: Reported on 9/22/2020), Disp: 15 mL, Rfl: 0     FAMILY MEDICAL/MH HISTORY   Her family history includes Arthritis in her sister; Cancer in her maternal grandmother and paternal grandfather; Diabetes in her father, maternal grandmother, and paternal grandfather; Hypertension in her mother; Mult Sclerosis in her father; Rheum Arthritis in her mother and sister. PATIENT MENTAL HEALTH HISTORY  Pt reported engaging in therapy for depression approximately 15 years ago but could not recall where or with whom. Pt reported being in therapy for approximately six months before quitting.  She shared that she did not find therapy helpful because the therapist appeared to not be invested in pt's care. Pt reported she then began taking anti-depressants and previously took Xanax for panic attacks, although she reports she does not currently take either of those medications and does not have panic symptoms currently. Pt was unable to recall what the name of the anti-depressant. Pt reported she is currently prescribed Cymbalta, Buspar, and Ambien by her PCP (Jennyfer Robins). Pt reported she takes Cymbalta and Buspar as prescribed, except for days when she forgets, which are rare. Pt reported feeling as though the Cymbalta and Buspar are not as effective as what she was prescribed previously. She also described \"brain shocks\" when she forgets to take her medication, which are unpleasant and take some time to go away once she re-starts her medication. Pt reported she does not take the Ambien because she does not like the side effects. She also shared that she does not take the Ambien because the instructions say that she needs to get 8 hours of sleep when taking it, but she does not feel that is reasonable or feasible for her, as she often has to get up in the middle of the night to assist her son. Pt denied a history of psychiatric hospitalizations or suicide attempts. She denied recent thoughts of suicide, but said that she does occasionally have thoughts such as, \"Would anyone care if I left? \" Pt clarified that these thoughts are not about death but are about leaving in general.    PSYCHOSOCIAL HISTORY  Current living situation: Pt currently lives in [place] in [place] with her  and son. Pt reported her two daughters recently moved out of the house. Pt's son has disabilities that require nearly constant care; he has been living with the pt full-time since March. Pt reported being her son's primary caretaker has limited her interactions with others and the activities that she does.  Pt described this as wearing on her.    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 She reports no complaints about being his caretaker. Pt reported completing high school, and shared that she believes she had some learning difficulties, namely undiagnosed ADHD. Pt reported having difficulties reading and tends to learn best by auditory means. Support system: Pt reported her primary support is her . Pt reported she does not have friends, which is difficult for her. Restorationism/Spirituality: Pt reported no Faith or spiritual affiliations. DRUG AND ALCOHOL CURRENT USE/HISTORY  TOBACCO:  She reports that she has been smoking cigarettes. She has a 25.00 pack-year smoking history. She has never used smokeless tobacco.  ALCOHOL:  She reports no history of alcohol use. OTHER SUBSTANCES: She reports no history of drug use. ASSESSMENT  Silver Son presented to the appointment today for evaluation and treatment of symptoms of depression. She is currently deemed no risk to herself or others and meets criteria for major depressive disorder, recurrent, moderate. [facility] She will benefit from brief and solution-focused consultation to address cognitive and behavioral interventions for depressive symptoms. Nicole Lisas was in agreement with recommendations. PHQ Scores 9/23/2020 6/19/2020 10/11/2019 5/3/2019 7/20/2018 3/21/2018 10/27/2017   PHQ2 Score 5 1 1 2 0 0 0   PHQ9 Score 19 1 1 13 0 0 0     Interpretation of Total Score Depression Severity: 1-4 = Minimal depression, 5-9 = Mild depression, 10-14 = Moderate depression, 15-19 = Moderately severe depression, 20-27 = Severe depression    How often pt has had thoughts of death or hurting self (if PHQ positive for depression):       PETE 7 SCORE 5/3/2019   PETE-7 Total Score 7     Interpretation of PETE-7 score: 5-9 = mild anxiety, 10-14 = moderate anxiety, 15+ = severe anxiety. Recommend referral to behavioral health for scores 10 or greater.       DIAGNOSIS  Nicole Smith was seen today for depression. Diagnoses and all orders for this visit:    Major depressive disorder, recurrent, moderate ([facility]          INTERVENTION  Provided education, Established rapport and Supportive techniques      PLAN  1. Return in approximately two weeks      INTERACTIVE COMPLEXITY  Is interactive complexity present?   No  Reason:  N/A  Additional Supporting Information:  N/A       Electronically signed by Jt Flores on 10/7/20 at 2:53 AM EDT

## 2020-10-21 ENCOUNTER — TELEMEDICINE (OUTPATIENT)
Dept: PSYCHOLOGY | Age: 60
End: 2020-10-21
Payer: COMMERCIAL

## 2020-10-21 PROCEDURE — 90834 PSYTX W PT 45 MINUTES: CPT | Performed by: PSYCHOLOGIST

## 2020-10-21 NOTE — PROGRESS NOTES
Emily Lock M.A. Psychology Doctoral Trainee    Supervising Clinical Psychologists:  Sydnie Zamora, Ph.D. Sean Collado,  Ph.D. Amy Adair        Visit Date: 10/21/2020   Time of appointment: 11:00am-11:45am  Time spent with Patient: 45 minutes. This is patient's second appointment. Reason for Consult:  Depression     Referring Provider/PCP:    No ref. provider found  Sana Wang MD      Pt provided verbal consent to engage in telehealth psychotherapy with a supervised clinician due to contact restrictions related to the COVID-19 pandemic. This visit was completed virtually using [employer]. Session was held in patient's home, and she reported that she was alone. She identified Blas Alaniz (Spouse) as an emergency contact ([phone]) and gave verbal consent to contact him if needed. [facility]/Clinician Location:  [address] [place] [address],[address]; [place], [place]    Pt provided informed consent for the behavioral health program. Discussed with patient model of service to include the limits of confidentiality (i.e. abuse reporting, suicide intervention, etc.) and short-term intervention focused approach. Pt indicated understanding. Jona Johnson is a 61 y.o. female who presents for follow up of depression symptoms. Pt reported engaging in activities over the past two weeks despite not wanting to, including leaving the house twice with her  and attending a family dinner. Pt and [facility] discussed pt's mood over the past week, and pt reported little change and low motivation. Pt reported feeling \"fine\" when she did things, but that she experienced distress on the way to things. Pt and [facility] discussed pt's tendency to talk herself out of going to things and identified that this pattern of behavior emerges primarily when other people are involved. Pt and [facility] discussed pt's desire to be alone and desire to have social contacts.  Pt and [facility] discussed options for 5-9 = mild anxiety, 10-14 = moderate anxiety, 15+ = severe anxiety. Recommend referral to behavioral health for scores 10 or greater. DIAGNOSIS  Amy Simms was seen today for depression. Diagnoses and all orders for this visit:    Major depressive disorder, recurrent, [facility] ([facility]          INTERVENTION  Provided education, Established rapport, Conducted functional assessment and Supportive techniques      PLAN  1. Monitor thoughts related to going to activities that involve other people  2. Continue to engage in behavioral activation  3. Return in two weeks      INTERACTIVE COMPLEXITY  Is interactive complexity present?   No  Reason:  N/A  Additional Supporting Information:  N/A       Electronically signed by Geno Mills on 10/21/20 at 12:20 PM EDT

## 2020-11-04 ENCOUNTER — TELEMEDICINE (OUTPATIENT)
Dept: PSYCHOLOGY | Age: 60
End: 2020-11-04
Payer: COMMERCIAL

## 2020-11-04 PROCEDURE — 90834 PSYTX W PT 45 MINUTES: CPT | Performed by: PSYCHOLOGIST

## 2020-11-11 NOTE — PROGRESS NOTES
Margarette Ochoa M.A. Psychology Doctoral Trainee    Supervising Clinical Psychologists:  Rita Westbrook, Ph.D. Altagracia Silver,  Ph.D. Sam Hernandezvito        Visit Date: 11/4/2020   Time of appointment: 11:00am-11:40am  Time spent with Patient: 40 minutes. This is patient's third appointment. Reason for Consult:  Depression     Referring Provider/PCP:    No ref. provider found  Art Todd MD      Pt provided verbal consent to engage in telehealth psychotherapy with a supervised clinician due to contact restrictions related to the COVID-19 pandemic. This visit was completed virtually using Yieldr. Session was held in patient's home, and she reported that she was alone. She identified Bismark Castro (Spouse) as an emergency contact ([phone]) and gave verbal consent to contact him if needed. [facility]/Clinician Location:  [address] [place] [address],[address]; [place], [place]    Pt provided informed consent for the behavioral health program. Discussed with patient model of service to include the limits of confidentiality (i.e. abuse reporting, suicide intervention, etc.) and short-term intervention focused approach. Pt indicated understanding. Bakari Morris is a 61 y.o. female who presents for follow up of depression symptoms. Pt reported having one week that was challenging due to pain in her shoulder and head. She reported feeling good in the present week, sharing that she has been able to get out of bed. She described engaging in several activities, including showering regularly, shopping, taking care of house chores, and beginning to decorate for [employer]. Pt reported she also cooked for three days in a row. She shared she has felt a sense of accomplishment and that she is enjoying some of the activities she is engaging in. Pt stated that she has discovered that completing projects instead of starting multiple projects and leaving them unfinished helps her mood.  Pt and [facility] discussed future times when she may have difficult weeks and experience low mood. Previous Recommendations:  1. Monitor thoughts related to going to activities that involve other people  2. Continue to engage in behavioral activation  3. Return in two weeks      MENTAL STATUS EXAM  Mood was within normal limits with calm affect. Suicidal ideation was denied. Homicidal ideation was denied. Hygiene was good . Dress was neat. Behavior was Within Normal Limits with No observation or self-report of difficulties ambulating. Attitude was Cooperative. Eye-contact was good. Speech: rate - WNL, rhythm - WNL, volume - WNL. Verbalizations were goal directed and coherent. Thought processes were intact and goal-oriented without evidence of delusions, hallucinations, obsessions, or florencia; with no cognitive distortions. Associations were characterized by intact cognitive processes. Pt was oriented to person, place, time, and general circumstances;  recent:  good and remote:  good. Insight and judgment were estimated to be good, AEB, a good  understanding of cyclical maladaptive patterns, and the ability to use insight to inform behavior change. ASSESSMENT  Sebastian Damico presented to the appointment today for evaluation and treatment of symptoms of depression. She is currently deemed no risk to herself or others and meets criteria for major depressive disorder, recurrent, moderate. Pt shows some improvement in behavioral activation. Treatment will continue to focus on behavioral and cognitive interventions for depression symptoms. Colton Rodarte was in agreement with recommendations.       PHQ Scores 9/23/2020 6/19/2020 10/11/2019 5/3/2019 7/20/2018 3/21/2018 10/27/2017   PHQ2 Score 5 1 1 2 0 0 0   PHQ9 Score 19 1 1 13 0 0 0     Interpretation of Total Score Depression Severity: 1-4 = Minimal depression, 5-9 = Mild depression, 10-14 = Moderate depression, 15-19 = Moderately severe depression, 20-27 = Severe depression    How often pt has had thoughts of death or hurting self (if PHQ positive for depression):       PETE 7 SCORE 5/3/2019   PETE-7 Total Score 7     Interpretation of PETE-7 score: 5-9 = mild anxiety, 10-14 = moderate anxiety, 15+ = severe anxiety. Recommend referral to behavioral health for scores 10 or greater. DIAGNOSIS  Nicole Smith was seen today for depression. Diagnoses and all orders for this visit:    Major depressive disorder, recurrent, moderate ([facility] [address] [facility]          INTERVENTION  Provided education, Established rapport, Conducted functional assessment and Supportive techniques      PLAN  1. Continue to engage in behavioral activation even if she experiences low mood or pain  2. Return in approximately two weeks      INTERACTIVE COMPLEXITY  Is interactive complexity present?   No  Reason:  N/A  Additional Supporting Information:  N/A       Electronically signed by Mary Sheehan on 11/4/20 at 2:20 PM EDT

## 2020-11-18 ENCOUNTER — TELEMEDICINE (OUTPATIENT)
Dept: PSYCHOLOGY | Age: 60
End: 2020-11-18
Payer: COMMERCIAL

## 2020-11-18 PROCEDURE — 90832 PSYTX W PT 30 MINUTES: CPT | Performed by: PSYCHOLOGIST

## 2020-12-02 ENCOUNTER — TELEMEDICINE (OUTPATIENT)
Dept: PSYCHOLOGY | Age: 60
End: 2020-12-02
Payer: COMMERCIAL

## 2020-12-02 PROCEDURE — 90834 PSYTX W PT 45 MINUTES: CPT | Performed by: PSYCHOLOGIST

## 2020-12-23 NOTE — PROGRESS NOTES
Marcy Green M.A. Psychology Doctoral Trainee    Supervising Clinical Psychologists:  Elmer Rendon, Ph.D. Nate Rush,  Ph.D. Klaus Cardenas        Visit Date: 12/2/2020   Time of appointment: 11:00am-11:40am  Time spent with Patient: 40 minutes. This is patient's fifth appointment. Reason for Consult:  Depression     Referring Provider/PCP:    No ref. provider found  Bon Pugh MD      Pt provided verbal consent to engage in telehealth psychotherapy with a supervised clinician due to contact restrictions related to the COVID-19 pandemic. This visit was completed virtually using [employer]. Session was held in patient's home, and she reported that she was alone. She identified Calixto Colón (Spouse) as an emergency contact ([phone]) and gave verbal consent to contact him if needed. [facility]/Clinician Location:  Home office, [facility] provided informed consent for the behavioral health program. Discussed with patient model of service to include the limits of confidentiality (i.e. abuse reporting, suicide intervention, etc.) and short-term intervention focused approach. Pt indicated understanding. Laura Gonzalez is a 61 y.o. female who presents for follow up of depression symptoms. Pt reported continued improvement in mood over the past two weeks and has been consistently engaging in behavioral activation. Pt and [facility] identified activities pt can still do now that it's cold outside. Pt and [facility] discussed treatment progress, and pt shared that the bad days are less frequent and that she feels like she is better able to manage the bad days. Previous Recommendations:  1. Continue to engage in behavioral activation  2. Practice completing one task at a time  3. Return in approximately two weeks      [address] [place] [address] was within normal limits with calm affect. Suicidal ideation was denied. Homicidal ideation was denied. Major depressive disorder, recurrent, moderate ([facility])          INTERVENTION  Provided education, Conducted functional assessment and Supportive techniques      PLAN  1. Continue to engage in behavioral activation  2. Return in approximately one month    INTERACTIVE COMPLEXITY  Is interactive complexity present?   No  Reason:  N/A  Additional Supporting Information:  N/A       Electronically signed by Ariel Sanchez on 12/2/20 at 2:20 PM EDT

## 2020-12-23 NOTE — PROGRESS NOTES
Forest Haq M.A. Psychology Doctoral Trainee    Supervising Clinical Psychologists:  Conrado Wilson, Ph.D. Fabiola Green,  Ph.D. Sukumar Koo 1245        Visit Date: 11/18/2020   Time of appointment: 1:30pm-2:00pm  Time spent with Patient: 30 minutes. This is patient's fourth appointment. Reason for Consult:  Depression     Referring Provider/PCP:    No ref. provider found  Belvin Cabot, MD      Pt provided verbal consent to engage in telehealth psychotherapy with a supervised clinician due to contact restrictions related to the COVID-19 pandemic. This visit was completed virtually using [employer]. Session was held in patient's home, and she reported that she was alone. She identified Paul Soriano (Spouse) as an emergency contact ([phone]) and gave verbal consent to contact him if needed. [facility]/Clinician Location:  Home office, [facility] provided informed consent for the behavioral health program. Discussed with patient model of service to include the limits of confidentiality (i.e. abuse reporting, suicide intervention, etc.) and short-term intervention focused approach. Pt indicated understanding. Shani Patient is a 61 y.o. female who presents for follow up of depression symptoms. Pt reported improvement in mood over the past two weeks and has been consistently engaging in behavioral activation. She shared that she had a few difficult days due to a relative dying from COVID-19 but expressed that she did not let it set her back too much. She shared she has managed her grief and feelings of sadness by communicating with her support system, continuing to engage in important activities, and assuring herself that the emotions will pass. Pt and [facility] discussed pt's tendency to feel guilt when prioritizing herself over other people. Pt and [facility] discussed client's attempts to establish a routine and accomplish one task at a time. Previous Recommendations:  1. Continue to engage in behavioral activation even if she experiences low mood or pain  2. Return in approximately two weeks      [address] was within normal limits with calm affect. Suicidal ideation was denied. Homicidal ideation was denied. Hygiene was good . Dress was neat. Behavior was Within Normal Limits with No observation or self-report of difficulties ambulating. Attitude was Cooperative. Eye-contact was good. Speech: rate - WNL, rhythm - WNL, volume - WNL. Verbalizations were goal directed and coherent. Thought processes were intact and goal-oriented without evidence of delusions, hallucinations, obsessions, or florencia; with no cognitive distortions. Associations were characterized by intact cognitive processes. Pt was oriented to person, place, time, and general circumstances;  recent:  good and remote:  good. Insight and judgment were estimated to be good, AEB, a good  understanding of cyclical maladaptive patterns, and the ability to use insight to inform behavior change. ASSESSMENT  Uvaldo Monroe presented to the appointment today for evaluation and treatment of symptoms of depression. She is currently deemed no risk to herself or others and meets criteria for major depressive disorder, recurrent, moderate. Pt continues to show improvement. Treatment will continue to focus on behavioral and cognitive interventions for depression symptoms. Zeenat Ceja was in agreement with recommendations.       PHQ Scores 9/23/2020 6/19/2020 10/11/2019 5/3/2019 7/20/2018 3/21/2018 10/27/2017   PHQ2 Score 5 1 1 2 0 0 0   PHQ9 Score 19 1 1 13 0 0 0     Interpretation of Total Score Depression Severity: 1-4 = Minimal depression, 5-9 = Mild depression, 10-14 = Moderate depression, 15-19 = Moderately severe depression, 20-27 = Severe depression    How often pt has had thoughts of death or hurting self (if PHQ positive for depression):       EPTE 7 SCORE 5/3/2019 PETE-7 Total Score 7     Interpretation of PETE-7 score: 5-9 = mild anxiety, 10-14 = moderate anxiety, 15+ = severe anxiety. Recommend referral to behavioral health for scores 10 or greater. DIAGNOSIS  Enrique Thibodeaux was seen today for depression. Diagnoses and all orders for this visit:    Major depressive disorder, recurrent, moderate ([facility] [address]          INTERVENTION  Provided education, Established rapport, Conducted functional assessment and Supportive techniques      PLAN  1. Continue to engage in behavioral activation  2. Practice completing one task at a time  3. Return in approximately two weeks    INTERACTIVE COMPLEXITY  Is interactive complexity present?   No  Reason:  N/A  Additional Supporting Information:  N/A       Electronically signed by Ariel Sanchez on 11/18/20 at 2:20 PM EDT

## 2020-12-29 ENCOUNTER — OFFICE VISIT (OUTPATIENT)
Dept: FAMILY MEDICINE CLINIC | Age: 60
End: 2020-12-29
Payer: COMMERCIAL

## 2020-12-29 VITALS
TEMPERATURE: 97.4 F | HEIGHT: 62 IN | OXYGEN SATURATION: 100 % | WEIGHT: 141.8 LBS | DIASTOLIC BLOOD PRESSURE: 84 MMHG | BODY MASS INDEX: 26.09 KG/M2 | HEART RATE: 88 BPM | SYSTOLIC BLOOD PRESSURE: 128 MMHG

## 2020-12-29 DIAGNOSIS — F33.0 MILD EPISODE OF RECURRENT MAJOR DEPRESSIVE DISORDER (HCC): ICD-10-CM

## 2020-12-29 DIAGNOSIS — R94.31 ABNORMAL EKG: ICD-10-CM

## 2020-12-29 DIAGNOSIS — R00.2 INTERMITTENT PALPITATIONS: ICD-10-CM

## 2020-12-29 DIAGNOSIS — E78.5 HYPERLIPIDEMIA WITH TARGET LDL LESS THAN 100: ICD-10-CM

## 2020-12-29 DIAGNOSIS — Z28.21 PNEUMOCOCCAL VACCINATION DECLINED: ICD-10-CM

## 2020-12-29 DIAGNOSIS — K64.9 ACUTE HEMORRHOID: ICD-10-CM

## 2020-12-29 DIAGNOSIS — R53.82 CHRONIC FATIGUE: ICD-10-CM

## 2020-12-29 DIAGNOSIS — K52.9 CHRONIC DIARRHEA: Primary | ICD-10-CM

## 2020-12-29 DIAGNOSIS — Z12.31 ENCOUNTER FOR SCREENING MAMMOGRAM FOR BREAST CANCER: ICD-10-CM

## 2020-12-29 PROCEDURE — 99214 OFFICE O/P EST MOD 30 MIN: CPT | Performed by: FAMILY MEDICINE

## 2020-12-29 RX ORDER — YOHIMBE BARK 500 MG
CAPSULE ORAL
COMMUNITY
Start: 2020-09-23 | End: 2021-08-27 | Stop reason: ALTCHOICE

## 2020-12-29 ASSESSMENT — ENCOUNTER SYMPTOMS
CONSTIPATION: 0
COUGH: 0
TROUBLE SWALLOWING: 0
VOMITING: 0
ABDOMINAL DISTENTION: 0
DIARRHEA: 1
ABDOMINAL PAIN: 0
WHEEZING: 0
NAUSEA: 0
CHEST TIGHTNESS: 0
SHORTNESS OF BREATH: 1

## 2020-12-29 ASSESSMENT — ANXIETY QUESTIONNAIRES
7. FEELING AFRAID AS IF SOMETHING AWFUL MIGHT HAPPEN: 3-NEARLY EVERY DAY
2. NOT BEING ABLE TO STOP OR CONTROL WORRYING: 2-OVER HALF THE DAYS
3. WORRYING TOO MUCH ABOUT DIFFERENT THINGS: 0-NOT AT ALL
4. TROUBLE RELAXING: 3-NEARLY EVERY DAY
6. BECOMING EASILY ANNOYED OR IRRITABLE: 3-NEARLY EVERY DAY
1. FEELING NERVOUS, ANXIOUS, OR ON EDGE: 3-NEARLY EVERY DAY
GAD7 TOTAL SCORE: 17

## 2020-12-29 ASSESSMENT — PATIENT HEALTH QUESTIONNAIRE - PHQ9
SUM OF ALL RESPONSES TO PHQ QUESTIONS 1-9: 2
SUM OF ALL RESPONSES TO PHQ9 QUESTIONS 1 & 2: 2
1. LITTLE INTEREST OR PLEASURE IN DOING THINGS: 1

## 2020-12-29 NOTE — PROGRESS NOTES
Chief Complaint   Patient presents with    Depression    Insomnia     AMBIEN ISNT HELPING    Other     NO TO ALL VACCINES/ HASNT DONE COLOGUARD YET    Health Maintenance     FIT    Fatigue    Diarrhea    Hemorrhoids    Palpitations       Patient is here to follow-up on chronic medical problems. HPI    Having diarrhea, with loose and watery stools 3 times a day since on Probiotics,\"irritating the hemorrhoids\"  Sometimes blood in the stool when wiping. Denies nausea, vomiting, abdominal pain, or constipation. Onset of diarrhea several months ago, not getting better. Declines seeing specialist, but finally accepts        Hyperlipidemia:  No new myalgias or GI upset on pravastatin (Pravachol). Medication compliance: compliant all of the time. Patient is  following a low fat, low cholesterol diet. LDL is high  Lab Results   Component Value Date    LDLCHOLESTEROL 129 06/19/2020     Lab Results   Component Value Date    TRIG 98 06/19/2020    TRIG 109 01/06/2020    TRIG 113 05/03/2019     Lakia complains of Lower sternal pain, intermittent, lasting for 20 seconds,  Associated with shortness of breath and palpitations. She thinks they are due to anxiety. Patient denies leg swelling. She also reports Chronic fatigue for a long time, and hot flashes worsening  Feels she has more energy since she got the vitamin D. Denies fever, chills.  Reports anxiety and poor sleep, advised to discuss with her psychiatrist.  Her Father had 3 MIs, she is worried about her heart  EKG 2/21/2020-nonspecific changes, abnormal    Narrative & Impression    Sinus rhythm with occasional Premature ventricular complexes  Low Voltage  Nonspecific T wave abnormality  Abnormal ECG       cardiovascular risk  is low  The 10-year ASCVD risk score (Claven ., et al., 2013) is: 6.9%    Values used to calculate the score:      Age: 61 years      Sex: Female      Is Non- : No      Diabetic: No      Tobacco smoker: Yes      Systolic Blood Pressure: 433 mmHg      Is BP treated: No      HDL Cholesterol: 57 mg/dL      Total Cholesterol: 206 mg/dL    Depression  Stays with   Patient reports usually in Winter her depression gets worse    She is currently on cymbalta 60+30 mg  Patient reports she has been receiving counseling by phone  Patient says she takes care of her disabled son who is mentally retarded and has scoliosis, before the coronavirus pandemic he used to go to day hab, but not anymore, which is making it hard of. Denies suicidal ideation, plan or intent. PHQ-2 Over the past 2 weeks, how often have you been bothered by any of the following problems? Little interest or pleasure in doing things: Several days  Feeling down, depressed, or hopeless: Several days  PHQ-2 Score: 2  PHQ-9 Over the past 2 weeks, how often have you been bothered by any of the following problems? PHQ-9 Total Score: 2      PHQ Scores 12/29/2020 9/23/2020 6/19/2020 10/11/2019 5/3/2019 7/20/2018 3/21/2018   PHQ2 Score 2 5 1 1 2 0 0   PHQ9 Score 2 19 1 1 13 0 0       She is due for Mammogram.   Denies breast pain, lumps or nipple discharge. She declines breast exam today. I have recommended that this patient have a immunization for pneumonia, but she declines at this time. I have discussed the risks and benefits of this procedure with her. The patient verbalizes understanding.       Current Outpatient Medications   Medication Sig Dispense Refill    Lactobacillus (ACIDOPHILUS) 100 MG CAPS TAKE 1 CAPSULE BY MOUTH TWICE A DAY      lactase (LACTOSE FAST ACTING RELIEF) 9000 units CHEW chewable tablet Take 1 tablet by mouth 3 times daily (with meals) 270 tablet 3    famotidine (PEPCID) 20 MG tablet Take 1 tablet by mouth 2 times daily 180 tablet 1    pravastatin (PRAVACHOL) 40 MG tablet Take 1 tablet by mouth every evening For high cholesterol 90 tablet 3    DULoxetine (CYMBALTA) 60 MG extended release capsule Take 1 capsule by mouth every morning With breakfast. Total dosage 90 mg /day 90 capsule 3    DULoxetine (CYMBALTA) 30 MG extended release capsule Take 1 capsule by mouth Daily with supper Total dosage 90 mg /day 90 capsule 3    levothyroxine (SYNTHROID) 100 MCG tablet TAKE 1 TABLET BY MOUTH EVERY DAY BEFORE BREAKFAST 90 tablet 1    vitamin D (ERGOCALCIFEROL) 1.25 MG (93152 UT) CAPS capsule TAKE ONE CAPSULE BY MOUTH ONE TIME PER WEEK 12 capsule 3    busPIRone (BUSPAR) 10 MG tablet TAKE 1 TABLET BY MOUTH THREE TIMES A  tablet 3    saliva substitute (NUMOISYN) LOZG Take 1 lozenge by mouth every 2 hours as needed (dry mouth) OK to substitute 90 lozenge 3    tears naturale II (CELLUGEL) SOLN ophthalmic solution Place 1 drop into both eyes 4 times daily as needed for Dry Eyes 15 mL 0     No current facility-administered medications for this visit. Social History     Tobacco Use    Smoking status: Current Every Day Smoker     Packs/day: 1.00     Years: 25.00     Pack years: 25.00     Types: Cigarettes    Smokeless tobacco: Never Used   Substance Use Topics    Alcohol use: No    Drug use: No       Ready to quit: No  Counseling given: Yes                  -rest of complaints with corresponding details per ROS    The patient's past medical,surgical, social, and family history as well as her current medications and allergies were reviewed as documented in today's encounter. Review of Systems   Constitutional: Positive for fatigue. Negative for activity change, appetite change, chills, diaphoresis, fever and unexpected weight change. HENT: Negative for trouble swallowing. Respiratory: Positive for shortness of breath. Negative for cough, chest tightness and wheezing. Cardiovascular: Positive for chest pain (none now) and palpitations. Negative for leg swelling. Gastrointestinal: Positive for blood in stool and diarrhea.  Negative for abdominal distention, abdominal pain, constipation, nausea and vomiting. Endocrine: Positive for heat intolerance. Negative for cold intolerance, polydipsia, polyphagia and polyuria. Psychiatric/Behavioral: Positive for dysphoric mood and sleep disturbance. Negative for self-injury and suicidal ideas. The patient is nervous/anxious.            -vital signs stable and within normal limits except overweight per BMI  /84   Pulse 88   Temp 97.4 °F (36.3 °C)   Ht 5' 2\" (1.575 m)   Wt 141 lb 12.8 oz (64.3 kg)   LMP 06/04/2015   SpO2 100%   BMI 25.94 kg/m²      Physical Exam  Vitals signs and nursing note reviewed. Constitutional:       General: She is not in acute distress. Appearance: Normal appearance. She is well-developed. She is not diaphoretic. HENT:      Head: Normocephalic and atraumatic. Right Ear: External ear normal.      Left Ear: External ear normal.      Nose: No mucosal edema. Mouth/Throat:      Comments: I did not examine the mouth due to coronavirus pandemic and wearing masks    Eyes:      General: Lids are normal. No scleral icterus. Right eye: No discharge. Left eye: No discharge. Extraocular Movements: Extraocular movements intact. Conjunctiva/sclera: Conjunctivae normal.   Neck:      Musculoskeletal: Normal range of motion and neck supple. Thyroid: No thyromegaly. Cardiovascular:      Rate and Rhythm: Normal rate and regular rhythm. Heart sounds: Normal heart sounds. No murmur. Pulmonary:      Effort: Pulmonary effort is normal. No respiratory distress. Breath sounds: Normal breath sounds. No wheezing or rales. Chest:      Chest wall: No tenderness. Abdominal:      General: Bowel sounds are normal. There is no distension. Palpations: Abdomen is soft. There is no hepatomegaly or splenomegaly. Tenderness: There is abdominal tenderness in the left upper quadrant and left lower quadrant.       Comments: Has metal wire in her bra, advise to remove it   Genitourinary: Comments: Patient declines rectal exam  Musculoskeletal: Normal range of motion. General: No tenderness. Right lower leg: No edema. Left lower leg: No edema. Skin:     General: Skin is warm and dry. Capillary Refill: Capillary refill takes less than 2 seconds. Findings: No rash. Neurological:      Mental Status: She is alert and oriented to person, place, and time. Cranial Nerves: No cranial nerve deficit. Motor: No abnormal muscle tone. Psychiatric:         Mood and Affect: Mood is anxious. Behavior: Behavior normal.         Thought Content: Thought content normal.         Judgment: Judgment normal.         I personally reviewed testing . Discussed testing with the patient and all questions fully answered. hyperlipidemia improved    Otherwise labs within normal limits    Hospital Outpatient Visit on 06/19/2020   Component Date Value Ref Range Status    Cholesterol 06/19/2020 206* <200 mg/dL Final    Comment:    Cholesterol Guidelines:      <200  Desirable   200-240  Borderline      >240  Undesirable         HDL 06/19/2020 57  >40 mg/dL Final    Comment:    HDL Guidelines:    <40     Undesirable   40-59    Borderline    >59     Desirable         LDL Cholesterol 06/19/2020 129  0 - 130 mg/dL Final    Comment:    LDL Guidelines:     <100    Desirable   100-129   Near to/above Desirable   130-159   Borderline      >159   Undesirable     Direct (measured) LDL and calculated LDL are not interchangeable tests.  Chol/HDL Ratio 06/19/2020 3.6  <5 Final            Triglycerides 06/19/2020 98  <150 mg/dL Final    Comment:    Triglyceride Guidelines:     <150   Desirable   150-199  Borderline   200-499  High     >499   Very high   Based on AHA Guidelines for fasting triglyceride, October 2012.          VLDL 06/19/2020 NOT REPORTED  1 - 30 mg/dL Final    Thyroxine, Free 06/19/2020 1.53  0.93 - 1.70 ng/dL Final    TSH 06/19/2020 2.78  0.30 - 5.00 mIU/L Final    Glucose 06/19/2020 92  70 - 99 mg/dL Final    BUN 06/19/2020 8  8 - 23 mg/dL Final    CREATININE 06/19/2020 0.69  0.50 - 0.90 mg/dL Final    Bun/Cre Ratio 06/19/2020 NOT REPORTED  9 - 20 Final    Calcium 06/19/2020 9.4  8.6 - 10.4 mg/dL Final    Sodium 06/19/2020 142  135 - 144 mmol/L Final    Potassium 06/19/2020 4.1  3.7 - 5.3 mmol/L Final    Chloride 06/19/2020 104  98 - 107 mmol/L Final    CO2 06/19/2020 26  20 - 31 mmol/L Final    Anion Gap 06/19/2020 12  9 - 17 mmol/L Final    Alkaline Phosphatase 06/19/2020 77  35 - 104 U/L Final    ALT 06/19/2020 12  5 - 33 U/L Final    AST 06/19/2020 15  <32 U/L Final    Total Bilirubin 06/19/2020 0.30  0.3 - 1.2 mg/dL Final    Total Protein 06/19/2020 7.6  6.4 - 8.3 g/dL Final    Alb 06/19/2020 4.5  3.5 - 5.2 g/dL Final    Albumin/Globulin Ratio 06/19/2020 NOT REPORTED  1.0 - 2.5 Final    GFR Non- 06/19/2020 >60  >60 mL/min Final    GFR  06/19/2020 >60  >60 mL/min Final    GFR Comment 06/19/2020        Final    Comment: Average GFR for 61-76 years old:   80 mL/min/1.73sq m  Chronic Kidney Disease:   <60 mL/min/1.73sq m  Kidney failure:   <15 mL/min/1.73sq m              eGFR calculated using average adult body mass.  Additional eGFR calculator available at:        FORA[employer]tv.br            GFR Staging 06/19/2020 NOT REPORTED   Final    Magnesium 06/19/2020 2.1  1.6 - 2.6 mg/dL Final    Vit D, 25-Hydroxy 06/19/2020 32.7  30.0 - 100.0 ng/mL Final    Comment:    Reference Range:  Vitamin D status         Range   Deficiency              <20 ng/mL   Mild Deficiency       20-30 ng/mL   Sufficiency           ng/mL   Toxicity               >100 ng/mL      WBC 06/19/2020 5.7  3.5 - 11.0 k/uL Final    RBC 06/19/2020 5.09  4.0 - 5.2 m/uL Final    Hemoglobin 06/19/2020 16.0  12.0 - 16.0 g/dL Final    Hematocrit 06/19/2020 46.7* 36 - 46 % Final    MCV 06/19/2020 91.7  80 - 100 fL Final    MCH 06/19/2020 31.4  26 - 34 pg Final    MCHC 06/19/2020 34.3  31 - 37 g/dL Final    RDW 06/19/2020 13.9  11.5 - 14.9 % Final    Platelets 08/91/0910 298  150 - 450 k/uL Final    MPV 06/19/2020 8.9  6.0 - 12.0 fL Final    NRBC Automated 06/19/2020 NOT REPORTED  per 100 WBC Final    Differential Type 06/19/2020 NOT REPORTED   Final    Seg Neutrophils 06/19/2020 55  36 - 66 % Final    Lymphocytes 06/19/2020 35  24 - 44 % Final    Monocytes 06/19/2020 7  1 - 7 % Final    Eosinophils % 06/19/2020 2  0 - 4 % Final    Basophils 06/19/2020 1  0 - 2 % Final    Immature Granulocytes 06/19/2020 NOT REPORTED  0 % Final    Segs Absolute 06/19/2020 3.10  1.3 - 9.1 k/uL Final    Absolute Lymph # 06/19/2020 2.00  1.0 - 4.8 k/uL Final    Absolute Mono # 06/19/2020 0.40  0.1 - 1.3 k/uL Final    Absolute Eos # 06/19/2020 0.10  0.0 - 0.4 k/uL Final    Basophils Absolute 06/19/2020 0.10  0.0 - 0.2 k/uL Final    Absolute Immature Granulocyte 06/19/2020 NOT REPORTED  0.00 - 0.30 k/uL Final    WBC Morphology 06/19/2020 NOT REPORTED   Final    RBC Morphology 06/19/2020 NOT REPORTED   Final    Platelet Estimate 93/10/3486 NOT REPORTED   Final       Lab Results   Component Value Date    CHOL 206 (H) 06/19/2020    CHOL 219 (H) 01/06/2020    CHOL 227 (H) 05/03/2019     Lab Results   Component Value Date    TRIG 98 06/19/2020    TRIG 109 01/06/2020    TRIG 113 05/03/2019     Lab Results   Component Value Date    HDL 57 06/19/2020    HDL 55 01/06/2020    HDL 56 05/03/2019     Lab Results   Component Value Date    LDLCHOLESTEROL 129 06/19/2020    LDLCHOLESTEROL 142 (H) 01/06/2020    LDLCHOLESTEROL 148 (H) 05/03/2019       Lab Results   Component Value Date    CHOLHDLRATIO 3.6 06/19/2020    CHOLHDLRATIO 4.0 01/06/2020    CHOLHDLRATIO 4.1 05/03/2019       Lab Results   Component Value Date    VITD25 32.7 06/19/2020           ASSESSMENT AND PLAN    1.  Chronic diarrhea  Failing to change as expected  Dicussed need for colonoscopy .   Could change the probiotics  - Lactobacillus (ACIDOPHILUS) 100 MG CAPS; TAKE 1 CAPSULE BY MOUTH TWICE A DAY  - AFL([facility]) - Ethan De La Paz DO, General Surgery, [place]    2. Hyperlipidemia with target LDL less than 100  Improved  Continue pravastatin 40 mg  - Lipid Panel; Future    3. Intermittent palpitations  Failing to change as expected. Needs cardiology work-up  Avoid caffeinated beverages  - AFL - Yarely Bonds MD, Cardiology, [facility]    4. Chronic fatigue  Failing to change as expected. Will do basic labs to rule out certain common medical conditions: hematologic, renal, hepatic, electrolyte imbalances, thyroid disorders.   - CBC; Future  - Comprehensive Metabolic Panel; Future  - TSH without Reflex; Future  Continue vitamin D supplementation     5. Mild episode of recurrent major depressive disorder ([facility] [address]  Improved  Continue current treatment and follow up with psychiatrist and psychologist as scheduled. 6. Encounter for screening mammogram for breast cancer  - [facility] DIGITAL SCREEN W OR WO CAD BILATERAL; Future    7. Pneumococcal vaccination declined  I have recommended that this patient have a immunization for pneumonia, but she declines at this time. I have discussed the risks and benefits of this procedure with her. The patient verbalizes understanding. 8. Abnormal EKG  - AFL - Yarely Bonds MD, Cardiology, [facility]    9.  Acute hemorrhoid  Declines exam  Avoid constipation  - Lactobacillus (ACIDOPHILUS) 100 MG CAPS; TAKE 1 CAPSULE BY MOUTH TWICE A DAY  - AFL([facility]) - Ethan De La Paz DO, General Surgery, [place]  Discussed to get colonoscopy    Data Unavailable    Orders Placed This Encounter   Procedures    GERMAIN DIGITAL SCREEN W OR WO CAD BILATERAL     Standing Status:   Future     Standing Expiration Date:   3/1/2022     Order Specific Question:   Reason for exam:     Answer:   screening mammogram    CBC     Standing Status:   Future     Standing Expiration Date: 12/29/2021    Comprehensive Metabolic Panel     Standing Status:   Future     Standing Expiration Date:   12/29/2021    Lipid Panel     Standing Status:   Future     Standing Expiration Date:   12/29/2021     Order Specific Question:   Is Patient Fasting?/# of Hours     Answer:   8-10 Hours, water ok to drink    TSH without Reflex     Standing Status:   Future     Standing Expiration Date:   12/29/2021    AFL([facility]) - Malia Valero DO, General Surgery, [place]     Referral Priority:   Routine     Referral Type:   Eval and Treat     Referral Reason:   Specialty Services Required     Referred to Provider:   So Josue DO     Requested Specialty:   General Surgery     Number of Visits Requested:   1   Khushbu Avelar MD, Cardiology, [facility]     Referral Priority:   Routine     Referral Type:   Eval and Treat     Referral Reason:   Specialty Services Required     Referred to Provider:   Elena Marie MD     Requested Specialty:   Cardiology     Number of Visits Requested:   1         There are no discontinued medications. Metro Spatz received counseling on the following healthy behaviors: nutrition, exercise, medication adherence and tobacco cessation  Reviewed prior labs and health maintenance  Continue current medications, diet and exercise. Discussed use, benefit, and side effects of prescribed medications. Barriers to medication compliance addressed. Patient given educational materials - see patient instructions  Was a self-tracking handout given in paper form or via [employer]? No    Requested Prescriptions      No prescriptions requested or ordered in this encounter       All patient questions answered. Patient voiced understanding. The patient's past medical, surgical, social, and family history as well as her   current medicationsand allergies were reviewed as documented in today's encounter.       Medications, labs, diagnostic studies, consultations and follow-up as documented in this encounter. Return in about 3 months (around 3/29/2021) for Face-2F-30mins PHYSICAL, VISION screen, PHQ9. .    On this date 12/29/2020, I have spent 35 minutes reviewing previous notes, test results and face to face with the patient discussing the diagnosis and importance of compliance with the treatment plan. Future Appointments   Date Time Provider Ulysses Hickman   1/6/2021 11:00 AM Nguyen Haleigh   4/8/2021  1:30 PM Dena Auguste MD [facility] Beatrice Roman     This note was completed by using the assistance of a speech-recognition program. However, inadvertent computerized transcription errors may be present. Although every effort was made to ensure accuracy, no guarantees can be provided that every mistake has been identified and corrected by editing.   Electronically signed by Dena Auguste MD on 1/3/2021  10:44 PM

## 2020-12-29 NOTE — PROGRESS NOTES
Visit Information    Have you changed or started any medications since your last visit including any over-the-counter medicines, vitamins, or herbal medicines? no   Have you stopped taking any of your medications? Is so, why? -  no  Are you having any side effects from any of your medications? - no    Have you seen any other physician or provider since your last visit? yes - PSYCHOLOGY   Have you had any other diagnostic tests since your last visit?  no   Have you been seen in the emergency room and/or had an admission in a hospital since we last saw you?  no   Have you had your routine dental cleaning in the past 6 months?  no     Do you have an active MyChart account? If no, what is the barrier?   Yes    Patient Care Team:  Jewels Mullins MD as PCP - General (Family Medicine)  Jewels Mullins MD as PCP - [facility] Provider  Susan Donovan DO as Consulting Physician (Obstetrics & Gynecology)  Mendez Wesley (Certified Nurse Practitioner)  Sayra Foote MD as Consulting Physician (Internal Medicine)  Kathleen Duggan MD as Consulting Physician (Endocrinology)    Medical History Review  Past Medical, Family, and Social History reviewed and does contribute to the patient presenting condition    Health Maintenance   Topic Date Due    Pneumococcal 0-64 years Vaccine (1 of 1 - PPSV23) 02/23/1966    Shingles Vaccine (1 of 2) 02/23/2010    Colon cancer screen colonoscopy  02/23/2010    Flu vaccine (1) 09/01/2020    Lipid screen  06/19/2021    TSH testing  06/19/2021    Breast cancer screen  06/20/2021    Cervical cancer screen  01/15/2023    DTaP/Tdap/Td vaccine (2 - Td) 07/31/2027    Hepatitis C screen  Completed    HIV screen  Completed    Hepatitis A vaccine  Aged Out    Hepatitis B vaccine  Aged Out    Hib vaccine  Aged Out    Meningococcal (ACWY) vaccine  Aged Out

## 2020-12-29 NOTE — PATIENT INSTRUCTIONS
Patient Education        Stopping Smoking: Care Instructions  Your Care Instructions     Cigarette smokers crave the nicotine in cigarettes. Giving it up is much harder than simply changing a habit. Your body has to stop craving the nicotine. It is hard to quit, but you can do it. There are many tools that people use to quit smoking. You may find that combining tools works best for you. There are several steps to quitting. First you get ready to quit. Then you get support to help you. After that, you learn new skills and behaviors to become a nonsmoker. For many people, a necessary step is getting and using medicine. Your doctor will help you set up the plan that best meets your needs. You may want to attend a smoking cessation program to help you quit smoking. When you choose a program, look for one that has proven success. Ask your doctor for ideas. You will greatly increase your chances of success if you take medicine as well as get counseling or join a cessation program.  Some of the changes you feel when you first quit tobacco are uncomfortable. Your body will miss the nicotine at first, and you may feel short-tempered and grumpy. You may have trouble sleeping or concentrating. Medicine can help you deal with these symptoms. You may struggle with changing your smoking habits and rituals. The last step is the tricky one: Be prepared for the smoking urge to continue for a time. This is a lot to deal with, but keep at it. You will feel better. Follow-up care is a key part of your treatment and safety. Be sure to make and go to all appointments, and call your doctor if you are having problems. It's also a good idea to know your test results and keep a list of the medicines you take. How can you care for yourself at home? · Ask your family, friends, and coworkers for support. You have a better chance of quitting if you have help and support. · Join a support group, such as Nicotine Anonymous, for people who are trying to quit smoking. · Consider signing up for a smoking cessation program, such as the American Lung Association's Freedom from Smoking program.  · Get text messaging support. Go to the website at www.smokefree. gov to sign up for the [facility] program.  · Set a quit date. Pick your date carefully so that it is not right in the middle of a big deadline or stressful time. Once you quit, do not even take a puff. Get rid of all ashtrays and lighters after your last cigarette. Clean your house and your clothes so that they do not smell of smoke. · Learn how to be a nonsmoker. Think about ways you can avoid those things that make you reach for a cigarette. ? Avoid situations that put you at greatest risk for smoking. For some people, it is hard to have a drink with friends without smoking. For others, they might skip a coffee break with coworkers who smoke. ? Change your daily routine. Take a different route to work or eat a meal in a different place. · Cut down on stress. Calm yourself or release tension by doing an activity you enjoy, such as reading a book, taking a hot bath, or gardening. · Talk to your doctor or pharmacist about nicotine replacement therapy, which replaces the nicotine in your body. You still get nicotine but you do not use tobacco. Nicotine replacement products help you slowly reduce the amount of nicotine you need. These products come in several forms, many of them available over-the-counter:  ? Nicotine patches  ? Nicotine gum and lozenges  ? Nicotine inhaler  · Ask your doctor about bupropion (Wellbutrin) or varenicline (Chantix), which are prescription medicines. They do not contain nicotine. They help you by reducing withdrawal symptoms, such as stress and anxiety. · Some people find hypnosis, acupuncture, and massage helpful for ending the smoking habit. · Eat a healthy diet and get regular exercise. Having healthy habits will help your body move past its craving for nicotine. · Be prepared to keep trying. Most people are not successful the first few times they try to quit. Do not get mad at yourself if you smoke again. Make a list of things you learned and think about when you want to try again, such as next week, next month, or next year. Where can you learn more? Go to https://DreamHostpepicewThe Bakery.ARTENCY.COM. org and sign in to your Autotether account. Enter X844 in the [employer] box to learn more about \"Stopping Smoking: Care Instructions. \"     If you do not have an account, please click on the \"Sign Up Now\" link. Current as of: March 12, 2020               Content Version: 12.6  © 0114-4748 Network, Incorporated. Care instructions adapted under license by [facility] ([facility]). If you have questions about a medical condition or this instruction, always ask your healthcare professional. [place] [address] any warranty or liability for your use of this information. Patient Education        High Cholesterol: Care Instructions  Your Care Instructions     Cholesterol is a type of fat in your blood. It is needed for many body functions, such as making new cells. Cholesterol is made by your body. It also comes from food you eat. High cholesterol means that you have too much of the fat in your blood. This raises your risk of a heart attack and stroke. LDL and HDL are part of your total cholesterol. LDL is the \"bad\" cholesterol. High LDL can raise your risk for heart disease, heart attack, and stroke. HDL is the \"good\" cholesterol. It helps clear bad cholesterol from the body. High HDL is linked with a lower risk of heart disease, heart attack, and stroke. Your cholesterol levels help your doctor find out your risk for having a heart attack or stroke. You and your doctor can talk about whether you need to lower your risk and what treatment is best for you. A heart-healthy lifestyle along with medicines can help lower your cholesterol and your risk. The way you choose to lower your risk will depend on how high your risk is for heart attack and stroke. It will also depend on how you feel about taking medicines. Follow-up care is a key part of your treatment and safety. Be sure to make and go to all appointments, and call your doctor if you are having problems. It's also a good idea to know your test results and keep a list of the medicines you take. How can you care for yourself at home? · Eat a variety of foods every day. Good choices include fruits, vegetables, whole grains (like oatmeal), dried beans and peas, nuts and seeds, soy products (like tofu), and fat-free or low-fat dairy products. · Replace butter, margarine, and hydrogenated or partially hydrogenated oils with olive and canola oils. (Canola oil margarine without trans fat is fine.)  · Replace red meat with fish, poultry, and soy protein (like tofu). · Limit processed and packaged foods like chips, crackers, and cookies. · Bake, broil, or steam foods. Don't hauser them. · Be physically active. Get at least 30 minutes of exercise on most days of the week. Walking is a good choice. You also may want to do other activities, such as running, swimming, cycling, or playing tennis or team sports. · Stay at a healthy weight or lose weight by making the changes in eating and physical activity listed above. Losing just a small amount of weight, even 5 to 10 pounds, can reduce your risk for having a heart attack or stroke. · Do not smoke. When should you call for help? Watch closely for changes in your health, and be sure to contact your doctor if:    · You need help making lifestyle changes.   · You have questions about your medicine. Where can you learn more? Go to https://chpepiceweb.Flythegap. org and sign in to your Taktio account. Enter L794 in the Terra[employer] box to learn more about \"High Cholesterol: Care Instructions. \"     If you do not have an account, please click on the \"Sign Up Now\" link. Current as of: December 16, 2019               Content Version: 12.6  © 9750-3510 [employer], Incorporated. Care instructions adapted under license by [facility] ([facility]). If you have questions about a medical condition or this instruction, always ask your healthcare professional. [address] any warranty or liability for your use of this information.

## 2021-01-03 ASSESSMENT — ENCOUNTER SYMPTOMS: BLOOD IN STOOL: 1

## 2021-02-04 ENCOUNTER — HOSPITAL ENCOUNTER (OUTPATIENT)
Age: 61
Setting detail: SPECIMEN
Discharge: HOME | End: 2021-02-04
Payer: COMMERCIAL

## 2021-02-04 DIAGNOSIS — R53.82 CHRONIC FATIGUE: ICD-10-CM

## 2021-02-04 DIAGNOSIS — E78.5 HYPERLIPIDEMIA WITH TARGET LDL LESS THAN 100: ICD-10-CM

## 2021-02-04 LAB
ALBUMIN SERPL-MCNC: 4.3 G/DL (ref 3.5–5.2)
ALBUMIN/GLOBULIN RATIO: 1.6 (ref 1–2.5)
ALP BLD-CCNC: 83 U/L (ref 35–104)
ALT SERPL-CCNC: 11 U/L (ref 5–33)
ANION GAP SERPL CALCULATED.3IONS-SCNC: 13 MMOL/L (ref 9–17)
AST SERPL-CCNC: 14 U/L
BILIRUB SERPL-MCNC: 0.31 MG/DL (ref 0.3–1.2)
BUN BLDV-MCNC: 10 MG/DL (ref 8–23)
BUN/CREAT BLD: NORMAL (ref 9–20)
CALCIUM SERPL-MCNC: 9.1 MG/DL (ref 8.6–10.4)
CHLORIDE BLD-SCNC: 105 MMOL/L (ref 98–107)
CHOLESTEROL/HDL RATIO: 4
CHOLESTEROL: 222 MG/DL
CO2: 25 MMOL/L (ref 20–31)
CREAT SERPL-MCNC: 0.89 MG/DL (ref 0.5–0.9)
GFR AFRICAN AMERICAN: >60 ML/MIN
GFR NON-AFRICAN AMERICAN: >60 ML/MIN
GFR SERPL CREATININE-BSD FRML MDRD: NORMAL ML/MIN/{1.73_M2}
GFR SERPL CREATININE-BSD FRML MDRD: NORMAL ML/MIN/{1.73_M2}
GLUCOSE BLD-MCNC: 86 MG/DL (ref 70–99)
HCT VFR BLD CALC: 47.8 % (ref 36.3–47.1)
HDLC SERPL-MCNC: 55 MG/DL
HEMOGLOBIN: 15.9 G/DL (ref 11.9–15.1)
LDL CHOLESTEROL: 137 MG/DL (ref 0–130)
MCH RBC QN AUTO: 30.5 PG (ref 25.2–33.5)
MCHC RBC AUTO-ENTMCNC: 33.3 G/DL (ref 28.4–34.8)
MCV RBC AUTO: 91.7 FL (ref 82.6–102.9)
NRBC AUTOMATED: 0 PER 100 WBC
PDW BLD-RTO: 13.5 % (ref 11.8–14.4)
PLATELET # BLD: 299 K/UL (ref 138–453)
PMV BLD AUTO: 10.9 FL (ref 8.1–13.5)
POTASSIUM SERPL-SCNC: 4.3 MMOL/L (ref 3.7–5.3)
RBC # BLD: 5.21 M/UL (ref 3.95–5.11)
SODIUM BLD-SCNC: 143 MMOL/L (ref 135–144)
TOTAL PROTEIN: 7 G/DL (ref 6.4–8.3)
TRIGL SERPL-MCNC: 150 MG/DL
TSH SERPL DL<=0.05 MIU/L-ACNC: 5.77 MIU/L (ref 0.3–5)
VLDLC SERPL CALC-MCNC: ABNORMAL MG/DL (ref 1–30)
WBC # BLD: 6.1 K/UL (ref 3.5–11.3)

## 2021-02-05 DIAGNOSIS — E78.5 HYPERLIPIDEMIA WITH TARGET LDL LESS THAN 100: Primary | ICD-10-CM

## 2021-02-05 DIAGNOSIS — E78.5 HYPERLIPIDEMIA WITH TARGET LDL LESS THAN 100: ICD-10-CM

## 2021-02-05 RX ORDER — ATORVASTATIN CALCIUM 20 MG/1
20 TABLET, FILM COATED ORAL DAILY
Qty: 30 TABLET | Refills: 6
Start: 2021-02-05 | End: 2021-12-05 | Stop reason: SDUPTHER

## 2021-02-05 RX ORDER — HYDROCHLOROTHIAZIDE 12.5 MG/1
12.5 CAPSULE, GELATIN COATED ORAL EVERY MORNING
Qty: 30 CAPSULE | Refills: 0
Start: 2021-02-05 | End: 2022-04-26 | Stop reason: ALTCHOICE

## 2021-02-05 RX ORDER — PRAVASTATIN SODIUM 80 MG/1
80 TABLET ORAL EVERY EVENING
Qty: 90 TABLET | Refills: 3 | Status: SHIPPED | OUTPATIENT
Start: 2021-02-05 | End: 2021-02-05 | Stop reason: CLARIF

## 2021-02-05 NOTE — RESULT ENCOUNTER NOTE
ABNORMAL. Please notify patient. Worsening lipids I will increase the pravastatin from 40 mg to 80 mg    TSH mildly high, did she miss Synthroid? Advise to take Synthroid in the morning, on empty stomach, without any other meds and with a full glass of water.   Continue the same dosage to recheck at the next appointment    The red blood cells number is higher than normal could be related to smoking or dehydration to drink 8 x 8 ounce glasses of water, I can refer her to a blood doctor or just recheck cells in the blood at the next appointment  To make appointment with the specialist I referred her at the last appointment please give her contact information again to call  Future Appointments  4/8/2021   1:30 PM    MD simona Martin          [facility]

## 2021-02-10 DIAGNOSIS — F41.9 ANXIETY: ICD-10-CM

## 2021-02-10 DIAGNOSIS — F33.0 MILD EPISODE OF RECURRENT MAJOR DEPRESSIVE DISORDER (HCC): ICD-10-CM

## 2021-02-10 RX ORDER — BUSPIRONE HYDROCHLORIDE 10 MG/1
TABLET ORAL
Qty: 270 TABLET | Refills: 1 | Status: SHIPPED | OUTPATIENT
Start: 2021-02-10 | End: 2021-08-16

## 2021-02-10 NOTE — TELEPHONE ENCOUNTER
Please Approve or Refuse.   Send to Pharmacy per Pt's Request:      Next Visit Date:  4/8/2021   Last Visit Date: 12/29/2020    Hemoglobin A1C (%)   Date Value   03/21/2018 5.5             ( goal A1C is < 7)   BP Readings from Last 3 Encounters:   12/29/20 128/84   06/19/20 120/86   03/04/20 (!) 127/91          (goal 120/80)  BUN   Date Value Ref Range Status   02/04/2021 10 8 - 23 mg/dL Final     CREATININE   Date Value Ref Range Status   02/04/2021 0.89 0.50 - 0.90 mg/dL Final     Potassium   Date Value Ref Range Status   02/04/2021 4.3 3.7 - 5.3 mmol/L Final

## 2021-02-22 DIAGNOSIS — E55.9 VITAMIN D DEFICIENCY: Primary | ICD-10-CM

## 2021-02-22 RX ORDER — MELATONIN
1000 DAILY
Qty: 90 TABLET | Refills: 1
Start: 2021-02-22 | End: 2021-12-05 | Stop reason: SDUPTHER

## 2021-04-08 ENCOUNTER — OFFICE VISIT (OUTPATIENT)
Dept: FAMILY MEDICINE CLINIC | Age: 61
End: 2021-04-08
Payer: COMMERCIAL

## 2021-04-08 VITALS
HEART RATE: 98 BPM | WEIGHT: 138.8 LBS | OXYGEN SATURATION: 95 % | TEMPERATURE: 97.7 F | SYSTOLIC BLOOD PRESSURE: 124 MMHG | BODY MASS INDEX: 25.54 KG/M2 | DIASTOLIC BLOOD PRESSURE: 86 MMHG | HEIGHT: 62 IN

## 2021-04-08 DIAGNOSIS — F33.1 MAJOR DEPRESSIVE DISORDER, RECURRENT, MODERATE (HCC): ICD-10-CM

## 2021-04-08 DIAGNOSIS — E89.0 POSTOPERATIVE HYPOTHYROIDISM: ICD-10-CM

## 2021-04-08 DIAGNOSIS — I10 ESSENTIAL HYPERTENSION: ICD-10-CM

## 2021-04-08 DIAGNOSIS — Z85.850 HISTORY OF THYROID CANCER: ICD-10-CM

## 2021-04-08 DIAGNOSIS — Z28.21 PNEUMOCOCCAL VACCINATION DECLINED: ICD-10-CM

## 2021-04-08 DIAGNOSIS — E78.5 HYPERLIPIDEMIA WITH TARGET LDL LESS THAN 100: ICD-10-CM

## 2021-04-08 DIAGNOSIS — Z00.00 ANNUAL PHYSICAL EXAM: Primary | ICD-10-CM

## 2021-04-08 DIAGNOSIS — Z12.31 ENCOUNTER FOR SCREENING MAMMOGRAM FOR BREAST CANCER: ICD-10-CM

## 2021-04-08 PROCEDURE — 99396 PREV VISIT EST AGE 40-64: CPT | Performed by: FAMILY MEDICINE

## 2021-04-08 RX ORDER — LEVOTHYROXINE SODIUM 0.1 MG/1
100 TABLET ORAL
Qty: 90 TABLET | Refills: 0 | Status: SHIPPED | OUTPATIENT
Start: 2021-04-08 | End: 2021-10-01

## 2021-04-08 RX ORDER — METOPROLOL SUCCINATE 25 MG/1
25 TABLET, EXTENDED RELEASE ORAL DAILY
Qty: 90 TABLET | Refills: 0 | Status: SHIPPED | OUTPATIENT
Start: 2021-04-08 | End: 2021-07-06

## 2021-04-08 ASSESSMENT — ENCOUNTER SYMPTOMS
ABDOMINAL DISTENTION: 0
COUGH: 0
SHORTNESS OF BREATH: 0
CONSTIPATION: 0
WHEEZING: 0
CHEST TIGHTNESS: 0
VOMITING: 0
BLOOD IN STOOL: 0
ABDOMINAL PAIN: 0
BACK PAIN: 0

## 2021-04-08 ASSESSMENT — PATIENT HEALTH QUESTIONNAIRE - PHQ9
SUM OF ALL RESPONSES TO PHQ QUESTIONS 1-9: 17
5. POOR APPETITE OR OVEREATING: 3
1. LITTLE INTEREST OR PLEASURE IN DOING THINGS: 2
9. THOUGHTS THAT YOU WOULD BE BETTER OFF DEAD, OR OF HURTING YOURSELF: 0
3. TROUBLE FALLING OR STAYING ASLEEP: 3
SUM OF ALL RESPONSES TO PHQ QUESTIONS 1-9: 17
2. FEELING DOWN, DEPRESSED OR HOPELESS: 3
SUM OF ALL RESPONSES TO PHQ9 QUESTIONS 1 & 2: 5

## 2021-04-08 ASSESSMENT — COLUMBIA-SUICIDE SEVERITY RATING SCALE - C-SSRS
2. HAVE YOU ACTUALLY HAD ANY THOUGHTS OF KILLING YOURSELF?: NO
1. WITHIN THE PAST MONTH, HAVE YOU WISHED YOU WERE DEAD OR WISHED YOU COULD GO TO SLEEP AND NOT WAKE UP?: NO

## 2021-04-08 ASSESSMENT — VISUAL ACUITY
OD_CC: 20/25
OS_CC: 20/20

## 2021-04-08 NOTE — PROGRESS NOTES
 Chronic diarrhea       Prior to Visit Medications    Medication Sig Taking?  Authorizing Provider   vitamin D3 (CHOLECALCIFEROL) 25 MCG (1000 UT) TABS tablet Take 1 tablet by mouth daily Yes Michael Narayanan MD   busPIRone (BUSPAR) 10 MG tablet TAKE 1 TABLET BY MOUTH THREE TIMES A DAY Yes Michael Narayanan MD   atorvastatin (LIPITOR) 20 MG tablet Take 1 tablet by mouth daily From cardio, stopped pravachol Yes Michael Narayanan MD   hydroCHLOROthiazide (MICROZIDE) 12.5 MG capsule Take 1 capsule by mouth every morning FROM CARDIO Yes Michael Narayanan MD   Lactobacillus (ACIDOPHILUS) 100 MG CAPS TAKE 1 CAPSULE BY MOUTH TWICE A DAY Yes Historical Provider, MD   lactase (LACTOSE FAST ACTING RELIEF) 9000 units CHEW chewable tablet Take 1 tablet by mouth 3 times daily (with meals) Yes Michael Narayanan MD   DULoxetine (CYMBALTA) 60 MG extended release capsule Take 1 capsule by mouth every morning With breakfast. Total dosage 90 mg /day Yes Michael Narayanan MD   DULoxetine (CYMBALTA) 30 MG extended release capsule Take 1 capsule by mouth Daily with supper Total dosage 90 mg /day Yes Michael Narayanan MD   levothyroxine (SYNTHROID) 100 MCG tablet TAKE 1 TABLET BY MOUTH EVERY DAY BEFORE BREAKFAST Yes Michael Narayanan MD   tears naturale II (CELLUGEL) SOLN ophthalmic solution Place 1 drop into both eyes 4 times daily as needed for Dry Eyes Yes Michael Narayanan MD   famotidine (PEPCID) 20 MG tablet Take 1 tablet by mouth 2 times daily  Patient not taking: Reported on 4/8/2021  Michael Narayanan MD        Allergies   Allergen Reactions    Sulfa Antibiotics Other (See Comments)     bruise    Trazodone And Nefazodone      GI upset\" horrible\"    Flexeril [Cyclobenzaprine] Nausea And Vomiting       Past Medical History:   Diagnosis Date    Acute suppurative otitis media of right ear without spontaneous rupture of tympanic membrane 3/18/2019    AMAIRANI positive 5/6/2019    Anxiety     History of depression     History of thyroid cancer     Major depressive disorder with single episode, in partial remission ([facility] [address] 2019    Panic attacks     Postoperative hypothyroidism 3/21/2018    Sjogren's syndrome with keratoconjunctivitis sicca ([facility] 2019    Per rheumatology       Past Surgical History:   Procedure Laterality Date    BUNIONECTOMY       SECTION      LT x3    HERNIA REPAIR      THYROIDECTOMY      cancer       . Social History     Tobacco Use    Smoking status: Current Every Day Smoker     Packs/day: 1.00     Years: 25.00     Pack years: 25.00     Types: Cigarettes    Smokeless tobacco: Never Used   Substance Use Topics    Alcohol use: No    Drug use: No       Family History   Problem Relation Age of Onset    Rheum Arthritis Mother     Hypertension Mother     Mult Sclerosis Father     Diabetes Father     Heart Attack Father         x3    Arthritis Sister     Rheum Arthritis Sister     Diabetes Maternal Grandmother     Cancer Maternal Grandmother         unsure of type    Cancer Paternal Grandfather         unsure of type    Diabetes Paternal Grandfather        ADVANCE DIRECTIVE: N, <no information>    SUBJECTIVE / Lenore Ram is here for Annual Exam and Other (did see Dr. Christina Dumont)       Urinary symptoms: no    Sexually active: Yes     last pap: was normal, 1/15/2020  The patient has NO history of abnormal PAP    Last mammogram: 19  The patient has NO family history of breast cancer    Wears seatbelts: yes     Regular exercise: NO  Ever been transfused or tattooed?: yes  The patient reports that domestic violence in her life is absent. Last eye exam: up to date: Yes    Abnormal visual screening. Nunu Leal  reports she is up-to-date with her eye exam.  Due to Sjogren      Hearing Screening    125Hz 250Hz 500Hz 1000Hz 2000Hz 3000Hz 4000Hz 6000Hz 8000Hz   Right ear:            Left ear:               Visual Acuity Screening    Right eye Left eye Both eyes Without correction:      With correction: 20/25 20/20 20/20       Hearing:normal :Yes    Last dental exam and preventative dental cleaning: up to date : No:  Has dentures      Nutritional assessment: Body mass index is 25.39 kg/m². Normal.     Weight is decreasing     Wt Readings from Last 3 Encounters:   04/08/21 138 lb 12.8 oz (63 kg)   02/04/21 141 lb 12.8 oz (64.3 kg)   12/29/20 141 lb 12.8 oz (64.3 kg)       Healthful diet and Physical activity counseling to prevent CVD- low carb, low fat diet, increase fruits and vegetables, and exercise 4-5 times a day 30-40minutes a day discussed    Nicotine dependence. yes   Smoker, counseling given to quit smoking. Ready to quit: Yes  Counseling given: Yes      Alcohol use: no    Immunization History   Administered Date(s) Administered    Tdap (Boostrix, Adacel) 07/31/2017       Tdap one time, then Td every 10 years-up to date:  Yes    Influenza annually-up to date:  No    PPSV 23 in all adults 19-65 yo with chronic conditions,smokers, alcoholism,  nursing home residents; then PCV 13 at 73 yo-up to date: No: declines    Menopause: Yes   Patient's last menstrual period was 06/04/2015. Colon cancer screening recommended at 47 yo--wants to have colonoscopy , but wants to see Dr. Tara Muhammad only, [facility]  The patient has NO family history of colon cancer    HTN  Blood pressure is normal while taking HCTZ  Was started on Hydrochlorothiazide 12.5 MG-cannot tolerate  Had EKG and saw Dr. Rayshawn Pizano has palpitations and wearing Holter at this time.     BP Readings from Last 3 Encounters:   04/08/21 124/86   12/29/20 128/84   06/19/20 120/86       Pulse is normal.  Pulse Readings from Last 3 Encounters:   04/08/21 98   02/04/21 80   12/29/20 88       A1c is within normal limits   Lab Results   Component Value Date    LABA1C 5.5 03/21/2018       Hyperlipidemia, on lipitor      Lab Results   Component Value Date    CHOL 222 (H) 02/04/2021    CHOL 206 (H) 06/19/2020    CHOL 219 (H) 01/06/2020     Lab Results   Component Value Date    TRIG 150 (H) 02/04/2021    TRIG 98 06/19/2020    TRIG 109 01/06/2020     Lab Results   Component Value Date    HDL 55 02/04/2021    HDL 57 06/19/2020    HDL 55 01/06/2020     Lab Results   Component Value Date    LDLCHOLESTEROL 137 (H) 02/04/2021    LDLCHOLESTEROL 129 06/19/2020    LDLCHOLESTEROL 142 (H) 01/06/2020     Lab Results   Component Value Date    CHOLHDLRATIO 4.0 02/04/2021    CHOLHDLRATIO 3.6 06/19/2020    CHOLHDLRATIO 4.0 01/06/2020       Cardiovascular risk is: low    The 10-year ASCVD risk score (Rica Mancilla, et al., 2013) is: 7.5%    Values used to calculate the score:      Age: 64 years      Sex: Female      Is Non- : No      Diabetic: No      Tobacco smoker: Yes      Systolic Blood Pressure: 737 mmHg      Is BP treated: No      HDL Cholesterol: 55 mg/dL      Total Cholesterol: 222 mg/dL    Hepatitis C screening- nonreactive  Lab Results   Component Value Date    HEPCAB NONREACTIVE 03/22/2017     Depression  Taking Cymbalta  Getting hot flashes a lot and \"getting angry\" when having hot flashes   \"I'm stuck at home, and COVID 19, and winter\", contributing to her depression  Denies suicidal ideation, plan or intent. PHQ-2 Over the past 2 weeks, how often have you been bothered by any of the following problems? Little interest or pleasure in doing things: More than half the days  Feeling down, depressed, or hopeless: Nearly every day  PHQ-2 Score: 5  PHQ-9 Over the past 2 weeks, how often have you been bothered by any of the following problems?   Trouble falling or staying asleep, or sleeping too much: Nearly every day  Feeling tired or having little energy: Nearly every day  Poor appetite or overeating: Nearly every day  Feeling bad about yourself - or that you are a failure or have let yourself or your family down: Nearly every day  Trouble concentrating on things, such as reading the Date:   12/8/2021    Lipid Panel     Standing Status:   Future     Standing Expiration Date:   12/8/2021     Order Specific Question:   Is Patient Fasting?/# of Hours     Answer:   8-10 Hours, water ok to drink    TSH without Reflex     Standing Status:   Future     Standing Expiration Date:   12/8/2021    T4, Free     Standing Status:   Future     Standing Expiration Date:   12/8/2021    CBC Auto Differential     Standing Status:   Future     Standing Expiration Date:   12/8/2021         Future Appointments   Date Time Provider [facility]   8/27/2021 10:30 AM Dareen Goltz, MD [email or web address]       This note was completed by using the assistance of a speech-recognition program. However, inadvertent computerized transcription errors may be present. Although every effort was made to ensure accuracy, no guarantees can be provided that every mistake has been identified and corrected by editing. An electronic signature was used to authenticate this note.     Electronically signed by Dareen Goltz, MD on 4/9/2021 at 11:25 PM

## 2021-04-08 NOTE — PROGRESS NOTES
Visit Information    Have you changed or started any medications since your last visit including any over-the-counter medicines, vitamins, or herbal medicines? no   Are you having any side effects from any of your medications? -  no  Have you stopped taking any of your medications? Is so, why? -  no    Have you seen any other physician or provider since your last visit? Yes - Records Obtained  Have you had any other diagnostic tests since your last visit? No  Have you been seen in the emergency room and/or had an admission to a hospital since we last saw you? No  Have you had your routine dental cleaning in the past 6 months? no    Have you activated your [employer] account? If not, what are your barriers?  Yes     Patient Care Team:  Daphnie Bucio MD as PCP - General (Family Medicine)  Daphnie Bucio MD as PCP - [facility]  Gaurav Truong DO as Consulting Physician (Obstetrics & Gynecology)  Sangeeta William (Certified Nurse Practitioner)  Ed Nava MD as Consulting Physician (Internal Medicine)  Jennifer Jenkins MD as Consulting Physician (Endocrinology)    Medical History Review  Past Medical, Family, and Social History reviewed and does contribute to the patient presenting condition    Health Maintenance   Topic Date Due    Pneumococcal 0-64 years Vaccine (1 of 1 - PPSV23) Never done    COVID-19 Vaccine (1) Never done    Shingles Vaccine (1 of 2) Never done    Colon cancer screen colonoscopy  Never done    Breast cancer screen  06/20/2021    Flu vaccine (Season Ended) 09/01/2021    Lipid screen  02/04/2022    TSH testing  02/04/2022    Potassium monitoring  02/04/2022    Creatinine monitoring  02/04/2022    Cervical cancer screen  01/15/2023    DTaP/Tdap/Td vaccine (2 - Td) 07/31/2027    Hepatitis C screen  Completed    HIV screen  Completed    Hepatitis A vaccine  Aged Out    Hepatitis B vaccine  Aged Out    Hib vaccine  Aged Out    Meningococcal (ACWY) vaccine Aged Out

## 2021-04-08 NOTE — PATIENT INSTRUCTIONS
Patient Education        Stopping Smoking: Care Instructions  Your Care Instructions     Cigarette smokers crave the nicotine in cigarettes. Giving it up is much harder than simply changing a habit. Your body has to stop craving the nicotine. It is hard to quit, but you can do it. There are many tools that people use to quit smoking. You may find that combining tools works best for you. There are several steps to quitting. First you get ready to quit. Then you get support to help you. After that, you learn new skills and behaviors to become a nonsmoker. For many people, a necessary step is getting and using medicine. Your doctor will help you set up the plan that best meets your needs. You may want to attend a smoking cessation program to help you quit smoking. When you choose a program, look for one that has proven success. Ask your doctor for ideas. You will greatly increase your chances of success if you take medicine as well as get counseling or join a cessation program.  Some of the changes you feel when you first quit tobacco are uncomfortable. Your body will miss the nicotine at first, and you may feel short-tempered and grumpy. You may have trouble sleeping or concentrating. Medicine can help you deal with these symptoms. You may struggle with changing your smoking habits and rituals. The last step is the tricky one: Be prepared for the smoking urge to continue for a time. This is a lot to deal with, but keep at it. You will feel better. Follow-up care is a key part of your treatment and safety. Be sure to make and go to all appointments, and call your doctor if you are having problems. It's also a good idea to know your test results and keep a list of the medicines you take. How can you care for yourself at home? · Ask your family, friends, and coworkers for support. You have a better chance of quitting if you have help and support.   · Join a support group, such as Nicotine Anonymous, for people who are trying to quit smoking. · Consider signing up for a smoking cessation program, such as the American Lung Association's Freedom from Smoking program.  · Get text messaging support. Go to the website at www.smokefree. gov to sign up for the [facility] program.  · Set a quit date. Pick your date carefully so that it is not right in the middle of a big deadline or stressful time. Once you quit, do not even take a puff. Get rid of all ashtrays and lighters after your last cigarette. Clean your house and your clothes so that they do not smell of smoke. · Learn how to be a nonsmoker. Think about ways you can avoid those things that make you reach for a cigarette. ? Avoid situations that put you at greatest risk for smoking. For some people, it is hard to have a drink with friends without smoking. For others, they might skip a coffee break with coworkers who smoke. ? Change your daily routine. Take a different route to work or eat a meal in a different place. · Cut down on stress. Calm yourself or release tension by doing an activity you enjoy, such as reading a book, taking a hot bath, or gardening. · Talk to your doctor or pharmacist about nicotine replacement therapy, which replaces the nicotine in your body. You still get nicotine but you do not use tobacco. Nicotine replacement products help you slowly reduce the amount of nicotine you need. These products come in several forms, many of them available over-the-counter:  ? Nicotine patches  ? Nicotine gum and lozenges  ? Nicotine inhaler  · Ask your doctor about bupropion (Wellbutrin) or varenicline (Chantix), which are prescription medicines. They do not contain nicotine. They help you by reducing withdrawal symptoms, such as stress and anxiety. · Some people find hypnosis, acupuncture, and massage helpful for ending the smoking habit. · Eat a healthy diet and get regular exercise.  Having healthy habits will help your body move past its craving for · Get at least 30 minutes of exercise on most days of the week. Walking is a good choice. You also may want to do other activities, such as running, swimming, cycling, or playing tennis or team sports. · Reach and stay at a healthy weight. This will lower your risk for many problems, such as obesity, diabetes, heart disease, and high blood pressure. · Do not smoke. Smoking can make health problems worse. If you need help quitting, talk to your doctor about stop-smoking programs and medicines. These can increase your chances of quitting for good. · Care for your mental health. It is easy to get weighed down by worry and stress. Learn strategies to manage stress, like deep breathing and mindfulness, and stay connected with your family and community. If you find you often feel sad or hopeless, talk with your doctor. Treatment can help. · Talk to your doctor about whether you have any risk factors for sexually transmitted infections (STIs). You can help prevent STIs if you wait to have sex with a new partner (or partners) until you've each been tested for STIs. It also helps if you use condoms (male or female condoms) and if you limit your sex partners to one person who only has sex with you. Vaccines are available for some STIs. · If you think you may have a problem with alcohol or drug use, talk to your doctor. This includes prescription medicines (such as amphetamines and opioids) and illegal drugs (such as cocaine and methamphetamine). Your doctor can help you figure out what type of treatment is best for you. · Protect your skin from too much sun. When you're outdoors from 10 a.m. to 4 p.m., stay in the shade or cover up with clothing and a hat with a wide brim. Wear sunglasses that block UV rays. Even when it's cloudy, put broad-spectrum sunscreen (SPF 30 or higher) on any exposed skin. · See a dentist one or two times a year for checkups and to have your teeth cleaned. · Wear a seat belt in the car.   When should you call for help? Watch closely for changes in your health, and be sure to contact your doctor if you have any problems or symptoms that concern you. Where can you learn more? Go to https://analy.[employer]. org and sign in to your [employer] account. Enter C683 in the [facility] box to learn more about \"Well Visit, Women 50 to 72: Care Instructions. \"     If you do not have an account, please click on the \"Sign Up Now\" link. Current as of: May 27, 2020               Content Version: 12.8  © 2006-2021 [employer]. Care instructions adapted under license by [facility] ([facility]). If you have questions about a medical condition or this instruction, always ask your healthcare professional. [place] [address] any warranty or liability for your use of this information. Patient Education        Well Visit, Women 48 to 72: Care Instructions  Overview     Well visits can help you stay healthy. Your doctor has checked your overall health and may have suggested ways to take good care of yourself. Your doctor also may have recommended tests. At home, you can help prevent illness with healthy eating, regular exercise, and other steps. Follow-up care is a key part of your treatment and safety. Be sure to make and go to all appointments, and call your doctor if you are having problems. It's also a good idea to know your test results and keep a list of the medicines you take. How can you care for yourself at home? · Get screening tests that you and your doctor decide on. Screening helps find diseases before any symptoms appear. · Eat healthy foods. Choose fruits, vegetables, whole grains, protein, and low-fat dairy foods. Limit fat, especially saturated fat. Reduce salt in your diet. · Limit alcohol. Have no more than 1 drink a day or 7 drinks a week. · Get at least 30 minutes of exercise on most days of the week. Walking is a good choice.  You also may want to do other activities, such as running, swimming, cycling, or playing tennis or team sports. · Reach and stay at a healthy weight. This will lower your risk for many problems, such as obesity, diabetes, heart disease, and high blood pressure. · Do not smoke. Smoking can make health problems worse. If you need help quitting, talk to your doctor about stop-smoking programs and medicines. These can increase your chances of quitting for good. · Care for your mental health. It is easy to get weighed down by worry and stress. Learn strategies to manage stress, like deep breathing and mindfulness, and stay connected with your family and community. If you find you often feel sad or hopeless, talk with your doctor. Treatment can help. · Talk to your doctor about whether you have any risk factors for sexually transmitted infections (STIs). You can help prevent STIs if you wait to have sex with a new partner (or partners) until you've each been tested for STIs. It also helps if you use condoms (male or female condoms) and if you limit your sex partners to one person who only has sex with you. Vaccines are available for some STIs. · If you think you may have a problem with alcohol or drug use, talk to your doctor. This includes prescription medicines (such as amphetamines and opioids) and illegal drugs (such as cocaine and methamphetamine). Your doctor can help you figure out what type of treatment is best for you. · Protect your skin from too much sun. When you're outdoors from 10 a.m. to 4 p.m., stay in the shade or cover up with clothing and a hat with a wide brim. Wear sunglasses that block UV rays. Even when it's cloudy, put broad-spectrum sunscreen (SPF 30 or higher) on any exposed skin. · See a dentist one or two times a year for checkups and to have your teeth cleaned. · Wear a seat belt in the car. When should you call for help?   Watch closely for changes in your health, and be sure to contact your doctor if you have any problems or symptoms that concern you. Where can you learn more? Go to https://chpepiceweb.health-[email or web address] org and sign in to your [employer] account. Enter Q544 in the [employer] box to learn more about \"Well Visit, Women 50 to 72: Care Instructions. \"     If you do not have an account, please click on the \"Sign Up Now\" link. Current as of: May 27, 2020               Content Version: 12.8  © 3731-6419 Healthwise, Incorporated. Care instructions adapted under license by [facility] ([facility]). If you have questions about a medical condition or this instruction, always ask your healthcare professional. [address] any warranty or liability for your use of this information.

## 2021-04-09 PROBLEM — Z28.21 PNEUMOCOCCAL VACCINATION DECLINED: Status: ACTIVE | Noted: 2021-04-09

## 2021-04-09 PROBLEM — I10 ESSENTIAL HYPERTENSION: Status: ACTIVE | Noted: 2021-04-09

## 2021-04-09 ASSESSMENT — ENCOUNTER SYMPTOMS
NAUSEA: 0
DIARRHEA: 1

## 2021-04-20 DIAGNOSIS — F33.1 MODERATE EPISODE OF RECURRENT MAJOR DEPRESSIVE DISORDER (HCC): ICD-10-CM

## 2021-04-20 RX ORDER — DULOXETIN HYDROCHLORIDE 30 MG/1
30 CAPSULE, DELAYED RELEASE ORAL
Qty: 90 CAPSULE | Refills: 3 | Status: SHIPPED | OUTPATIENT
Start: 2021-04-20 | End: 2022-04-26 | Stop reason: SDUPTHER

## 2021-04-20 RX ORDER — DULOXETIN HYDROCHLORIDE 60 MG/1
60 CAPSULE, DELAYED RELEASE ORAL EVERY MORNING
Qty: 90 CAPSULE | Refills: 3 | Status: SHIPPED | OUTPATIENT
Start: 2021-04-20 | End: 2022-04-26 | Stop reason: SDUPTHER

## 2021-04-27 ENCOUNTER — E-VISIT (OUTPATIENT)
Dept: FAMILY MEDICINE CLINIC | Age: 61
End: 2021-04-27
Payer: COMMERCIAL

## 2021-04-27 DIAGNOSIS — T78.40XA ALLERGIC REACTION, INITIAL ENCOUNTER: Primary | ICD-10-CM

## 2021-04-27 PROCEDURE — 99422 OL DIG E/M SVC 11-20 MIN: CPT | Performed by: FAMILY MEDICINE

## 2021-04-27 RX ORDER — METHYLPREDNISOLONE 4 MG/1
TABLET ORAL
Qty: 1 KIT | Refills: 0 | Status: SHIPPED | OUTPATIENT
Start: 2021-04-27 | End: 2021-08-27 | Stop reason: ALTCHOICE

## 2021-04-27 NOTE — PROGRESS NOTES
HPI: per patient's questionnaire    EXAM: Large areas of erythema, roundish, on the areas where she had a heart monitor, some of them are blistered, likely superinfection    Diagnoses and all orders for this visit:    1. Allergic reaction, initial encounter  Worsening    - methylPREDNISolone (MEDROL, MARLYN,) 4 MG tablet; Take by mouth, with food. Keep low carb, low salt diet while taking it  Dispense: 1 kit; Refill: 0  - triamcinolone (KENALOG) 0.1 % ointment; Apply topically 2 times daily x 7 days. Avoid applying it on the skin folds, face, groin and neck  Dispense: 1 Tube; Refill: 0  - mupirocin (BACTROBAN) 2 % ointment; Apply topically 2 times daily on the affected area BLISTERS for 7-10 days. OK to substitute to cream  Dispense: 30 g; Refill: 2      No orders of the defined types were placed in this encounter. Patient was advised to contact PCP if symptoms worsen or failing to change as expected        11-20 minutes were spent on the digital evaluation and management of this patient.

## 2021-06-08 ENCOUNTER — HOSPITAL ENCOUNTER (OUTPATIENT)
Age: 61
Setting detail: SPECIMEN
Discharge: HOME | End: 2021-06-08
Payer: COMMERCIAL

## 2021-06-08 DIAGNOSIS — I10 ESSENTIAL HYPERTENSION: ICD-10-CM

## 2021-06-08 DIAGNOSIS — E89.0 POSTOPERATIVE HYPOTHYROIDISM: ICD-10-CM

## 2021-06-08 DIAGNOSIS — E78.5 HYPERLIPIDEMIA WITH TARGET LDL LESS THAN 100: ICD-10-CM

## 2021-06-08 DIAGNOSIS — Z85.850 HISTORY OF THYROID CANCER: ICD-10-CM

## 2021-06-08 LAB
-: NORMAL
ABSOLUTE EOS #: 0.25 K/UL (ref 0–0.44)
ABSOLUTE IMMATURE GRANULOCYTE: 0.03 K/UL (ref 0–0.3)
ABSOLUTE LYMPH #: 2.48 K/UL (ref 1.1–3.7)
ABSOLUTE MONO #: 0.63 K/UL (ref 0.1–1.2)
AMORPHOUS: NORMAL
BACTERIA: NORMAL
BASOPHILS # BLD: 1 % (ref 0–2)
BASOPHILS ABSOLUTE: 0.07 K/UL (ref 0–0.2)
BILIRUBIN URINE: NEGATIVE
CASTS UA: NORMAL /LPF (ref 0–8)
COLOR: ABNORMAL
COMMENT UA: ABNORMAL
CRYSTALS, UA: NORMAL /HPF
DIFFERENTIAL TYPE: ABNORMAL
EOSINOPHILS RELATIVE PERCENT: 3 % (ref 1–4)
EPITHELIAL CELLS UA: NORMAL /HPF (ref 0–5)
GLUCOSE URINE: NEGATIVE
HCT VFR BLD CALC: 48.1 % (ref 36.3–47.1)
HEMOGLOBIN: 15.8 G/DL (ref 11.9–15.1)
IMMATURE GRANULOCYTES: 0 %
KETONES, URINE: NEGATIVE
LEUKOCYTE ESTERASE, URINE: ABNORMAL
LYMPHOCYTES # BLD: 28 % (ref 24–43)
MCH RBC QN AUTO: 30.3 PG (ref 25.2–33.5)
MCHC RBC AUTO-ENTMCNC: 32.8 G/DL (ref 28.4–34.8)
MCV RBC AUTO: 92.3 FL (ref 82.6–102.9)
MONOCYTES # BLD: 7 % (ref 3–12)
MUCUS: NORMAL
NITRITE, URINE: NEGATIVE
NRBC AUTOMATED: 0 PER 100 WBC
OTHER OBSERVATIONS UA: NORMAL
PDW BLD-RTO: 13.4 % (ref 11.8–14.4)
PH UA: 8.5 (ref 5–8)
PLATELET # BLD: 296 K/UL (ref 138–453)
PLATELET ESTIMATE: ABNORMAL
PMV BLD AUTO: 11.8 FL (ref 8.1–13.5)
PROTEIN UA: NEGATIVE
RBC # BLD: 5.21 M/UL (ref 3.95–5.11)
RBC # BLD: ABNORMAL 10*6/UL
RBC UA: NORMAL /HPF (ref 0–4)
RENAL EPITHELIAL, UA: NORMAL /HPF
SEG NEUTROPHILS: 61 % (ref 36–65)
SEGMENTED NEUTROPHILS ABSOLUTE COUNT: 5.42 K/UL (ref 1.5–8.1)
SPECIFIC GRAVITY UA: 1.02 (ref 1–1.03)
TRICHOMONAS: NORMAL
TURBIDITY: CLEAR
URINE HGB: NEGATIVE
UROBILINOGEN, URINE: NORMAL
WBC # BLD: 8.9 K/UL (ref 3.5–11.3)
WBC # BLD: ABNORMAL 10*3/UL
WBC UA: NORMAL /HPF (ref 0–5)
YEAST: NORMAL

## 2021-06-09 DIAGNOSIS — D75.1 POLYCYTHEMIA: Primary | ICD-10-CM

## 2021-06-09 DIAGNOSIS — R31.9 HEMATURIA, UNSPECIFIED TYPE: ICD-10-CM

## 2021-06-09 DIAGNOSIS — Z12.31 ENCOUNTER FOR SCREENING MAMMOGRAM FOR BREAST CANCER: ICD-10-CM

## 2021-06-09 LAB
ANION GAP SERPL CALCULATED.3IONS-SCNC: 9 MMOL/L (ref 9–17)
BUN BLDV-MCNC: 10 MG/DL (ref 8–23)
BUN/CREAT BLD: NORMAL (ref 9–20)
CALCIUM SERPL-MCNC: 9.3 MG/DL (ref 8.6–10.4)
CHLORIDE BLD-SCNC: 101 MMOL/L (ref 98–107)
CHOLESTEROL/HDL RATIO: 4.2
CHOLESTEROL: 204 MG/DL
CO2: 29 MMOL/L (ref 20–31)
CREAT SERPL-MCNC: 0.64 MG/DL (ref 0.5–0.9)
GFR AFRICAN AMERICAN: >60 ML/MIN
GFR NON-AFRICAN AMERICAN: >60 ML/MIN
GFR SERPL CREATININE-BSD FRML MDRD: NORMAL ML/MIN/{1.73_M2}
GFR SERPL CREATININE-BSD FRML MDRD: NORMAL ML/MIN/{1.73_M2}
GLUCOSE BLD-MCNC: 74 MG/DL (ref 70–99)
HDLC SERPL-MCNC: 49 MG/DL
LDL CHOLESTEROL: 124 MG/DL (ref 0–130)
POTASSIUM SERPL-SCNC: 4.3 MMOL/L (ref 3.7–5.3)
SODIUM BLD-SCNC: 139 MMOL/L (ref 135–144)
THYROXINE, FREE: 1.69 NG/DL (ref 0.93–1.7)
TRIGL SERPL-MCNC: 154 MG/DL
TSH SERPL DL<=0.05 MIU/L-ACNC: 1.61 MIU/L (ref 0.3–5)
VLDLC SERPL CALC-MCNC: ABNORMAL MG/DL (ref 1–30)

## 2021-06-09 NOTE — RESULT ENCOUNTER NOTE
ABNORMAL. Please notify patient. There is blood in the urine and would like her to see urology.   Thyroid function mildly abnormal  Improving lipids  Increased red blood cells  I will place the referrals to urology and hematologist oncologist to look at the cells  Patient also schedule her mammogram  Please obtain report from cardiology, Dr. Tirado Antis requested at appointment but not received        Future Appointments  8/27/2021  10:30 AM   Francis Kiran MD     [facility]               [facility]

## 2021-07-03 DIAGNOSIS — I10 ESSENTIAL HYPERTENSION: ICD-10-CM

## 2021-07-06 RX ORDER — METOPROLOL SUCCINATE 25 MG/1
25 TABLET, EXTENDED RELEASE ORAL DAILY
Qty: 90 TABLET | Refills: 3 | Status: SHIPPED | OUTPATIENT
Start: 2021-07-06 | End: 2021-08-27

## 2021-07-13 ENCOUNTER — HOSPITAL ENCOUNTER (OUTPATIENT)
Facility: MEDICAL CENTER | Age: 61
End: 2021-07-13
Payer: COMMERCIAL

## 2021-07-15 ENCOUNTER — INITIAL CONSULT (OUTPATIENT)
Dept: ONCOLOGY | Age: 61
End: 2021-07-15
Payer: COMMERCIAL

## 2021-07-15 VITALS
TEMPERATURE: 98.1 F | OXYGEN SATURATION: 98 % | HEART RATE: 82 BPM | HEIGHT: 62 IN | RESPIRATION RATE: 18 BRPM | DIASTOLIC BLOOD PRESSURE: 80 MMHG | BODY MASS INDEX: 26.68 KG/M2 | WEIGHT: 145 LBS | SYSTOLIC BLOOD PRESSURE: 134 MMHG

## 2021-07-15 DIAGNOSIS — D75.1 POLYCYTHEMIA: Primary | ICD-10-CM

## 2021-07-15 DIAGNOSIS — F17.200 TOBACCO DEPENDENCE: ICD-10-CM

## 2021-07-15 PROCEDURE — 99244 OFF/OP CNSLTJ NEW/EST MOD 40: CPT | Performed by: INTERNAL MEDICINE

## 2021-07-15 NOTE — PROGRESS NOTES
Ирина Barba                                                                                                                  7/15/2021  MRN:   [id number]  YOB: 1960  PCP:                           Kristy Pastrana MD  Referring Physician: Lucina Phillips  Treating Physician Name: Michael Redman MD      Reason for consultation:  Chief Complaint   Patient presents with    Consultation     new patient       Current problems:  Polycythemia  Tobacco dependence    Active and recent treatments:  Work-up in progress    Summary of Case/History:    Ирина Barba a 64 y. o.female is a patient with polycythemia who presents to the clinic to establish care and for further work-up and evaluation. Patient was noted to have polycythemia on routine work-up. Patient hemoglobin was 15.8 with hematocrit 48. Patient does smoke cigarette 1 pack/day. Patient is on low-dose hydrochlorothiazide. Denies living at a high altitude. UA done shows hematuria. Chest x-ray from  did not show any concerning findings. Patient does complain of having hot flashes and some drenching night sweats. Also complains of some left upper quadrant pain. Denies unintentional weight loss. Denies history of venous or arterial thromboembolism.     Past Medical History:   Past Medical History:   Diagnosis Date    Acute suppurative otitis media of right ear without spontaneous rupture of tympanic membrane 3/18/2019    AMAIRANI positive 2019    Anxiety     Essential hypertension 2021    History of depression     History of thyroid cancer     Major depressive disorder with single episode, in partial remission ([address] 2019    Panic attacks     Postoperative hypothyroidism 3/21/2018    Sjogren's syndrome with keratoconjunctivitis sicca ([address] 2019    Per rheumatology       Past Surgical History:     Past Surgical History:   Procedure Laterality Date    BUNIONECTOMY       SECTION      LT x3    HERNIA REPAIR      THYROIDECTOMY      cancer       Patient Family Social History:     Social History     Socioeconomic History    Marital status:      Spouse name: None    Number of children: None    Years of education: None    Highest education level: None   Occupational History    None   Tobacco Use    Smoking status: Current Every Day Smoker     Packs/day: 1.00     Years: 25.00     Pack years: 25.00     Types: Cigarettes    Smokeless tobacco: Never Used   Substance and Sexual Activity    Alcohol use: No    Drug use: No    Sexual activity: Yes     Partners: Male   Other Topics Concern    None   Social History Narrative    None     Social Determinants of Health     Financial Resource Strain:     Difficulty of Paying Living Expenses:    Food Insecurity:     Worried About Running Out of Food in the Last Year:     Ran Out of Food in the Last Year:    Transportation Needs:     Lack of Transportation (Medical):      Lack of Transportation (Non-Medical):    Physical Activity:     Days of Exercise per Week:     Minutes of Exercise per Session:    Stress:     Feeling of Stress :    Social Connections:     Frequency of Communication with Friends and Family:     Frequency of Social Gatherings with Friends and Family:     Attends Synagogue Services:     Active Member of Clubs or Organizations:     Attends Club or Organization Meetings:     Marital Status:    Intimate Partner Violence:     Fear of Current or Ex-Partner:     Emotionally Abused:     Physically Abused:     Sexually Abused:      Family History   Problem Relation Age of Onset    Rheum Arthritis Mother     Hypertension Mother     Mult Sclerosis Father     Diabetes Father     Heart Attack Father         x3    Arthritis Sister     Rheum Arthritis Sister     Diabetes Maternal Grandmother     Cancer Maternal Grandmother         unsure of type    Cancer Paternal Grandfather         unsure of type    Diabetes Paternal Grandfather        Current for this visit. Allergies:   Sulfa antibiotics, Trazodone and nefazodone, and Flexeril [cyclobenzaprine]    Review of Systems:    Constitutional: No fever or chills. No night sweats, no weight loss   Eyes: No eye discharge, double vision, or eye pain   HEENT: negative for sore mouth, sore throat, hoarseness and voice change   Respiratory: negative for cough , sputum, dyspnea, wheezing, hemoptysis, chest pain   Cardiovascular: negative for chest pain, dyspnea, palpitations, orthopnea, PND   Gastrointestinal: negative for nausea, vomiting, diarrhea, constipation, abdominal pain, Dysphagia, hematemesis and hematochezia   Genitourinary: negative for frequency, dysuria, nocturia, urinary incontinence, and hematuria   Integument: negative for rash, skin lesions, bruises.    Hematologic/Lymphatic: negative for easy bruising, bleeding, lymphadenopathy, or petechiae   Endocrine: negative for heat or cold intolerance,weight changes, change in bowel habits and hair loss   Musculoskeletal: negative for myalgias, arthralgias, pain, joint swelling,and bone pain   Neurological: negative for headaches, dizziness, seizures, weakness, numbness        Physical Exam:    Vitals: /80   Pulse 82   Temp 98.1 °F (36.7 °C)   Resp 18   Ht 5' 2\" (1.575 m)   Wt 145 lb (65.8 kg)   LMP 06/04/2015 Comment: g:3 p:3  SpO2 98%   BMI 26.52 kg/m²   General appearance - well appearing, no in pain or distress  Mental status - AAO X3  Eyes - pupils equal and reactive, extraocular eye movements intact  Mouth - mucous membranes moist, pharynx normal without lesions  Neck - supple, no significant adenopathy  Lymphatics - no palpable lymphadenopathy, no hepatosplenomegaly  Chest - clear to auscultation, no wheezes, rales or rhonchi, symmetric air entry  Heart - normal rate, regular rhythm, normal S1, S2, no murmurs  Abdomen - soft, nontender, nondistended, no masses or organomegaly  Neurological - alert, oriented, normal speech, no focal findings or movement disorder noted  Extremities - peripheral pulses normal, no pedal edema, no clubbing or cyanosis  Skin - normal coloration and turgor, no rashes, no suspicious skin lesions noted       DATA:    Results for orders placed or performed during the hospital encounter of 06/08/21   CBC Auto Differential   Result Value Ref Range    WBC 8.9 3.5 - 11.3 k/uL    RBC 5.21 (H) 3.95 - 5.11 m/uL    Hemoglobin 15.8 (H) 11.9 - 15.1 g/dL    Hematocrit 48.1 (H) 36.3 - 47.1 %    MCV 92.3 82.6 - 102.9 fL    MCH 30.3 25.2 - 33.5 pg    MCHC 32.8 28.4 - 34.8 g/dL    RDW 13.4 11.8 - 14.4 %    Platelets 565 637 - 398 k/uL    MPV 11.8 8.1 - 13.5 fL    NRBC Automated 0.0 0.0 per 100 WBC    Differential Type NOT REPORTED     Seg Neutrophils 61 36 - 65 %    Lymphocytes 28 24 - 43 %    Monocytes 7 3 - 12 %    Eosinophils % 3 1 - 4 %    Basophils 1 0 - 2 %    Immature Granulocytes 0 0 %    Segs Absolute 5.42 1.50 - 8.10 k/uL    Absolute Lymph # 2.48 1.10 - 3.70 k/uL    Absolute Mono # 0.63 0.10 - 1.20 k/uL    Absolute Eos # 0.25 0.00 - 0.44 k/uL    Basophils Absolute 0.07 0.00 - 0.20 k/uL    Absolute Immature Granulocyte 0.03 0.00 - 0.30 k/uL    WBC Morphology NOT REPORTED     RBC Morphology NOT REPORTED     Platelet Estimate NOT REPORTED    T4, Free   Result Value Ref Range    Thyroxine, Free 1.69 0.93 - 1.70 ng/dL   TSH without Reflex   Result Value Ref Range    TSH 1.61 0.30 - 5.00 mIU/L   Lipid Panel   Result Value Ref Range    Cholesterol 204 (H) <200 mg/dL    HDL 49 >40 mg/dL    LDL Cholesterol 124 0 - 130 mg/dL    Chol/HDL Ratio 4.2 <5    Triglycerides 154 (H) <150 mg/dL    VLDL NOT REPORTED (H) 1 - 30 mg/dL   Basic Metabolic Panel   Result Value Ref Range    Glucose 74 70 - 99 mg/dL    BUN 10 8 - 23 mg/dL    CREATININE 0.64 0.50 - 0.90 mg/dL    Bun/Cre Ratio NOT REPORTED 9 - 20    Calcium 9.3 8.6 - 10.4 mg/dL    Sodium 139 135 - 144 mmol/L    Potassium 4.3 3.7 - 5.3 mmol/L    Chloride 101 98 - 107 mmol/L    CO2 29 20 - 31 mmol/L    Anion Gap 9 9 - 17 mmol/L    GFR Non-African American >60 >60 mL/min    GFR African American >60 >60 mL/min    GFR Comment          GFR Staging NOT REPORTED    Urinalysis Reflex to Culture    Specimen: Urine, clean catch   Result Value Ref Range    Color, UA DARK YELLOW (A) YELLOW    Turbidity UA CLEAR CLEAR    Glucose, Ur NEGATIVE NEGATIVE    Bilirubin Urine NEGATIVE NEGATIVE    Ketones, Urine NEGATIVE NEGATIVE    Specific Gravity, UA 1.020 1.005 - 1.030    Urine Hgb NEGATIVE NEGATIVE    pH, UA 8.5 (H) 5.0 - 8.0    Protein, UA NEGATIVE NEGATIVE    Urobilinogen, Urine Normal Normal    Nitrite, Urine NEGATIVE NEGATIVE    Leukocyte Esterase, Urine TRACE (A) NEGATIVE    Urinalysis Comments NOT REPORTED    Microscopic Urinalysis   Result Value Ref Range    -          WBC, UA 5 TO 10 0 - 5 /HPF    RBC, UA 5 TO 10 0 - 4 /HPF    Casts UA  0 - 8 /LPF     5 TO 10 HYALINE Reference range defined for non-centrifuged specimen. Crystals, UA NOT REPORTED None /HPF    Epithelial Cells UA 2 TO 5 0 - 5 /HPF    Renal Epithelial, UA NOT REPORTED 0 /HPF    Bacteria, UA NOT REPORTED None    Mucus, UA NOT REPORTED None    Trichomonas, UA NOT REPORTED None    Amorphous, UA NOT REPORTED None    Other Observations UA NOT REPORTED NOT REQ. Yeast, UA NOT REPORTED None     No results found. Impression:  Polycythemia  Tobacco dependence    Plan:  I had a detailed discussion with the patient and personally went over results of lab work-up imaging studies and other relevant clinical data. Personally reviewed results of lab work-up and other relevant clinical data. Reviewed patient's medical chart  Discussed differential diagnosis of polycythemia which could be reactive as well as primary in nature  I will order work-up including peripheral smear carboxyhemoglobin. I will also obtain erythropoietin level as well as JAK2 mutation analysis.   X-ray from 2020 did not show any acute findings  UA done shows hematuria, microscopic. Patient is scheduled to follow-up with urology next month. Discussed with patient that renal tumors can lead to polycythemia. I will hold off on ordering imaging for the kidneys since she is already to follow-up with urology. Patient counseled tobacco cessation  Clinically patient does not have any signs of hyperviscosity syndrome. No indication for phlebotomy at this point  Return to clinic to discuss results of above test.  Patient multiple questions was answered to the best my ability. Heber Albert MD        I spent more than 60 minutes examining, evaluating, reviewing data, counseling the patient and coordinating care. Greater than 50% of time was spent face-to-face with the patient this note is created with the assistance of a speech recognition program.  While intending to generate a document that actually reflects the content of the visit, the document can still have some errors including those of syntax and sound a like substitutions which may escape proof reading. It such instances, actual meaning can be extrapolated by contextual diversion.

## 2021-08-10 ENCOUNTER — HOSPITAL ENCOUNTER (OUTPATIENT)
Age: 61
Discharge: HOME | End: 2021-08-10
Payer: COMMERCIAL

## 2021-08-10 DIAGNOSIS — F17.200 TOBACCO DEPENDENCE: ICD-10-CM

## 2021-08-10 DIAGNOSIS — D75.1 POLYCYTHEMIA: ICD-10-CM

## 2021-08-10 LAB
ABSOLUTE EOS #: 0.2 K/UL (ref 0–0.4)
ABSOLUTE IMMATURE GRANULOCYTE: ABNORMAL K/UL (ref 0–0.3)
ABSOLUTE LYMPH #: 2 K/UL (ref 1–4.8)
ABSOLUTE MONO #: 0.6 K/UL (ref 0.1–1.3)
ABSOLUTE RETIC #: 0.04 M/UL (ref 0.02–0.1)
BASOPHILS # BLD: 1 % (ref 0–2)
BASOPHILS ABSOLUTE: 0.1 K/UL (ref 0–0.2)
CARBOXYHEMOGLOBIN: 8.3 % (ref 0–5)
DIFFERENTIAL TYPE: ABNORMAL
EOSINOPHILS RELATIVE PERCENT: 3 % (ref 0–4)
FERRITIN: 66 UG/L (ref 13–150)
HCT VFR BLD CALC: 45.5 % (ref 36–46)
HEMOGLOBIN: 15.4 G/DL (ref 12–16)
IMMATURE GRANULOCYTES: ABNORMAL %
IMMATURE RETIC FRACT: NORMAL %
IRON SATURATION: 37 % (ref 20–55)
IRON: 98 UG/DL (ref 37–145)
LYMPHOCYTES # BLD: 30 % (ref 24–44)
MCH RBC QN AUTO: 30.5 PG (ref 26–34)
MCHC RBC AUTO-ENTMCNC: 33.8 G/DL (ref 31–37)
MCV RBC AUTO: 90.2 FL (ref 80–100)
MONOCYTES # BLD: 9 % (ref 1–7)
NRBC AUTOMATED: ABNORMAL PER 100 WBC
PDW BLD-RTO: 13.7 % (ref 11.5–14.9)
PLATELET # BLD: 286 K/UL (ref 150–450)
PLATELET ESTIMATE: ABNORMAL
PMV BLD AUTO: 8.2 FL (ref 6–12)
RBC # BLD: 5.04 M/UL (ref 4–5.2)
RBC # BLD: ABNORMAL 10*6/UL
RETIC %: 0.7 % (ref 0.5–2)
RETIC HEMOGLOBIN: NORMAL PG (ref 28.2–35.7)
SEG NEUTROPHILS: 57 % (ref 36–66)
SEGMENTED NEUTROPHILS ABSOLUTE COUNT: 3.9 K/UL (ref 1.3–9.1)
TOTAL IRON BINDING CAPACITY: 265 UG/DL (ref 250–450)
UNSATURATED IRON BINDING CAPACITY: 167 UG/DL (ref 112–347)
WBC # BLD: 6.8 K/UL (ref 3.5–11)
WBC # BLD: ABNORMAL 10*3/UL

## 2021-08-10 PROCEDURE — 85045 AUTOMATED RETICULOCYTE COUNT: CPT

## 2021-08-10 PROCEDURE — 81270 JAK2 GENE: CPT

## 2021-08-10 PROCEDURE — 83550 IRON BINDING TEST: CPT

## 2021-08-10 PROCEDURE — 36415 COLL VENOUS BLD VENIPUNCTURE: CPT

## 2021-08-10 PROCEDURE — 82668 ASSAY OF ERYTHROPOIETIN: CPT

## 2021-08-10 PROCEDURE — 83540 ASSAY OF IRON: CPT

## 2021-08-10 PROCEDURE — 82800 BLOOD PH: CPT

## 2021-08-10 PROCEDURE — 85025 COMPLETE CBC W/AUTO DIFF WBC: CPT

## 2021-08-10 PROCEDURE — 82728 ASSAY OF FERRITIN: CPT

## 2021-08-10 PROCEDURE — 82375 ASSAY CARBOXYHB QUANT: CPT

## 2021-08-11 LAB — SURGICAL PATHOLOGY REPORT: NORMAL

## 2021-08-12 LAB
ERYTHROPOIETIN: 8 MU/ML (ref 4–27)
PATHOLOGIST REVIEW: NORMAL

## 2021-08-15 DIAGNOSIS — F33.0 MILD EPISODE OF RECURRENT MAJOR DEPRESSIVE DISORDER (HCC): ICD-10-CM

## 2021-08-15 DIAGNOSIS — F41.9 ANXIETY: ICD-10-CM

## 2021-08-16 RX ORDER — BUSPIRONE HYDROCHLORIDE 10 MG/1
TABLET ORAL
Qty: 270 TABLET | Refills: 1 | Status: SHIPPED | OUTPATIENT
Start: 2021-08-16 | End: 2022-02-11

## 2021-08-19 LAB
JAK2 QNT, SOURCE: NORMAL
V617 MUTATION, PERCENT: 0 %
V617F MUTATION, QNT: NOT DETECTED

## 2021-08-23 NOTE — RESULT ENCOUNTER NOTE
Please let the patient know the blood work showed increased carboxyhemoglobin which comes from smoking most of the times, strongly advised to cut down smoking  She should also check her carbon monoxide in her house    Follow-up with hematologist oncologist  Improving hemoglobin hematocrit  Carboxyhemoglobin high, she is a smoker    Future Appointments  8/24/2021  1:30 PM    Mario Manzano MD        [address]  8/27/2021  10:30 AM   Lisa Rick MD     [facility]               [email or web address]  9/9/2021   2:15 PM    Miguel Berrios MD          [address]

## 2021-08-23 NOTE — RESULT ENCOUNTER NOTE
Management per hematologist oncologist    Future Appointments  8/24/2021  1:30 PM    Lisa Burnett MD        [address]  8/27/2021  10:30 AM   Madina Pantoja MD     [facility]  9/9/2021   2:15 PM    Cisco Adams MD          [address] [facility]

## 2021-08-24 ENCOUNTER — OFFICE VISIT (OUTPATIENT)
Dept: UROLOGY | Age: 61
End: 2021-08-24
Payer: COMMERCIAL

## 2021-08-24 VITALS
TEMPERATURE: 97.4 F | HEART RATE: 60 BPM | DIASTOLIC BLOOD PRESSURE: 67 MMHG | BODY MASS INDEX: 27.23 KG/M2 | SYSTOLIC BLOOD PRESSURE: 109 MMHG | HEIGHT: 62 IN | WEIGHT: 148 LBS

## 2021-08-24 DIAGNOSIS — R31.29 MICROHEMATURIA: Primary | ICD-10-CM

## 2021-08-24 PROCEDURE — 99204 OFFICE O/P NEW MOD 45 MIN: CPT | Performed by: UROLOGY

## 2021-08-24 ASSESSMENT — ENCOUNTER SYMPTOMS
BACK PAIN: 0
COUGH: 0
EYE PAIN: 0
EYE REDNESS: 0
ABDOMINAL PAIN: 1
WHEEZING: 0
RESPIRATORY NEGATIVE: 1
CONSTIPATION: 0
VOMITING: 0
DIARRHEA: 0
NAUSEA: 0
SHORTNESS OF BREATH: 0
EYES NEGATIVE: 1

## 2021-08-24 NOTE — PROGRESS NOTES
Review of Systems   Constitutional: Negative. Negative for appetite change, chills and fever. Eyes: Negative. Negative for pain, redness and visual disturbance. Respiratory: Negative. Negative for cough, shortness of breath and wheezing. Cardiovascular: Negative. Negative for chest pain and leg swelling. Gastrointestinal: Positive for abdominal pain. Negative for constipation, diarrhea, nausea and vomiting. Genitourinary: Negative. Negative for difficulty urinating, dysuria, flank pain, frequency, hematuria and urgency. Musculoskeletal: Negative. Negative for back pain, joint swelling and myalgias. Skin: Negative. Negative for rash and wound. Neurological: Negative. Negative for dizziness, tremors and numbness. Hematological: Negative. Does not bruise/bleed easily.

## 2021-08-24 NOTE — PROGRESS NOTES
[address] [address]  Dept: [address] [facility] Urology Office Note - New Patient    Patient:  Sebastian Damico  YOB: 1960  Date: 8/24/2021    The patient is a 64 y.o. female who presentstoday for evaluation of the following problems:   Chief Complaint   Patient presents with    New Patient     Hematuria/EPIC    referred by Tiffanie Duran MD.    HPI  She is here for hematuria. She did no see any blood. She has not had any dysuria. She is a smoker. She has not seen a urologist in the past. U/A did show 5-10 RBCs. She has no history of kidney stones. (Patient's old records have been requested, reviewed and summarized in today's note.)    Summary of old records: N/A    History: N/A    ProceduresToday: N/A    Urinalysis today:  No results found for this visit on 08/24/21. AUA Symptom Score (8/24/2021):                               Last BUN andcreatinine:  Lab Results   Component Value Date    BUN 10 06/08/2021     Lab Results   Component Value Date    CREATININE 0.64 06/08/2021       Additional Lab/Culture results: none    Reviewed during this Office Visit: u/A with micro showed 5-10 RBCs.    (results were independently reviewed byphysician and radiology report verified)    PAST MEDICAL, FAMILY AND SOCIAL HISTORY:  Past Medical History:   Diagnosis Date    Acute suppurative otitis media of right ear without spontaneous rupture of tympanic membrane 3/18/2019    AMAIRANI positive 5/6/2019    Anxiety     Essential hypertension 4/9/2021    History of depression     History of thyroid cancer     Major depressive disorder with single episode, in partial remission ([address] 1/24/2019    Panic attacks     Postoperative hypothyroidism 3/21/2018    Sjogren's syndrome with keratoconjunctivitis sicca ([facility] [address] 7/5/2019    Per rheumatology     Past Surgical History:   Procedure Laterality Date    BUNIONECTOMY   SECTION      LT x3    HERNIA REPAIR      THYROIDECTOMY      cancer     Family History   Problem Relation Age of Onset    Rheum Arthritis Mother     Hypertension Mother     Mult Sclerosis Father     Diabetes Father     Heart Attack Father         x3    Arthritis Sister     Rheum Arthritis Sister     Diabetes Maternal Grandmother     Cancer Maternal Grandmother         unsure of type    Cancer Paternal Grandfather         unsure of type    Diabetes Paternal Grandfather      Outpatient Medications Marked as Taking for the 21 encounter (Office Visit) with Derrick Jimenez MD   Medication Sig Dispense Refill    busPIRone (BUSPAR) 10 MG tablet TAKE 1 TABLET BY MOUTH THREE TIMES A  tablet 1    metoprolol succinate (TOPROL XL) 25 MG extended release tablet TAKE 1 TABLET BY MOUTH DAILY STOP HYDROCHLOROTHIAZIDE 12.5 MG 90 tablet 3    methylPREDNISolone (MEDROL, MARLYN,) 4 MG tablet Take by mouth, with food. Keep low carb, low salt diet while taking it 1 kit 0    triamcinolone (KENALOG) 0.1 % ointment Apply topically 2 times daily x 7 days. Avoid applying it on the skin folds, face, groin and neck 1 Tube 0    mupirocin (BACTROBAN) 2 % ointment Apply topically 2 times daily on the affected area BLISTERS for 7-10 days.  OK to substitute to cream 30 g 2    DULoxetine (CYMBALTA) 60 MG extended release capsule Take 1 capsule by mouth every morning With breakfast. Total dosage 90 mg /day 90 capsule 3    DULoxetine (CYMBALTA) 30 MG extended release capsule Take 1 capsule by mouth Daily with supper Total dosage 90 mg /day 90 capsule 3    levothyroxine (SYNTHROID) 100 MCG tablet Take 1 tablet by mouth every morning (before breakfast) 90 tablet 0    vitamin D3 (CHOLECALCIFEROL) 25 MCG (1000 UT) TABS tablet Take 1 tablet by mouth daily 90 tablet 1    atorvastatin (LIPITOR) 20 MG tablet Take 1 tablet by mouth daily From cardio, stopped pravachol 30 tablet 6    hydroCHLOROthiazide (MICROZIDE) 12.5 MG capsule Take 1 capsule by mouth every morning FROM CARDIO 30 capsule 0    Lactobacillus (ACIDOPHILUS) 100 MG CAPS TAKE 1 CAPSULE BY MOUTH TWICE A DAY      lactase (LACTOSE FAST ACTING RELIEF) 9000 units CHEW chewable tablet Take 1 tablet by mouth 3 times daily (with meals) 270 tablet 3    famotidine (PEPCID) 20 MG tablet Take 1 tablet by mouth 2 times daily 180 tablet 1    tears naturale II (CELLUGEL) SOLN ophthalmic solution Place 1 drop into both eyes 4 times daily as needed for Dry Eyes 15 mL 0       Sulfa antibiotics, Trazodone and nefazodone, and Flexeril [cyclobenzaprine]  Social History     Tobacco Use   Smoking Status Current Every Day Smoker    Packs/day: 1.00    Years: 25.00    Pack years: 25.00    Types: Cigarettes   Smokeless Tobacco Never Used      (If patient a smoker, smoking cessation counseling offered)   Social History     Substance and Sexual Activity   Alcohol Use No       REVIEW OF SYSTEMS:  Review of Systems    Physical Exam:    This a 64 y.o. female      Vitals:    08/24/21 1334   BP: 109/67   Pulse: 60   Temp: 97.4 °F (36.3 °C)     Body mass index is 27.07 kg/m². Physical Exam  Constitutional: Patient in no acute distress, ggod grooming, appropriately dressed  Neuro: Alert and oriented to person, place and time. Psych:Mood normal, affect normal  Skin: No rash noted  HEENT: Head: Normocephalic and atraumatic,Conjunctivae and EOM are normal,Nose- normal, Right/Left External Ear: normal, Mouth: Mucosa Moist  Neck: Supple  Lungs: Respiratory effort is normal  Cardiovascular: strong and regular, no lower leg edema  Abdomen: Soft, non-tender, non-distended with no CVA,    Lymphatics: No cervical palpable lymphadenopathy. Bladder non-tender and not distended. Musculoskeletal: Normal gait and station        Assessment and Plan      1. Microhematuria            Plan:         Has 5-10 RBC on micro  She is a smoker. Will need cysto and CTU.      Prescriptions Ordered:  No orders of

## 2021-08-27 ENCOUNTER — OFFICE VISIT (OUTPATIENT)
Dept: FAMILY MEDICINE CLINIC | Age: 61
End: 2021-08-27
Payer: COMMERCIAL

## 2021-08-27 VITALS
HEIGHT: 62 IN | BODY MASS INDEX: 27.05 KG/M2 | OXYGEN SATURATION: 96 % | HEART RATE: 79 BPM | WEIGHT: 147 LBS | TEMPERATURE: 97.5 F | DIASTOLIC BLOOD PRESSURE: 80 MMHG | SYSTOLIC BLOOD PRESSURE: 122 MMHG

## 2021-08-27 DIAGNOSIS — I10 ESSENTIAL HYPERTENSION: Primary | ICD-10-CM

## 2021-08-27 DIAGNOSIS — E89.0 POSTOPERATIVE HYPOTHYROIDISM: ICD-10-CM

## 2021-08-27 DIAGNOSIS — J44.9 CHRONIC OBSTRUCTIVE PULMONARY DISEASE, UNSPECIFIED COPD TYPE (HCC): ICD-10-CM

## 2021-08-27 DIAGNOSIS — E78.5 HYPERLIPIDEMIA WITH TARGET LDL LESS THAN 100: ICD-10-CM

## 2021-08-27 DIAGNOSIS — F33.2 SEVERE EPISODE OF RECURRENT MAJOR DEPRESSIVE DISORDER, WITHOUT PSYCHOTIC FEATURES (HCC): ICD-10-CM

## 2021-08-27 DIAGNOSIS — R06.09 DOE (DYSPNEA ON EXERTION): ICD-10-CM

## 2021-08-27 DIAGNOSIS — Z87.891 PERSONAL HISTORY OF TOBACCO USE: ICD-10-CM

## 2021-08-27 DIAGNOSIS — Z12.31 ENCOUNTER FOR SCREENING MAMMOGRAM FOR BREAST CANCER: ICD-10-CM

## 2021-08-27 PROCEDURE — G0296 VISIT TO DETERM LDCT ELIG: HCPCS | Performed by: FAMILY MEDICINE

## 2021-08-27 PROCEDURE — 99214 OFFICE O/P EST MOD 30 MIN: CPT | Performed by: FAMILY MEDICINE

## 2021-08-27 RX ORDER — MIRTAZAPINE 7.5 MG/1
7.5-15 TABLET, FILM COATED ORAL NIGHTLY
Qty: 60 TABLET | Refills: 0 | Status: SHIPPED | OUTPATIENT
Start: 2021-08-27 | End: 2021-09-18

## 2021-08-27 RX ORDER — ALBUTEROL SULFATE 90 UG/1
2 AEROSOL, METERED RESPIRATORY (INHALATION) EVERY 6 HOURS PRN
Qty: 1 INHALER | Refills: 1 | Status: SHIPPED | OUTPATIENT
Start: 2021-08-27 | End: 2021-10-11

## 2021-08-27 RX ORDER — TIOTROPIUM BROMIDE 18 UG/1
18 CAPSULE ORAL; RESPIRATORY (INHALATION) DAILY
Qty: 90 CAPSULE | Refills: 1 | Status: SHIPPED | OUTPATIENT
Start: 2021-08-27 | End: 2022-04-26 | Stop reason: SDUPTHER

## 2021-08-27 SDOH — ECONOMIC STABILITY: FOOD INSECURITY: WITHIN THE PAST 12 MONTHS, THE FOOD YOU BOUGHT JUST DIDN'T LAST AND YOU DIDN'T HAVE MONEY TO GET MORE.: NEVER TRUE

## 2021-08-27 SDOH — ECONOMIC STABILITY: FOOD INSECURITY: WITHIN THE PAST 12 MONTHS, YOU WORRIED THAT YOUR FOOD WOULD RUN OUT BEFORE YOU GOT MONEY TO BUY MORE.: NEVER TRUE

## 2021-08-27 ASSESSMENT — COLUMBIA-SUICIDE SEVERITY RATING SCALE - C-SSRS
1. WITHIN THE PAST MONTH, HAVE YOU WISHED YOU WERE DEAD OR WISHED YOU COULD GO TO SLEEP AND NOT WAKE UP?: NO
6. HAVE YOU EVER DONE ANYTHING, STARTED TO DO ANYTHING, OR PREPARED TO DO ANYTHING TO END YOUR LIFE?: NO
2. HAVE YOU ACTUALLY HAD ANY THOUGHTS OF KILLING YOURSELF?: NO

## 2021-08-27 ASSESSMENT — ENCOUNTER SYMPTOMS
NAUSEA: 0
CONSTIPATION: 0
WHEEZING: 0
SHORTNESS OF BREATH: 1
ABDOMINAL PAIN: 0
ABDOMINAL DISTENTION: 0
DIARRHEA: 0
VOMITING: 0
CHEST TIGHTNESS: 0
COUGH: 1

## 2021-08-27 ASSESSMENT — PATIENT HEALTH QUESTIONNAIRE - PHQ9
10. IF YOU CHECKED OFF ANY PROBLEMS, HOW DIFFICULT HAVE THESE PROBLEMS MADE IT FOR YOU TO DO YOUR WORK, TAKE CARE OF THINGS AT HOME, OR GET ALONG WITH OTHER PEOPLE: 1
7. TROUBLE CONCENTRATING ON THINGS, SUCH AS READING THE NEWSPAPER OR WATCHING TELEVISION: 3
9. THOUGHTS THAT YOU WOULD BE BETTER OFF DEAD, OR OF HURTING YOURSELF: 0
3. TROUBLE FALLING OR STAYING ASLEEP: 3
SUM OF ALL RESPONSES TO PHQ QUESTIONS 1-9: 20
2. FEELING DOWN, DEPRESSED OR HOPELESS: 2
5. POOR APPETITE OR OVEREATING: 3
4. FEELING TIRED OR HAVING LITTLE ENERGY: 3
6. FEELING BAD ABOUT YOURSELF - OR THAT YOU ARE A FAILURE OR HAVE LET YOURSELF OR YOUR FAMILY DOWN: 3
SUM OF ALL RESPONSES TO PHQ QUESTIONS 1-9: 20
1. LITTLE INTEREST OR PLEASURE IN DOING THINGS: 3
SUM OF ALL RESPONSES TO PHQ9 QUESTIONS 1 & 2: 5
SUM OF ALL RESPONSES TO PHQ QUESTIONS 1-9: 20
8. MOVING OR SPEAKING SO SLOWLY THAT OTHER PEOPLE COULD HAVE NOTICED. OR THE OPPOSITE, BEING SO FIGETY OR RESTLESS THAT YOU HAVE BEEN MOVING AROUND A LOT MORE THAN USUAL: 0

## 2021-08-27 ASSESSMENT — SOCIAL DETERMINANTS OF HEALTH (SDOH): HOW HARD IS IT FOR YOU TO PAY FOR THE VERY BASICS LIKE FOOD, HOUSING, MEDICAL CARE, AND HEATING?: NOT HARD AT ALL

## 2021-08-27 NOTE — PATIENT INSTRUCTIONS
Drug-Drug: mirtazapine and DULoxetine  Serotonergic effects of mirtazapine and serotonin/norepinephrine reuptake inhibitors (SNRIs) may be additive. The risk of serotonin syndrome/toxicity may be increased. Details  Override reason  Press F5 to access options       Seek medical attention right away if you have symptoms of serotonin syndrome, such as: agitation, hallucinations, fever, sweating, shivering, fast heart rate, muscle stiffness, twitching, loss of coordination, nausea, vomiting, or diarrhea. \"    ============    What is lung cancer screening? Lung cancer screening is a way in which doctors check the lungs for early signs of cancer in people who have no symptoms of lung cancer. A low-dose CT scan uses much less radiation than a normal CT scan and shows a more detailed image of the lungs than a standard X-ray. The goal of lung cancer screening is to find cancer early, before it has a chance to grow, spread, or cause problems. One large study found that smokers who were screened with low-dose CT scans were less likely to die of lung cancer than those who were screened with standard X-ray. Below is a summary of the things you need to know regarding screening for lung cancer with low-dose computed tomography (LDCT). This is a screening program that involves routine annual screening with LDCT studies of the lung. The LDCTs are done using low-dose radiation that is not thought to increase your cancer risk. If you have other serious medical conditions (other cancers, congestive heart failure) that limit your life expectancy to less than 10 years, you should not undergo lung cancer screening with LDCT. The chance is 20%-60% that the LDCT result will show abnormalities. This would require additional testing which could include repeat imaging or even invasive procedures. Most (about 95%) of \"abnormal\" LDCT results are false in the sense that no lung cancer is ultimately found.   Additionally, some (about 10%) of the cancers found would not affect your life expectancy, even if undetected and untreated. If you are still smoking, the single most important thing that you can do to reduce your risk of dying of lung cancer is to quit. For this screening to be covered by Medicare and most other insurers, strict criteria must be met. If you do not meet these criteria, but still wish to undergo LDCT testing, you will be required to sign a waiver indicating your willingness to pay for the scan.

## 2021-08-27 NOTE — PROGRESS NOTES
Logan Lynne (:  1960) is a 64 y.o. female,Established patient, here for evaluation of the following chief complaint(s): Hypertension, Hyperlipidemia, Thyroid Problem, Other (follow hem), and Shortness of Breath      ASSESSMENT/PLAN:    1. Essential hypertension  Well controlled. Continue current treatment. Hydrochlorothiazide 12.5 mg, advised to take with bananas    Will recheck labs. -     CBC; Future  -     Comprehensive Metabolic Panel; Future  Discussed low salt diet and BP and pulse monitoring. 2. Hyperlipidemia with target LDL less than 100  Inadequately controlled   Continue Lipitor 20 mg daily and recheck lipid profile  -     Lipid Panel; Future  3. RIZVI (dyspnea on exertion)  worsening    -     Miller Mcclure MD, Cardiology, [place] ---she is to schedule the stress test, The patient verbalizes understanding and agrees with the plan. -Suspect COPD, will need to rule out coronary artery disease  -     Full PFT Study With Bronchodilator; Future  -start     tiotropium (SPIRIVA HANDIHALER) 18 MCG inhalation capsule; Inhale 1 capsule into the lungs daily, Disp-90 capsule, R-1Normal  -  start   albuterol sulfate HFA (PROVENTIL HFA) 108 (90 Base) MCG/ACT inhaler; Inhale 2 puffs into the lungs every 6 hours as needed for Wheezing or Shortness of Breath (cough), Disp-1 Inhaler, R-1Normal  Smoking cessation strongly advised, she will start nicotine patches which she has at home    4. Severe episode of recurrent major depressive disorder, without psychotic features ([address]  Stable, but failed to improve  -start     mirtazapine (REMERON) 7.5 MG tablet; Take 1-2 tablets by mouth nightly For insomnia and anxiety, Disp-60 tablet, R-0Normal  To start cognitive behavioral therapy  To continue Cymbalta and [address]  5.  Postoperative hypothyroidism  Stable  Continue Synthroid 120 mcg  Advised to take Synthroid in the morning, on empty stomach, without any other meds and with a full glass of water. Recheck labs  -     TSH without Reflex; Future  -     T4, Free; Future  -     Vitamin D 25 Hydroxy; Future  6. Chronic obstructive pulmonary disease, unspecified COPD type ([address]  worsening    -start     tiotropium (SPIRIVA HANDIHALER) 18 MCG inhalation capsule; Inhale 1 capsule into the lungs daily, Disp-90 capsule, R-1Normal  -  start   albuterol sulfate HFA (PROVENTIL HFA) 108 (90 Base) MCG/ACT inhaler; Inhale 2 puffs into the lungs every 6 hours as needed for Wheezing or Shortness of Breath (cough), Disp-1 Inhaler, R-1Normal  Smoking cessation, has nicotine patches at home  7. Encounter for screening mammogram for breast cancer  -     [facility] JEANNIE DIGITAL SCREEN BILATERAL; Future  8. Personal history of tobacco use  -     MS VISIT TO DISCUSS LUNG CA SCREEN W LDCT  -     CT Lung Screen (Annual); Future    Low Dose CT (LDCT) Lung Screening criteria met   Age 50-69   Pack year smoking >30   Still smoking or less than 15 year since quit   No sign or symptoms of lung cancer   > 11 months since last LDCT     Risks and benefits of lung cancer screening with LDCT scans discussed:    Significance of positive screen - False-positive LDCT results often occur. 95% of all positive results do not lead to a diagnosis of cancer. Usually further imaging can resolve most false-positive results; however, some patients may require invasive procedures. Over diagnosis risk - 10% to 12% of screen-detected lung cancer cases are over diagnosed--that is, the cancer would not have been detected in the patient's lifetime without the screening. Need for follow up screens annually to continue lung cancer screening effectiveness     Risks associated with radiation from annual LDCT- Radiation exposure is about the same as for a mammogram, which is about 1/3 of the annual background radiation exposure from everyday life.   Starting screening at age 54 is not likely to increase cancer risk from radiation exposure. Patients with comorbidities resulting in life expectancy of < 10 years, or that would preclude treatment of an abnormality identified on CT, should not be screened due to lack of benefit. To obtain maximal benefit from this screening, smoking cessation and long-term abstinence from smoking is critical            Damaris Masterson received counseling on the following healthy behaviors: nutrition, exercise, medication adherence and tobacco cessation  Reviewed prior labs and health maintenance  Discussed use, benefit, and side effects of prescribed medications. Barriers to medication compliance addressed. Patient given educational materials - see patient instructions  Was a self-tracking handout given in paper form or via [employer]? Yes  All patient questions answered. Patient voiced understanding. The patient's past medical,surgical, social, and family history as well as her current medications and allergies were reviewed as documented in today's encounter. Medications, labs, diagnostic studies, consultations and follow-up as documented in this encounter. Return in about 3 months (around 2021). Data Unavailable    Future Appointments   Date Time Provider Ulysses Hickman   2021  2:15 PM Steva Spatz, MD [facility]   2021  2:00 PM Angel Green MD [address] [place] [address]   2021  1:30 PM Lynann Apgar, MD [facility]         SUBJECTIVE/OBJECTIVE:    Hypertension: Patient here for follow-up. She is not exercising and is adherent to low salt diet. Blood pressure is well controlled at home. Cardiac symptoms dyspnea and fatigue. Patient denies chest pain, chest pressure/discomfort, claudication, exertional chest pressure/discomfort, irregular heart beat, lower extremity edema, near-syncope, orthopnea, palpitations, paroxysmal nocturnal dyspnea, syncope and tachypnea. Cardiovascular risk factors: dyslipidemia, hypertension and smoking/ tobacco exposure.    Use of agents associated with hypertension: thyroid hormones. History of target organ damage: none. Saw Dr. Mariano Julian 2/1/2021, note in the chart in media,  said she needs stress test. I advised Sushila Galarza to make appointment for her stress test. The patient verbalizes understanding and agrees with the plan. EKG 2/21/2020  Has seen cardiology had Monitor, doesn't know the results. Prior EKG showed PVCs  Narrative & Impression    Sinus rhythm with occasional Premature ventricular complexes  Low Voltage  Nonspecific T wave abnormality  Abnormal ECG         BP controlled. Eric Morales reports compliance with BP medications, and tolerates them well, denies side effects. BP Readings from Last 3 Encounters:   08/27/21 122/80   08/24/21 109/67   07/15/21 134/80          Pulse is Normal.    Pulse Readings from Last 3 Encounters:   08/27/21 79   08/24/21 60   07/15/21 82       Weight has been stable   Wt Readings from Last 3 Encounters:   08/27/21 147 lb (66.7 kg)   08/24/21 148 lb (67.1 kg)   07/15/21 145 lb (65.8 kg)         Hyperlipidemia:  No new myalgias or GI upset on atorvastatin (Lipitor). Medication compliance: compliant all of the time. Patient is  following a low fat, low cholesterol diet. LDL is  high  Lab Results   Component Value Date    LDLCHOLESTEROL 124 06/08/2021     Lab Results   Component Value Date    TRIG 154 (H) 06/08/2021    TRIG 150 (H) 02/04/2021    TRIG 98 06/19/2020       Lakia complains of dyspnea on exertion worsening, all the time, coughing more at home  Has AC  Cough with mucus which is clear   Denies wheezing or chest pain      Nicotine dependence. Smoker, counseling given to quit smoking.  Has patches, she says she will start using them again    Ready to quit: Yes  Counseling given: Yes  Comment: started smoking at 31 yo    Depression  She says it is the same as before  She says the stress level is high, her son cannot go to [employer], has MRD, and doesn't have any time left for herself anymore. Cannot do crafting, lost interest     Not sleeping well  She says she is sleeping for 1 hr and then wakes up, gets max 2-4 hrs /night  Melatonin didn't help  Eating only supper, doesn't feel hungry much  Taking Cymbalta, Buspar, Tolerates medications well, denies side effects. PHQ-2 Over the past 2 weeks, how often have you been bothered by any of the following problems? Little interest or pleasure in doing things: Nearly every day  Feeling down, depressed, or hopeless: More than half the days  PHQ-2 Score: 5  PHQ-9 Over the past 2 weeks, how often have you been bothered by any of the following problems? Trouble falling or staying asleep, or sleeping too much: Nearly every day  Feeling tired or having little energy: Nearly every day  Poor appetite or overeating: Nearly every day  Feeling bad about yourself - or that you are a failure or have let yourself or your family down: Nearly every day  Trouble concentrating on things, such as reading the newspaper or watching television: Nearly every day  Moving or speaking so slowly that other people could have noticed. Or the opposite - being so fidgety or restless that you have been moving around a lot more than usual: Not at all  Thoughts that you would be better off dead, or of hurting yourself in some way: Not at all  If you checked off any problems, how difficult have these problems made it for you to do your work, take care of things at home, or get along with other people?: Somewhat difficult  PHQ-9 Total Score: 20       AMB C-SSRS Suicide Screening     1) Within the past month, have you wished you were dead or wished you could go to sleep and not wake up? NO   2) Have you actually had any thoughts of killing yourself? NO   6) Have you ever done anything, started to do anything, or prepared to do anything to end your life?  NO          [x]20-27 = Severe depression    PHQ Scores 8/27/2021 4/8/2021 12/29/2020 9/23/2020 6/19/2020 10/11/2019 5/3/2019   PHQ2 Score 5 5 2 5 1 1 2   PHQ9 Score 20 17 2 19 1 1 13     Hypothyroidism: Recent symptoms: fatigue and anxiety. She denies weight gain, weight loss, cold intolerance, heat intolerance, hair loss, diarrhea, edema, tremor, palpitations and dysphagia. Patient is  taking her medication consistently on an empty stomach. TSH is Normal.    No results found for: [facility]  Lab Results   Component Value Date    TSH 1.61 06/08/2021    TSH 5.77 (H) 02/04/2021    TSH 2.78 06/19/2020       She is due for Mammogram.   Denies breast pain, lumps or nipple discharge. She declines breast exam today. Prior to Visit Medications    Medication Sig Taking? Authorizing Provider   busPIRone (BUSPAR) 10 MG tablet TAKE 1 TABLET BY MOUTH THREE TIMES A DAY Yes Johny Chaudhry MD   DULoxetine (CYMBALTA) 60 MG extended release capsule Take 1 capsule by mouth every morning With breakfast. Total dosage 90 mg /day Yes Johny Chaudhry MD   DULoxetine (CYMBALTA) 30 MG extended release capsule Take 1 capsule by mouth Daily with supper Total dosage 90 mg /day Yes Johny Chaudhry MD   levothyroxine (SYNTHROID) 100 MCG tablet Take 1 tablet by mouth every morning (before breakfast) Yes Johny Chaudhry MD   vitamin D3 (CHOLECALCIFEROL) 25 MCG (1000 UT) TABS tablet Take 1 tablet by mouth daily Yes Johny Chaudhry MD   atorvastatin (LIPITOR) 20 MG tablet Take 1 tablet by mouth daily From cardio, stopped pravachol Yes Johny Chaudhry MD   hydroCHLOROthiazide (MICROZIDE) 12.5 MG capsule Take 1 capsule by mouth every morning FROM CARDIO Yes Johny Chaudhry MD   triamcinolone (KENALOG) 0.1 % ointment Apply topically 2 times daily x 7 days. Avoid applying it on the skin folds, face, groin and neck  Patient not taking: Reported on 8/27/2021  Johny Chaudhry MD   mupirocin (BACTROBAN) 2 % ointment Apply topically 2 times daily on the affected area BLISTERS for 7-10 days.  OK to substitute to cream  Patient not taking: Reported on 8/27/2021  Mariela Fowler MD   Lactobacillus (ACIDOPHILUS) 100 MG CAPS TAKE 1 CAPSULE BY MOUTH TWICE A DAY  Patient not taking: Reported on 8/27/2021  Historical Provider, MD   lactase (LACTOSE FAST ACTING RELIEF) 9000 units CHEW chewable tablet Take 1 tablet by mouth 3 times daily (with meals)  Patient not taking: Reported on 8/27/2021  Mariela Fowler MD   famotidine (PEPCID) 20 MG tablet Take 1 tablet by mouth 2 times daily  Patient not taking: Reported on 8/27/2021  Mariela Fowler MD   tears naturale II (CELLUGEL) SOLN ophthalmic solution Place 1 drop into both eyes 4 times daily as needed for Dry Eyes  Patient not taking: Reported on 8/27/2021  Mariela Fowler MD       Social History     Tobacco Use    Smoking status: Current Every Day Smoker     Packs/day: 1.00     Years: 30.00     Pack years: 30.00     Types: Cigarettes    Smokeless tobacco: Never Used    Tobacco comment: started smoking at 31 yo   Substance Use Topics    Alcohol use: No    Drug use: No         Review of Systems   Constitutional: Positive for fatigue. Negative for activity change, appetite change, chills, diaphoresis, fever and unexpected weight change. HENT: Negative for trouble swallowing. Respiratory: Positive for cough and shortness of breath. Negative for chest tightness and wheezing. Cardiovascular: Negative for chest pain, palpitations and leg swelling. Gastrointestinal: Negative for abdominal distention, abdominal pain, constipation, diarrhea, nausea and vomiting. Endocrine: Negative for cold intolerance, heat intolerance, polydipsia, polyphagia and polyuria. Allergic/Immunologic: Positive for environmental allergies. Neurological: Negative for tremors. Psychiatric/Behavioral: Positive for decreased concentration, dysphoric mood and sleep disturbance. Negative for self-injury and suicidal ideas.  The patient is nervous/anxious.          -vital signs stable and within normal limits except Overweight per BMI. /80   Pulse 79   Temp 97.5 °F (36.4 °C) (Temporal)   Ht 5' 2\" (1.575 m)   Wt 147 lb (66.7 kg)   LMP 06/04/2015   SpO2 96%   BMI 26.89 kg/m²        Physical Exam  Vitals and nursing note reviewed. Constitutional:       General: She is not in acute distress. Appearance: Normal appearance. She is well-developed. She is not diaphoretic. HENT:      Head: Normocephalic and atraumatic. Right Ear: External ear normal.      Left Ear: External ear normal.      Mouth/Throat:      Comments: I did not examine the mouth due to coronavirus pandemic and wearing masks    Eyes:      General: Lids are normal. No scleral icterus. Right eye: No discharge. Left eye: No discharge. Extraocular Movements: Extraocular movements intact. Conjunctiva/sclera: Conjunctivae normal.   Neck:      Thyroid: No thyromegaly. Cardiovascular:      Rate and Rhythm: Normal rate and regular rhythm. Heart sounds: Normal heart sounds. No murmur heard. Pulmonary:      Effort: Pulmonary effort is normal. No respiratory distress. Breath sounds: Examination of the right-lower field reveals decreased breath sounds. Examination of the left-lower field reveals decreased breath sounds. Decreased breath sounds present. No wheezing or rales. Comments: coughing when taking a deep breath  Chest:      Chest wall: No tenderness. Abdominal:      General: Bowel sounds are normal. There is no distension. Palpations: Abdomen is soft. There is no hepatomegaly or splenomegaly. Tenderness: There is no abdominal tenderness. Musculoskeletal:         General: No tenderness. Normal range of motion. Cervical back: Normal range of motion and neck supple. Right lower leg: No edema. Left lower leg: No edema. Skin:     General: Skin is warm and dry. Capillary Refill: Capillary refill takes less than 2 seconds. Findings: No rash. Neurological:      Mental Status: She is alert and oriented to person, place, and time. Cranial Nerves: No cranial nerve deficit. Motor: No abnormal muscle tone. Psychiatric:         Mood and Affect: Mood is anxious and depressed. Affect is labile. Behavior: Behavior normal.         Thought Content: Thought content normal.         Judgment: Judgment normal.           I personally reviewed testing with patient and all questions fully answered. CO increased  Says her CO monitor shows there is no leak  She is trying to quit smoking  Polycythemia  Hyperlipidemia    Otherwise labs within normal limits    Hospital Outpatient Visit on 08/10/2021   Component Date Value Ref Range Status    JAK2 QNT, Source 08/10/2021 Whole Blood   Corrected    CORRECTED ON 08/19 AT 0441: PREVIOUSLY REPORTED AS . BLOOD    V617F Mutation, Qnt 08/10/2021 Not Detected   Final    Comment: (NOTE)  There is no evidence of the JAK2 V617F mutation by ddPCR analysis. This result does not entirely exclude the possibility that a point   mutation exists in the sample below the detection limit of the   test (0.5 percent), nor does it exclude mutations other than the   V617F mutation. This result has been reviewed and approved by Jamir Howe M.D. INTERPRETIVE INFORMATION: JAK2 (V617F) Mutation by ddPCR,                           Quant  This assay is designed to detect the point mutation c.1849G>T   (V617F) of the JAK2 gene. JAK2 V617F mutations are present in   patients with myeloproliferative neoplasms. Methodology: DNA from whole blood or bone marrow specimens is   amplified in an allele-specific droplet digital PCR (ddPCR)   multiplex reaction targeting the JAK2 c.1849G>T single nucleotide   mutation encoding the V617F mutation. Results are reported as a   percent mutated alleles versus wild type alleles. The limit of   detection for this assay is 0.5 percent mutated                            alleles.   Limitations: Variants in genes other than JAK2 are not detected. Variant alleles of JAK2 other than V617F (c.1849G>T) are not   reported. Samples with JAK2 V617F mutations below the limit of   reporting may not be detected. Results of this test must always be interpreted in the context of   morphologic and other relevant data, and should not be used alone   for a diagnosis of malignancy. This test is not intended to detect   minimal residual disease. This test was developed and its performance characteristics   determined by [facility]. It has not been cleared or   approved by the [address] [facility]. This test was   performed in a CLIA certified laboratory and is intended for   clinical purposes. Performed by [facility],  [facility], [address] [facility] [phone]  [place]. [employer]  Lui Encinas MD, Lab. Director      V617 Mutation, Percent 08/10/2021 0.0  % Final    Pathologist Review 08/10/2021 SEE REPORT   Final    Comment:       REVIEWING PATHOLOGIST:  Angelo Slaughter M.D.      Ferritin 08/10/2021 66  13 - 150 ug/L Final    Iron 08/10/2021 98  37 - 145 ug/dL Final    TIBC 08/10/2021 265  250 - 450 ug/dL Final    Iron Saturation 08/10/2021 37  20 - 55 % Final    UIBC 08/10/2021 167  112 - 347 ug/dL Final    Carboxyhemoglobin 08/10/2021 8.3* 0 - 5 % Final    Comment:       Reference Range:  Non-Smokers     0-2%  Average Smoker  2-4%  Heavy Smoker    <10%            Erythropoietin 08/10/2021 8  4 - 27 mU/mL Final    Comment: (NOTE)  INTERPRETIVE INFORMATION: Erythropoietin  Normal serum concentrations of erythropoietin for 95% of   individuals with normal hematocrits range from 4-27 mU/mL. As the hematocrit is lowered by iron deficiency, aplastic, or   hemolytic anemia, the concentration of erythropoietin increases as   shown in the graph below.  In the absence of anemia, elevated   concentrations are seen in renal tumors, as a manifestation of   renal transplant rejection, and in secondary polycythemia. Low   values may be observed in hemochromatosis. Expected Erythropoietin Concentrations in Patients                    with Uncomplicated Anemia    Erythropoietin (mU/mL)               100,000 - +                         +                10,000 - +. ..... .                         + . ... [place] Stands .                 1,000 - +    . ... [place] St[place] Stands .                         +     . ...... [place] 100 - +       . ...... .                         +        . ...... [place] 10 - +          . ...... [place] St[place] +---+---+---+---+---+---+                        10   20  30  40  50  60  70                               (Hematocrit %)           (Contributions To Nephrology [id number])  Decreased erythropoietin concentrations with an elevated   hematocrit are observed in patients with polycythemia rubra vera,   and with a decreased hematocrit in patients with HIV infection who   are receiving AZT. Patients on AZT who have anemia and   erythropoietin concentrations of less than or equal to 500 mU/mL   may benefit from therapy with recombinant EPO ([facility]   [id number]). Performed By: [facility] [address]  [place], [address] [facility]  : Delaney Nava.  Diogo Huerta MD      WBC 08/10/2021 6.8  3.5 - 11.0 k/uL Final    RBC 08/10/2021 5.04  4.0 - 5.2 m/uL Final    Hemoglobin 08/10/2021 15.4  12.0 - 16.0 g/dL Final    Hematocrit 08/10/2021 45.5  36 - 46 % Final    MCV 08/10/2021 90.2  80 - 100 fL Final    MCH 08/10/2021 30.5  26 - 34 pg Final    MCHC 08/10/2021 33.8  31 - 37 g/dL Final    RDW 08/10/2021 13.7  11.5 - 14.9 % Final    Platelets 92/56/0018 286  150 - 450 k/uL Final    MPV 08/10/2021 8.2  6.0 - 12.0 fL Final    NRBC Automated 08/10/2021 NOT REPORTED  per 100 WBC Final    Differential Type 08/10/2021 NOT REPORTED   Final    Seg Neutrophils 08/10/2021 57  36 - 66 % Final    Lymphocytes 08/10/2021 30  24 - 44 % Final    Monocytes 08/10/2021 9* 1 - 7 % Final    Eosinophils % 08/10/2021 3  0 - 4 % Final    Basophils 08/10/2021 1  0 - 2 % Final    Immature Granulocytes 08/10/2021 NOT REPORTED  0 % Final    Segs Absolute 08/10/2021 3.90  1.3 - 9.1 k/uL Final    Absolute Lymph # 08/10/2021 2.00  1.0 - 4.8 k/uL Final    Absolute Mono # 08/10/2021 0.60  0.1 - 1.3 k/uL Final    Absolute Eos # 08/10/2021 0.20  0.0 - 0.4 k/uL Final    Basophils Absolute 08/10/2021 0.10  0.0 - 0.2 k/uL Final    Absolute Immature Granulocyte 08/10/2021 NOT REPORTED  0.00 - 0.30 k/uL Final    WBC Morphology 08/10/2021 NOT REPORTED   Final    RBC Morphology 08/10/2021 NOT REPORTED   Final    Platelet Estimate 40/58/4833 NOT REPORTED   Final    Retic % 08/10/2021 0.7  0.5 - 2.0 % Final    Absolute Retic # 08/10/2021 0.035  0.0245 - 0.098 M/uL Final    Immature Retic Fract 08/10/2021 NOT REPORTED  % Final    Retic Hemoglobin 08/10/2021 NOT REPORTED  28.2 - 35.7 pg Final    Surgical Pathology Report 08/10/2021    Final                    Value:[id number]  [facility]  ANATOMIC PATHOLOGY  [address] [place] [address]. [place], [address]  [phone]  Fax: [phone]  SURGICAL PATHOLOGY CONSULTATION    Patient Name: Donny Brian  MR#: [id number]  Specimen #[id number]    Procedures/Addenda  PERIPHERAL BLOOD REPORT     Date Ordered:     8/11/2021     Status:  Signed Out       Date Complete:     8/11/2021     By: Cary Lindquist M.D. Date Reported:     8/11/2021       INTERPRETATION  PERIPHERAL BLOOD:   ESSENTIALLY NORMAL CBC, DIFFERENTIAL AND PLATELET COUNT. NO  MORPHOLOGIC ABNORMALITIES.     RESULTS-COMMENTS                           PERIPHERAL BLOOD STUDY    HEMOGRAM                              DIFFERENTIAL %     ABSOLUTE  (K/UL)    WBC (K/uL)     6.8                                          RBC (K/uL)     5.04               SEGS               57.4          3.9  HGB (G/dL)     15.4 LYMPHS          29.8          2.0  HCT (%)     45.5                                                               MCV (FL.)     90.2               MONOS          8.9          0.6  MCH (PG.)     30.5               EOS               2.8          0.2  MCHC (g/dL)     33.8               BASO               1.1          0.1  RDW (%)     13.7               NRBC               0.1          0.01                       PLT (k/uL)     438                                                                  PERIPHERAL EXAMINATION BY PATHOLOGIST    PLATELETS:       Normal    LEUKOCYTES: Normal    ERYTHROCYTES:   Normal     Paul Madrid M.D.                    Source:  1: PERIPHERAL BLOOD FOR REVIEW BY PATHOLOGIST               Lab Results   Component Value Date     06/08/2021    K 4.3 06/08/2021     06/08/2021    CO2 29 06/08/2021    BUN 10 06/08/2021    CREATININE 0.64 06/08/2021    GLUCOSE 74 06/08/2021    CALCIUM 9.3 06/08/2021        Lab Results   Component Value Date    ALT 11 02/04/2021    AST 14 02/04/2021    ALKPHOS 83 02/04/2021    BILITOT 0.31 02/04/2021       Lab Results   Component Value Date    TSH 1.61 06/08/2021       Lab Results   Component Value Date    CHOL 204 (H) 06/08/2021    CHOL 222 (H) 02/04/2021    CHOL 206 (H) 06/19/2020     Lab Results   Component Value Date    TRIG 154 (H) 06/08/2021    TRIG 150 (H) 02/04/2021    TRIG 98 06/19/2020     Lab Results   Component Value Date    HDL 49 06/08/2021    HDL 55 02/04/2021    HDL 57 06/19/2020     Lab Results   Component Value Date    LDLCHOLESTEROL 124 06/08/2021    LDLCHOLESTEROL 137 (H) 02/04/2021    LDLCHOLESTEROL 129 06/19/2020     Lab Results   Component Value Date    CHOLHDLRATIO 4.2 06/08/2021    CHOLHDLRATIO 4.0 02/04/2021    CHOLHDLRATIO 3.6 06/19/2020       No results found for: [id number]  No results found for: FOLATE  Lab Results   Component Value Date    VITD25 32.7 06/19/2020         Orders Placed This Encounter   Medications    mirtazapine (REMERON) 7.5 MG tablet     Sig: Take 1-2 tablets by mouth nightly For insomnia and anxiety     Dispense:  60 tablet     Refill:  0    tiotropium (SPIRIVA HANDIHALER) 18 MCG inhalation capsule     Sig: Inhale 1 capsule into the lungs daily     Dispense:  90 capsule     Refill:  1    albuterol sulfate HFA (PROVENTIL HFA) 108 (90 Base) MCG/ACT inhaler     Sig: Inhale 2 puffs into the lungs every 6 hours as needed for Wheezing or Shortness of Breath (cough)     Dispense:  1 Inhaler     Refill:  1       Orders Placed This Encounter   Procedures    CT Lung Screen (Annual)     Age: Patient is 64 y.o. Smoking History: Social History    Tobacco Use      Smoking status: Current Every Day Smoker        Packs/day: 1.00        Years: 25.00        Pack years: 25        Types: Cigarettes      Smokeless tobacco: Never Used    Alcohol use: No    Drug use: No   Pack years: 25    Date of last lung cancer screening: No previous lung cancer screening exam     Standing Status:   Future     Standing Expiration Date:   2/27/2023     Order Specific Question:   Is there documentation of shared decision making? Answer:   Yes     Order Specific Question:   Is this a low dose CT or a routine CT? Answer:   Low Dose CT [1]     Order Specific Question:   Is this the first (baseline) CT or an annual exam?     Answer:   Baseline [1]     Order Specific Question:   Does the patient show any signs or symptoms of lung cancer? Answer:   Yes     Order Specific Question:   Smoking Status? Answer:   Current Every Day Smoker [1]     Order Specific Question:   Smoking packs per day? Answer:   1     Order Specific Question:   Years smoking?      Answer:   27    GERMAIN JEANNIE DIGITAL SCREEN BILATERAL     Standing Status:   Future     Standing Expiration Date:   10/27/2022    CBC     Standing Status:   Future     Standing Expiration Date:   8/27/2022    Comprehensive Metabolic Panel     Fasting 8 hrs     Standing Status: Future     Standing Expiration Date:   8/27/2022    Lipid Panel     Standing Status:   Future     Standing Expiration Date:   8/27/2022     Order Specific Question:   Is Patient Fasting?/# of Hours     Answer:   yes, 8-10 hours    TSH without Reflex     Standing Status:   Future     Standing Expiration Date:   8/27/2022    T4, Free     Standing Status:   Future     Standing Expiration Date:   8/27/2022    Vitamin D 25 Hydroxy     Standing Status:   Future     Standing Expiration Date:   8/27/2022    ERYN - Amaris Kenny MD, Cardiology, [place]     Referral Priority:   Routine     Referral Type:   Eval and Treat     Referral Reason:   Specialty Services Required     Referred to Provider:   Jose Juan Narayanan MD     Requested Specialty:   Cardiology     Number of Visits Requested:   [address]     Referral Priority:   Routine     Referral Type:   Eval and Treat     Referral Reason:   Specialty Services Required     Referred to Provider:   Peter Zavala PhD     Requested Specialty:   [facility]     Number of Visits Requested:   1    Full PFT Study With Bronchodilator     Standing Status:   Future     Standing Expiration Date:   8/27/2022     Scheduling Instructions:      Pre- and post bronchodilator    [facility] VISIT TO DISCUSS LUNG CA SCREEN W LDCT       Medications Discontinued During This Encounter   Medication Reason    methylPREDNISolone (MEDROL, MARLYN,) 4 MG tablet Therapy completed    metoprolol succinate (TOPROL XL) 25 MG extended release tablet     Lactobacillus (ACIDOPHILUS) 100 MG CAPS Therapy completed    lactase (LACTOSE FAST ACTING RELIEF) 9000 units CHEW chewable tablet Therapy completed    famotidine (PEPCID) 20 MG tablet Therapy completed    tears naturale II (CELLUGEL) SOLN ophthalmic solution Therapy completed    triamcinolone (KENALOG) 0.1 % ointment Therapy completed    mupirocin (BACTROBAN) 2 % ointment Therapy completed         On this date 8/27/2021 I have spent 35 minutes reviewing previous notes, test results and face to face with the patient discussing the diagnosis and importance of compliance with the treatment plan as well as documenting on the day of the visit. This note was completed by using the assistance of a speech-recognition program. However, inadvertent computerized transcription errors may be present. Although every effort was made to ensure accuracy, no guarantees can be provided that every mistake has been identified and corrected by editing. An electronic signature was used to authenticate this note.   Electronically signed by Kristy Pastrana MD on 8/28/2021 at 6:49 PM

## 2021-08-28 PROBLEM — J44.9 CHRONIC OBSTRUCTIVE PULMONARY DISEASE (HCC): Status: ACTIVE | Noted: 2021-08-28

## 2021-08-28 ASSESSMENT — ENCOUNTER SYMPTOMS: TROUBLE SWALLOWING: 0

## 2021-09-01 DIAGNOSIS — R31.29 MICROHEMATURIA: Primary | ICD-10-CM

## 2021-09-14 ENCOUNTER — HOSPITAL ENCOUNTER (OUTPATIENT)
Age: 61
Setting detail: SPECIMEN
Discharge: HOME | End: 2021-09-14
Payer: COMMERCIAL

## 2021-09-14 ENCOUNTER — PROCEDURE VISIT (OUTPATIENT)
Dept: UROLOGY | Age: 61
End: 2021-09-14
Payer: COMMERCIAL

## 2021-09-14 VITALS — TEMPERATURE: 96.7 F | BODY MASS INDEX: 27.05 KG/M2 | WEIGHT: 147 LBS | HEIGHT: 62 IN

## 2021-09-14 DIAGNOSIS — R31.29 MICROHEMATURIA: Primary | ICD-10-CM

## 2021-09-14 DIAGNOSIS — R31.29 MICROHEMATURIA: ICD-10-CM

## 2021-09-14 PROCEDURE — 99999 PR OFFICE/OUTPT VISIT,PROCEDURE ONLY: CPT | Performed by: UROLOGY

## 2021-09-14 PROCEDURE — 52000 CYSTOURETHROSCOPY: CPT | Performed by: UROLOGY

## 2021-09-14 NOTE — PROGRESS NOTES
Cystoscopy Operative Note (9/14/21):  Surgeon: Zo Monaco MD  Anesthesia: Urethral 2% Xylocaine  Indications: Microhematuria  Position: Dorsal Lithotomy    Findings:   Risks and Benefits discussed with patient prior to procedure. The patient was prepped and draped in the usual sterile fashion. The flexible cystoscope was advanced through the urethra and into the bladder. The bladder was thoroughly inspected and the following was noted:    Vagina: normal appearing vagina with normal color and discharge, no lesions  Residual Urine: mild  Urethra: not indicated and normal appearing urethra with no masses, tenderness or lesions  Bladder: No tumors or CIS noted. No bladder diverticulum. There was mild trabeculation noted. Ureters: Clear efflux from both ureters. Orifices with normal configuration and location. The cystoscope was removed. The patient tolerated the procedure well. Agree with the ROS entered by the MA. Multiple areas of chronic cystitis noted. No tumor seen. Cytology sent.

## 2021-09-15 LAB
CASE NUMBER:: NORMAL
SPECIMEN DESCRIPTION: NORMAL
SURGICAL PATHOLOGY REPORT: NORMAL

## 2021-09-15 NOTE — RESULT ENCOUNTER NOTE
Mychart comment sent to patient.   Negative for malignancy  Future Appointments  11/30/2021 1:30 PM    Bette Silveira MD     [facility]  3/14/2022  1:30 PM    Nayeli Caldwell

## 2021-09-18 DIAGNOSIS — F33.2 SEVERE EPISODE OF RECURRENT MAJOR DEPRESSIVE DISORDER, WITHOUT PSYCHOTIC FEATURES (HCC): ICD-10-CM

## 2021-09-18 RX ORDER — MIRTAZAPINE 7.5 MG/1
7.5-15 TABLET, FILM COATED ORAL NIGHTLY
Qty: 60 TABLET | Refills: 5 | Status: SHIPPED | OUTPATIENT
Start: 2021-09-18 | End: 2021-12-06

## 2021-09-18 NOTE — TELEPHONE ENCOUNTER
Please Approve or Refuse.   Send to Pharmacy per Pt's Request:      Next Visit Date:  11/30/2021   Last Visit Date: 8/27/2021    Hemoglobin A1C (%)   Date Value   03/21/2018 5.5             ( goal A1C is < 7)   BP Readings from Last 3 Encounters:   08/27/21 122/80   08/24/21 109/67   07/15/21 134/80          (goal 120/80)  BUN   Date Value Ref Range Status   06/08/2021 10 8 - 23 mg/dL Final     CREATININE   Date Value Ref Range Status   06/08/2021 0.64 0.50 - 0.90 mg/dL Final     Potassium   Date Value Ref Range Status   06/08/2021 4.3 3.7 - 5.3 mmol/L Final

## 2021-10-01 DIAGNOSIS — E89.0 POSTOPERATIVE HYPOTHYROIDISM: ICD-10-CM

## 2021-10-01 DIAGNOSIS — Z85.850 HISTORY OF THYROID CANCER: ICD-10-CM

## 2021-10-01 RX ORDER — LEVOTHYROXINE SODIUM 0.1 MG/1
TABLET ORAL
Qty: 90 TABLET | Refills: 1 | Status: SHIPPED | OUTPATIENT
Start: 2021-10-01 | End: 2022-04-26 | Stop reason: SDUPTHER

## 2021-10-10 DIAGNOSIS — J44.9 CHRONIC OBSTRUCTIVE PULMONARY DISEASE, UNSPECIFIED COPD TYPE (HCC): ICD-10-CM

## 2021-10-10 DIAGNOSIS — R06.09 DOE (DYSPNEA ON EXERTION): ICD-10-CM

## 2021-10-11 RX ORDER — ALBUTEROL SULFATE 90 UG/1
2 AEROSOL, METERED RESPIRATORY (INHALATION) EVERY 6 HOURS PRN
Qty: 8 EACH | Refills: 5 | Status: SHIPPED | OUTPATIENT
Start: 2021-10-11 | End: 2022-09-15 | Stop reason: SDUPTHER

## 2021-11-29 ASSESSMENT — PATIENT HEALTH QUESTIONNAIRE - PHQ9
SUM OF ALL RESPONSES TO PHQ9 QUESTIONS 1 & 2: 6
5. POOR APPETITE OR OVEREATING: 0
6. FEELING BAD ABOUT YOURSELF - OR THAT YOU ARE A FAILURE OR HAVE LET YOURSELF OR YOUR FAMILY DOWN: 1
SUM OF ALL RESPONSES TO PHQ QUESTIONS 1-9: 16
3. TROUBLE FALLING OR STAYING ASLEEP: 3
1. LITTLE INTEREST OR PLEASURE IN DOING THINGS: 3
7. TROUBLE CONCENTRATING ON THINGS, SUCH AS READING THE NEWSPAPER OR WATCHING TELEVISION: 3
2. FEELING DOWN, DEPRESSED OR HOPELESS: 3
SUM OF ALL RESPONSES TO PHQ QUESTIONS 1-9: 16
10. IF YOU CHECKED OFF ANY PROBLEMS, HOW DIFFICULT HAVE THESE PROBLEMS MADE IT FOR YOU TO DO YOUR WORK, TAKE CARE OF THINGS AT HOME, OR GET ALONG WITH OTHER PEOPLE: 2
4. FEELING TIRED OR HAVING LITTLE ENERGY: 3
SUM OF ALL RESPONSES TO PHQ QUESTIONS 1-9: 16
8. MOVING OR SPEAKING SO SLOWLY THAT OTHER PEOPLE COULD HAVE NOTICED. OR THE OPPOSITE, BEING SO FIGETY OR RESTLESS THAT YOU HAVE BEEN MOVING AROUND A LOT MORE THAN USUAL: 0
9. THOUGHTS THAT YOU WOULD BE BETTER OFF DEAD, OR OF HURTING YOURSELF: 0

## 2021-11-30 ENCOUNTER — TELEMEDICINE (OUTPATIENT)
Dept: FAMILY MEDICINE CLINIC | Age: 61
End: 2021-11-30
Payer: COMMERCIAL

## 2021-11-30 DIAGNOSIS — Z85.850 HISTORY OF THYROID CANCER: ICD-10-CM

## 2021-11-30 DIAGNOSIS — Z12.11 SCREEN FOR COLON CANCER: ICD-10-CM

## 2021-11-30 DIAGNOSIS — J44.9 CHRONIC OBSTRUCTIVE PULMONARY DISEASE, UNSPECIFIED COPD TYPE (HCC): Primary | ICD-10-CM

## 2021-11-30 DIAGNOSIS — I10 ESSENTIAL HYPERTENSION: ICD-10-CM

## 2021-11-30 DIAGNOSIS — E89.0 POSTOPERATIVE HYPOTHYROIDISM: ICD-10-CM

## 2021-11-30 DIAGNOSIS — Z12.31 ENCOUNTER FOR SCREENING MAMMOGRAM FOR BREAST CANCER: ICD-10-CM

## 2021-11-30 DIAGNOSIS — F33.1 MODERATE EPISODE OF RECURRENT MAJOR DEPRESSIVE DISORDER (HCC): ICD-10-CM

## 2021-11-30 DIAGNOSIS — E55.9 VITAMIN D DEFICIENCY: ICD-10-CM

## 2021-11-30 DIAGNOSIS — E78.5 HYPERLIPIDEMIA WITH TARGET LDL LESS THAN 100: ICD-10-CM

## 2021-11-30 PROCEDURE — 99214 OFFICE O/P EST MOD 30 MIN: CPT | Performed by: FAMILY MEDICINE

## 2021-11-30 ASSESSMENT — ENCOUNTER SYMPTOMS
DIARRHEA: 0
ABDOMINAL DISTENTION: 0
CONSTIPATION: 0
SHORTNESS OF BREATH: 1
COUGH: 1
CHEST TIGHTNESS: 0
ABDOMINAL PAIN: 0
NAUSEA: 0
VOMITING: 0
TROUBLE SWALLOWING: 0
WHEEZING: 0

## 2021-11-30 NOTE — PROGRESS NOTES
2021    TELEHEALTH EVALUATION -- Audio/Visual (During VQDEM-88 public health emergency)      Kalpesh Hays (:  1960) is a 64 y.o. female,Established patient, here for evaluation of the following chief complaint(s): Thyroid Problem, Hyperlipidemia, Depression, and Other (testing not done)        ASSESSMENT/PLAN:    1. Chronic obstructive pulmonary disease, unspecified COPD type ([facility] 75.)  Worsening  -     Full PFT Study With Bronchodilator; Future  -    To start mometasone Furo-Formoterol Fum 50-5 MCG/ACT AERO; Inhale 2 puffs into the lungs 2 times daily, Disp-13 g, R-3Normal  To continue Spiriva, albuterol as needed, smoking cessation  2. Essential hypertension  Very likely Well controlled per patient's report and most recent blood pressure readings  BP Readings from Last 3 Encounters:   21 122/80   21 109/67   07/15/21 134/80       Continue current treatment hydrochlorothiazide 12.5 mg daily, to eat high potassium diet, advised to eat 1 banana every day. Will recheck labs. -     CBC; Future  -     Comprehensive Metabolic Panel; Future  3. Hyperlipidemia with target LDL less than 100  Inadequately controlled  Continue Lipitor 20 mg daily  -     Lipid Panel; Future  4. Postoperative hypothyroidism  Likely stable  Check thyroid function, continue Synthroid 100 MCG daily  Advised to take Synthroid in the morning, on empty stomach, without any other meds and with a full glass of water.     -     TSH without Reflex; Future  -     T4, Free; Future  5. Moderate episode of recurrent major depressive disorder ([facility]  Improving but still moderately severe  Continue current treatment , Cymbalta and BuSpar, and follow up with psychiatrist and psychologist as scheduled. 6. Vitamin D deficiency  Will recheck level  Continue supplementation.    -     Vitamin D 25 Hydroxy; Future  7. History of thyroid cancer  -     TSH without Reflex; Future  -     T4, Free; Future  8.  Encounter for screening mammogram for breast cancer  -     [facility] JEANNIE DIGITAL SCREEN BILATERAL; Future  9. Screen for colon cancer  -     Cologuard (For External Results Only); Future      Will schedule PFTs, mammogram and CT lung screening, I gave her contact information to schedule    Patient was strongly to get her COVID-19 vaccine, Pfizer at the pharmacy, she says she will look into it  Continue to work on smoking cessation    Jeferson Kee received counseling on the following healthy behaviors: nutrition, exercise, medication adherence and tobacco cessation  Reviewed prior labs and health maintenance  Discussed use, benefit, and side effects of prescribed medications. Barriers to medication compliance addressed. Patient given educational materials - see patient instructions  All patient questions answered. Patient voiced understanding. The patient's past medical,surgical, social, and family history as well as her current medications and allergies were reviewed as documented in today's encounter. Medications, labs, diagnostic studies, consultations and follow-up as documented in this encounter. Return in about 4 months (around 2022) for Face-2F-30mins PHYSICAL, VISION screen, PHQ9. .    BP reading when checking out      Future Appointments   Date Time Provider Ulysses Hickman   3/14/2022  1:30 PM Katia Cummings, APRN - [facility]  Temi Morgan (:  1960) has requested an audio/video evaluation for the following concern(s):Thyroid Problem, Hyperlipidemia, Depression, and Other (testing not done)    Barely eating but gaining weight  She says she feels tired all the time  5 people close to her had COVID 23 and she had COVID-19 infection about upset 114  5 4 weeks ago      COPD:  Current treatment includes short-acting beta agonist inhaler, nebulized beta agonist, anticholinergic inhaler, which has been somewhat effective.     Residual symptoms: chronic dyspnea and cough productive of clear sputum in moderate amounts. She denies purulent sputum, wheezing, chest tightness, chest pain. She requires her rescue inhaler 1-2 time(s) per day. Nicotine dependence. Smoker, counseling given to quit smoking. 1 PPD, now 1/2 PPD less, she is currently cutting down smoking    Ready to quit: No  Counseling given: Yes  Comment: started smoking at 33 yo    Hypertension:    she  is not exercising, but staying very active, and is adherent to low salt diet. Blood pressure is well controlled at home, she says she will give me a blood pressure reading when we check her out. Cardiac symptoms dyspnea and fatigue. Patient denies chest pain, chest pressure/discomfort, claudication, exertional chest pressure/discomfort, irregular heart beat, lower extremity edema, near-syncope, orthopnea, palpitations, paroxysmal nocturnal dyspnea, syncope and tachypnea. Cardiovascular risk factors: dyslipidemia, hypertension, sedentary lifestyle and smoking/ tobacco exposure. Use of agents associated with hypertension: thyroid hormones. History of target organ damage: none. EKG 2/21/2020 showed PVCs and nonspecific T wave abnormality, she was referred to cardiologist and she did ago      blood pressure is Normal. Will give us BP reading     BP Readings from Last 3 Encounters:   08/27/21 122/80   08/24/21 109/67   07/15/21 134/80        Pulse is Normal.    Pulse Readings from Last 3 Encounters:   08/27/21 79   08/24/21 60   07/15/21 82       Hypothyroidism, patient has history of thyroid cancer and thyroidectomy: Recent symptoms: fatigue, anxiety and weight gain. She denies weight loss, cold intolerance, heat intolerance, hair loss, dry skin, constipation, diarrhea, edema, tremor, palpitations and dysphagia. Patient is  taking her medication consistently on an empty stomach.     TSH is Normal.    No results found for: [facility]  Lab Results   Component Value Date    TSH 1.61 06/08/2021    TSH 5.77 (H) 02/04/2021 TSH 2.78 06/19/2020         Hyperlipidemia:  No new myalgias or GI upset on atorvastatin (Lipitor). Medication compliance: compliant all of the time. Patient is  following a low fat, low cholesterol diet. LDL is high with high triglycerides  Lab Results   Component Value Date    LDLCHOLESTEROL 124 06/08/2021     Lab Results   Component Value Date    TRIG 154 (H) 06/08/2021    TRIG 150 (H) 02/04/2021    TRIG 98 06/19/2020     Major Depression Disorder  Improved depression  She sees psychiatrist, taking Cymbalta 90 mg daily BuSpar as needed for anxiety  She says she is not taking mirtazapine anymore      PHQ-2 Over the past 2 weeks, how often have you been bothered by any of the following problems? Little interest or pleasure in doing things: Nearly every day  Feeling down, depressed, or hopeless: Nearly every day  PHQ-2 Score: 6  PHQ-9 Over the past 2 weeks, how often have you been bothered by any of the following problems? Trouble falling or staying asleep, or sleeping too much: Nearly every day  Feeling tired or having little energy: Nearly every day  Poor appetite or overeating: Not at all  Feeling bad about yourself - or that you are a failure or have let yourself or your family down: Several days  Trouble concentrating on things, such as reading the newspaper or watching television: Nearly every day  Moving or speaking so slowly that other people could have noticed.  Or the opposite - being so fidgety or restless that you have been moving around a lot more than usual: Not at all  Thoughts that you would be better off dead, or of hurting yourself in some way: Not at all  If you checked off any problems, how difficult have these problems made it for you to do your work, take care of things at home, or get along with other people?: Very difficult  PHQ-9 Total Score: 16      AMB C-SSRS Suicide Screening     1) Within the past month, have you wished you were dead or wished you could go to sleep and not wake up? NO   2) Have you actually had any thoughts of killing yourself? NO   6) Have you ever done anything, started to do anything, or prepared to do anything to end your life? NO         Moderate depression  PHQ Scores 11/29/2021 8/27/2021 4/8/2021 12/29/2020 9/23/2020 6/19/2020 10/11/2019   PHQ2 Score 6 5 5 2 5 1 1   PHQ9 Score 16 20 17 2 19 1 1     Jessica Logan has Vitamin D deficiency. Jessica Logan  is  taking Vitamin D supplementation   she feels tired. Lowest vitamin D level was 12.4 on 1/6/2020, 8.3 on 5/3/2019 very low. She is due for Mammogram.   Denies breast pain, lumps or nipple discharge. She declines breast exam today. Patient is due for colon cancer screening. Jessica Logan denies nausea, vomiting, diarrhea, constipation, blood in the stool or abdominal pain. We discussed options, she would like to have: Cologuard. Review of Systems   Constitutional: Positive for fatigue and unexpected weight change. Negative for activity change, appetite change, chills, diaphoresis and fever. HENT: Negative for trouble swallowing. Respiratory: Positive for cough (clear ) and shortness of breath. Negative for chest tightness and wheezing. Cardiovascular: Negative for chest pain, palpitations and leg swelling. Gastrointestinal: Negative for abdominal distention, abdominal pain, constipation, diarrhea, nausea and vomiting. Endocrine: Negative for cold intolerance, heat intolerance, polydipsia, polyphagia and polyuria. Allergic/Immunologic: Positive for environmental allergies. Neurological: Negative for tremors. Hematological: Does not bruise/bleed easily. Psychiatric/Behavioral: Positive for decreased concentration, dysphoric mood and sleep disturbance. Negative for self-injury and suicidal ideas. The patient is nervous/anxious. Prior to Visit Medications    Medication Sig Taking?  Authorizing Provider   albuterol sulfate  (90 Base) MCG/ACT inhaler INHALE 2 PUFFS INTO THE LUNGS EVERY 6 HOURS AS NEEDED FOR WHEEZING OR SHORTNESS OF BREATH (COUGH) Yes Steph Maddox MD   levothyroxine (SYNTHROID) 100 MCG tablet TAKE 1 TABLET BY MOUTH EVERY DAY BEFORE BREAKFAST Yes Steph Maddox MD   tiotropium (Evia Pullman) 18 MCG inhalation capsule Inhale 1 capsule into the lungs daily Yes Steph Maddox MD   busPIRone (BUSPAR) 10 MG tablet TAKE 1 TABLET BY MOUTH THREE TIMES A DAY Yes Steph Maddox MD   DULoxetine (CYMBALTA) 60 MG extended release capsule Take 1 capsule by mouth every morning With breakfast. Total dosage 90 mg /day Yes Steph Maddox MD   DULoxetine (CYMBALTA) 30 MG extended release capsule Take 1 capsule by mouth Daily with supper Total dosage 90 mg /day Yes Steph Maddox MD   vitamin D3 (CHOLECALCIFEROL) 25 MCG (1000 UT) TABS tablet Take 1 tablet by mouth daily Yes Steph Maddox MD   atorvastatin (LIPITOR) 20 MG tablet Take 1 tablet by mouth daily From cardio, stopped pravachol Yes Stpeh Maddox MD   hydroCHLOROthiazide (MICROZIDE) 12.5 MG capsule Take 1 capsule by mouth every morning FROM CARDIO Yes Steph Maddox MD   mirtazapine (REMERON) 7.5 MG tablet TAKE 1-2 TABLETS BY MOUTH NIGHTLY FOR INSOMNIA AND ANXIETY  Patient not taking: Reported on 11/29/2021  Steph Maddox MD       Social History     Tobacco Use    Smoking status: Current Every Day Smoker     Packs/day: 1.00     Years: 30.00     Pack years: 30.00     Types: Cigarettes    Smokeless tobacco: Never Used    Tobacco comment: started smoking at 31 yo   Substance Use Topics    Alcohol use: No    Drug use: No          PHYSICAL EXAMINATION:    Vital Signs: (As obtained by patient/caregiver or practitioner observation)  -vital signs stable and within normal limits except overweight per BMI, BMI 26.89 kg/M2  Patient-Reported Vitals 11/29/2021   Patient-Reported Weight 145 lb   Patient-Reported Height 5'2             Constitutional: [x] Appears well-developed and well-nourished [x] No apparent distress      [] Abnormal-       Mental status  [x] Alert and awake  [x] Oriented to person/place/time [x]Able to follow commands      Eyes:  EOM    [x]  Normal  [] Abnormal-  Sclera  [x]  Normal  [] Abnormal -         Discharge [x]  None visible  [] Abnormal -    HENT:   [x] Normocephalic, atraumatic. [] Abnormal   [x] Mouth/Throat: Mucous membranes are moist.     External Ears [x] Normal  [] Abnormal-     Neck: [x] No visualized mass     Pulmonary/Chest: [x] Respiratory effort normal.  [x] No visualized signs of difficulty breathing or respiratory distress        [] Abnormal        Musculoskeletal:   [x] Normal gait with no signs of ataxia         [x] Normal range of motion of neck        [] Abnormal-       Neurological:        [x] No Facial Asymmetry (Cranial nerve 7 motor function) (limited exam to video visit)          [x] No gaze palsy        [] Abnormal-            Skin:        [x] No significant exanthematous lesions or discoloration noted on facial skin         [] Abnormal-            Psychiatric:      [x] No Hallucinations       []Mood is normal      [x]Behavior is normal      [x]Judgment is normal      [x]Thought content is normal       [x] Abnormal-anxious  Other pertinent observable physical exam findings- none     Due to this being a TeleHealth encounter, evaluation of the following organ systems is limited: Vitals/Constitutional/EENT/Resp/CV/GI//MS/Neuro/Skin/Heme-Lymph-Imm. I personally reviewed most recent labs reviewed with the patient and all questions fully answered.    Hyperlipidemia  High triglycerides  Vitamin D deficiency improved  Otherwise labs within normal limits        Lab Results   Component Value Date    WBC 6.8 08/10/2021    HGB 15.4 08/10/2021    HCT 45.5 08/10/2021    MCV 90.2 08/10/2021     08/10/2021       Lab Results   Component Value Date     06/08/2021    K 4.3 06/08/2021     06/08/2021    CO2 29 06/08/2021    BUN 10 06/08/2021 CREATININE 0.64 06/08/2021    GLUCOSE 74 06/08/2021    CALCIUM 9.3 06/08/2021        Lab Results   Component Value Date    ALT 11 02/04/2021    AST 14 02/04/2021    ALKPHOS 83 02/04/2021    BILITOT 0.31 02/04/2021       Lab Results   Component Value Date    TSH 1.61 06/08/2021       Lab Results   Component Value Date    CHOL 204 (H) 06/08/2021    CHOL 222 (H) 02/04/2021    CHOL 206 (H) 06/19/2020     Lab Results   Component Value Date    TRIG 154 (H) 06/08/2021    TRIG 150 (H) 02/04/2021    TRIG 98 06/19/2020     Lab Results   Component Value Date    HDL 49 06/08/2021    HDL 55 02/04/2021    HDL 57 06/19/2020     Lab Results   Component Value Date    LDLCHOLESTEROL 124 06/08/2021    LDLCHOLESTEROL 137 (H) 02/04/2021    LDLCHOLESTEROL 129 06/19/2020       Lab Results   Component Value Date    CHOLHDLRATIO 4.2 06/08/2021    CHOLHDLRATIO 4.0 02/04/2021    CHOLHDLRATIO 3.6 06/19/2020       Lab Results   Component Value Date    LABA1C 5.5 03/21/2018         No results found for: [id number]    Lab Results   Component Value Date    VITD25 32.7 06/19/2020       Orders Placed This Encounter   Medications    Mometasone Furo-Formoterol Fum 50-5 MCG/ACT AERO     Sig: Inhale 2 puffs into the lungs 2 times daily     Dispense:  13 g     Refill:  3       Orders Placed This Encounter   Procedures    Cologuard (For External Results Only)     This test is performed by an external laboratory and is used for result attachment only. It is required that this order requisition be faxed to: [facility] @ [phone]. See [email or web address] for further information.      Standing Status:   Future     Standing Expiration Date:   11/30/2022    [facility] JEANNIE DIGITAL SCREEN BILATERAL     Standing Status:   Future     Standing Expiration Date:   1/30/2023    CBC     Standing Status:   Future     Number of Occurrences:   1     Standing Expiration Date:   11/30/2022    Comprehensive Metabolic Panel     Standing Status:   Future     Number of Occurrences:   1     Standing Expiration Date:   1/27/2022    Lipid Panel     Standing Status:   Future     Number of Occurrences:   1     Standing Expiration Date:   11/30/2022     Order Specific Question:   Is Patient Fasting?/# of Hours     Answer:   8-10 Hours, water ok to drink    TSH without Reflex     Standing Status:   Future     Number of Occurrences:   1     Standing Expiration Date:   11/30/2022    T4, Free     Standing Status:   Future     Number of Occurrences:   1     Standing Expiration Date:   11/30/2022    Vitamin D 25 Hydroxy     Standing Status:   Future     Number of Occurrences:   1     Standing Expiration Date:   11/30/2022    Full PFT Study With Bronchodilator     Standing Status:   Future     Standing Expiration Date:   11/30/2022     Scheduling Instructions:      Pre- and post bronchodilator       Medications Discontinued During This Encounter   Medication Reason    mirtazapine (REMERON) 7.5 MG tablet Patient Choice              Mi Juan, was evaluated through a synchronous (real-time) audio-video encounter. The patient (or guardian if applicable) is aware that this is a billable service. Verbal consent to proceed has been obtained within the past 12 months. The visit was conducted pursuant to the emergency declaration under the [address] [place] [address], [address] [place] [address] authority and the [employer] General Act. Patient identification was verified    Patient was alone   The patient was located in a state where the provider was credentialed to provide care. On this date 11/30/2021 I have spent 35 minutes reviewing previous notes, test results and face to face with the patient discussing the diagnosis and importance of compliance with the treatment plan as well as documenting on the day of the visit.     --Iraida Decker MD on 12/6/2021 at 9:52 PM    An electronic signature was used to authenticate this note.

## 2021-11-30 NOTE — PATIENT INSTRUCTIONS
Patient Education        Stopping Smoking: Care Instructions  Your Care Instructions     Cigarette smokers crave the nicotine in cigarettes. Giving it up is much harder than simply changing a habit. Your body has to stop craving the nicotine. It is hard to quit, but you can do it. There are many tools that people use to quit smoking. You may find that combining tools works best for you. There are several steps to quitting. First you get ready to quit. Then you get support to help you. After that, you learn new skills and behaviors to become a nonsmoker. For many people, a necessary step is getting and using medicine. Your doctor will help you set up the plan that best meets your needs. You may want to attend a smoking cessation program to help you quit smoking. When you choose a program, look for one that has proven success. Ask your doctor for ideas. You will greatly increase your chances of success if you take medicine as well as get counseling or join a cessation program.  Some of the changes you feel when you first quit tobacco are uncomfortable. Your body will miss the nicotine at first, and you may feel short-tempered and grumpy. You may have trouble sleeping or concentrating. Medicine can help you deal with these symptoms. You may struggle with changing your smoking habits and rituals. The last step is the tricky one: Be prepared for the smoking urge to continue for a time. This is a lot to deal with, but keep at it. You will feel better. Follow-up care is a key part of your treatment and safety. Be sure to make and go to all appointments, and call your doctor if you are having problems. It's also a good idea to know your test results and keep a list of the medicines you take. How can you care for yourself at home? · Ask your family, friends, and coworkers for support. You have a better chance of quitting if you have help and support.   · Join a support group, such as Nicotine Anonymous, for people who are trying to quit smoking. · Consider signing up for a smoking cessation program, such as the American Lung Association's Freedom from Smoking program.  · Get text messaging support. Go to the website at www.smokefree. gov to sign up for the [facility] program.  · Set a quit date. Pick your date carefully so that it is not right in the middle of a big deadline or stressful time. Once you quit, do not even take a puff. Get rid of all ashtrays and lighters after your last cigarette. Clean your house and your clothes so that they do not smell of smoke. · Learn how to be a nonsmoker. Think about ways you can avoid those things that make you reach for a cigarette. ? Avoid situations that put you at greatest risk for smoking. For some people, it is hard to have a drink with friends without smoking. For others, they might skip a coffee break with coworkers who smoke. ? Change your daily routine. Take a different route to work or eat a meal in a different place. · Cut down on stress. Calm yourself or release tension by doing an activity you enjoy, such as reading a book, taking a hot bath, or gardening. · Talk to your doctor or pharmacist about nicotine replacement therapy, which replaces the nicotine in your body. You still get nicotine but you do not use tobacco. Nicotine replacement products help you slowly reduce the amount of nicotine you need. These products come in several forms, many of them available over-the-counter:  ? Nicotine patches  ? Nicotine gum and lozenges  ? Nicotine inhaler  · Ask your doctor about bupropion (Wellbutrin) or varenicline (Chantix), which are prescription medicines. They do not contain nicotine. They help you by reducing withdrawal symptoms, such as stress and anxiety. · Some people find hypnosis, acupuncture, and massage helpful for ending the smoking habit. · Eat a healthy diet and get regular exercise.  Having healthy habits will help your body move past its craving for nicotine. · Be prepared to keep trying. Most people are not successful the first few times they try to quit. Do not get mad at yourself if you smoke again. Make a list of things you learned and think about when you want to try again, such as next week, next month, or next year. Where can you learn more? Go to [email or web address].[email or web address] org and sign in to your [facility] account. Enter G636 in the V2contact box to learn more about \"Stopping Smoking: Care Instructions. \"     If you do not have an account, please click on the \"Sign Up Now\" link. Current as of: February 11, 2021               Content Version: 13.0  © 2006-2021 Healthwise, Incorporated. Care instructions adapted under license by [facility] ([facility]). If you have questions about a medical condition or this instruction, always ask your healthcare professional. [address] any warranty or liability for your use of this information.

## 2021-12-03 ENCOUNTER — HOSPITAL ENCOUNTER (OUTPATIENT)
Age: 61
Discharge: HOME | End: 2021-12-03
Payer: COMMERCIAL

## 2021-12-03 DIAGNOSIS — E89.0 POSTOPERATIVE HYPOTHYROIDISM: ICD-10-CM

## 2021-12-03 DIAGNOSIS — E78.5 HYPERLIPIDEMIA WITH TARGET LDL LESS THAN 100: ICD-10-CM

## 2021-12-03 DIAGNOSIS — Z85.850 HISTORY OF THYROID CANCER: ICD-10-CM

## 2021-12-03 DIAGNOSIS — E55.9 VITAMIN D DEFICIENCY: ICD-10-CM

## 2021-12-03 DIAGNOSIS — I10 ESSENTIAL HYPERTENSION: ICD-10-CM

## 2021-12-03 LAB
ALBUMIN SERPL-MCNC: 4.5 G/DL (ref 3.5–5.2)
ALBUMIN/GLOBULIN RATIO: ABNORMAL (ref 1–2.5)
ALP BLD-CCNC: 85 U/L (ref 35–104)
ALT SERPL-CCNC: 12 U/L (ref 5–33)
ANION GAP SERPL CALCULATED.3IONS-SCNC: 10 MMOL/L (ref 9–17)
AST SERPL-CCNC: 15 U/L
BILIRUB SERPL-MCNC: 0.36 MG/DL (ref 0.3–1.2)
BUN BLDV-MCNC: 14 MG/DL (ref 8–23)
BUN/CREAT BLD: ABNORMAL (ref 9–20)
CALCIUM SERPL-MCNC: 9.5 MG/DL (ref 8.6–10.4)
CHLORIDE BLD-SCNC: 107 MMOL/L (ref 98–107)
CHOLESTEROL/HDL RATIO: 4.8
CHOLESTEROL: 236 MG/DL
CO2: 27 MMOL/L (ref 20–31)
CREAT SERPL-MCNC: 0.83 MG/DL (ref 0.5–0.9)
GFR AFRICAN AMERICAN: >60 ML/MIN
GFR NON-AFRICAN AMERICAN: >60 ML/MIN
GFR SERPL CREATININE-BSD FRML MDRD: ABNORMAL ML/MIN/{1.73_M2}
GFR SERPL CREATININE-BSD FRML MDRD: ABNORMAL ML/MIN/{1.73_M2}
GLUCOSE BLD-MCNC: 103 MG/DL (ref 70–99)
HCT VFR BLD CALC: 47.4 % (ref 36–46)
HDLC SERPL-MCNC: 49 MG/DL
HEMOGLOBIN: 16 G/DL (ref 12–16)
LDL CHOLESTEROL: 157 MG/DL (ref 0–130)
MCH RBC QN AUTO: 30.7 PG (ref 26–34)
MCHC RBC AUTO-ENTMCNC: 33.8 G/DL (ref 31–37)
MCV RBC AUTO: 90.8 FL (ref 80–100)
NRBC AUTOMATED: ABNORMAL PER 100 WBC
PDW BLD-RTO: 13.4 % (ref 11.5–14.9)
PLATELET # BLD: 302 K/UL (ref 150–450)
PMV BLD AUTO: 7.7 FL (ref 6–12)
POTASSIUM SERPL-SCNC: 4.3 MMOL/L (ref 3.7–5.3)
RBC # BLD: 5.22 M/UL (ref 4–5.2)
SODIUM BLD-SCNC: 144 MMOL/L (ref 135–144)
THYROXINE, FREE: 1.37 NG/DL (ref 0.93–1.7)
TOTAL PROTEIN: 7.6 G/DL (ref 6.4–8.3)
TRIGL SERPL-MCNC: 149 MG/DL
TSH SERPL DL<=0.05 MIU/L-ACNC: 3.8 MIU/L (ref 0.3–5)
VITAMIN D 25-HYDROXY: 24.2 NG/ML (ref 30–100)
VLDLC SERPL CALC-MCNC: ABNORMAL MG/DL (ref 1–30)
WBC # BLD: 6.1 K/UL (ref 3.5–11)

## 2021-12-03 PROCEDURE — 80061 LIPID PANEL: CPT

## 2021-12-03 PROCEDURE — 36415 COLL VENOUS BLD VENIPUNCTURE: CPT

## 2021-12-03 PROCEDURE — 85027 COMPLETE CBC AUTOMATED: CPT

## 2021-12-03 PROCEDURE — 84443 ASSAY THYROID STIM HORMONE: CPT

## 2021-12-03 PROCEDURE — 82306 VITAMIN D 25 HYDROXY: CPT

## 2021-12-03 PROCEDURE — 80053 COMPREHEN METABOLIC PANEL: CPT

## 2021-12-03 PROCEDURE — 84439 ASSAY OF FREE THYROXINE: CPT

## 2021-12-05 DIAGNOSIS — E78.5 HYPERLIPIDEMIA WITH TARGET LDL LESS THAN 100: ICD-10-CM

## 2021-12-05 DIAGNOSIS — E55.9 VITAMIN D DEFICIENCY: ICD-10-CM

## 2021-12-05 RX ORDER — ATORVASTATIN CALCIUM 20 MG/1
20 TABLET, FILM COATED ORAL DAILY
Qty: 90 TABLET | Refills: 3 | Status: SHIPPED | OUTPATIENT
Start: 2021-12-05 | End: 2022-04-26 | Stop reason: ALTCHOICE

## 2021-12-05 RX ORDER — MELATONIN
2000 DAILY
Qty: 180 TABLET | Refills: 3 | Status: SHIPPED | OUTPATIENT
Start: 2021-12-05

## 2021-12-06 NOTE — RESULT ENCOUNTER NOTE
Please notify patient: Vitamin D is low, to increase the vitamin D supplement from 1000 units daily to 2000 units daily take with food, I will send a new prescription to the pharmacy  Very high lipids  Otherwise labs within normal limits, worsening, I do not think she is taking the Lipitor I will refill it  Mild increased blood glucose, low-carb diet advised  Mild increased red blood cells to increase the water intake 8 x 8 ounce glasses of water, avoid smoking exposure    continue current treatment    Please schedule her next appointment  Return in about 4 months (around 4/8/2022) for Face-2F-30mins PHYSICAL, VISION screen, PHQ9. .    Future Appointments  3/14/2022  1:30 PM    Filomena Lee, Nayeli Adkins

## 2021-12-14 ENCOUNTER — TELEPHONE (OUTPATIENT)
Dept: FAMILY MEDICINE CLINIC | Age: 61
End: 2021-12-14

## 2021-12-14 NOTE — TELEPHONE ENCOUNTER
SPOKE WITH PATIENT REGARDING INHALER AND REVIEWED PHYSICIANS INSTRUCTIONS PER ENCOUNTER     1. Chronic obstructive pulmonary disease, unspecified COPD type ([facility]  Worsening  -     Full PFT Study With Bronchodilator; Future  -    To start mometasone Furo-Formoterol Fum 50-5 MCG/ACT AERO;  Inhale 2 puffs into the lungs 2 times daily, Disp-13 g, R-3Normal  To continue Spiriva, albuterol as needed, smoking cessation

## 2021-12-14 NOTE — TELEPHONE ENCOUNTER
----- Message from Ulices Daniels sent at 12/14/2021  4:04 PM EST -----  Subject: Message to Provider    QUESTIONS  Information for Provider? Patient wants to know if she is to stop using   her old inhaler and just use the new one. She wants clarification. Requesting a call back   ---------------------------------------------------------------------------  --------------  CALL BACK INFO  What is the best way for the office to contact you? OK to leave message on   voicemail  Preferred Call Back Phone Number? [phone]  ---------------------------------------------------------------------------  --------------  SCRIPT ANSWERS  Relationship to Patient?  Self

## 2022-02-11 DIAGNOSIS — F41.9 ANXIETY: ICD-10-CM

## 2022-02-11 DIAGNOSIS — F33.0 MILD EPISODE OF RECURRENT MAJOR DEPRESSIVE DISORDER (HCC): ICD-10-CM

## 2022-02-11 RX ORDER — BUSPIRONE HYDROCHLORIDE 10 MG/1
TABLET ORAL
Qty: 270 TABLET | Refills: 1 | Status: SHIPPED | OUTPATIENT
Start: 2022-02-11 | End: 2022-09-15 | Stop reason: SDUPTHER

## 2022-02-22 DIAGNOSIS — J44.9 CHRONIC OBSTRUCTIVE PULMONARY DISEASE, UNSPECIFIED COPD TYPE (HCC): ICD-10-CM

## 2022-02-22 DIAGNOSIS — R06.09 DOE (DYSPNEA ON EXERTION): ICD-10-CM

## 2022-02-22 RX ORDER — TIOTROPIUM BROMIDE 18 UG/1
18 CAPSULE ORAL; RESPIRATORY (INHALATION) DAILY
Qty: 90 CAPSULE | Refills: 1 | OUTPATIENT
Start: 2022-02-22

## 2022-03-14 ENCOUNTER — OFFICE VISIT (OUTPATIENT)
Dept: UROLOGY | Age: 62
End: 2022-03-14
Payer: COMMERCIAL

## 2022-03-14 ENCOUNTER — HOSPITAL ENCOUNTER (OUTPATIENT)
Age: 62
Setting detail: SPECIMEN
Discharge: HOME | End: 2022-03-14

## 2022-03-14 VITALS
SYSTOLIC BLOOD PRESSURE: 125 MMHG | HEART RATE: 81 BPM | BODY MASS INDEX: 26.31 KG/M2 | WEIGHT: 143 LBS | HEIGHT: 62 IN | TEMPERATURE: 98.4 F | DIASTOLIC BLOOD PRESSURE: 65 MMHG

## 2022-03-14 DIAGNOSIS — Z80.51 FAMILY HISTORY OF RENAL CANCER: ICD-10-CM

## 2022-03-14 DIAGNOSIS — R31.29 MICROHEMATURIA: Primary | ICD-10-CM

## 2022-03-14 DIAGNOSIS — F17.200 CURRENT SMOKER: ICD-10-CM

## 2022-03-14 DIAGNOSIS — N39.3 STRESS INCONTINENCE: ICD-10-CM

## 2022-03-14 PROCEDURE — 99214 OFFICE O/P EST MOD 30 MIN: CPT | Performed by: NURSE PRACTITIONER

## 2022-03-14 ASSESSMENT — ENCOUNTER SYMPTOMS
VOMITING: 0
SHORTNESS OF BREATH: 0
BACK PAIN: 0
COUGH: 0
ABDOMINAL PAIN: 0
NAUSEA: 0
EYE REDNESS: 0
EYE PAIN: 0
WHEEZING: 0

## 2022-03-14 NOTE — PROGRESS NOTES
..Review of Systems   Constitutional: Negative for activity change, chills and fever. Eyes: Negative for pain, redness and visual disturbance. Respiratory: Negative for cough, shortness of breath and wheezing. Cardiovascular: Negative for chest pain and leg swelling. Gastrointestinal: Negative for abdominal pain, nausea and vomiting. Genitourinary: Negative for difficulty urinating, dysuria, enuresis, flank pain, frequency, hematuria and urgency. Musculoskeletal: Negative for back pain, joint swelling and myalgias. Skin: Negative for rash and wound. Neurological: Negative for dizziness, tremors and numbness. Hematological: Does not bruise/bleed easily.

## 2022-03-14 NOTE — PATIENT INSTRUCTIONS
CT urogram- pt understands it is her responsibility to call and schedule. Stress urinary incontinence: Kegel exercise: squeeze and hold 3 seconds, relax and release 3 seconds, repeat 10-15 times in a row, at least 6 times  per day.  Quick flicks: after kegel, squeeze, release no hold, 12-15 times in a row, at least 6 times per day

## 2022-03-14 NOTE — PROGRESS NOTES
[address] [address]  Dept:  [facility] Urology Office Note - Established    Patient:  Agustina Dominique  YOB: 1960  Date: 3/14/2022    The patient is a 58 y.o. female whopresents today for evaluation of the following problems:   Chief Complaint   Patient presents with    Follow-up     6 month follow up        HPI  Patient is presenting for 6 mos follow up. She has hx of microhematuria (UA showed 5-10 RBC in June 2021). Completed part of the hematuria workup. Cysto 9/14/21: no tumors, multiple areas of chronic cystitis. Cytology negative. She did not complete the CT urogram- states she has a dye allergy which was not noted on her chart but added today. She is a current, everyday smoker. Her mother had renal cancer. She has mild stress incontinence, wears 1-2 ppd- 3 children. Denies vaginal bulge. Not bothersome. Summary of old records: N/A    Additional History: N/A    Procedures Today: N/A    Urinalysis today:  No results found for this visit on 03/14/22. Imaging Reviewed during this Office Visit: none  (results were independently reviewed by physician and radiology report verified)    AUA Symptom Score (3/14/2022):   INCOMPLETE EMPTYING: How often have you had the sensation of not emptying your bladder?: Not at all  FREQUENCY: How often do you have to urinate less than every two hours?: Not at all  INTERMITTENCY: How often have you found you stopped and started again several times when you urinated?: Not at all  URGENCY: How often have you found it difficult to postpone urination?: Less than 1 to 5 times  WEAK STREAM: How often have you had a weak urinary stream?: Not at all  STRAINING: How often have you had to strain to start  urination?: Not at all  NOCTURIA: How many times did you typically get up at night to uriniate?: 1 Time  TOTAL I-PSS SCORE[de-identified] 2  How would you feel if you were to spend the rest of your life with your urinary condition?: Delighted    Last BUN and creatinine:  Lab Results   Component Value Date    BUN 14 12/03/2021     Lab Results   Component Value Date    CREATININE 0.83 12/03/2021       Additional Lab/Culture results:  Microscopic Urinalysis  Order: [id number]   Status: Final result     Visible to patient: Yes (seen)     Next appt: None     2 Result Notes     1 Patient Communication     Ref Range & Units 6/8/21 2227   -          WBC, UA 0 - 5 /HPF 5 TO 10    RBC, UA 0 - 4 /HPF 5 TO 10    Comment: Reference range defined for non-centrifuged specimen. Casts UA 0 - 8 /LPF 5 TO 10 HYALINE Reference range defined for non-centrifuged specimen.     Crystals, UA None /HPF NOT REPORTED    Epithelial Cells UA 0 - 5 /HPF 2 TO 5    Renal Epithelial, UA 0 /HPF NOT REPORTED    Bacteria, UA None NOT REPORTED    Mucus, UA None NOT REPORTED    Trichomonas, UA None NOT REPORTED    Amorphous, UA None NOT REPORTED    Other Observations UA NOT REQ. NOT REPORTED    Yeast, UA None NOT REPORTED    Resulting [place]              Specimen Collected: 06/08/21 22:27 Last Resulted: 06/08/21 22:43         Lab Flowsheet      Order Details      View Encounter      Lab and Collection Details      Routing      Result History                 PAST MEDICAL, FAMILY AND SOCIAL HISTORY UPDATE:  Past Medical History:   Diagnosis Date    Acute suppurative otitis media of right ear without spontaneous rupture of tympanic membrane 3/18/2019    AMAIRANI positive 5/6/2019    Anxiety     Essential hypertension 4/9/2021    History of depression     History of thyroid cancer     Major depressive disorder with single episode, in partial remission ([facility] [address] 1/24/2019    Panic attacks     Postoperative hypothyroidism 3/21/2018    Sjogren's syndrome with keratoconjunctivitis sicca ([facility] [address] 7/5/2019    Per rheumatology     Past Surgical History:   Procedure by provider sincelast office visit or hospitalization)   Sulfa antibiotics, Trazodone and nefazodone, Contrast [iodides], and Flexeril [cyclobenzaprine]  Social History     Tobacco Use   Smoking Status Current Every Day Smoker    Packs/day: 1.00    Years: 30.00    Pack years: 30.00    Types: Cigarettes   Smokeless Tobacco Never Used   Tobacco Comment    started smoking at 31 yo      (If patient a smoker, smoking cessation counseling offered)     Social History     Substance and Sexual Activity   Alcohol Use No       REVIEW OF SYSTEMS:  Review of Systems      Physical Exam:      Vitals:    03/14/22 1327   BP: 125/65   Pulse: 81   Temp: 98.4 °F (36.9 °C)     Body mass index is 26.16 kg/m². Patient is a 58 y.o. female in noacute distress and alert and oriented to person, place and time. Physical Exam  Constitutional: Patient in no acute distress. Neuro: Alert andoriented to person, place and time. Psych: Mood normal, affect normal  Skin: No rash noted  Lungs: Respiratory effort is normal  Cardiovascular: Warm & Pink  Abdomen: Soft, non-tender, non-distended   Bladder non-tender and not distended. Musculoskeletal: Normal gait and station      and Plan      1. Microhematuria    2. Stress incontinence    3. Current smoker    4. Family history of renal cancer           Plan:   Microhematuria last June, had partial workup completed. Did not complete CT urogram d/t dye allergy. We discussed completing remainder of workup, would rx prophylaxis- pt declines at this time because she would not have anyone to drive her and she is worried about getting sick afterwards. I would not advise driving a motor vehicle after taking benadryl. Will d/w Dr. Mantilla Rising tomorrow, may want additional imaging. Will call patient this week to update. Repeat urine micro now and again in 1 year. Follow up in 1 year with UA. Denisse Cali will be in touch regarding additional testing.       Prescriptions Ordered:  No orders of the defined types were placed in this encounter. Orders Placed:  Orders Placed This Encounter   Procedures    CT UROGRAM     Standing Status:   Future     Standing Expiration Date:   3/14/2023    Urinalysis with Microscopic     Standing Status:   Future     Standing Expiration Date:   3/14/2023     Order Specific Question:   SPECIFY(EX-CATH,MIDSTREAM,CYSTO,ETC)? Answer:   mid stream    Urinalysis with Microscopic     Standing Status:   Future     Standing Expiration Date:   3/14/2023     Order Specific Question:   SPECIFY(EX-CATH,MIDSTREAM,CYSTO,ETC)? Answer:   mid stream            CAITLYN Whelan - CNP    Reviewed and agree with the ROS entered by the MA.

## 2022-03-15 ENCOUNTER — TELEPHONE (OUTPATIENT)
Dept: UROLOGY | Age: 62
End: 2022-03-15

## 2022-03-15 DIAGNOSIS — R31.29 MICROHEMATURIA: ICD-10-CM

## 2022-03-15 LAB
-: ABNORMAL
BACTERIA: ABNORMAL
BILIRUBIN URINE: NEGATIVE
CASTS UA: ABNORMAL /LPF (ref 0–8)
COLOR: YELLOW
EPITHELIAL CELLS UA: ABNORMAL /HPF (ref 0–5)
GLUCOSE URINE: NEGATIVE
KETONES, URINE: NEGATIVE
LEUKOCYTE ESTERASE, URINE: ABNORMAL
NITRITE, URINE: POSITIVE
PH UA: 6 (ref 5–8)
PROTEIN UA: NEGATIVE
RBC UA: ABNORMAL /HPF (ref 0–4)
SPECIFIC GRAVITY UA: 1.02 (ref 1–1.03)
TURBIDITY: CLEAR
URINE HGB: NEGATIVE
UROBILINOGEN, URINE: NORMAL
WBC UA: ABNORMAL /HPF (ref 0–5)

## 2022-03-15 NOTE — TELEPHONE ENCOUNTER
Reed White updated on renal us orders being placed for further evaluation of microhematuria. She was provided with central scheduling number. I offered to transfer her to  to be scheduled for f/u to review those results in about 2-4 weeks. She declines, states she will call and make her own f/u because she needs to find a  for kid.

## 2022-03-15 NOTE — PROGRESS NOTES
Collaborating provider, Dr. Evon Glez updated on my OV with patient yesterday. Would like renal us ordered with f/u in our office. I will update patient.  Orders placed

## 2022-04-04 DIAGNOSIS — Z85.850 HISTORY OF THYROID CANCER: ICD-10-CM

## 2022-04-04 DIAGNOSIS — E89.0 POSTOPERATIVE HYPOTHYROIDISM: ICD-10-CM

## 2022-04-04 DIAGNOSIS — F33.1 MODERATE EPISODE OF RECURRENT MAJOR DEPRESSIVE DISORDER (HCC): ICD-10-CM

## 2022-04-04 RX ORDER — LEVOTHYROXINE SODIUM 0.1 MG/1
TABLET ORAL
Qty: 90 TABLET | Refills: 1 | OUTPATIENT
Start: 2022-04-04

## 2022-04-04 RX ORDER — DULOXETIN HYDROCHLORIDE 30 MG/1
30 CAPSULE, DELAYED RELEASE ORAL
Qty: 90 CAPSULE | Refills: 3 | OUTPATIENT
Start: 2022-04-04

## 2022-04-04 RX ORDER — DULOXETIN HYDROCHLORIDE 60 MG/1
60 CAPSULE, DELAYED RELEASE ORAL EVERY MORNING
Qty: 90 CAPSULE | Refills: 3 | OUTPATIENT
Start: 2022-04-04

## 2022-04-25 ASSESSMENT — PATIENT HEALTH QUESTIONNAIRE - PHQ9
SUM OF ALL RESPONSES TO PHQ QUESTIONS 1-9: 6
7. TROUBLE CONCENTRATING ON THINGS, SUCH AS READING THE NEWSPAPER OR WATCHING TELEVISION: 0
SUM OF ALL RESPONSES TO PHQ QUESTIONS 1-9: 6
3. TROUBLE FALLING OR STAYING ASLEEP: 3
1. LITTLE INTEREST OR PLEASURE IN DOING THINGS: 0
4. FEELING TIRED OR HAVING LITTLE ENERGY: 0
6. FEELING BAD ABOUT YOURSELF - OR THAT YOU ARE A FAILURE OR HAVE LET YOURSELF OR YOUR FAMILY DOWN: 0
8. MOVING OR SPEAKING SO SLOWLY THAT OTHER PEOPLE COULD HAVE NOTICED. OR THE OPPOSITE, BEING SO FIGETY OR RESTLESS THAT YOU HAVE BEEN MOVING AROUND A LOT MORE THAN USUAL: 0
SUM OF ALL RESPONSES TO PHQ QUESTIONS 1-9: 6
SUM OF ALL RESPONSES TO PHQ QUESTIONS 1-9: 6
SUM OF ALL RESPONSES TO PHQ9 QUESTIONS 1 & 2: 0
10. IF YOU CHECKED OFF ANY PROBLEMS, HOW DIFFICULT HAVE THESE PROBLEMS MADE IT FOR YOU TO DO YOUR WORK, TAKE CARE OF THINGS AT HOME, OR GET ALONG WITH OTHER PEOPLE: 0
9. THOUGHTS THAT YOU WOULD BE BETTER OFF DEAD, OR OF HURTING YOURSELF: 0
2. FEELING DOWN, DEPRESSED OR HOPELESS: 0
5. POOR APPETITE OR OVEREATING: 3

## 2022-04-25 NOTE — PROGRESS NOTES
Visit Information    Have you changed or started any medications since your last visit including any over-the-counter medicines, vitamins, or herbal medicines? no   Have you stopped taking any of your medications? Is so, why? -  no  Are you having any side effects from any of your medications? - no    Have you seen any other physician or provider since your last visit? yes -    Have you had any other diagnostic tests since your last visit? yes -    Have you been seen in the emergency room and/or had an admission in a hospital since we last saw you?  no   Have you had your routine dental cleaning in the past 6 months?  no     Do you have an active MyChart account? If no, what is the barrier?   Yes    Patient Care Team:  Orquidea Tierney MD as PCP - General (Family Medicine)  Orquidea Tierney MD as PCP - [facility]  Angel Yusuf DO as Consulting Physician (Obstetrics & Gynecology)  Lilian Ramos (Certified Nurse Practitioner)  Arcadio Brown MD as Consulting Physician (Internal Medicine)  Ava Palacio MD as Consulting Physician (Endocrinology)  Jasen Duncan MD as Consulting Physician (Urology)  Naina Hoffman MD as Surgeon (Cardiology)  Ramos Danielle MD as Consulting Physician (Hematology and Oncology)    Medical History Review  Past Medical, Family, and Social History reviewed and does contribute to the patient presenting condition    Health Maintenance   Topic Date Due    COVID-19 Vaccine (1) Never done    Pneumococcal 0-64 years Vaccine (1 - PCV) Never done    Colorectal Cancer Screen  Never done    Shingles Vaccine (1 of 2) Never done    Low dose CT lung screening  Never done    Diabetes screen  03/21/2021    Breast cancer screen  06/20/2021    Flu vaccine (Season Ended) 09/01/2022    Depression Monitoring  11/29/2022    Lipids  12/03/2022    TSH  12/03/2022    Potassium  12/03/2022    Creatinine  12/03/2022    Cervical cancer screen  01/15/2025    DTaP/Tdap/Td vaccine (2 - Td or Tdap) 07/31/2027    Hepatitis C screen  Completed    HIV screen  Completed    Hepatitis A vaccine  Aged Out    Hepatitis B vaccine  Aged Out    Hib vaccine  Aged Out    Meningococcal (ACWY) vaccine  Aged Out

## 2022-04-26 ENCOUNTER — TELEMEDICINE (OUTPATIENT)
Dept: FAMILY MEDICINE CLINIC | Age: 62
End: 2022-04-26
Payer: COMMERCIAL

## 2022-04-26 DIAGNOSIS — Z28.21 COVID-19 VACCINATION DECLINED: ICD-10-CM

## 2022-04-26 DIAGNOSIS — E55.9 VITAMIN D DEFICIENCY: ICD-10-CM

## 2022-04-26 DIAGNOSIS — E89.0 POSTOPERATIVE HYPOTHYROIDISM: ICD-10-CM

## 2022-04-26 DIAGNOSIS — F33.0 MILD EPISODE OF RECURRENT MAJOR DEPRESSIVE DISORDER (HCC): ICD-10-CM

## 2022-04-26 DIAGNOSIS — Z12.11 SCREEN FOR COLON CANCER: ICD-10-CM

## 2022-04-26 DIAGNOSIS — Z87.891 PERSONAL HISTORY OF TOBACCO USE: ICD-10-CM

## 2022-04-26 DIAGNOSIS — Z13.1 SCREENING FOR DIABETES MELLITUS: ICD-10-CM

## 2022-04-26 DIAGNOSIS — Z12.31 ENCOUNTER FOR SCREENING MAMMOGRAM FOR BREAST CANCER: ICD-10-CM

## 2022-04-26 DIAGNOSIS — I10 ESSENTIAL HYPERTENSION: Primary | ICD-10-CM

## 2022-04-26 DIAGNOSIS — J44.9 CHRONIC OBSTRUCTIVE PULMONARY DISEASE, UNSPECIFIED COPD TYPE (HCC): ICD-10-CM

## 2022-04-26 DIAGNOSIS — E78.5 HYPERLIPIDEMIA WITH TARGET LDL LESS THAN 100: ICD-10-CM

## 2022-04-26 DIAGNOSIS — Z85.850 HISTORY OF THYROID CANCER: ICD-10-CM

## 2022-04-26 PROCEDURE — G0296 VISIT TO DETERM LDCT ELIG: HCPCS | Performed by: FAMILY MEDICINE

## 2022-04-26 PROCEDURE — 99214 OFFICE O/P EST MOD 30 MIN: CPT | Performed by: FAMILY MEDICINE

## 2022-04-26 RX ORDER — DULOXETIN HYDROCHLORIDE 30 MG/1
30 CAPSULE, DELAYED RELEASE ORAL
Qty: 90 CAPSULE | Refills: 3 | Status: SHIPPED | OUTPATIENT
Start: 2022-04-26 | End: 2022-09-15 | Stop reason: SDUPTHER

## 2022-04-26 RX ORDER — METOPROLOL SUCCINATE 25 MG/1
TABLET, EXTENDED RELEASE ORAL
COMMUNITY
Start: 2022-04-07 | End: 2022-09-15 | Stop reason: SDUPTHER

## 2022-04-26 RX ORDER — LEVOTHYROXINE SODIUM 0.1 MG/1
100 TABLET ORAL
Qty: 90 TABLET | Refills: 3 | Status: SHIPPED | OUTPATIENT
Start: 2022-04-26 | End: 2022-09-15 | Stop reason: SDUPTHER

## 2022-04-26 RX ORDER — DULOXETIN HYDROCHLORIDE 60 MG/1
60 CAPSULE, DELAYED RELEASE ORAL EVERY MORNING
Qty: 90 CAPSULE | Refills: 3 | Status: SHIPPED | OUTPATIENT
Start: 2022-04-26 | End: 2022-09-15 | Stop reason: SDUPTHER

## 2022-04-26 RX ORDER — TIOTROPIUM BROMIDE 18 UG/1
18 CAPSULE ORAL; RESPIRATORY (INHALATION) DAILY
Qty: 90 CAPSULE | Refills: 1 | Status: SHIPPED | OUTPATIENT
Start: 2022-04-26 | End: 2022-09-15 | Stop reason: SDUPTHER

## 2022-04-26 ASSESSMENT — ENCOUNTER SYMPTOMS
ABDOMINAL PAIN: 0
CONSTIPATION: 0
COUGH: 1
VOMITING: 0
WHEEZING: 0
ABDOMINAL DISTENTION: 0
DIARRHEA: 0
SHORTNESS OF BREATH: 1
CHEST TIGHTNESS: 0
NAUSEA: 0

## 2022-04-26 NOTE — PROGRESS NOTES
2022    TELEHEALTH EVALUATION -- Audio/Visual (During TMOJB-22 public health emergency)      Edgardo Turcios (:  1960) is a 58 y.o. female,Established patient, here for evaluation of the following chief complaint(s): COPD, Hypertension, Hyperlipidemia, Thyroid Problem, Depression, and Results        ASSESSMENT/PLAN:    1. Essential hypertension  Inadequately controlled  Needs nurse visit appointment BP check in 1 week. She wants to stop hydrochlorothiazide due to frequency of urination   metoprolol XL 25 mg daily  We will recheck her labs  -     Urinalysis with Reflex to Culture; Future  -     CBC; Future  -     Comprehensive Metabolic Panel; Future  -     TSH; Future  2. Hyperlipidemia with target LDL less than 100  Likely not improving, she refused to take Lipitor, she would like recheck of the lipid panel first  -     Lipid Panel; Future  3. Chronic obstructive pulmonary disease, unspecified COPD type ([facility] [address]  Stable  Strongly advised to quit smoking  Counseling given regarding correct use of inhalers, Spiriva daily, albuterol as needed  -     tiotropium (SPIRIVA HANDIHALER) 18 MCG inhalation capsule; Inhale 1 capsule into the lungs daily, Disp-90 capsule, R-1Normal  4. Personal history of tobacco use  -     [place] VISIT TO DISCUSS LUNG CA SCREEN W LDCT  -     CT Lung Screen (Annual); Future  Low Dose CT (LDCT) Lung Screening criteria met:     Age 50-77(Medicare) or 50-80 (USPSTF)   Pack year smoking >20   Still smoking or less than 15 year since quit   No sign or symptoms of lung cancer   > 11 months since last LDCT     Risks and benefits of lung cancer screening with LDCT scans discussed:    Significance of positive screen - False-positive LDCT results often occur. 95% of all positive results do not lead to a diagnosis of cancer. Usually further imaging can resolve most false-positive results; however, some patients may require invasive procedures.     Over diagnosis risk - 10% to 12% of screen-detected lung cancer cases are over diagnosed--that is, the cancer would not have been detected in the patient's lifetime without the screening. Need for follow up screens annually to continue lung cancer screening effectiveness     Risks associated with radiation from annual LDCT- Radiation exposure is about the same as for a mammogram, which is about 1/3 of the annual background radiation exposure from everyday life. Starting screening at age 54 is not likely to increase cancer risk from radiation exposure. Patients with comorbidities resulting in life expectancy of < 10 years, or that would preclude treatment of an abnormality identified on CT, should not be screened due to lack of benefit. To obtain maximal benefit from this screening, smoking cessation and long-term abstinence from smoking is critical      5. Mild episode of recurrent major depressive disorder (HCC)  Improved  Continue current medications  -     DULoxetine (CYMBALTA) 60 MG extended release capsule; Take 1 capsule by mouth every morning With breakfast. Total dosage 90 mg /day, Disp-90 capsule, R-3Normal  -     DULoxetine (CYMBALTA) 30 MG extended release capsule; Take 1 capsule by mouth daily (before lunch) Total dosage 90 mg /day, Disp-90 capsule, R-3Normal  6. Vitamin D deficiency  Unsure if improving or not. Will recheck level  Continue supplementation.    -     Vitamin D 25 Hydroxy; Future  7. Postoperative hypothyroidism  Stable  Continue Synthroid , Advised to take Synthroid in the morning, on empty stomach, without any other meds and with a full glass of water.    -     levothyroxine (SYNTHROID) 100 MCG tablet; Take 1 tablet by mouth every morning (before breakfast), Disp-90 tablet, R-3Normal  8. Encounter for screening mammogram for breast cancer  -     [facility] JEANNIE DIGITAL SCREEN BILATERAL; Future  9. Screening for diabetes mellitus  -     Hemoglobin A1C; Future  10.  History of thyroid cancer  -     levothyroxine (SYNTHROID) 100 MCG tablet; Take 1 tablet by mouth every morning (before breakfast), Disp-90 tablet, R-3Normal    11. COVID-19 vaccination declined--she absolutely declines COVID-19 vaccination despite counseling  I did have a long discussion with the patient regarding COVID-19 vaccine and to get it through [place] [id number] if she decides to    15. Screen for colon cancer  Patient does have Cologuard at home, advised to take it to [facility] when he is    CT urogram and US kidneys not done, I gave her contact information to schedule        Hoa Thomas received counseling on the following healthy behaviors: nutrition, exercise, medication adherence, tobacco cessation and weight loss    Reviewed prior labs and health maintenance  Discussed of, benefit, and side effects of prescribed medications. Barriers to medication compliance addressed. Patient given educational materials - see patient instructions  All patient questions answered. Patient voiced understanding. The patient's past medical,surgical, social, and family history as well as her current medications and allergies were reviewed as documented in today's encounter. Medications, labs, diagnostic studies, consultations and follow-up as documented in this encounter. Return for Face-2F-30mins PHYSICAL, VISION screen, PHQ9. .    Needs nurse visit appointment BP check in 1 week. Future Appointments   Date Time Provider Ulysses Hickman   2022  3:00 PM SCHEDULE, [facility]   9/15/2022  3:45 PM Mariela Fowler MD [facility]         SUBJECTIVE/OBJECTIVE:  Sharron  (:  1960) has requested an audio/video evaluation for the following concern(s):COPD, Hypertension, Hyperlipidemia, Thyroid Problem, Depression, and Results      Hypertension:    she  is not exercising , but staying active, and is adherent to low salt diet. Blood pressure is not well controlled at home. Cardiac symptoms dyspnea and fatigue.  Patient denies chest pain, chest pressure/discomfort, claudication, exertional chest pressure/discomfort, irregular heart beat, lower extremity edema, near-syncope, orthopnea, palpitations, paroxysmal nocturnal dyspnea, syncope and tachypnea. Cardiovascular risk factors: dyslipidemia, hypertension and smoking/ tobacco exposure. Use of agents associated with hypertension: thyroid hormones. History of target organ damage: none. EKG on 3/21/2020 showed PVCs and nonspecific T waves  We have ordered echo 2D and Holter monitor for her on 3/4/2020 but never completed. Patient was also referred to cardiologist on 8/27/2021, I requested records from Dr. Hector Crane. She wants to stop Hydrochlorothiazide due to frequency of urination    blood pressure is Elevated. 155/97    BP Readings from Last 3 Encounters:   03/14/22 125/65   08/27/21 122/80   08/24/21 109/67        Pulse is Normal.    Pulse Readings from Last 3 Encounters:   03/14/22 81   08/27/21 79   08/24/21 60     Hyperlipidemia:  No new myalgias or GI upset on atorvastatin (Lipitor). She says she DIDN'T EVER START the Lipitor, which we prescribed to her    Medication compliance: noncompliant: Did not start Lipitor. Patient is not following a low fat, low cholesterol diet. LDL is high with high triglycerides  Lab Results   Component Value Date    LDLCHOLESTEROL 157 (H) 12/03/2021     Lab Results   Component Value Date    TRIG 149 12/03/2021    TRIG 154 (H) 06/08/2021    TRIG 150 (H) 02/04/2021     COPD:  Current treatment includes short-acting beta agonist inhaler, anticholinergic inhaler, which has been effective. Didn't receive the mometasone inhaler. Residual symptoms: chronic dyspnea and cough productive of white sputum in small amounts. She denies purulent sputum, wheezing. She requires her rescue inhaler 1 time(s) per month or less. Nicotine dependence. Smoker, counseling given to quit smoking.   Ready to quit: No  Counseling given: Yes  Comment: started smoking at 31 yo, cutting down now 1/2 PPD or less      Continues to have frequency of urination, she has seen urologist.  She wants to stop hydrochlorothiazide  She tells me she didn't do the CT urogram and US kidneys, I gave her contact information to schedule them    Has cologuard at home, she just has to return it, I went over the steps to her again    CT lung screen told it is with dye, she never scheduled it, it . Depression  is better  Taking Cymbalta, tolerated well, denies side effects  PHQ-2 Over the past 2 weeks, how often have you been bothered by any of the following problems? Little interest or pleasure in doing things: Not at all  Feeling down, depressed, or hopeless: Not at all  PHQ-2 Score: 0  PHQ-9 Over the past 2 weeks, how often have you been bothered by any of the following problems? Trouble falling or staying asleep, or sleeping too much: Nearly every day  Feeling tired or having little energy: Not at all  Poor appetite or overeating: Nearly every day  Feeling bad about yourself - or that you are a failure or have let yourself or your family down: Not at all  Trouble concentrating on things, such as reading the newspaper or watching television: Not at all  Moving or speaking so slowly that other people could have noticed. Or the opposite - being so fidgety or restless that you have been moving around a lot more than usual: Not at all  Thoughts that you would be better off dead, or of hurting yourself in some way: Not at all  If you checked off any problems, how difficult have these problems made it for you to do your work, take care of things at home, or get along with other people?: Not difficult at all  PHQ-9 Total Score: 6  PHQ-9 Total Score: 6    PHQ Scores 2021   PHQ2 Score 0 6 5 5 2 5 1   PHQ9 Score 6 16 20 17 2 19 1       Briseyda Gabriel has Vitamin D deficiency.   Briseyda Gabriel  is  taking Vitamin D supplementation   she feels tired. Lab Results   Component Value Date    VITD25 24.2 (L) 12/03/2021     Hypothyroidism, patient does have history of thyroid cancer and total thyroidectomy: Recent symptoms: fatigue and anxiety. She denies weight gain, weight loss, cold intolerance, heat intolerance, hair loss, dry skin, constipation, diarrhea, edema, tremor, palpitations and dysphagia. Patient is  taking her medication consistently on an empty stomach. TSH is Normal.    No results found for: TSHREFLEX  Lab Results   Component Value Date    TSH 3.80 12/03/2021    TSH 1.61 06/08/2021    TSH 5.77 (H) 02/04/2021      She is due for Mammogram.   Denies breast pain, lumps or nipple discharge. Absolutely declines COVID-19 vaccination, despite counseling. She is also due for diabetes screening. Review of Systems   Constitutional: Positive for fatigue. Negative for activity change, appetite change, chills, diaphoresis, fever and unexpected weight change. HENT: Negative for trouble swallowing. Eyes: Positive for visual disturbance. Respiratory: Positive for cough and shortness of breath. Negative for chest tightness and wheezing. Cardiovascular: Negative for chest pain, palpitations and leg swelling. Gastrointestinal: Negative for abdominal distention, abdominal pain, constipation, diarrhea, nausea and vomiting. Endocrine: Negative for cold intolerance, heat intolerance, polydipsia, polyphagia and polyuria. Genitourinary: Positive for frequency and urgency. Negative for dysuria. Allergic/Immunologic: Positive for environmental allergies. Psychiatric/Behavioral: Positive for dysphoric mood and sleep disturbance. Negative for self-injury and suicidal ideas. The patient is nervous/anxious. Prior to Visit Medications    Medication Sig Taking?  Authorizing Provider   metoprolol tartrate (LOPRESSOR) 25 MG tablet Take 25 mg by mouth 2 times daily Yes Historical Provider, MD   busPIRone (BUSPAR) 10 MG tablet TAKE 1 TABLET BY MOUTH THREE TIMES A DAY Yes Vineet Verduzco MD   vitamin D3 (CHOLECALCIFEROL) 25 MCG (1000 UT) TABS tablet Take 2 tablets by mouth daily Dose increased 12/5/2021 Yes Vineet Verduzco MD   albuterol sulfate  (90 Base) MCG/ACT inhaler INHALE 2 PUFFS INTO THE LUNGS EVERY 6 HOURS AS NEEDED FOR WHEEZING OR SHORTNESS OF BREATH (COUGH) Yes Vineet Verduzco MD   levothyroxine (SYNTHROID) 100 MCG tablet TAKE 1 TABLET BY MOUTH EVERY DAY BEFORE BREAKFAST Yes Vineet Verduzco MD   tiotropium (Deepika Amber) 18 MCG inhalation capsule Inhale 1 capsule into the lungs daily Yes Vineet Verudzco MD   DULoxetine (CYMBALTA) 60 MG extended release capsule Take 1 capsule by mouth every morning With breakfast. Total dosage 90 mg /day Yes Vnieet Verduzco MD   DULoxetine (CYMBALTA) 30 MG extended release capsule Take 1 capsule by mouth Daily with supper Total dosage 90 mg /day Yes Vineet Verduzco MD   hydroCHLOROthiazide (MICROZIDE) 12.5 MG capsule Take 1 capsule by mouth every morning FROM Carrington Holguin MD   atorvastatin (LIPITOR) 20 MG tablet Take 1 tablet by mouth daily From cardio, stopped pravachol  Patient not taking: Reported on 4/25/2022  Vineet Verduzco MD   Mometasone Furo-Formoterol Fum 50-5 MCG/ACT AERO Inhale 2 puffs into the lungs 2 times daily  Patient not taking: Reported on 4/25/2022  Vineet Verduzco MD       Social History     Tobacco Use    Smoking status: Current Every Day Smoker     Packs/day: 1.00     Years: 30.00     Pack years: 30.00     Types: Cigarettes    Smokeless tobacco: Never Used    Tobacco comment: started smoking at 31 yo, cutting down now 1/2 PPD or less   Substance Use Topics    Alcohol use: No    Drug use: No          PHYSICAL EXAMINATION:    Vital Signs: (As obtained by patient/caregiver or practitioner observation)  -vital signs stable and within normal limits except   Patient-Reported Vitals 4/26/2022   Patient-Reported Weight 143 lbs   Patient-Reported Height 52\"   Patient-Reported Systolic 829   Patient-Reported Diastolic 97   Patient-Reported Pulse 94   Patient-Reported Temperature 98           Constitutional: [x] Appears well-developed and well-nourished [x] No apparent distress      [] Abnormal-       Mental status  [x] Alert and awake  [x] Oriented to person/place/time [x]Able to follow commands      Eyes:  EOM    [x]  Normal  [] Abnormal-  Sclera  [x]  Normal  [] Abnormal -         Discharge [x]  None visible  [] Abnormal -    HENT:   [x] Normocephalic, atraumatic. [] Abnormal   [x] Mouth/Throat: Mucous membranes are moist.     External Ears [x] Normal  [] Abnormal-     Neck: [x] No visualized mass     Pulmonary/Chest: [x] Respiratory effort normal.  [x] No visualized signs of difficulty breathing or respiratory distress        [] Abnormal        Musculoskeletal:   [x] Normal gait with no signs of ataxia         [x] Normal range of motion of neck        [] Abnormal-       Neurological:        [x] No Facial Asymmetry (Cranial nerve 7 motor function) (limited exam to video visit)          [x] No gaze palsy        [] Abnormal-            Skin:        [x] No significant exanthematous lesions or discoloration noted on facial skin         [] Abnormal-            Psychiatric:      [x] No Hallucinations       []Mood is normal      [x]Behavior is normal      [x]Judgment is normal      [x]Thought content is normal       [x] Abnormal-mildly anxious, talking fast    Other pertinent observable physical exam findings- none    Due to this being a TeleHealth encounter, evaluation of the following organ systems is limited: Vitals/Constitutional/EENT/Resp/CV/GI//MS/Neuro/Skin/Heme-Lymph-Imm. I personally reviewed most recent labs reviewed with the patient and all questions fully answered.    Hyperlipidemia  Vitamin D deficiency  Mild polycythemia likely related to COPD  Mild hyperglycemia  High triglycerides    Otherwise labs within normal limits        Lab Results   Component Value Date    WBC 6.1 12/03/2021    HGB 16.0 12/03/2021    HCT 47.4 (H) 12/03/2021    MCV 90.8 12/03/2021     12/03/2021       Lab Results   Component Value Date     12/03/2021    K 4.3 12/03/2021     12/03/2021    CO2 27 12/03/2021    BUN 14 12/03/2021    CREATININE 0.83 12/03/2021    GLUCOSE 103 12/03/2021    CALCIUM 9.5 12/03/2021        Lab Results   Component Value Date    ALT 12 12/03/2021    AST 15 12/03/2021    ALKPHOS 85 12/03/2021    BILITOT 0.36 12/03/2021       Lab Results   Component Value Date    TSH 3.80 12/03/2021       Lab Results   Component Value Date    CHOL 236 (H) 12/03/2021    CHOL 204 (H) 06/08/2021    CHOL 222 (H) 02/04/2021     Lab Results   Component Value Date    TRIG 149 12/03/2021    TRIG 154 (H) 06/08/2021    TRIG 150 (H) 02/04/2021     Lab Results   Component Value Date    HDL 49 12/03/2021    HDL 49 06/08/2021    HDL 55 02/04/2021     Lab Results   Component Value Date    LDLCHOLESTEROL 157 (H) 12/03/2021    LDLCHOLESTEROL 124 06/08/2021    LDLCHOLESTEROL 137 (H) 02/04/2021       Lab Results   Component Value Date    CHOLHDLRATIO 4.8 12/03/2021    CHOLHDLRATIO 4.2 06/08/2021    CHOLHDLRATIO 4.0 02/04/2021       Lab Results   Component Value Date    LABA1C 5.5 03/21/2018         No results found for: [id number]    Lab Results   Component Value Date    VITD25 24.2 (L) 12/03/2021     To start metoprolol XL 25 mg daily  Orders Placed This Encounter   Medications    tiotropium (SPIRIVA HANDIHALER) 18 MCG inhalation capsule     Sig: Inhale 1 capsule into the lungs daily     Dispense:  90 capsule     Refill:  1    DULoxetine (CYMBALTA) 60 MG extended release capsule     Sig: Take 1 capsule by mouth every morning With breakfast. Total dosage 90 mg /day     Dispense:  90 capsule     Refill:  3    DULoxetine (CYMBALTA) 30 MG extended release capsule     Sig: Take 1 capsule by mouth daily (before lunch) Total dosage 90 mg /day Dispense:  90 capsule     Refill:  3    levothyroxine (SYNTHROID) 100 MCG tablet     Sig: Take 1 tablet by mouth every morning (before breakfast)     Dispense:  90 tablet     Refill:  3       Orders Placed This Encounter   Procedures    CT Lung Screen (Annual)     Age: Patient is 58 y.o. Smoking History: Social History    Tobacco Use      Smoking status: Current Every Day Smoker        Packs/day: 1.00        Years: 30.00        Pack years: 30        Types: Cigarettes      Smokeless tobacco: Never Used      Tobacco comment: started smoking at 33 yo    Alcohol use: No    Drug use: No   Pack years: 30    Date of last lung cancer screening: No previous lung cancer screening exam     Standing Status:   Future     Standing Expiration Date:   10/26/2023     Scheduling Instructions:      NO DYE     Order Specific Question:   Is there documentation of shared decision making? Answer:   Yes     Order Specific Question:   Is this a low dose CT or a routine CT? Answer:   Low Dose CT [1]     Order Specific Question:   Is this the first (baseline) CT or an annual exam?     Answer:   Baseline [1]     Order Specific Question:   Does the patient show any signs or symptoms of lung cancer? Answer:   No     Order Specific Question:   Smoking Status? Answer:   Current Every Day Smoker [1]     Order Specific Question:   Smoking packs per day? Answer:   1     Order Specific Question:   Years smoking? Answer:   27    [facility] JEANNIE DIGITAL SCREEN BILATERAL     Standing Status:   Future     Standing Expiration Date:   6/26/2023    Urinalysis with Reflex to Culture     Standing Status:   Future     Standing Expiration Date:   4/26/2023     Order Specific Question:   SPECIFY(EX-CATH,MIDSTREAM,CYSTO,ETC)?      Answer:   MIDSTREAM    Hemoglobin A1C     Standing Status:   Future     Standing Expiration Date:   4/26/2023    CBC     Standing Status:   Future     Standing Expiration Date:   4/26/2023   Dana Campa Metabolic Panel     Standing Status:   Future     Standing Expiration Date:   6/23/2022    Lipid Panel     Standing Status:   Future     Standing Expiration Date:   4/26/2023     Order Specific Question:   Is Patient Fasting?/# of Hours     Answer:   8-10 Hours, water ok to drink    TSH     Standing Status:   Future     Standing Expiration Date:   4/26/2023    Vitamin D 25 Hydroxy     Standing Status:   Future     Standing Expiration Date:   4/26/2023    NC VISIT TO DISCUSS LUNG CA SCREEN W LDCT       Medications Discontinued During This Encounter   Medication Reason    atorvastatin (LIPITOR) 20 MG tablet Therapy completed    hydroCHLOROthiazide (MICROZIDE) 12.5 MG capsule Therapy completed    DULoxetine (CYMBALTA) 60 MG extended release capsule REORDER    DULoxetine (CYMBALTA) 30 MG extended release capsule REORDER    tiotropium (SPIRIVA HANDIHALER) 18 MCG inhalation capsule REORDER    levothyroxine (SYNTHROID) 100 MCG tablet REORDER    metoprolol tartrate (LOPRESSOR) 25 MG tablet Alternate therapy    Mometasone Furo-Formoterol Fum 50-5 MCG/ACT AERO Cost of medication              Candelariochristine Lynne, was evaluated through a synchronous (real-time) audio-video encounter. The patient (or guardian if applicable) is aware that this is a billable service, which includes applicable co-pays. The virtual visit was conducted with patient's (and/or legal guardian consent). Verbal consent to proceed has been obtained within the past 12 months. The visit was conducted pursuant to the emergency declaration under the [address] [place] [address], [address] [place] [address] authority and the [employer] and [employer] Act. Patient identification was verified    Patient was alone   Provider was located at primary practice location. The patient was located at home, in a state where the provider was licensed to provide care.     On this date 4/26/2022 I have spent 35 minutes reviewing previous notes, test results and face to face with the patient discussing the diagnosis and importance of compliance with the treatment plan as well as documenting on the day of the visit. --Mariela Fowler MD on 5/2/2022 at 7:21 PM    An electronic signature was used to authenticate this note.

## 2022-04-26 NOTE — PATIENT INSTRUCTIONS
What is lung cancer screening? Lung cancer screening is a way in which doctors check the lungs for early signs of cancer in people who have no symptoms of lung cancer. A low-dose CT scan uses much less radiation than a normal CT scan and shows a more detailed image of the lungs than a standard X-ray. The goal of lung cancer screening is to find cancer early, before it has a chance to grow, spread, or cause problems. One large study found that smokers who were screened with low-dose CT scans were less likely to die of lung cancer than those who were screened with standard X-ray. Below is a summary of the things you need to know regarding screening for lung cancer with low-dose computed tomography (LDCT). This is a screening program that involves routine annual screening with LDCT studies of the lung. The LDCTs are done using low-dose radiation that is not thought to increase your cancer risk. If you have other serious medical conditions (other cancers, congestive heart failure) that limit your life expectancy to less than 10 years, you should not undergo lung cancer screening with LDCT. The chance is 20%-60% that the LDCT result will show abnormalities. This would require additional testing which could include repeat imaging or even invasive procedures. Most (about 95%) of \"abnormal\" LDCT results are false in the sense that no lung cancer is ultimately found. Additionally, some (about 10%) of the cancers found would not affect your life expectancy, even if undetected and untreated. If you are still smoking, the single most important thing that you can do to reduce your risk of dying of lung cancer is to quit. For this screening to be covered by Medicare and most other insurers, strict criteria must be met. If you do not meet these criteria, but still wish to undergo LDCT testing, you will be required to sign a waiver indicating your willingness to pay for the scan. Patient Education Stopping Smoking: Care Instructions  Your Care Instructions     Cigarette smokers crave the nicotine in cigarettes. Giving it up is much harder than simply changing a habit. Your body has to stop craving the nicotine. It is hard to quit, but you can do it. There are many tools that people use to quitsmoking. You may find that combining tools works best for you. There are several steps to quitting. First you get ready to quit. Then you get support to help you. After that, you learn new skills and behaviors to become anonsmoker. For many people, a necessary step is getting and using medicine. Your doctor will help you set up the plan that best meets your needs. You may want to attend a smoking cessation program to help you quit smoking. When you choose a program, look for one that has proven success. Ask your doctor for ideas. You will greatly increase your chances of success if you take medicineas well as get counseling or join a cessation program.  Some of the changes you feel when you first quit tobacco are uncomfortable. Your body will miss the nicotine at first, and you may feel short-tempered and grumpy. You may have trouble sleeping or concentrating. Medicine can help you deal with these symptoms. You may struggle with changing your smoking habits and rituals. The last step is the tricky one: Be prepared for the smoking urge to continue for a time. This is a lot to deal with, but keep at it. You willfeel better. Follow-up care is a key part of your treatment and safety. Be sure to make and go to all appointments, and call your doctor if you are having problems. It's also a good idea to know your test results and keep alist of the medicines you take. How can you care for yourself at home?  Ask your family, friends, and coworkers for support. You have a better chance of quitting if you have help and support.    Join a support group, such as Nicotine Anonymous, for people who are trying to quit smoking.  Consider signing up for a smoking cessation program, such as the American Lung Association's Freedom from Smoking program.   Get text messaging support. Go to the website at www.smokefree. gov to sign up for the [facility] program.   Set a quit date. Pick your date carefully so that it is not right in the middle of a big deadline or stressful time. Once you quit, do not even take a puff. Get rid of all ashtrays and lighters after your last cigarette. Clean your house and your clothes so that they do not smell of smoke.  Learn how to be a nonsmoker. Think about ways you can avoid those things that make you reach for a cigarette. ? Avoid situations that put you at greatest risk for smoking. For some people, it is hard to have a drink with friends without smoking. For others, they might skip a coffee break with coworkers who smoke. ? Change your daily routine. Take a different route to work or eat a meal in a different place.  Cut down on stress. Calm yourself or release tension by doing an activity you enjoy, such as reading a book, taking a hot bath, or gardening.  Talk to your doctor or pharmacist about nicotine replacement therapy, which replaces the nicotine in your body. You still get nicotine but you do not use tobacco. Nicotine replacement products help you slowly reduce the amount of nicotine you need. These products come in several forms, many of them available over-the-counter:  ? Nicotine patches  ? Nicotine gum and lozenges  ? Nicotine inhaler   Ask your doctor about bupropion (Wellbutrin) or varenicline (Chantix), which are prescription medicines. They do not contain nicotine. They help you by reducing withdrawal symptoms, such as stress and anxiety.  Some people find hypnosis, acupuncture, and massage helpful for ending the smoking habit.  Eat a healthy diet and get regular exercise. Having healthy habits will help your body move past its craving for nicotine.    Be prepared to keep trying. Most people are not successful the first few times they try to quit. Do not get mad at yourself if you smoke again. Make a list of things you learned and think about when you want to try again, such as next week, next month, or next year. Where can you learn more? Go to https://chpepedro.Quintesocial. org and sign in to your [employer] account. Enter A362 in the Droplr box to learn more about \"Stopping Smoking: Care Instructions. \"     If you do not have an account, please click on the \"Sign Up Now\" link. Current as of: October 28, 2021               Content Version: 13.2  © 2006-2022 [employer]. Care instructions adapted under license by [facility] ([facility]). If you have questions about a medical condition or this instruction, always ask your healthcare professional. [address] any warranty or liability for your use of this information.

## 2022-05-02 ASSESSMENT — ENCOUNTER SYMPTOMS: TROUBLE SWALLOWING: 0

## 2022-05-04 ENCOUNTER — NURSE ONLY (OUTPATIENT)
Dept: FAMILY MEDICINE CLINIC | Age: 62
End: 2022-05-04
Payer: COMMERCIAL

## 2022-05-04 VITALS — SYSTOLIC BLOOD PRESSURE: 122 MMHG | DIASTOLIC BLOOD PRESSURE: 86 MMHG

## 2022-05-04 DIAGNOSIS — I10 ESSENTIAL HYPERTENSION: Primary | ICD-10-CM

## 2022-05-04 PROCEDURE — 99211 OFF/OP EST MAY X REQ PHY/QHP: CPT | Performed by: FAMILY MEDICINE

## 2022-05-04 NOTE — PROGRESS NOTES
Chief Complaint   Patient presents with    Hypertension     BP CHECK       Sushila Galarza is here for BP check    BP Readings from Last 3 Encounters:   03/14/22 125/65   08/27/21 122/80   08/24/21 109/67       BP is 108/90 @ 2:58PM      Future Appointments   Date Time Provider Ulysses Hickman   5/4/2022  3:00 PM SCHEDULE, [facility]   9/15/2022  3:45 PM Jocelyne Crain MD [facility]

## 2022-05-04 NOTE — PROGRESS NOTES
Chief Complaint   Patient presents with    Hypertension     BP CHECK         Patient is here for blood pressure recheck. 1. Essential hypertension        Well controlled BP   Continue current treatment.        BP Readings from Last 3 Encounters:   05/04/22 122/86   03/14/22 125/65   08/27/21 122/80       Pulse Readings from Last 3 Encounters:   03/14/22 81   08/27/21 79   08/24/21 60       Future Appointments   Date Time Provider Ulysses Marylou   9/15/2022  3:45 PM [facility]

## 2022-05-13 ENCOUNTER — HOSPITAL ENCOUNTER (OUTPATIENT)
Age: 62
Setting detail: SPECIMEN
Discharge: HOME | End: 2022-05-13
Payer: COMMERCIAL

## 2022-05-13 DIAGNOSIS — E55.9 VITAMIN D DEFICIENCY: ICD-10-CM

## 2022-05-13 DIAGNOSIS — I10 ESSENTIAL HYPERTENSION: ICD-10-CM

## 2022-05-13 DIAGNOSIS — E78.5 HYPERLIPIDEMIA WITH TARGET LDL LESS THAN 100: ICD-10-CM

## 2022-05-13 DIAGNOSIS — Z13.1 SCREENING FOR DIABETES MELLITUS: ICD-10-CM

## 2022-05-13 LAB
ALBUMIN SERPL-MCNC: 4.5 G/DL (ref 3.5–5.2)
ALP BLD-CCNC: 101 U/L (ref 35–104)
ALT SERPL-CCNC: 16 U/L (ref 5–33)
ANION GAP SERPL CALCULATED.3IONS-SCNC: 11 MMOL/L (ref 9–17)
AST SERPL-CCNC: 18 U/L
BILIRUB SERPL-MCNC: 0.2 MG/DL (ref 0.3–1.2)
BILIRUBIN URINE: NEGATIVE
BUN BLDV-MCNC: 11 MG/DL (ref 8–23)
CALCIUM SERPL-MCNC: 9.5 MG/DL (ref 8.6–10.4)
CHLORIDE BLD-SCNC: 105 MMOL/L (ref 98–107)
CHOLESTEROL/HDL RATIO: 4.8
CHOLESTEROL: 229 MG/DL
CO2: 27 MMOL/L (ref 20–31)
COLOR: YELLOW
COMMENT UA: NORMAL
CREAT SERPL-MCNC: 0.75 MG/DL (ref 0.5–0.9)
ESTIMATED AVERAGE GLUCOSE: 117 MG/DL
GFR AFRICAN AMERICAN: >60 ML/MIN
GFR NON-AFRICAN AMERICAN: >60 ML/MIN
GFR SERPL CREATININE-BSD FRML MDRD: ABNORMAL ML/MIN/{1.73_M2}
GLUCOSE BLD-MCNC: 97 MG/DL (ref 70–99)
GLUCOSE URINE: NEGATIVE
HBA1C MFR BLD: 5.7 % (ref 4–6)
HCT VFR BLD CALC: 44.4 % (ref 36–46)
HDLC SERPL-MCNC: 48 MG/DL
HEMOGLOBIN: 15.2 G/DL (ref 12–16)
KETONES, URINE: NEGATIVE
LDL CHOLESTEROL: 158 MG/DL (ref 0–130)
LEUKOCYTE ESTERASE, URINE: NEGATIVE
MCH RBC QN AUTO: 30.8 PG (ref 26–34)
MCHC RBC AUTO-ENTMCNC: 34.2 G/DL (ref 31–37)
MCV RBC AUTO: 90.3 FL (ref 80–100)
NITRITE, URINE: NEGATIVE
PDW BLD-RTO: 13.5 % (ref 11.5–14.9)
PH UA: 7.5 (ref 5–8)
PLATELET # BLD: 302 K/UL (ref 150–450)
PMV BLD AUTO: 8.4 FL (ref 6–12)
POTASSIUM SERPL-SCNC: 4.3 MMOL/L (ref 3.7–5.3)
PROTEIN UA: NEGATIVE
RBC # BLD: 4.92 M/UL (ref 4–5.2)
SODIUM BLD-SCNC: 143 MMOL/L (ref 135–144)
SPECIFIC GRAVITY UA: 1.01 (ref 1–1.03)
TOTAL PROTEIN: 7.7 G/DL (ref 6.4–8.3)
TRIGL SERPL-MCNC: 117 MG/DL
TSH SERPL DL<=0.05 MIU/L-ACNC: 4.37 UIU/ML (ref 0.3–5)
TURBIDITY: CLEAR
URINE HGB: NEGATIVE
UROBILINOGEN, URINE: NORMAL
VITAMIN D 25-HYDROXY: 47.5 NG/ML
WBC # BLD: 7.3 K/UL (ref 3.5–11)

## 2022-05-13 PROCEDURE — 36415 COLL VENOUS BLD VENIPUNCTURE: CPT

## 2022-05-13 PROCEDURE — 80061 LIPID PANEL: CPT

## 2022-05-13 PROCEDURE — 84443 ASSAY THYROID STIM HORMONE: CPT

## 2022-05-13 PROCEDURE — 80053 COMPREHEN METABOLIC PANEL: CPT

## 2022-05-13 PROCEDURE — 82306 VITAMIN D 25 HYDROXY: CPT

## 2022-05-13 PROCEDURE — 85027 COMPLETE CBC AUTOMATED: CPT

## 2022-05-13 PROCEDURE — 81003 URINALYSIS AUTO W/O SCOPE: CPT

## 2022-05-13 PROCEDURE — 83036 HEMOGLOBIN GLYCOSYLATED A1C: CPT

## 2022-05-13 NOTE — RESULT ENCOUNTER NOTE
Please notify patient: Mild prediabetes, low-carb diet is advised.   High lipids not improving, I strongly recommend to start atorvastatin to improve the lipids, and to prevent strokes and heart attacks, please let me know if she agrees and I will send to the pharmacy  Otherwise labs within normal limits  continue current treatment    Future Appointments  9/15/2022  3:45 PM    MD simona Anderson               [facility] - - -

## 2022-05-16 DIAGNOSIS — E78.5 HYPERLIPIDEMIA WITH TARGET LDL LESS THAN 100: Primary | ICD-10-CM

## 2022-05-16 RX ORDER — ATORVASTATIN CALCIUM 10 MG/1
10 TABLET, FILM COATED ORAL EVERY EVENING
Qty: 90 TABLET | Refills: 3 | Status: SHIPPED | OUTPATIENT
Start: 2022-05-16 | End: 2022-09-15 | Stop reason: SDUPTHER

## 2022-05-16 NOTE — RESULT ENCOUNTER NOTE
Noted Lipitor sent to the pharmacy  Future Appointments  9/15/2022  3:45 PM    [facility] Frost [facility]     [facility]

## 2022-09-15 ENCOUNTER — OFFICE VISIT (OUTPATIENT)
Dept: FAMILY MEDICINE CLINIC | Age: 62
End: 2022-09-15
Payer: COMMERCIAL

## 2022-09-15 VITALS
TEMPERATURE: 98 F | BODY MASS INDEX: 25.03 KG/M2 | DIASTOLIC BLOOD PRESSURE: 80 MMHG | HEART RATE: 96 BPM | OXYGEN SATURATION: 95 % | WEIGHT: 136 LBS | HEIGHT: 62 IN | SYSTOLIC BLOOD PRESSURE: 138 MMHG

## 2022-09-15 DIAGNOSIS — F33.0 MILD EPISODE OF RECURRENT MAJOR DEPRESSIVE DISORDER (HCC): ICD-10-CM

## 2022-09-15 DIAGNOSIS — I10 ESSENTIAL HYPERTENSION: ICD-10-CM

## 2022-09-15 DIAGNOSIS — M54.2 CHRONIC NECK PAIN: ICD-10-CM

## 2022-09-15 DIAGNOSIS — R73.9 HYPERGLYCEMIA: ICD-10-CM

## 2022-09-15 DIAGNOSIS — Z12.31 ENCOUNTER FOR SCREENING MAMMOGRAM FOR BREAST CANCER: ICD-10-CM

## 2022-09-15 DIAGNOSIS — Z23 ENCOUNTER FOR IMMUNIZATION: ICD-10-CM

## 2022-09-15 DIAGNOSIS — G89.29 CHRONIC NECK PAIN: ICD-10-CM

## 2022-09-15 DIAGNOSIS — R20.0 NUMBNESS AND TINGLING IN RIGHT HAND: ICD-10-CM

## 2022-09-15 DIAGNOSIS — J44.9 CHRONIC OBSTRUCTIVE PULMONARY DISEASE, UNSPECIFIED COPD TYPE (HCC): ICD-10-CM

## 2022-09-15 DIAGNOSIS — Z12.11 SCREEN FOR COLON CANCER: ICD-10-CM

## 2022-09-15 DIAGNOSIS — R20.2 NUMBNESS AND TINGLING IN RIGHT HAND: ICD-10-CM

## 2022-09-15 DIAGNOSIS — F41.9 ANXIETY: ICD-10-CM

## 2022-09-15 DIAGNOSIS — M35.01 SJOGREN'S SYNDROME WITH KERATOCONJUNCTIVITIS SICCA (HCC): ICD-10-CM

## 2022-09-15 DIAGNOSIS — Z00.01 ENCOUNTER FOR WELL ADULT EXAM WITH ABNORMAL FINDINGS: Primary | ICD-10-CM

## 2022-09-15 DIAGNOSIS — E78.5 HYPERLIPIDEMIA WITH TARGET LDL LESS THAN 100: ICD-10-CM

## 2022-09-15 DIAGNOSIS — Z85.850 HISTORY OF THYROID CANCER: ICD-10-CM

## 2022-09-15 DIAGNOSIS — E89.0 POSTOPERATIVE HYPOTHYROIDISM: ICD-10-CM

## 2022-09-15 PROBLEM — M17.9 OSTEOARTHRITIS OF KNEE: Status: ACTIVE | Noted: 2022-09-15

## 2022-09-15 PROBLEM — M47.812 CERVICAL SPONDYLOSIS WITHOUT MYELOPATHY: Status: ACTIVE | Noted: 2022-09-15

## 2022-09-15 PROCEDURE — 99214 OFFICE O/P EST MOD 30 MIN: CPT | Performed by: FAMILY MEDICINE

## 2022-09-15 PROCEDURE — 99396 PREV VISIT EST AGE 40-64: CPT | Performed by: FAMILY MEDICINE

## 2022-09-15 RX ORDER — ALBUTEROL SULFATE 90 UG/1
2 AEROSOL, METERED RESPIRATORY (INHALATION) EVERY 6 HOURS PRN
Qty: 8 EACH | Refills: 5 | Status: SHIPPED | OUTPATIENT
Start: 2022-09-15

## 2022-09-15 RX ORDER — BUSPIRONE HYDROCHLORIDE 10 MG/1
10 TABLET ORAL 3 TIMES DAILY
Qty: 270 TABLET | Refills: 3 | Status: SHIPPED | OUTPATIENT
Start: 2022-09-15

## 2022-09-15 RX ORDER — METOPROLOL SUCCINATE 25 MG/1
25 TABLET, EXTENDED RELEASE ORAL DAILY
Qty: 90 TABLET | Refills: 3 | Status: SHIPPED | OUTPATIENT
Start: 2022-09-15

## 2022-09-15 RX ORDER — DULOXETIN HYDROCHLORIDE 60 MG/1
60 CAPSULE, DELAYED RELEASE ORAL EVERY MORNING
Qty: 90 CAPSULE | Refills: 3 | Status: SHIPPED | OUTPATIENT
Start: 2022-09-15

## 2022-09-15 RX ORDER — TIOTROPIUM BROMIDE 18 UG/1
18 CAPSULE ORAL; RESPIRATORY (INHALATION) DAILY
Qty: 90 CAPSULE | Refills: 3 | Status: SHIPPED | OUTPATIENT
Start: 2022-09-15

## 2022-09-15 RX ORDER — DULOXETIN HYDROCHLORIDE 30 MG/1
30 CAPSULE, DELAYED RELEASE ORAL
Qty: 90 CAPSULE | Refills: 3 | Status: SHIPPED | OUTPATIENT
Start: 2022-09-15

## 2022-09-15 RX ORDER — BACLOFEN 10 MG/1
10 TABLET ORAL 3 TIMES DAILY PRN
Qty: 90 TABLET | Refills: 0 | Status: SHIPPED | OUTPATIENT
Start: 2022-09-15 | End: 2022-10-10

## 2022-09-15 RX ORDER — LEVOTHYROXINE SODIUM 0.1 MG/1
100 TABLET ORAL
Qty: 90 TABLET | Refills: 3 | Status: SHIPPED | OUTPATIENT
Start: 2022-09-15

## 2022-09-15 RX ORDER — ATORVASTATIN CALCIUM 10 MG/1
10 TABLET, FILM COATED ORAL EVERY EVENING
Qty: 90 TABLET | Refills: 3 | Status: SHIPPED | OUTPATIENT
Start: 2022-09-15

## 2022-09-15 SDOH — ECONOMIC STABILITY: FOOD INSECURITY: WITHIN THE PAST 12 MONTHS, YOU WORRIED THAT YOUR FOOD WOULD RUN OUT BEFORE YOU GOT MONEY TO BUY MORE.: NEVER TRUE

## 2022-09-15 SDOH — ECONOMIC STABILITY: FOOD INSECURITY: WITHIN THE PAST 12 MONTHS, THE FOOD YOU BOUGHT JUST DIDN'T LAST AND YOU DIDN'T HAVE MONEY TO GET MORE.: NEVER TRUE

## 2022-09-15 ASSESSMENT — PATIENT HEALTH QUESTIONNAIRE - PHQ9
SUM OF ALL RESPONSES TO PHQ QUESTIONS 1-9: 1
1. LITTLE INTEREST OR PLEASURE IN DOING THINGS: 0
2. FEELING DOWN, DEPRESSED OR HOPELESS: 1
SUM OF ALL RESPONSES TO PHQ9 QUESTIONS 1 & 2: 1

## 2022-09-15 ASSESSMENT — ENCOUNTER SYMPTOMS
VOMITING: 0
NAUSEA: 0
ABDOMINAL PAIN: 0
BACK PAIN: 0
WHEEZING: 0
DIARRHEA: 0
BLOOD IN STOOL: 0
SHORTNESS OF BREATH: 1
COUGH: 1
ABDOMINAL DISTENTION: 0
CONSTIPATION: 0
CHEST TIGHTNESS: 0

## 2022-09-15 ASSESSMENT — VISUAL ACUITY
OD_CC: 20/25
OS_CC: 20/25

## 2022-09-15 ASSESSMENT — SOCIAL DETERMINANTS OF HEALTH (SDOH): HOW HARD IS IT FOR YOU TO PAY FOR THE VERY BASICS LIKE FOOD, HOUSING, MEDICAL CARE, AND HEATING?: NOT HARD AT ALL

## 2022-09-15 NOTE — PROGRESS NOTES
9/15/2022      Matheus Balderas (:  1960) is a 58 y.o. female, here for a preventive medicine evaluation, Annual Exam, Numbness (Numbness and tingling in right hand), Health Maintenance (Refused flu vaccine), Memory Loss (Having issues with using words), and Difficulty Walking (Stumbles and waddles when walking)   . ASSESSMENT/PLAN:    1. Encounter for well adult exam with abnormal findings  Reprinted CT lung and advised to have done. Low carb, low fat diet, increase fruits and vegetables, and exercise 4-5 times a week 30-40 minutes a day, or walk 1-2 hours per day, or wear a pedometer and get at least 10,000 steps per day. Dental exam 2-3 times /year advised. Immunizations reviewed. Will receive pneumonia vaccine at the pharmacy  She declines flu shot and COVID-19 vaccine    Health Maintenance reviewed   Smoking cessation counseling given     Annual eye exam advised. 2. Encounter for immunization  -     pneumococcal 20-valent conjugat (PREVNAR) 0.5 ML FAMILIA inj; Inject 0.5 mLs into the muscle once for 1 dose, Disp-0.5 mL, R-0Normal  3. Chronic neck pain  Failing to resolve, likely radiculopathy and pinching nerves  -     XR CERVICAL SPINE (2-3 VIEWS); Future  -    To start  N  -     To start baclofen (LIORESAL) 10 MG tablet; Take 1 tablet by mouth 3 times daily as needed (MUSCLE SPASMS) Causes sedation, do not drive while taking this medication, Disp-90 tablet, R-0Normal  Repositioning, stretching, start physical therapy discussed  For pain she is already on Cymbalta 90 mg daily  -     IA OFFICE/OUTPATIENT ESTABLISHED MOD MDM 30-39 MIN  4.  Numbness and tingling in right hand  Failing to resolve  Will start physical therapy for chronic neck pain  I suspect pinching nerves in the neck  To start using a wrist brace, to use exercising balls which she has at home for her son  We discussed EMG if symptoms do not improve or resolve  -     Vitamin B12 & Folate; Future  -     PA OFFICE/OUTPATIENT ESTABLISHED MOD MDM 30-39 MIN  5. Chronic obstructive pulmonary disease, unspecified COPD type ([facility] [address]  Stable  Strongly admonished her to quit smoking or at least to cut down on smoking  Continue current inhalers  -     tiotropium (SPIRIVA HANDIHALER) 18 MCG inhalation capsule; Inhale 1 capsule into the lungs daily, Disp-90 capsule, R-3Normal  -     albuterol sulfate HFA (PROVENTIL;VENTOLIN;PROAIR) 108 (90 Base) MCG/ACT inhaler; Inhale 2 puffs into the lungs every 6 hours as needed for Wheezing or Shortness of Breath (cough), Disp-8 each, R-5Normal  -     PA OFFICE/OUTPATIENT ESTABLISHED MOD MDM 30-39 MIN  6. Mild episode of recurrent major depressive disorder (HCC)  Slightly worsening  Declines decreasing the dosage of Cymbalta  Continue current medications, continue to monitor, declines counseling due to time constraints  -     DULoxetine (CYMBALTA) 60 MG extended release capsule; Take 1 capsule by mouth every morning With breakfast. Total dosage 90 mg /day, Disp-90 capsule, R-3Normal  -     DULoxetine (CYMBALTA) 30 MG extended release capsule; Take 1 capsule by mouth daily (before lunch) Total dosage 90 mg /day, Disp-90 capsule, R-3Normal  -     busPIRone (BUSPAR) 10 MG tablet; Take 1 tablet by mouth 3 times daily, Disp-270 tablet, R-3Normal  -     PA OFFICE/OUTPATIENT ESTABLISHED MOD MDM 30-39 MIN  7. Postoperative hypothyroidism  Borderline high TSH, recheck thyroid testing  Lab Results   Component Value Date    TSH 4.37 05/13/2022   Advised to take Synthroid in the morning, on empty stomach, without any other meds and with a full glass of water.      -     levothyroxine (SYNTHROID) 100 MCG tablet; Take 1 tablet by mouth every morning (before breakfast), Disp-90 tablet, R-3Normal  -     T4, Free; Future  -     TSH; Future  -     PA OFFICE/OUTPATIENT ESTABLISHED MOD MDM 30-39 MIN  8.  Essential hypertension  Inadequately controlled systolic blood pressure  Patient says she has been off metoprolol for 2 months  -To restart     metoprolol succinate (TOPROL XL) 25 MG extended release tablet; Take 1 tablet by mouth daily, Disp-90 tablet, R-3Normal  Discussed low salt diet and BP and pulse monitoring. Check labs  -     CBC with Auto Differential; Future  -     Comprehensive Metabolic Panel; Future  -     Magnesium; Future  -     Uric Acid; Future  -     Urinalysis with Reflex to Culture; Future  -     DE OFFICE/OUTPATIENT ESTABLISHED MOD MDM 30-39 MIN  9. Sjogren's syndrome with keratoconjunctivitis sicca (HCC)  Worsening symptoms, worsening dry eyes and dry mouth  Patient strongly advised to follow-up with dentist, ophthalmologist, and Dr. Sierra Buckley rheumatologist  Will do CBC with differential due to high risk of development of lymphoma when having Sjogren syndrome  -     CBC with Auto Differential; Future  -     DE OFFICE/OUTPATIENT ESTABLISHED MOD MDM 30-39 MIN  10. History of thyroid cancer  -     levothyroxine (SYNTHROID) 100 MCG tablet; Take 1 tablet by mouth every morning (before breakfast), Disp-90 tablet, R-3Normal  -     T4, Free; Future  -     TSH; Future  -     DE OFFICE/OUTPATIENT ESTABLISHED MOD MDM 30-39 MIN  11. Anxiety  Worsening  Relaxation techniques discussed  -Continue     busPIRone (BUSPAR) 10 MG tablet; Take 1 tablet by mouth 3 times daily, Disp-270 tablet, R-3Normal  -     DE OFFICE/OUTPATIENT ESTABLISHED MOD MDM 30-39 MIN  12. Hyperglycemia  Worsening  Low-carb diet discussed  Mild prediabetes, recheck A1c  Lab Results   Component Value Date    LABA1C 5.7 05/13/2022    LABA1C 5.5 03/21/2018       -     Hemoglobin A1C; Future  -     DE OFFICE/OUTPATIENT ESTABLISHED MOD MDM 30-39 MIN  13. Hyperlipidemia with target LDL less than 100  Inadequately controlled  Low-fat diet, continue Lipitor, recheck lipid panel  Lab Results   Component Value Date    LDLCHOLESTEROL 158 (H) 05/13/2022       -     atorvastatin (LIPITOR) 10 MG tablet;  Take 1 tablet by mouth every evening, Disp-90 tablet, R-3Normal  -     Lipid Panel; Future  -     [place] OFFICE/OUTPATIENT ESTABLISHED MOD MDM 30-39 MIN  14. Encounter for screening mammogram for breast cancer  -     [facility] DIGITAL SCREEN BILATERAL; Future  15. Screen for colon cancer  -     Cologuard (Fecal DNA Colorectal Cancer Screening); Future          August Zarate received counseling on the following healthy behaviors: nutrition, exercise, medication adherence, and tobacco cessation  Reviewed prior labs and health maintenance  Discussed use, benefit, and side effects of prescribed medications. Barriers to medication compliance addressed. Patient given educational materials - see patient instructions  All patient questions answered. Patient voiced understanding. The patient's past medical, surgical, social, and family history as well as her  current medications and allergies were reviewed as documented in today's encounter. Medications, labs, diagnostic studies, consultations and follow-up as documented in thisencounter. Return in 4 months (on 1/15/2023) for Visit type extended 30 minutes chronic problems.     Data Unavailable    Future Appointments   Date Time Provider Ulysses Hickman   2/3/2023  3:30 PM [facility]           Patient Active Problem List   Diagnosis    History of thyroid cancer    Anxiety    Panic attacks    Chronic neck pain    Postoperative hypothyroidism    Major depressive disorder with single episode, in partial remission ([address]    Agoraphobia with panic attacks    Conductive hearing loss of right ear    Vitamin D deficiency    Hyperlipidemia with target LDL less than 100    RIZVI (dyspnea on exertion)    Family history of rheumatoid arthritis    Moderate episode of recurrent major depressive disorder (HCC)    Nodule of finger of both hands    Chronic fatigue    Sjogren's syndrome with keratoconjunctivitis sicca (HCC)    Influenza vaccination declined    Personal history of tobacco use    Acute hemorrhoid Chronic RLQ pain    Abnormal EKG    Palpitations    Psychophysiological insomnia    Current smoker    Mild episode of recurrent major depressive disorder (HCC)    Easy bruising    Gastroesophageal reflux disease without esophagitis    Chronic diarrhea    Essential hypertension    Pneumococcal vaccination declined    Polycythemia    Hematuria    Chronic obstructive pulmonary disease ([facility] [address]    COVID-19 vaccination declined    Osteoarthritis of knee    Cervical spondylosis without myelopathy    Hyperglycemia    Numbness and tingling in right hand       Prior to Visit Medications    Medication Sig Taking?  Authorizing Provider   tiotropium (SPIRIVA HANDIHALER) 18 MCG inhalation capsule Inhale 1 capsule into the lungs daily Yes Michael Thorne MD   metoprolol succinate (TOPROL XL) 25 MG extended release tablet TAKE 1 TABLET BY MOUTH DAILY STOP HYDROCHLOROTHIAZIDE 12.5 MG Yes Historical Provider, MD   DULoxetine (CYMBALTA) 60 MG extended release capsule Take 1 capsule by mouth every morning With breakfast. Total dosage 90 mg /day Yes Michael Thorne MD   DULoxetine (CYMBALTA) 30 MG extended release capsule Take 1 capsule by mouth daily (before lunch) Total dosage 90 mg /day Yes Michael Thorne MD   levothyroxine (SYNTHROID) 100 MCG tablet Take 1 tablet by mouth every morning (before breakfast) Yes Michael Thorne MD   busPIRone (BUSPAR) 10 MG tablet TAKE 1 TABLET BY MOUTH THREE TIMES A DAY Yes Michael Thorne MD   vitamin D3 (CHOLECALCIFEROL) 25 MCG (1000 UT) TABS tablet Take 2 tablets by mouth daily Dose increased 12/5/2021 Yes Michael Thorne MD   albuterol sulfate  (90 Base) MCG/ACT inhaler INHALE 2 PUFFS INTO THE LUNGS EVERY 6 HOURS AS NEEDED FOR WHEEZING OR SHORTNESS OF BREATH (COUGH) Yes Michael Thorne MD   atorvastatin (LIPITOR) 10 MG tablet Take 1 tablet by mouth every evening  Patient not taking: Reported on 9/15/2022  Michael Thorne MD        Allergies   Allergen Reactions    Sulfa Antibiotics Other (See Comments)     bruise    Trazodone And Nefazodone      GI upset\" horrible\"    Contrast [Iodides] Other (See Comments)     Nausea, vomiting, dizziness. Flexeril [Cyclobenzaprine] Nausea And Vomiting       Past Medical History:   Diagnosis Date    Acute suppurative otitis media of right ear without spontaneous rupture of tympanic membrane 3/18/2019    AMAIRANI positive 2019    Anxiety     Essential hypertension 2021    History of depression     History of thyroid cancer     Major depressive disorder with single episode, in partial remission ([facility] [address] 2019    Panic attacks     Postoperative hypothyroidism 3/21/2018    Sjogren's syndrome with keratoconjunctivitis sicca ([facility] [address] 2019    Per rheumatology       Past Surgical History:   Procedure Laterality Date    BUNIONECTOMY       SECTION      LT x3    HERNIA REPAIR      THYROIDECTOMY      cancer       .   Social History     Tobacco Use    Smoking status: Every Day     Packs/day: 1.00     Years: 30.00     Pack years: 30.00     Types: Cigarettes    Smokeless tobacco: Never    Tobacco comments:     started smoking at 33 yo, cutting down now 1/2 PPD or less   Substance Use Topics    Alcohol use: No    Drug use: No       Family History   Problem Relation Age of Onset    Rheum Arthritis Mother     Hypertension Mother     Mult Sclerosis Father     Diabetes Father     Heart Attack Father         x3    Arthritis Sister     Rheum Arthritis Sister     Diabetes Maternal Grandmother     Cancer Maternal Grandmother         unsure of type    Cancer Paternal Grandfather         unsure of type    Diabetes Paternal Grandfather        ADVANCE DIRECTIVE: N, <no information>    ZOHRA / Cory Garcia is here for Annual Exam, Numbness (Numbness and tingling in right hand), Health Maintenance (Refused flu vaccine), Memory Loss (Having issues with using words), and Difficulty Walking (Stumbles and waddles when walking) Patient complains of chronic neck pain for several years, and numbness in the right hand. Patient says right 4th and 5 th fingers are numb all the time for 3 months, not getting better. Denies pain in the right wrist or right elbow. Has chronic neck pain and posterior upper thorax pain  Patient reports her neck pops a lot, when turning her neck side to side. She is left handed  Intensity of pain is 9/10 in her neck. Had PT many years ago. An MRI of the neck was never done, because physical therapy was not close enough to the MRI request.  Patient says she did have x-rays in the past but I cannot find one. Patient also reports that she has no  in the hands. Words not coming out. Walking on and off, if outside, she goes sideway. She denies headache, or dizziness or spinning. She is agreeable for physical therapy of her neck. COPD:  Current treatment includes short-acting beta agonist inhaler, anticholinergic inhaler, which has been somewhat effective. Residual symptoms: chronic dyspnea and cough productive of white and gray sputum in small amounts. She denies increased dyspnea, purulent sputum, wheezing. She requires her rescue inhaler 3 time(s) per week. Nicotine dependence. Smoker, counseling given to quit smoking. Patient says she is currently cutting down on smoking, smoking only half pack per day. She has never completed a CT lung screening, reprinted order, I advised her to schedule. The patient verbalizes understanding and agrees with the plan. Ready to quit: No  Counseling given: Yes  Tobacco comments: started smoking at 33 yo, cutting down now 1/2 PPD or less    Patient has Sjogren's syndrome. Patient says she didn't see Dr. Mary Morgan in more than 1 year.   Sjogren symptoms are worsening, patient reports her vision and dry eyes are worsening and has been having dry mouth which is \"horrible:\" she says she still has a few buttom teeth   Didn't have BP meds for 2 months      Depression and anxiety  Patient says she has been more depressed and anxious, has been having stressful month, does not want to give me more details. Taking Cymbalta 90 mg/day, I wanted to decrease the dosage, she declines, patient says is not a good time for her to cut down the dosage. Patient denies side effects from Cymbalta, tolerated well, denies side effects. Denies suicidal ideation, plan or intent. PHQ-2 Over the past 2 weeks, how often have you been bothered by any of the following problems? Little interest or pleasure in doing things: Not at all  Feeling down, depressed, or hopeless: Several days  PHQ-2 Score: 1  PHQ-9 Over the past 2 weeks, how often have you been bothered by any of the following problems? PHQ-9 Total Score: 1  PHQ-9 Total Score: 1    PHQ Scores 9/15/2022 4/25/2022 11/29/2021 8/27/2021 4/8/2021 12/29/2020 9/23/2020   PHQ2 Score 1 0 6 5 5 2 5   PHQ9 Score 1 6 16 20 17 2 19     Hypothyroidism and history of thyroid cancer: Recent symptoms: fatigue, weight loss, and anxiety. She denies weight gain, cold intolerance, heat intolerance, hair loss, constipation, diarrhea, edema, tremor, palpitations, and dysphagia. Patient is  taking her medication consistently on an empty stomach. TSH is  borderline high TSH . No results found for: [facility]  Lab Results   Component Value Date    TSH 4.37 05/13/2022    TSH 3.80 12/03/2021    TSH 1.61 06/08/2021       Hypertension:    she  is not exercising, but staying very active, and is not always adherent to low salt diet. Blood pressure is not well controlled at home. Cardiac symptoms dyspnea and fatigue. Patient denies chest pain, chest pressure/discomfort, claudication, exertional chest pressure/discomfort, irregular heart beat, lower extremity edema, near-syncope, orthopnea, palpitations, paroxysmal nocturnal dyspnea, syncope, and tachypnea.   Cardiovascular risk factors: dyslipidemia, hypertension, and smoking/ tobacco exposure. Use of agents associated with hypertension: thyroid hormones. History of target organ damage: none. She says she has been off metoprolol for 2 months   Prior EKG from 2/21/2020 showed occasional PVCs. She has never completed her echo 2D and Holter monitor ordered on 3/4/2020. Systolic blood pressure is Elevated. BP Readings from Last 3 Encounters:   09/15/22 138/80   05/04/22 122/86   03/14/22 125/65        Pulse is  borderline tachycardia . Again, she has been off metoprolol for 2 months    Pulse Readings from Last 3 Encounters:   09/15/22 96   03/14/22 81   08/27/21 79       Prior prediabetes and hyperglycemia  Denies increased appetite, thirst or polyuria. Lab Results   Component Value Date    LABA1C 5.7 05/13/2022    LABA1C 5.5 03/21/2018   There is unintentional weight loss of 7 pounds in 6 months  Wt Readings from Last 3 Encounters:   09/15/22 136 lb (61.7 kg)   03/14/22 143 lb (64.9 kg)   09/14/21 147 lb (66.7 kg)     Hyperlipidemia:  No new myalgias or GI upset on atorvastatin (Lipitor). Medication compliance: compliant most of the time. Patient is not following a low fat, low cholesterol diet. LDL is high      Lab Results   Component Value Date    CHOL 229 (H) 05/13/2022    CHOL 236 (H) 12/03/2021    CHOL 204 (H) 06/08/2021     Lab Results   Component Value Date    TRIG 117 05/13/2022    TRIG 149 12/03/2021    TRIG 154 (H) 06/08/2021     Lab Results   Component Value Date    HDL 48 05/13/2022    HDL 49 12/03/2021    HDL 49 06/08/2021     Lab Results   Component Value Date    LDLCHOLESTEROL 158 (H) 05/13/2022    LDLCHOLESTEROL 157 (H) 12/03/2021    LDLCHOLESTEROL 124 06/08/2021       Lab Results   Component Value Date    CHOLHDLRATIO 4.8 05/13/2022    CHOLHDLRATIO 4.8 12/03/2021    CHOLHDLRATIO 4.2 06/08/2021           Urinary symptoms: yes - for the past 2 days \"horrible, orange and brown urine\"  She denies pain with urination, frequency or urgency of urination.     Sexually active: Yes     Contraception: menopause    last pap: was normal  The patient has NO history of abnormal PAP    Last mammogram:overdue, we will order again  The patient has NO family history of breast cancer    Wears seatbelts: yes     Regular exercise: no  Ever been transfused or tattooed?: yes  The patient reports that domestic violence in her life is absent. Last eye exam: up to date: NO    I advised Agueda Mendes to make appointment with ophthalmologist. The patient verbalizes understanding and agrees with the plan. Abnormal vision screening  Vision Screening    Right eye Left eye Both eyes   Without correction      With correction 20/25 20/25 20/25       Hearing:normal :Yes    Last dental exam and preventative dental cleaning: up to date :NO  I advised Agueda Mendes to make appointment with dentist. The patient verbalizes understanding and agrees with the plan. Nutritional assessment: Body mass index is 24.87 kg/m². Normal.     Healthful diet and Physical activity counseling to prevent CVD- low carb, low fat diet, increase fruits and vegetables, and exercise 4-5 times a day 30-40minutes a day discussed    Alcohol use: no    Immunization History   Administered Date(s) Administered    Tdap (Boostrix, Adacel) 07/31/2017       COVID-19 vaccine:  No: Absolutely declines despite counseling     Tdap one time, then Td every 10 years-up to date:  Yes    Influenza annually-up to date:  No: Absolutely declines despite counseling    PPSV 23 in all adults 19-65 yo with chronic conditions,smokers, alcoholism,  nursing home residents; then PCV 13 at 73 yo-up to date: No: Agrees to receive at the pharmacy    Menopause: Yes   Patient's last menstrual period was 06/04/2015.      Colon cancer screening recommended at 39years old -overdue, we discussed options, she declines colonoscopy, she is agreeable for Cologuard  The patient has NO family history of colon cancer        Cardiovascular risk is: Increasing, risk factor modifications discussed, improved lipids, blood pressure, and smoking cessation  If the CT lung screening will show coronary calcifications, will refer her to cardiologist, in the past she did see Dr. Colby Michel. The 10-year ASCVD risk score (Nidia De Anda., et al., 2013) is: 10.9%    Values used to calculate the score:      Age: 58 years      Sex: Female      Is Non- : No      Diabetic: No      Tobacco smoker: Yes      Systolic Blood Pressure: 838 mmHg      Is BP treated: No      HDL Cholesterol: 48 mg/dL      Total Cholesterol: 229 mg/dL    Hepatitis C screening-nonreactive  Lab Results   Component Value Date    HEPCAB NONREACTIVE 03/22/2017             Health Maintenance   Topic Date Due    COVID-19 Vaccine (1) Never done    Pneumococcal 0-64 years Vaccine (1 - PCV) Never done    Colorectal Cancer Screen  Never done    Shingles vaccine (1 of 2) Never done    Low dose CT lung screening  Never done    Breast cancer screen  06/20/2021    Flu vaccine (1) 06/30/2023 (Originally 9/1/2022)    A1C test (Diabetic or Prediabetic)  05/13/2023    Lipids  05/13/2023    Depression Monitoring  09/15/2023    Cervical cancer screen  01/15/2025    DTaP/Tdap/Td vaccine (2 - Td or Tdap) 07/31/2027    Hepatitis C screen  Completed    HIV screen  Completed    Hepatitis A vaccine  Aged Out    Hepatitis B vaccine  Aged Out    Hib vaccine  Aged Out    Meningococcal (ACWY) vaccine  Aged Out       -rest of complaints with corresponding details per ROS    Review of Systems   Constitutional:  Positive for fatigue and unexpected weight change. Negative for activity change, appetite change, chills, diaphoresis and fever. HENT:  Negative for congestion, dental problem, hearing loss and trouble swallowing. Severe dry mouth   Eyes:  Positive for visual disturbance. Severe dry eyes   Respiratory:  Positive for cough and shortness of breath. Negative for chest tightness and wheezing.     Cardiovascular: Negative for chest pain, palpitations and leg swelling. Gastrointestinal:  Negative for abdominal distention, abdominal pain, blood in stool, constipation, diarrhea, nausea and vomiting. Endocrine: Negative for cold intolerance, heat intolerance, polydipsia, polyphagia and polyuria. Genitourinary:  Negative for difficulty urinating, dysuria, frequency, urgency and vaginal pain. Musculoskeletal:  Positive for arthralgias, myalgias and neck pain. Negative for back pain. Skin:  Negative for rash. Allergic/Immunologic: Positive for environmental allergies. Neurological:  Positive for weakness (hands) and numbness (right hand). Negative for dizziness. Hematological:  Does not bruise/bleed easily. Psychiatric/Behavioral:  Positive for decreased concentration, dysphoric mood and sleep disturbance. Negative for self-injury and suicidal ideas. The patient is nervous/anxious. Declines neurology referral at this time due to her poor concentration       -vital signs stable and within normal limits except systolic blood pressure high, borderline high pulse, mildly low pulse ox    /80   Pulse 96   Temp 98 °F (36.7 °C)   Ht 5' 2\" (1.575 m)   Wt 136 lb (61.7 kg)   LMP 06/04/2015   SpO2 95%   BMI 24.87 kg/m²   Vitals:    09/15/22 1536   BP: 138/80   Pulse: 96   Temp: 98 °F (36.7 °C)   SpO2: 95%   Weight: 136 lb (61.7 kg)   Height: 5' 2\" (1.575 m)     Estimated body mass index is 24.87 kg/m² as calculated from the following:    Height as of this encounter: 5' 2\" (1.575 m). Weight as of this encounter: 136 lb (61.7 kg). Physical Exam  Vitals and nursing note reviewed. Constitutional:       General: She is not in acute distress. Appearance: Normal appearance. She is well-developed. She is not diaphoretic. HENT:      Head: Normocephalic and atraumatic.       Right Ear: Hearing, tympanic membrane, ear canal and external ear normal.      Left Ear: Hearing, tympanic membrane, ear canal and external ear normal.      Nose: No mucosal edema. Mouth/Throat:      Comments: I did not examine the mouth due to coronavirus pandemic and wearing masks. Eyes:      General: Lids are normal. No scleral icterus. Right eye: No discharge. Left eye: No discharge. Extraocular Movements: Extraocular movements intact. Conjunctiva/sclera: Conjunctivae normal.   Neck:      Thyroid: No thyromegaly. Cardiovascular:      Rate and Rhythm: Normal rate and regular rhythm. Heart sounds: Normal heart sounds. No murmur heard. Pulmonary:      Effort: Pulmonary effort is normal. No respiratory distress. Breath sounds: Normal breath sounds. No wheezing or rales. Chest:      Chest wall: No tenderness. Abdominal:      General: Bowel sounds are normal. There is no distension. Palpations: Abdomen is soft. There is no hepatomegaly or splenomegaly. Tenderness: There is no abdominal tenderness. Musculoskeletal:         General: Normal range of motion. Cervical back: Normal range of motion and neck supple. Spasms, tenderness and bony tenderness present. Right lower leg: No edema. Left lower leg: No edema. Skin:     General: Skin is warm and dry. Capillary Refill: Capillary refill takes less than 2 seconds. Findings: No rash. Neurological:      General: No focal deficit present. Mental Status: She is alert and oriented to person, place, and time. Mental status is at baseline. Cranial Nerves: No cranial nerve deficit. Motor: No abnormal muscle tone. Gait: Gait normal.      Deep Tendon Reflexes: Reflexes normal.      Reflex Scores:       Patellar reflexes are 2+ on the right side and 2+ on the left side. Comments: Tinel and Phalen test positive on the right side  Normal walking today, no ataxia   Psychiatric:         Mood and Affect: Mood is anxious. Speech: Speech is rapid and pressured.          Behavior: Behavior normal. Thought Content: Thought content normal.         Judgment: Judgment normal.           I personally reviewed testing with patient.   Hyperlipidemia  Mild prediabetes  TSH borderline high    Otherwise labs within normal limits      Lab Results   Component Value Date    WBC 7.3 05/13/2022    HGB 15.2 05/13/2022    HCT 44.4 05/13/2022    MCV 90.3 05/13/2022     05/13/2022       Lab Results   Component Value Date/Time     05/13/2022 09:40 AM    K 4.3 05/13/2022 09:40 AM     05/13/2022 09:40 AM    CO2 27 05/13/2022 09:40 AM    BUN 11 05/13/2022 09:40 AM    CREATININE 0.75 05/13/2022 09:40 AM    GLUCOSE 97 05/13/2022 09:40 AM    CALCIUM 9.5 05/13/2022 09:40 AM        Lab Results   Component Value Date    ALT 16 05/13/2022    AST 18 05/13/2022    ALKPHOS 101 05/13/2022    BILITOT 0.20 (L) 05/13/2022       Lab Results   Component Value Date    TSH 4.37 05/13/2022       Lab Results   Component Value Date    LDLCHOLESTEROL 158 (H) 05/13/2022       Lab Results   Component Value Date    LABA1C 5.7 05/13/2022       No results found for: [id number]    No results found for: FOLATE    Lab Results   Component Value Date    IRON 98 08/10/2021    TIBC 265 08/10/2021    FERRITIN 66 08/10/2021       Lab Results   Component Value Date    VITD25 47.5 05/13/2022         Orders Placed This Encounter   Medications    pneumococcal 20-valent conjugat (PREVNAR) 0.5 ML FAMILIA inj     Sig: Inject 0.5 mLs into the muscle once for 1 dose     Dispense:  0.5 mL     Refill:  0    tiotropium (SPIRIVA HANDIHALER) 18 MCG inhalation capsule     Sig: Inhale 1 capsule into the lungs daily     Dispense:  90 capsule     Refill:  3    metoprolol succinate (TOPROL XL) 25 MG extended release tablet     Sig: Take 1 tablet by mouth daily     Dispense:  90 tablet     Refill:  3    DULoxetine (CYMBALTA) 60 MG extended release capsule     Sig: Take 1 capsule by mouth every morning With breakfast. Total dosage 90 mg /day     Dispense:  90 capsule Refill:  3    DULoxetine (CYMBALTA) 30 MG extended release capsule     Sig: Take 1 capsule by mouth daily (before lunch) Total dosage 90 mg /day     Dispense:  90 capsule     Refill:  3    levothyroxine (SYNTHROID) 100 MCG tablet     Sig: Take 1 tablet by mouth every morning (before breakfast)     Dispense:  90 tablet     Refill:  3    busPIRone (BUSPAR) 10 MG tablet     Sig: Take 1 tablet by mouth 3 times daily     Dispense:  270 tablet     Refill:  3    albuterol sulfate HFA (PROVENTIL;VENTOLIN;PROAIR) 108 (90 Base) MCG/ACT inhaler     Sig: Inhale 2 puffs into the lungs every 6 hours as needed for Wheezing or Shortness of Breath (cough)     Dispense:  8 each     Refill:  5    atorvastatin (LIPITOR) 10 MG tablet     Sig: Take 1 tablet by mouth every evening     Dispense:  90 tablet     Refill:  3    baclofen (LIORESAL) 10 MG tablet     Sig: Take 1 tablet by mouth 3 times daily as needed (MUSCLE SPASMS) Causes sedation, do not drive while taking this medication     Dispense:  90 tablet     Refill:  0         Medications Discontinued During This Encounter   Medication Reason    albuterol sulfate  (90 Base) MCG/ACT inhaler REORDER    busPIRone (BUSPAR) 10 MG tablet REORDER    tiotropium (SPIRIVA HANDIHALER) 18 MCG inhalation capsule REORDER    metoprolol succinate (TOPROL XL) 25 MG extended release tablet REORDER    DULoxetine (CYMBALTA) 60 MG extended release capsule REORDER    DULoxetine (CYMBALTA) 30 MG extended release capsule REORDER    levothyroxine (SYNTHROID) 100 MCG tablet REORDER    atorvastatin (LIPITOR) 10 MG tablet REORDER         Orders Placed This Encounter   Procedures    Cologuard (Fecal DNA Colorectal Cancer Screening)     Standing Status:   Future     Standing Expiration Date:   9/15/2023    GERMAIN JEANNIE DIGITAL SCREEN BILATERAL     Standing Status:   Future     Standing Expiration Date:   11/15/2023    XR CERVICAL SPINE (2-3 VIEWS)     Standing Status:   Future     Standing Expiration Date: 9/15/2023    CBC with Auto Differential     Standing Status:   Future     Standing Expiration Date:   9/15/2023    Comprehensive Metabolic Panel     Standing Status:   Future     Standing Expiration Date:   11/12/2022    Magnesium     Standing Status:   Future     Standing Expiration Date:   9/15/2023    T4, Free     Standing Status:   Future     Standing Expiration Date:   9/15/2023    TSH     Standing Status:   Future     Standing Expiration Date:   9/15/2023    Uric Acid     Standing Status:   Future     Standing Expiration Date:   9/15/2023    Urinalysis with Reflex to Culture     Standing Status:   Future     Standing Expiration Date:   9/15/2023     Order Specific Question:   SPECIFY(EX-CATH,MIDSTREAM,CYSTO,ETC)? Answer:   MIDSTREAM    Vitamin B12 & Folate     Standing Status:   Future     Standing Expiration Date:   11/12/2022    Lipid Panel     Standing Status:   Future     Standing Expiration Date:   9/15/2023     Order Specific Question:   Is Patient Fasting?/# of Hours     Answer:   8-10 Hours, water ok to drink    Hemoglobin A1C     Standing Status:   Future     Standing Expiration Date:   9/15/2023    [facility] - [facility]     Referral Priority:   Routine     Referral Type:   Eval and Treat     Referral Reason:   Specialty Services Required     Requested Specialty:   Physical Therapist     Number of Visits Requested:   1    UT OFFICE/OUTPATIENT ESTABLISHED MOD MDM 30-39 MIN         This note was completed by using the assistance of a speech-recognition program. However, inadvertent computerized transcription errors may be present. Although every effort was made to ensure accuracy, no guarantees can be provided that every mistake has been identified and corrected by editing. An electronic signature was used to authenticate this note.     Electronically signed by Saira Santoro MD on 9/18/2022 at 3:02 PM

## 2022-09-15 NOTE — PATIENT INSTRUCTIONS
Well Visit, Ages 25 to 72: Care Instructions  Well visits can help you stay healthy. Your doctor has checked your overall health and may have suggested ways to take good care of yourself. Your doctor also may have recommended tests. You can help prevent illness with healthy eating, good sleep, vaccinations, regular exercise, and other steps. Get the tests that you and your doctor decide on. Depending on your age and risks, examples might include screening for diabetes; hepatitis C; HIV; and cervical, breast, lung, and colon cancer. Screening helps find diseases before any symptoms appear. Eat healthy foods. Choose fruits, vegetables, whole grains, lean protein, and low-fat dairy foods. Limit saturated fat and reduce salt. Limit alcohol. Men should have no more than 2 drinks a day. Women should have no more than 1. For some people, no alcohol is the best choice. Exercise. Get at least 30 minutes of exercise on most days of the week. Walking can be a good choice. Reach and stay at your healthy weight. This will lower your risk for many health problems. Take care of your mental health. Try to stay connected with friends, family, and community, and find ways to manage stress. If you're feeling depressed or hopeless, talk to someone. A counselor can help. If you don't have a counselor, talk to your doctor. Talk to your doctor if you think you may have a problem with alcohol or drug use. This includes prescription medicines and illegal drugs. Avoid tobacco and nicotine: Don't smoke, vape, or chew. If you need help quitting, talk to your doctor. Practice safer sex. Getting tested, using condoms or dental dams, and limiting sex partners can help prevent STIs. Use birth control if it's important to you to prevent pregnancy. Talk with your doctor about your choices and what might be best for you. Prevent problems where you can.  Protect your skin from too much sun, wash your hands, brush your teeth twice a day, and wear a seat belt in the car. Where can you learn more? Go to [email or web address]://[email or web address].[email or web address]. org and sign in to your [facility] account. Enter P072 in the [facility] box to learn more about \"Well Visit, Ages 25 to 72: Care Instructions. \"     If you do not have an account, please click on the \"Sign Up Now\" link. Current as of: March 9, 2022               Content Version: 13.4  © 5909-0485 [employer]. Care instructions adapted under license by [facility] [place] ([facility]). If you have questions about a medical condition or this instruction, always ask your healthcare professional. [address] any warranty or liability for your use of this information.

## 2022-09-18 PROBLEM — R20.0 NUMBNESS AND TINGLING IN RIGHT HAND: Status: ACTIVE | Noted: 2022-09-18

## 2022-09-18 PROBLEM — R20.2 NUMBNESS AND TINGLING IN RIGHT HAND: Status: ACTIVE | Noted: 2022-09-18

## 2022-09-18 PROBLEM — R73.9 HYPERGLYCEMIA: Status: ACTIVE | Noted: 2022-09-18

## 2022-09-18 ASSESSMENT — ENCOUNTER SYMPTOMS: TROUBLE SWALLOWING: 0

## 2022-09-19 ENCOUNTER — HOSPITAL ENCOUNTER (OUTPATIENT)
Dept: GENERAL RADIOLOGY | Age: 62
Discharge: HOME | End: 2022-09-21
Payer: COMMERCIAL

## 2022-09-19 ENCOUNTER — HOSPITAL ENCOUNTER (OUTPATIENT)
Age: 62
Discharge: HOME | End: 2022-09-19
Payer: COMMERCIAL

## 2022-09-19 ENCOUNTER — HOSPITAL ENCOUNTER (OUTPATIENT)
Age: 62
Discharge: HOME | End: 2022-09-21
Payer: COMMERCIAL

## 2022-09-19 DIAGNOSIS — M54.2 CHRONIC NECK PAIN: ICD-10-CM

## 2022-09-19 DIAGNOSIS — M35.01 SJOGREN'S SYNDROME WITH KERATOCONJUNCTIVITIS SICCA (HCC): ICD-10-CM

## 2022-09-19 DIAGNOSIS — R20.2 NUMBNESS AND TINGLING IN RIGHT HAND: ICD-10-CM

## 2022-09-19 DIAGNOSIS — E89.0 POSTOPERATIVE HYPOTHYROIDISM: ICD-10-CM

## 2022-09-19 DIAGNOSIS — R73.9 HYPERGLYCEMIA: ICD-10-CM

## 2022-09-19 DIAGNOSIS — G89.29 CHRONIC NECK PAIN: ICD-10-CM

## 2022-09-19 DIAGNOSIS — Z85.850 HISTORY OF THYROID CANCER: ICD-10-CM

## 2022-09-19 DIAGNOSIS — I10 ESSENTIAL HYPERTENSION: ICD-10-CM

## 2022-09-19 DIAGNOSIS — E78.5 HYPERLIPIDEMIA WITH TARGET LDL LESS THAN 100: ICD-10-CM

## 2022-09-19 DIAGNOSIS — R20.0 NUMBNESS AND TINGLING IN RIGHT HAND: ICD-10-CM

## 2022-09-19 LAB
ABSOLUTE EOS #: 0.3 K/UL (ref 0–0.4)
ABSOLUTE LYMPH #: 2.5 K/UL (ref 1–4.8)
ABSOLUTE MONO #: 0.6 K/UL (ref 0.1–1.3)
ALBUMIN SERPL-MCNC: 4.8 G/DL (ref 3.5–5.2)
ALP BLD-CCNC: 101 U/L (ref 35–104)
ALT SERPL-CCNC: 10 U/L (ref 5–33)
ANION GAP SERPL CALCULATED.3IONS-SCNC: 9 MMOL/L (ref 9–17)
AST SERPL-CCNC: 14 U/L
BACTERIA: ABNORMAL
BASOPHILS # BLD: 1 % (ref 0–2)
BASOPHILS ABSOLUTE: 0.1 K/UL (ref 0–0.2)
BILIRUB SERPL-MCNC: 0.3 MG/DL (ref 0.3–1.2)
BILIRUBIN URINE: NEGATIVE
BUN BLDV-MCNC: 11 MG/DL (ref 8–23)
CALCIUM SERPL-MCNC: 10.1 MG/DL (ref 8.6–10.4)
CASTS UA: ABNORMAL /LPF
CHLORIDE BLD-SCNC: 103 MMOL/L (ref 98–107)
CHOLESTEROL/HDL RATIO: 3.7
CHOLESTEROL: 224 MG/DL
CO2: 29 MMOL/L (ref 20–31)
COLOR: YELLOW
CREAT SERPL-MCNC: 0.74 MG/DL (ref 0.5–0.9)
EOSINOPHILS RELATIVE PERCENT: 4 % (ref 0–4)
EPITHELIAL CELLS UA: ABNORMAL /HPF
ESTIMATED AVERAGE GLUCOSE: 114 MG/DL
FOLATE: 16.6 NG/ML
GFR AFRICAN AMERICAN: >60 ML/MIN
GFR NON-AFRICAN AMERICAN: >60 ML/MIN
GFR SERPL CREATININE-BSD FRML MDRD: NORMAL ML/MIN/{1.73_M2}
GLUCOSE BLD-MCNC: 97 MG/DL (ref 70–99)
GLUCOSE URINE: NEGATIVE
HBA1C MFR BLD: 5.6 % (ref 4–6)
HCT VFR BLD CALC: 44.6 % (ref 36–46)
HDLC SERPL-MCNC: 60 MG/DL
HEMOGLOBIN: 15.2 G/DL (ref 12–16)
KETONES, URINE: NEGATIVE
LDL CHOLESTEROL: 141 MG/DL (ref 0–130)
LEUKOCYTE ESTERASE, URINE: ABNORMAL
LYMPHOCYTES # BLD: 32 % (ref 24–44)
MAGNESIUM: 2.1 MG/DL (ref 1.6–2.6)
MCH RBC QN AUTO: 30.8 PG (ref 26–34)
MCHC RBC AUTO-ENTMCNC: 34.1 G/DL (ref 31–37)
MCV RBC AUTO: 90.2 FL (ref 80–100)
MONOCYTES # BLD: 8 % (ref 1–7)
NITRITE, URINE: NEGATIVE
PDW BLD-RTO: 13.5 % (ref 11.5–14.9)
PH UA: 7.5 (ref 5–8)
PLATELET # BLD: 293 K/UL (ref 150–450)
PMV BLD AUTO: 8.2 FL (ref 6–12)
POTASSIUM SERPL-SCNC: 4.8 MMOL/L (ref 3.7–5.3)
PROTEIN UA: NEGATIVE
RBC # BLD: 4.95 M/UL (ref 4–5.2)
RBC UA: ABNORMAL /HPF
SEG NEUTROPHILS: 55 % (ref 36–66)
SEGMENTED NEUTROPHILS ABSOLUTE COUNT: 4.2 K/UL (ref 1.3–9.1)
SODIUM BLD-SCNC: 141 MMOL/L (ref 135–144)
SPECIFIC GRAVITY UA: 1.01 (ref 1–1.03)
THYROXINE, FREE: 1.36 NG/DL (ref 0.93–1.7)
TOTAL PROTEIN: 7.8 G/DL (ref 6.4–8.3)
TRIGL SERPL-MCNC: 113 MG/DL
TSH SERPL DL<=0.05 MIU/L-ACNC: 5.75 UIU/ML (ref 0.3–5)
TURBIDITY: CLEAR
URIC ACID: 3.9 MG/DL (ref 2.4–5.7)
URINE HGB: NEGATIVE
UROBILINOGEN, URINE: NORMAL
VITAMIN B-12: 391 PG/ML (ref 232–1245)
WBC # BLD: 7.7 K/UL (ref 3.5–11)
WBC UA: ABNORMAL /HPF

## 2022-09-19 PROCEDURE — 84439 ASSAY OF FREE THYROXINE: CPT

## 2022-09-19 PROCEDURE — 80061 LIPID PANEL: CPT

## 2022-09-19 PROCEDURE — 82607 VITAMIN B-12: CPT

## 2022-09-19 PROCEDURE — 84550 ASSAY OF BLOOD/URIC ACID: CPT

## 2022-09-19 PROCEDURE — 82746 ASSAY OF FOLIC ACID SERUM: CPT

## 2022-09-19 PROCEDURE — 72040 X-RAY EXAM NECK SPINE 2-3 VW: CPT

## 2022-09-19 PROCEDURE — 83735 ASSAY OF MAGNESIUM: CPT

## 2022-09-19 PROCEDURE — 80053 COMPREHEN METABOLIC PANEL: CPT

## 2022-09-19 PROCEDURE — 81001 URINALYSIS AUTO W/SCOPE: CPT

## 2022-09-19 PROCEDURE — 84443 ASSAY THYROID STIM HORMONE: CPT

## 2022-09-19 PROCEDURE — 36415 COLL VENOUS BLD VENIPUNCTURE: CPT

## 2022-09-19 PROCEDURE — 83036 HEMOGLOBIN GLYCOSYLATED A1C: CPT

## 2022-09-19 PROCEDURE — 85025 COMPLETE CBC W/AUTO DIFF WBC: CPT

## 2022-09-19 NOTE — RESULT ENCOUNTER NOTE
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

## 2022-09-19 NOTE — RESULT ENCOUNTER NOTE
Please notify patient: Vitamin B12 is towards lower side could take vitamin B12 500 MCG daily from over-the-counter, Free thyroxine is normal while TSH is borderline high continue the same dosage of Synthroid 100 MCG. Advised to take Synthroid in the morning, on empty stomach, without any other meds and with a full glass of water. Lipids are slightly are slightly improving, needs to take her Lipitor 10 Mg daily.     Otherwise labs within normal limits  continue current treatment    Future Appointments  2/3/2023   3:30 PM    Joann Brothers MD     [facility]

## 2022-10-09 DIAGNOSIS — G89.29 CHRONIC NECK PAIN: ICD-10-CM

## 2022-10-09 DIAGNOSIS — M54.2 CHRONIC NECK PAIN: ICD-10-CM

## 2022-10-10 RX ORDER — BACLOFEN 10 MG/1
TABLET ORAL
Qty: 90 TABLET | Refills: 3 | Status: SHIPPED | OUTPATIENT
Start: 2022-10-10

## 2022-11-28 DIAGNOSIS — G89.29 CHRONIC NECK PAIN: ICD-10-CM

## 2022-11-28 DIAGNOSIS — M54.2 CHRONIC NECK PAIN: ICD-10-CM

## 2022-11-28 RX ORDER — BACLOFEN 10 MG/1
TABLET ORAL
Qty: 270 TABLET | Refills: 1 | Status: SHIPPED | OUTPATIENT
Start: 2022-11-28

## 2022-11-29 ENCOUNTER — TELEPHONE (OUTPATIENT)
Dept: FAMILY MEDICINE CLINIC | Age: 62
End: 2022-11-29

## 2022-11-29 DIAGNOSIS — Z85.850 HISTORY OF THYROID CANCER: ICD-10-CM

## 2022-11-29 DIAGNOSIS — Z28.21 INFLUENZA VACCINATION DECLINED: Primary | ICD-10-CM

## 2022-11-29 DIAGNOSIS — E89.0 POSTOPERATIVE HYPOTHYROIDISM: ICD-10-CM

## 2022-11-29 RX ORDER — LEVOTHYROXINE SODIUM 0.1 MG/1
100 TABLET ORAL
Qty: 90 TABLET | Refills: 3 | Status: SHIPPED | OUTPATIENT
Start: 2022-11-29

## 2022-11-29 NOTE — TELEPHONE ENCOUNTER
Please let the patient know to  prescription from the pharmacy. The only way to verify 90 pills is to count them in front of the pharmacist        Orders Placed This Encounter   Medications    levothyroxine (SYNTHROID) 100 MCG tablet     Sig: Take 1 tablet by mouth every morning (before breakfast) OK for early refill     Dispense:  90 tablet     Refill:  3     [id number] [place] - [address]  [address] [place] [address]  Phone: [phone] Fax: [phone]      No orders of the defined types were placed in this encounter. Future Appointments   Date Time Provider Ulysses Hickman   1/10/2023  2:00 PM [facility]       Thank you!

## 2022-11-29 NOTE — TELEPHONE ENCOUNTER
Pt called in stating that she is out of her levothyroxine (SYNTHROID) 100 MCG tablet , pt states that she has only been taking 1 per day as instructed but the pharmacy states she cannot refill until 12/11/22. Pt is inquiring if a partial script can be put in for her to get her through until 12/11/22, pt states if she has to pay out of pocket that is fine because she needs her medication. Pt adv this is the first time she has used the [facility] in [facility]. Writer did adv pt to see if there is a way to verify she is getting the correct count of her medication when she refills the 90 day supply to make sure she isnt shorted. Pt also confirmed that she does not get the flu shot.

## 2023-01-10 ENCOUNTER — TELEMEDICINE (OUTPATIENT)
Dept: FAMILY MEDICINE CLINIC | Age: 63
End: 2023-01-10
Payer: COMMERCIAL

## 2023-01-10 DIAGNOSIS — J44.9 CHRONIC OBSTRUCTIVE PULMONARY DISEASE, UNSPECIFIED COPD TYPE (HCC): ICD-10-CM

## 2023-01-10 DIAGNOSIS — I10 ESSENTIAL HYPERTENSION: Primary | ICD-10-CM

## 2023-01-10 DIAGNOSIS — Z12.31 ENCOUNTER FOR SCREENING MAMMOGRAM FOR BREAST CANCER: ICD-10-CM

## 2023-01-10 DIAGNOSIS — Z28.21 INFLUENZA VACCINATION DECLINED: ICD-10-CM

## 2023-01-10 DIAGNOSIS — E89.0 POSTOPERATIVE HYPOTHYROIDISM: ICD-10-CM

## 2023-01-10 DIAGNOSIS — F41.1 GAD (GENERALIZED ANXIETY DISORDER): ICD-10-CM

## 2023-01-10 DIAGNOSIS — Z23 ENCOUNTER FOR IMMUNIZATION: ICD-10-CM

## 2023-01-10 DIAGNOSIS — E78.5 HYPERLIPIDEMIA WITH TARGET LDL LESS THAN 100: ICD-10-CM

## 2023-01-10 DIAGNOSIS — Z12.11 SCREEN FOR COLON CANCER: ICD-10-CM

## 2023-01-10 DIAGNOSIS — Z28.21 COVID-19 VACCINATION DECLINED: ICD-10-CM

## 2023-01-10 DIAGNOSIS — F40.01 AGORAPHOBIA WITH PANIC ATTACKS: ICD-10-CM

## 2023-01-10 DIAGNOSIS — Z87.891 PERSONAL HISTORY OF TOBACCO USE: ICD-10-CM

## 2023-01-10 DIAGNOSIS — F33.1 MODERATE EPISODE OF RECURRENT MAJOR DEPRESSIVE DISORDER (HCC): ICD-10-CM

## 2023-01-10 PROCEDURE — 99214 OFFICE O/P EST MOD 30 MIN: CPT | Performed by: FAMILY MEDICINE

## 2023-01-10 RX ORDER — PAROXETINE HYDROCHLORIDE 40 MG/1
40 TABLET, FILM COATED ORAL
Qty: 30 TABLET | Refills: 0 | Status: SHIPPED | OUTPATIENT
Start: 2023-01-10

## 2023-01-10 ASSESSMENT — PATIENT HEALTH QUESTIONNAIRE - PHQ9
9. THOUGHTS THAT YOU WOULD BE BETTER OFF DEAD, OR OF HURTING YOURSELF: 0
1. LITTLE INTEREST OR PLEASURE IN DOING THINGS: 0
3. TROUBLE FALLING OR STAYING ASLEEP: 3
8. MOVING OR SPEAKING SO SLOWLY THAT OTHER PEOPLE COULD HAVE NOTICED. OR THE OPPOSITE, BEING SO FIGETY OR RESTLESS THAT YOU HAVE BEEN MOVING AROUND A LOT MORE THAN USUAL: 3
5. POOR APPETITE OR OVEREATING: 3
SUM OF ALL RESPONSES TO PHQ QUESTIONS 1-9: 13
2. FEELING DOWN, DEPRESSED OR HOPELESS: 1
7. TROUBLE CONCENTRATING ON THINGS, SUCH AS READING THE NEWSPAPER OR WATCHING TELEVISION: 1
6. FEELING BAD ABOUT YOURSELF - OR THAT YOU ARE A FAILURE OR HAVE LET YOURSELF OR YOUR FAMILY DOWN: 1
SUM OF ALL RESPONSES TO PHQ QUESTIONS 1-9: 13
4. FEELING TIRED OR HAVING LITTLE ENERGY: 1
10. IF YOU CHECKED OFF ANY PROBLEMS, HOW DIFFICULT HAVE THESE PROBLEMS MADE IT FOR YOU TO DO YOUR WORK, TAKE CARE OF THINGS AT HOME, OR GET ALONG WITH OTHER PEOPLE: 1
SUM OF ALL RESPONSES TO PHQ9 QUESTIONS 1 & 2: 1
SUM OF ALL RESPONSES TO PHQ QUESTIONS 1-9: 13
SUM OF ALL RESPONSES TO PHQ QUESTIONS 1-9: 13

## 2023-01-10 ASSESSMENT — ANXIETY QUESTIONNAIRES
5. BEING SO RESTLESS THAT IT IS HARD TO SIT STILL: 1
6. BECOMING EASILY ANNOYED OR IRRITABLE: 1
IF YOU CHECKED OFF ANY PROBLEMS ON THIS QUESTIONNAIRE, HOW DIFFICULT HAVE THESE PROBLEMS MADE IT FOR YOU TO DO YOUR WORK, TAKE CARE OF THINGS AT HOME, OR GET ALONG WITH OTHER PEOPLE: EXTREMELY DIFFICULT
GAD7 TOTAL SCORE: 17
4. TROUBLE RELAXING: 3
3. WORRYING TOO MUCH ABOUT DIFFERENT THINGS: 3
2. NOT BEING ABLE TO STOP OR CONTROL WORRYING: 3
7. FEELING AFRAID AS IF SOMETHING AWFUL MIGHT HAPPEN: 3
1. FEELING NERVOUS, ANXIOUS, OR ON EDGE: 3

## 2023-01-10 ASSESSMENT — ENCOUNTER SYMPTOMS
VOMITING: 0
COUGH: 0
WHEEZING: 0
NAUSEA: 0
DIARRHEA: 0
ABDOMINAL PAIN: 0
ABDOMINAL DISTENTION: 0
SHORTNESS OF BREATH: 0
TROUBLE SWALLOWING: 0
CHEST TIGHTNESS: 0
CONSTIPATION: 0

## 2023-01-10 ASSESSMENT — COLUMBIA-SUICIDE SEVERITY RATING SCALE - C-SSRS
1. WITHIN THE PAST MONTH, HAVE YOU WISHED YOU WERE DEAD OR WISHED YOU COULD GO TO SLEEP AND NOT WAKE UP?: NO
2. HAVE YOU ACTUALLY HAD ANY THOUGHTS OF KILLING YOURSELF?: NO
6. HAVE YOU EVER DONE ANYTHING, STARTED TO DO ANYTHING, OR PREPARED TO DO ANYTHING TO END YOUR LIFE?: NO

## 2023-01-10 NOTE — PROGRESS NOTES
1/10/2023    TELEHEALTH EVALUATION -- Audio/Visual (During VCWFV-25 public health emergency)      Ирина Barba (:  1960) is a 58 y.o. female,Established patient, here for evaluation of the following chief complaint(s): Hypertension (4 month follow up ), Anxiety, COPD, Hyperlipidemia, and Thyroid Problem        ASSESSMENT/PLAN:    1. Essential hypertension  Well controlled. Continue current treatment. Metoprolol XL 25 Mg daily  Will recheck labs. Discussed low salt diet and BP and pulse monitoring.    -     CBC; Future  -     Comprehensive Metabolic Panel; Future  -     Uric Acid; Future  2. Agoraphobia with panic attacks  Worsening  -     3 [facility])  -   We will start PARoxetine (PAXIL) 40 MG tablet; Take 1 tablet by mouth Daily with supper ** stop cymbalta 90 mg on 1/10/2023, Disp-30 tablet, R-0Normal  3. Moderate episode of recurrent major depressive disorder ([facility] [address]  worsening    -     3 [facility])  -  start   PARoxetine (PAXIL) 40 MG tablet; Take 1 tablet by mouth Daily with supper ** stop cymbalta 90 mg on 1/10/2023, Disp-30 tablet, R-0Normal    4. PETE (generalized anxiety disorder)  worsening    -     3 [facility])  -  start   PARoxetine (PAXIL) 40 MG tablet; Take 1 tablet by mouth Daily with supper ** stop cymbalta 90 mg on 1/10/2023, Disp-30 tablet, R-0Normal  5. Chronic obstructive pulmonary disease, unspecified COPD type ([facility] [address]  Stable  Smoking cessation counseling given. Spiriva 1 inhalation daily, albuterol as needed  Update immunizations, she is agreeable for pneumonia vaccine, she absolutely declines flu shot and COVID-19 vaccine    -     pneumococcal 20-valent conjugat (PREVNAR) 0.5 ML FAMILIA inj; Inject 0.5 mLs into the muscle once for 1 dose, Disp-0.5 mL, R-0Normal  6.  Hyperlipidemia with target LDL less than 100  Inadequately controlled  Continue Lipitor 10 Mg, low-carb diet, low-fat diet, recheck lipid panel  Lab Results   Component Value Date    LDLCHOLESTEROL 141 (H) 09/19/2022       -     Lipid Panel; Future  7. Postoperative hypothyroidism  Inadequately controlled  Recheck thyroid hormones    Lab Results   Component Value Date    TSH 5.75 (H) 09/19/2022   If needed, will need to adjust Synthroid dosage, for now continue Synthroid 100 MCG daily  Advised to take Synthroid in the morning, on empty stomach, without any other meds and with a full glass of water.      -     TSH; Future  -     T4, Free; Future  8. Encounter for immunization  -     pneumococcal 20-valent conjugat (PREVNAR) 0.5 ML FAMILIA inj; Inject 0.5 mLs into the muscle once for 1 dose, Disp-0.5 mL, R-0Normal  9. COVID-19 vaccination declined--absolutely declines despite counseling  10. Influenza vaccination declined--absolutely declines despite counseling  11. Encounter for screening mammogram for breast cancer--counseling given to schedule her mammogram, phone number given  12. Screen for colon cancer--we will refax the order for Cologuard  13. Personal history of tobacco use  Counseling given, cut down on smoking, she is not ready to quit smoking yet      Never received Arville Luba received counseling on the following healthy behaviors: nutrition, exercise, medication adherence, and tobacco cessation  Reviewed prior labs and health maintenance  Discussed use, benefit, and side effects of prescribed medications. Barriers to medication compliance addressed. Patient given educational materials - see patient instructions  All patient questions answered. Patient voiced understanding. The patient's past medical,surgical, social, and family history as well as her current medications and allergies were reviewed as documented in today's encounter. Medications, labs, diagnostic studies, consultations and follow-up as documented in this encounter.     Return for Visit type extended 30 minutes chronic problems, DO PETE 7 in EPIC, PHQ-9 in EPIC, LABS F/U, HTN,HLP. Data Unavailable    Future Appointments   Date Time Provider Ulysses Hickman   2023  2:00 PM Lizzeth Sherman, [facility] Bryce Shaver   2023  3:45 PM Art Todd MD [id number]           SUBJECTIVE/OBJECTIVE:  Abdi Torres (:  1960) has requested an audio/video evaluation for the following concern(s):Hypertension (4 month follow up ), Anxiety, COPD, Hyperlipidemia, and Thyroid Problem    Hypertension:    she  is not exercising, but staying active and is adherent to low salt diet. Blood pressure is well controlled at home. Cardiac symptoms fatigue. Patient denies chest pain, chest pressure/discomfort, claudication, dyspnea, exertional chest pressure/discomfort, irregular heart beat, lower extremity edema, near-syncope, orthopnea, palpitations, paroxysmal nocturnal dyspnea, syncope, and tachypnea. Cardiovascular risk factors: dyslipidemia, hypertension, and smoking/ tobacco exposure. Use of agents associated with hypertension: thyroid hormones. History of target organ damage: none. BP controlled. Alray Collet reports compliance with BP medications, and tolerates them well, denies side effects. She reports blood pressure 130/80 mmHg      BP Readings from Last 3 Encounters:   09/15/22 138/80   22 122/86   22 125/65        Pulse is Normal.    Pulse Readings from Last 3 Encounters:   09/15/22 96   22 81   21 79     Patient reports worsening anxiety    \"I don't want to leave my house\"  \"I cannot explain this one. If I'm forced I go to store, once a week, I have a panic attack if I get stuck  at check out\". \"I don't like not to be able to get out of the house\"  Winter and pandemic it's making it worse.     Taking buspar and cymbalta 90 mg    She is currently not following with  either psychiatrist or psychologist, she is wondering about referral, she is agreeable for referral to our psychologist while trying to establish with possible [facility], however it is far away from her, she lives in [place]. She takes care of her disabled and mentally retarded son, she is the primary caregiver    Depression is worsening  PHQ-2 Over the past 2 weeks, how often have you been bothered by any of the following problems? Little interest or pleasure in doing things: Not at all  Feeling down, depressed, or hopeless: Several days  PHQ-2 Score: 1  PHQ-9 Over the past 2 weeks, how often have you been bothered by any of the following problems? Trouble falling or staying asleep, or sleeping too much: Nearly every day  Feeling tired or having little energy: Several days  Poor appetite or overeating: Nearly every day (poor appetite)  Feeling bad about yourself - or that you are a failure or have let yourself or your family down: Several days  Trouble concentrating on things, such as reading the newspaper or watching television: Several days  Moving or speaking so slowly that other people could have noticed.  Or the opposite - being so fidgety or restless that you have been moving around a lot more than usual: Nearly every day  Thoughts that you would be better off dead, or of hurting yourself in some way: Not at all  If you checked off any problems, how difficult have these problems made it for you to do your work, take care of things at home, or get along with other people?: Somewhat difficult  PHQ-9 Total Score: 13  PHQ-9 Total Score: 13    Moderate depression  PHQ Scores 1/10/2023 9/15/2022 4/25/2022 11/29/2021 8/27/2021 4/8/2021 12/29/2020   PHQ2 Score 1 1 0 6 5 5 2   PHQ9 Score 13 1 6 16 20 17 2     Severe anxiety  PETE-7 SCREENING 1/16/2023 1/10/2023 12/29/2020 5/3/2019   Feeling nervous, anxious, or on edge More than half the days Nearly every day - -   Not being able to stop or control worrying More than half the days Nearly every day - -   Worrying too much about different things More than half the days Nearly every day - -   Trouble relaxing Nearly every day Nearly every day - -   Being so restless that it is hard to sit still Nearly every day Several days - -   Becoming easily annoyed or irritable More than half the days Several days - -   Feeling afraid as if something awful might happen Nearly every day Nearly every day - -   PETE-7 Total Score 17 17 - -   How difficult have these problems made it for you to do your work, take care of things at home, or get along with other people? Very difficult Extremely difficult - -   Feeling nervous, anxious, or on edge - - 3-Nearly every day 1-Several days   Not able to stop or control worrying - - 2-Over half the days 1-Several days   Worrying too much about different things - - 0-Not at all 1-Several days   Trouble relaxing - - 3-Nearly every day 1-Several days   Being so restless that it's hard to sit still - - 3-Nearly every day 1-Several days   Becoming easily annoyed or irritable - - 3-Nearly every day 2-Over half the days   Feeling afraid as if something awful might happen - - 3-Nearly every day 0-Not at all sure   PETE-7 Total Score - - 17 7         COPD:  Current treatment includes short-acting beta agonist inhaler, anticholinergic inhaler, which has been effective. Residual symptoms: none. She denies increased dyspnea, decreased exercise tolerance, cough, purulent sputum, wheezing, chest tightness. She requires her rescue inhaler 0 time(s) per month. Nicotine dependence. Smoker, counseling given to quit smoking. Ready to quit: Yes  Counseling given: Yes  Tobacco comments: started smoking at 31 yo, cutting down now 1/2 PPD or less      Hyperlipidemia:  No new myalgias or GI upset on atorvastatin (Lipitor). Was started on Lipitor in September. Medication compliance: compliant all of the time. Patient is  following a low fat, low cholesterol diet.     LDL is high  Lab Results   Component Value Date    LDLCHOLESTEROL 141 (H) 09/19/2022     Lab Results   Component Value Date    TRIG 113 09/19/2022    TRIG 117 05/13/2022    TRIG 149 12/03/2021     Hypothyroidism: Recent symptoms: fatigue, weight gain, and anxiety. She denies weight loss, cold intolerance, heat intolerance, hair loss, dry skin, constipation, diarrhea, edema, tremor, palpitations, and dysphagia. Patient is  taking her medication consistently on an empty stomach. TSH is Elevated. No results found for: [facility]  Lab Results   Component Value Date    TSH 5.75 (H) 09/19/2022    TSH 4.37 05/13/2022    TSH 3.80 12/03/2021     She declines COVID-19 vaccine and flu shot. Counseling given, she is agreeable for pneumonia shot though    Has never received the Cologuard  Has not completed the mammogram and CT lung screening advised to schedule I gave her the contact information again    Review of Systems   Constitutional:  Positive for appetite change, fatigue and unexpected weight change. Negative for activity change, chills, diaphoresis and fever. HENT:  Negative for trouble swallowing. Respiratory:  Negative for cough, chest tightness, shortness of breath and wheezing. Cardiovascular:  Negative for chest pain, palpitations and leg swelling. Gastrointestinal:  Negative for abdominal distention, abdominal pain, constipation, diarrhea, nausea and vomiting. Endocrine: Negative for cold intolerance, heat intolerance, polydipsia, polyphagia and polyuria. Allergic/Immunologic: Positive for environmental allergies. Neurological:  Negative for tremors. Psychiatric/Behavioral:  Positive for decreased concentration, dysphoric mood and sleep disturbance. Negative for self-injury and suicidal ideas. The patient is nervous/anxious. Prior to Visit Medications    Medication Sig Taking?  Authorizing Provider   levothyroxine (SYNTHROID) 100 MCG tablet Take 1 tablet by mouth every morning (before breakfast) OK for early refill Yes Wanda Ortiz MD   baclofen (LIORESAL) 10 MG tablet TAKE 1 TABLET 3 TIMES DAILY AS NEEDED (MUSCLE SPASMS) CAUSES SEDATION, DONT DRIVE WHILE TAKING Yes Jocelyne Crain MD   tiotropium (SPIRIVA HANDIHALER) 18 MCG inhalation capsule Inhale 1 capsule into the lungs daily Yes Jocelyne Crain MD   metoprolol succinate (TOPROL XL) 25 MG extended release tablet Take 1 tablet by mouth daily Yes Jocelyne Crain MD   DULoxetine (CYMBALTA) 60 MG extended release capsule Take 1 capsule by mouth every morning With breakfast. Total dosage 90 mg /day Yes Jocelyne Crain MD   DULoxetine (CYMBALTA) 30 MG extended release capsule Take 1 capsule by mouth daily (before lunch) Total dosage 90 mg /day Yes Jocelyne Crain MD   busPIRone (BUSPAR) 10 MG tablet Take 1 tablet by mouth 3 times daily Yes Jocelyne Crain MD   albuterol sulfate HFA (PROVENTIL;VENTOLIN;PROAIR) 108 (90 Base) MCG/ACT inhaler Inhale 2 puffs into the lungs every 6 hours as needed for Wheezing or Shortness of Breath (cough) Yes Jocelyne Crain MD   atorvastatin (LIPITOR) 10 MG tablet Take 1 tablet by mouth every evening Yes Jocelyne Crain MD   vitamin D3 (CHOLECALCIFEROL) 25 MCG (1000 UT) TABS tablet Take 2 tablets by mouth daily Dose increased 12/5/2021 Yes Jocelyne Crain MD       Social History     Tobacco Use    Smoking status: Every Day     Packs/day: 1.00     Years: 30.00     Pack years: 30.00     Types: Cigarettes    Smokeless tobacco: Never    Tobacco comments:     started smoking at 33 yo, cutting down now 1/2 PPD or less   Substance Use Topics    Alcohol use: No    Drug use: No          PHYSICAL EXAMINATION:    Vital Signs: (As obtained by patient/caregiver or practitioner observation)  -vital signs stable and within normal limits except, BMI 24.87 kg/M2  Patient-Reported Vitals 1/10/2023   Patient-Reported Weight 140 lbs   Patient-Reported Height 5 ft 2 in   Patient-Reported Systolic 309   Patient-Reported Diastolic 80   Patient-Reported Pulse -   Patient-Reported Temperature -           Intensity of pain is:0/10       Constitutional: [x] Appears well-developed and well-nourished [x] No apparent distress      [] Abnormal-       Mental status  [x] Alert and awake  [x] Oriented to person/place/time [x]Able to follow commands      Eyes:  EOM    [x]  Normal  [] Abnormal-  Sclera  [x]  Normal  [] Abnormal -         Discharge [x]  None visible  [] Abnormal -    HENT:   [x] Normocephalic, atraumatic.  [] Abnormal   [x] Mouth/Throat: Mucous membranes are moist.     External Ears [x] Normal  [] Abnormal-     Neck: [x] No visualized mass     Pulmonary/Chest: [x] Respiratory effort normal.  [x] No visualized signs of difficulty breathing or respiratory distress        [] Abnormal        Musculoskeletal:   [x] Normal gait with no signs of ataxia         [x] Normal range of motion of neck        [] Abnormal-       Neurological:        [x] No Facial Asymmetry (Cranial nerve 7 motor function) (limited exam to video visit)          [x] No gaze palsy        [] Abnormal-            Skin:        [x] No significant exanthematous lesions or discoloration noted on facial skin         [] Abnormal-            Psychiatric:      [x] No Hallucinations       []Mood is normal      [x]Behavior is normal      [x]Judgment is normal      [x]Thought content is normal       [x] Abnormal-looking anxious today, talking fast    Other pertinent observable physical exam findings    Due to this being a TeleHealth encounter, evaluation of the following organ systems is limited: Vitals/Constitutional/EENT/Resp/CV/GI//MS/Neuro/Skin/Heme-Lymph-Imm.    I personally reviewed most recent labs reviewed with the patient and all questions fully answered.   Hyperlipidemia  High TSH  Otherwise labs within normal limits        Lab Results   Component Value Date    WBC 7.7 09/19/2022    HGB 15.2 09/19/2022    HCT 44.6 09/19/2022    MCV 90.2 09/19/2022     09/19/2022       Lab Results   Component Value Date/Time     09/19/2022 10:43 AM    K 4.8 09/19/2022  10:43 AM     09/19/2022 10:43 AM    CO2 29 09/19/2022 10:43 AM    BUN 11 09/19/2022 10:43 AM    CREATININE 0.74 09/19/2022 10:43 AM    GLUCOSE 97 09/19/2022 10:43 AM    CALCIUM 10.1 09/19/2022 10:43 AM        Lab Results   Component Value Date    ALT 10 09/19/2022    AST 14 09/19/2022    ALKPHOS 101 09/19/2022    BILITOT 0.3 09/19/2022       Lab Results   Component Value Date    TSH 5.75 (H) 09/19/2022       Lab Results   Component Value Date    CHOL 224 (H) 09/19/2022    CHOL 229 (H) 05/13/2022    CHOL 236 (H) 12/03/2021     Lab Results   Component Value Date    TRIG 113 09/19/2022    TRIG 117 05/13/2022    TRIG 149 12/03/2021     Lab Results   Component Value Date    HDL 60 09/19/2022    HDL 48 05/13/2022    HDL 49 12/03/2021     Lab Results   Component Value Date    LDLCHOLESTEROL 141 (H) 09/19/2022    LDLCHOLESTEROL 158 (H) 05/13/2022    LDLCHOLESTEROL 157 (H) 12/03/2021       Lab Results   Component Value Date    CHOLHDLRATIO 3.7 09/19/2022    CHOLHDLRATIO 4.8 05/13/2022    CHOLHDLRATIO 4.8 12/03/2021       Lab Results   Component Value Date    LABA1C 5.6 09/19/2022    LABA1C 5.7 05/13/2022    LABA1C 5.5 03/21/2018         Lab Results   Component Value Date    HRFGRIWO45 391 09/19/2022       Lab Results   Component Value Date    VITD25 47.5 05/13/2022       Orders Placed This Encounter   Medications    PARoxetine (PAXIL) 40 MG tablet     Sig: Take 1 tablet by mouth Daily with supper ** stop cymbalta 90 mg on 1/10/2023     Dispense:  30 tablet     Refill:  0    pneumococcal 20-valent conjugat (PREVNAR) 0.5 ML FAMILIA inj     Sig: Inject 0.5 mLs into the muscle once for 1 dose     Dispense:  0.5 mL     Refill:  0       Orders Placed This Encounter   Procedures    CBC     Standing Status:   Future     Standing Expiration Date:   1/10/2024    Comprehensive Metabolic Panel     Standing Status:   Future     Standing Expiration Date:   1/10/2024    Lipid Panel     Standing Status:   Future     Standing Expiration Date:   1/10/2024     Order Specific Question:   Is Patient Fasting?/# of Hours     Answer:   8-10 Hours, water ok to drink    Uric Acid     Standing Status:   Future     Standing Expiration Date:   1/10/2024    TSH     Standing Status:   Future     Standing Expiration Date:   1/10/2024    T4, Free     Standing Status:   Future     Standing Expiration Date:   1/10/2024    [facility] [facility]     Referral Priority:   Routine     Referral Type:   Behavioral Health     Referral Reason:   Specialty Services Required     Referred to Provider:   Eduarda Hayden, PhD     Requested Specialty:   January Stein     Number of Visits Requested:   1       Medications Discontinued During This Encounter   Medication Reason    DULoxetine (CYMBALTA) 60 MG extended release capsule Alternate therapy    DULoxetine (CYMBALTA) 30 MG extended release capsule Alternate therapy              Westley Pearson, was evaluated through a synchronous (real-time) audio-video encounter. The patient (or guardian if applicable) is aware that this is a billable service, which includes applicable co-pays. The virtual visit was conducted with patient's (and/or legal guardian consent). Verbal consent to proceed has been obtained within the past 12 months. The visit was conducted pursuant to the emergency declaration under the [address] [place] [address], [address] [place] [address] authority and the [employer] and [employer] General Act. Patient identification was verified    Patient was alone   Provider was located at primary practice location. The patient was located at home, in a state where the provider was licensed to provide care.     On this date 1/10/2023 I have spent 35 minutes reviewing previous notes, test results and face to face with the patient discussing the diagnosis and importance of compliance with the treatment plan as well as documenting on the day of the visit.    --Wanda Ortiz MD on 1/16/2023 at 11:06 PM    An electronic signature was used to authenticate this note.

## 2023-01-10 NOTE — PROGRESS NOTES
Visit Information    Have you changed or started any medications since your last visit including any over-the-counter medicines, vitamins, or herbal medicines? no   Have you stopped taking any of your medications? Is so, why? -  no  Are you having any side effects from any of your medications? - no    Have you seen any other physician or provider since your last visit?  no   Have you had any other diagnostic tests since your last visit?  no   Have you been seen in the emergency room and/or had an admission in a hospital since we last saw you?  no   Have you had your routine dental cleaning in the past 6 months?  no     Do you have an active MyChart account? If no, what is the barrier?   Yes    Patient Care Team:  Latanya Martin MD as PCP - General (Family Medicine)  Latanya Martin MD as PCP - [facility]  Kumar Rojo DO as Consulting Physician (Obstetrics & Gynecology)  Matthew Burroughs (Certified Nurse Practitioner)  Migdalia Velasquez MD as Consulting Physician (Internal Medicine)  Thang Lloyd MD as Consulting Physician (Endocrinology)  Maisha Suarez MD as Consulting Physician (Urology)  Desire Packer MD as Surgeon (Cardiology)  Corina Isidro MD as Consulting Physician (Hematology and Oncology)    Medical History Review  Past Medical, Family, and Social History reviewed and does contribute to the patient presenting condition    Health Maintenance   Topic Date Due    COVID-19 Vaccine (1) Never done    Pneumococcal 0-64 years Vaccine (1 - PCV) Never done    Colorectal Cancer Screen  Never done    Shingles vaccine (1 of 2) Never done    Low dose CT lung screening  Never done    Breast cancer screen  06/20/2021    Flu vaccine (1) 06/30/2023 (Originally 8/1/2022)    Depression Monitoring  09/15/2023    Lipids  09/19/2023    Cervical cancer screen  01/15/2025    DTaP/Tdap/Td vaccine (2 - Td or Tdap) 07/31/2027    Hepatitis C screen  Completed    HIV screen  Completed    Hepatitis A vaccine  Aged Out    Hib vaccine  Aged Out    Meningococcal (ACWY) vaccine  Aged Out

## 2023-01-16 PROBLEM — F41.1 GAD (GENERALIZED ANXIETY DISORDER): Status: ACTIVE | Noted: 2023-01-16

## 2023-01-16 ASSESSMENT — ANXIETY QUESTIONNAIRES
3. WORRYING TOO MUCH ABOUT DIFFERENT THINGS: MORE THAN HALF THE DAYS
1. FEELING NERVOUS, ANXIOUS, OR ON EDGE: 2
6. BECOMING EASILY ANNOYED OR IRRITABLE: MORE THAN HALF THE DAYS
IF YOU CHECKED OFF ANY PROBLEMS ON THIS QUESTIONNAIRE, HOW DIFFICULT HAVE THESE PROBLEMS MADE IT FOR YOU TO DO YOUR WORK, TAKE CARE OF THINGS AT HOME, OR GET ALONG WITH OTHER PEOPLE: VERY DIFFICULT
2. NOT BEING ABLE TO STOP OR CONTROL WORRYING: MORE THAN HALF THE DAYS
7. FEELING AFRAID AS IF SOMETHING AWFUL MIGHT HAPPEN: 3
1. FEELING NERVOUS, ANXIOUS, OR ON EDGE: MORE THAN HALF THE DAYS
3. WORRYING TOO MUCH ABOUT DIFFERENT THINGS: 2
4. TROUBLE RELAXING: 3
GAD7 TOTAL SCORE: 17
5. BEING SO RESTLESS THAT IT IS HARD TO SIT STILL: NEARLY EVERY DAY
7. FEELING AFRAID AS IF SOMETHING AWFUL MIGHT HAPPEN: NEARLY EVERY DAY
5. BEING SO RESTLESS THAT IT IS HARD TO SIT STILL: 3
IF YOU CHECKED OFF ANY PROBLEMS ON THIS QUESTIONNAIRE, HOW DIFFICULT HAVE THESE PROBLEMS MADE IT FOR YOU TO DO YOUR WORK, TAKE CARE OF THINGS AT HOME, OR GET ALONG WITH OTHER PEOPLE: VERY DIFFICULT
2. NOT BEING ABLE TO STOP OR CONTROL WORRYING: 2
4. TROUBLE RELAXING: NEARLY EVERY DAY
6. BECOMING EASILY ANNOYED OR IRRITABLE: 2

## 2023-01-16 ASSESSMENT — LIFESTYLE VARIABLES
PAST THREE MONTHS WHAT IS THE LARGEST AMOUNT OF ALCOHOLIC DRINKS YOU HAVE CONSUMED IN ONE DAY: 0
ALCOHOL_DAYS_PER_WEEK: 0
HAVE YOU EVER RECEIVED ALCOHOL OR OTHER DRUG ABUSE TREATMENT: NO
HISTORY_ALCOHOL_USE: NO

## 2023-01-17 ENCOUNTER — TELEMEDICINE (OUTPATIENT)
Dept: BEHAVIORAL/MENTAL HEALTH CLINIC | Age: 63
End: 2023-01-17
Payer: COMMERCIAL

## 2023-01-17 DIAGNOSIS — F40.01 AGORAPHOBIA WITH PANIC DISORDER: Primary | ICD-10-CM

## 2023-01-17 PROCEDURE — 90791 PSYCH DIAGNOSTIC EVALUATION: CPT | Performed by: COUNSELOR

## 2023-01-17 ASSESSMENT — ANXIETY QUESTIONNAIRES
GAD7 TOTAL SCORE: 13
4. TROUBLE RELAXING: 3
2. NOT BEING ABLE TO STOP OR CONTROL WORRYING: 2
IF YOU CHECKED OFF ANY PROBLEMS ON THIS QUESTIONNAIRE, HOW DIFFICULT HAVE THESE PROBLEMS MADE IT FOR YOU TO DO YOUR WORK, TAKE CARE OF THINGS AT HOME, OR GET ALONG WITH OTHER PEOPLE: VERY DIFFICULT
5. BEING SO RESTLESS THAT IT IS HARD TO SIT STILL: 2
3. WORRYING TOO MUCH ABOUT DIFFERENT THINGS: 1
7. FEELING AFRAID AS IF SOMETHING AWFUL MIGHT HAPPEN: 0
6. BECOMING EASILY ANNOYED OR IRRITABLE: 3
1. FEELING NERVOUS, ANXIOUS, OR ON EDGE: 2

## 2023-01-17 ASSESSMENT — PATIENT HEALTH QUESTIONNAIRE - PHQ9
10. IF YOU CHECKED OFF ANY PROBLEMS, HOW DIFFICULT HAVE THESE PROBLEMS MADE IT FOR YOU TO DO YOUR WORK, TAKE CARE OF THINGS AT HOME, OR GET ALONG WITH OTHER PEOPLE: 1
1. LITTLE INTEREST OR PLEASURE IN DOING THINGS: 3
9. THOUGHTS THAT YOU WOULD BE BETTER OFF DEAD, OR OF HURTING YOURSELF: 0
4. FEELING TIRED OR HAVING LITTLE ENERGY: 3
5. POOR APPETITE OR OVEREATING: 3
7. TROUBLE CONCENTRATING ON THINGS, SUCH AS READING THE NEWSPAPER OR WATCHING TELEVISION: 3
SUM OF ALL RESPONSES TO PHQ QUESTIONS 1-9: 17
2. FEELING DOWN, DEPRESSED OR HOPELESS: 1
8. MOVING OR SPEAKING SO SLOWLY THAT OTHER PEOPLE COULD HAVE NOTICED. OR THE OPPOSITE, BEING SO FIGETY OR RESTLESS THAT YOU HAVE BEEN MOVING AROUND A LOT MORE THAN USUAL: 0
SUM OF ALL RESPONSES TO PHQ QUESTIONS 1-9: 17
3. TROUBLE FALLING OR STAYING ASLEEP: 3
6. FEELING BAD ABOUT YOURSELF - OR THAT YOU ARE A FAILURE OR HAVE LET YOURSELF OR YOUR FAMILY DOWN: 1
SUM OF ALL RESPONSES TO PHQ QUESTIONS 1-9: 17
SUM OF ALL RESPONSES TO PHQ9 QUESTIONS 1 & 2: 4
SUM OF ALL RESPONSES TO PHQ QUESTIONS 1-9: 17
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
## 2023-01-24 ENCOUNTER — TELEMEDICINE (OUTPATIENT)
Dept: BEHAVIORAL/MENTAL HEALTH CLINIC | Age: 63
End: 2023-01-24

## 2023-01-24 DIAGNOSIS — F40.01 AGORAPHOBIA WITH PANIC DISORDER: Primary | ICD-10-CM

## 2023-01-24 NOTE — PROGRESS NOTES
ADULT BEHAVIORAL HEALTH FOLLOW UP  [facility]      Visit Date: 1/24/2023   Time of appointment:  1:00 PM   Time spent with Patient: 56 minutes. This is patient's second appointment. Reason for Consult:  Anxiety     Referring Provider/PCP:    No ref. provider found  Joy Schuster MD      Pt provided informed consent for the behavioral health program. Discussed with patient model of service to include the limits of confidentiality (i.e. abuse reporting, suicide intervention, etc.) and short-term intervention focused approach. Pt indicated understanding. Marc Reid is a 58 y.o. female who presents for follow up of Agoraphobia with Panic Disorder. Client reported she has been reducing her caffeine intake by mixing regualr and decaf coffee. Client plans to drink this mixture until it is gone, and then switch to decaf only. Therapist praised client for these efforts. Client reported she continues to have difficulty sleeping, and that her exhaustion makes it more difficult to manage her anxiety. Therapist provided education regarding sleep hygiene, and collaborated with client to develop a sleep schedule. Client and therapist focused on; reducing blue light, removing highly distracting stimulus like TV, adding Brick music and Fitz tree night light which are elements that are personally soothing to client. Previous Recommendations: Attend session to discuss treatment options, begin to reduce caffeine use        MENTAL STATUS EXAM  Mood was anxious with anxious affect. Suicidal ideation was denied. Homicidal ideation was denied. Hygiene was good . Dress was neat. Behavior was Restless & fidgety with No observation or self-report of difficulties ambulating. Attitude was Cooperative. Eye-contact was fair. Speech: rate - WNL, rhythm - WNL, volume - WNL. Verbalizations were goal directed.   Thought processes were intact and goal-oriented without evidence of delusions, hallucinations, obsessions, or florencia; with moderate cognitive distortions. Associations were characterized by intact cognitive processes. Pt was oriented to person, place, time, and general circumstances;  recent:  good and remote:  good. Insight and judgment were estimated to be fair, AEB, a fair  understanding of cyclical maladaptive patterns, and the ability to use insight to inform behavior change. Attention span and concentration appear within functional limits  Language use and knowledge base appear within functional limits        ASSESSMENT  Turner Gates presented to the appointment today for evaluation and treatment of symptoms of anxiety. 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 Zina Moralez was in agreement with recommendations. PHQ Scores 1/17/2023 1/10/2023 9/15/2022 4/25/2022 11/29/2021 8/27/2021 4/8/2021   PHQ2 Score 4 1 1 0 6 5 5   PHQ9 Score 17 13 1 6 16 20 17     Interpretation of Total Score Depression Severity: 1-4 = Minimal depression, 5-9 = Mild depression, 10-14 = Moderate depression, 15-19 = Moderately severe depression, 20-27 = Severe depression    How often pt has had thoughts of death or hurting self (if PHQ positive for depression):       PETE 7 SCORE 1/17/2023 1/16/2023 1/10/2023 12/29/2020 5/3/2019   PETE-7 Total Score 13 17 17 - -   PETE-7 Total Score - - - 17 7     Interpretation of PETE-7 score: 5-9 = mild anxiety, 10-14 = moderate anxiety, 15+ = severe anxiety. Recommend referral to behavioral health for scores 10 or greater. DIAGNOSIS  Zina Moralez was seen today for anxiety.     Diagnoses and all orders for this visit:    Agoraphobia with panic disorder      INTERVENTION  Discussed and set plan for behavioral activation, Provided education, Discussed self-care (sleep, nutrition, rewarding activities, social support, exercise), Emphasized self-care as important for managing overall health, and Problem-solving re: development of sleep schedule, improve sleep hygiene    PLAN  Client to initiate sleep schedule and improve sleep hygiene. Client will continue to reduce caffeine intake. INTERACTIVE COMPLEXITY  Is interactive complexity present?   No  Reason:      Additional Supporting Information:  N/A     Electronically signed by Maria Del Rosario Vera on 1/29/2023 at 9:21 PM

## 2023-01-31 ENCOUNTER — TELEMEDICINE (OUTPATIENT)
Dept: BEHAVIORAL/MENTAL HEALTH CLINIC | Age: 63
End: 2023-01-31

## 2023-01-31 DIAGNOSIS — F40.01 AGORAPHOBIA WITH PANIC DISORDER: Primary | ICD-10-CM
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
## 2023-02-03 DIAGNOSIS — F41.1 GAD (GENERALIZED ANXIETY DISORDER): ICD-10-CM

## 2023-02-03 DIAGNOSIS — F33.1 MODERATE EPISODE OF RECURRENT MAJOR DEPRESSIVE DISORDER (HCC): ICD-10-CM

## 2023-02-03 DIAGNOSIS — F40.01 AGORAPHOBIA WITH PANIC ATTACKS: ICD-10-CM

## 2023-02-03 RX ORDER — PAROXETINE HYDROCHLORIDE 40 MG/1
40 TABLET, FILM COATED ORAL
Qty: 30 TABLET | Refills: 0 | Status: SHIPPED | OUTPATIENT
Start: 2023-02-03

## 2023-02-03 NOTE — TELEPHONE ENCOUNTER
Please Approve or Refuse.   Send to Pharmacy per Pt's Request: [facility]     Next Visit Date:  7/27/2023   Last Visit Date: 1/10/2023    Hemoglobin A1C (%)   Date Value   09/19/2022 5.6   05/13/2022 5.7   03/21/2018 5.5             ( goal A1C is < 7)   BP Readings from Last 3 Encounters:   09/15/22 138/80   05/04/22 122/86   03/14/22 125/65          (goal 120/80)  BUN   Date Value Ref Range Status   09/19/2022 11 8 - 23 mg/dL Final     Creatinine   Date Value Ref Range Status   09/19/2022 0.74 0.50 - 0.90 mg/dL Final     Potassium   Date Value Ref Range Status   09/19/2022 4.8 3.7 - 5.3 mmol/L Final

## 2023-02-07 ENCOUNTER — TELEMEDICINE (OUTPATIENT)
Dept: BEHAVIORAL/MENTAL HEALTH CLINIC | Age: 63
End: 2023-02-07

## 2023-02-07 DIAGNOSIS — F40.01 AGORAPHOBIA WITH PANIC DISORDER: Primary | ICD-10-CM

## 2023-02-07 NOTE — PROGRESS NOTES
ADULT BEHAVIORAL HEALTH FOLLOW UP  [facility]      Visit Date: 2/7/2023   Time of appointment:  1:05 PM   Time spent with Patient: 55 minutes. This is patient's fourth appointment. Reason for Consult:  Anxiety     Referring Provider/PCP:    No ref. provider found  Juan Diego Carias MD      Pt provided informed consent for the behavioral health program. Discussed with patient model of service to include the limits of confidentiality (i.e. abuse reporting, suicide intervention, etc.) and short-term intervention focused approach. Pt indicated understanding. Fouzia Nielsen is a 58 y.o. female who presents for follow up of Agoraphobia with Panic Disorder. Client reported she has reduced her caffeine intake to one cup of coffee a day. Therapist praised client for these efforts, and encouraged her to maintain. Client stated she is still working on efforts to improve sleep, continues to fall asleep more quickly, but averaging 3-4 hours. Client reported taking two trips out of the house in the past week without having a panic attack. Client acknowledged it took her up to 2 hours to prepare herself to leave, and did experience a slight increase in anxiety, but no attack or significant increase was noticed. Therapist praised client for their progress, and encouraged client to take credit for the changes she has been making. Client remains resistant to this, but did acknowledge that she has made several changes recently. Client and therapist shifted to exploring client boundaries after noting the frequency with which client ignores her own boundaries, or has them violated by others. Therapist provided psychoeducation regarding the concept of personal boundaries, and the way in which they are qualified. Discussed security limitations of email with client. Client stated they understood and were accepting of this risk. Therapist provided client with a worksheet to complete before next session.       MENTAL STATUS EXAM  Mood was anxious with anxious affect. Suicidal ideation was denied. Homicidal ideation was denied. Hygiene was good . Dress was neat. Behavior was Restless & fidgety with No observation or self-report of difficulties ambulating. Attitude was Cooperative. Eye-contact was fair. Speech: rate - WNL, rhythm - WNL, volume - WNL. Verbalizations were goal directed. Thought processes were intact and goal-oriented without evidence of delusions, hallucinations, obsessions, or florencia; with moderate cognitive distortions. Associations were characterized by intact cognitive processes. Pt was oriented to person, place, time, and general circumstances;  recent:  good and remote:  good. Insight and judgment were estimated to be fair, AEB, a fair  understanding of cyclical maladaptive patterns, and the ability to use insight to inform behavior change. Attention span and concentration appear within functional limits  Language use and knowledge base appear within functional limits        ASSESSMENT  Matheus Balderas presented to the appointment today for evaluation and treatment of symptoms of anxiety. 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 Irwin Hernandez was in agreement with recommendations.        PHQ Scores 1/17/2023 1/10/2023 9/15/2022 4/25/2022 11/29/2021 8/27/2021 4/8/2021   PHQ2 Score 4 1 1 0 6 5 5   PHQ9 Score 17 13 1 6 16 20 17     Interpretation of Total Score Depression Severity: 1-4 = Minimal depression, 5-9 = Mild depression, 10-14 = Moderate depression, 15-19 = Moderately severe depression, 20-27 = Severe depression    How often pt has had thoughts of death or hurting self (if PHQ positive for depression):       PETE 7 SCORE 1/17/2023 1/16/2023 1/10/2023 12/29/2020 5/3/2019   PETE-7 Total Score 13 17 17 - -   PETE-7 Total Score - - - 17 7     Interpretation of PETE-7 score: 5-9 = mild anxiety, 10-14 = moderate anxiety, 15+ = severe anxiety. Recommend referral to behavioral health for scores 10 or greater. DIAGNOSIS  Eze To was seen today for anxiety. Diagnoses and all orders for this visit:    Agoraphobia with panic disorder      INTERVENTION  Discussed and set plan for behavioral activation, Provided education, Discussed self-care (sleep, nutrition, rewarding activities, social support, exercise), Emphasized self-care as important for managing overall health, Provided Psychoeducation re: boundaries, and CBT to target negative self-talk    PLAN  Client to continue sleep schedule and improve sleep hygiene, will introduce use of nightlight and music/white noise. Client will continue to reduce caffeine intake. Client to complete boundary worksheet before next session. INTERACTIVE COMPLEXITY  Is interactive complexity present? Yes  Reason:  Nonverbal communication methods (e.g., toys, other devices) to eliminate communication barriers  Additional Supporting Information:   Use of worksheets to assist in education and implementation of techniques to improve boundaries      Electronically signed by Tomy Patino [facility] on 2/17/2023 at 10:31 AM    Rubin Vicente, was evaluated through a synchronous (real-time) audio-video encounter. The patient (or guardian if applicable) is aware that this is a billable service. Verbal consent to proceed has been obtained within the past 12 months. The visit was conducted pursuant to the emergency declaration under the [address] [place] [address], [address] [place] [address] waiver authority and the [employer] General Act. Patient identification was verified, and a caregiver was present when appropriate. The patient was located in a state where the provider was credentialed to provide care.

## 2023-02-21 ENCOUNTER — HOSPITAL ENCOUNTER (OUTPATIENT)
Age: 63
Discharge: HOME | End: 2023-02-21
Payer: COMMERCIAL

## 2023-02-21 DIAGNOSIS — E78.5 HYPERLIPIDEMIA WITH TARGET LDL LESS THAN 100: ICD-10-CM

## 2023-02-21 DIAGNOSIS — E89.0 POSTOPERATIVE HYPOTHYROIDISM: Primary | ICD-10-CM

## 2023-02-21 DIAGNOSIS — I10 ESSENTIAL HYPERTENSION: ICD-10-CM

## 2023-02-21 DIAGNOSIS — E89.0 POSTOPERATIVE HYPOTHYROIDISM: ICD-10-CM

## 2023-02-21 LAB
ALBUMIN SERPL-MCNC: 4.3 G/DL (ref 3.5–5.2)
ALP SERPL-CCNC: 94 U/L (ref 35–104)
ALT SERPL-CCNC: 13 U/L (ref 5–33)
ANION GAP SERPL CALCULATED.3IONS-SCNC: 7 MMOL/L (ref 9–17)
AST SERPL-CCNC: 17 U/L
BILIRUB SERPL-MCNC: 0.3 MG/DL (ref 0.3–1.2)
BUN SERPL-MCNC: 12 MG/DL (ref 8–23)
CALCIUM SERPL-MCNC: 9 MG/DL (ref 8.6–10.4)
CHLORIDE SERPL-SCNC: 105 MMOL/L (ref 98–107)
CHOLEST SERPL-MCNC: 224 MG/DL
CHOLESTEROL/HDL RATIO: 4.1
CO2 SERPL-SCNC: 28 MMOL/L (ref 20–31)
CREAT SERPL-MCNC: 0.77 MG/DL (ref 0.5–0.9)
GFR SERPL CREATININE-BSD FRML MDRD: >60 ML/MIN/1.73M2
GLUCOSE SERPL-MCNC: 94 MG/DL (ref 70–99)
HCT VFR BLD AUTO: 45.8 % (ref 36–46)
HDLC SERPL-MCNC: 55 MG/DL
HGB BLD-MCNC: 15.4 G/DL (ref 12–16)
LDLC SERPL CALC-MCNC: 146 MG/DL (ref 0–130)
MCH RBC QN AUTO: 30.3 PG (ref 26–34)
MCHC RBC AUTO-ENTMCNC: 33.6 G/DL (ref 31–37)
MCV RBC AUTO: 90.3 FL (ref 80–100)
PDW BLD-RTO: 14 % (ref 11.5–14.9)
PLATELET # BLD AUTO: 279 K/UL (ref 150–450)
PMV BLD AUTO: 7.8 FL (ref 6–12)
POTASSIUM SERPL-SCNC: 4.1 MMOL/L (ref 3.7–5.3)
PROT SERPL-MCNC: 7.4 G/DL (ref 6.4–8.3)
RBC # BLD: 5.07 M/UL (ref 4–5.2)
SODIUM SERPL-SCNC: 140 MMOL/L (ref 135–144)
T4 FREE SERPL-MCNC: 1.26 NG/DL (ref 0.93–1.7)
TRIGL SERPL-MCNC: 114 MG/DL
TSH SERPL-ACNC: 6.07 UIU/ML (ref 0.3–5)
URATE SERPL-MCNC: 4 MG/DL (ref 2.4–5.7)
WBC # BLD AUTO: 6.6 K/UL (ref 3.5–11)

## 2023-02-21 PROCEDURE — 84443 ASSAY THYROID STIM HORMONE: CPT

## 2023-02-21 PROCEDURE — 80061 LIPID PANEL: CPT

## 2023-02-21 PROCEDURE — 80053 COMPREHEN METABOLIC PANEL: CPT

## 2023-02-21 PROCEDURE — 85027 COMPLETE CBC AUTOMATED: CPT

## 2023-02-21 PROCEDURE — 84550 ASSAY OF BLOOD/URIC ACID: CPT

## 2023-02-21 PROCEDURE — 84439 ASSAY OF FREE THYROXINE: CPT

## 2023-02-21 PROCEDURE — 36415 COLL VENOUS BLD VENIPUNCTURE: CPT

## 2023-02-21 RX ORDER — LEVOTHYROXINE SODIUM 0.03 MG/1
25 TABLET ORAL WEEKLY
Qty: 12 TABLET | Refills: 1 | Status: SHIPPED | OUTPATIENT
Start: 2023-02-21

## 2023-02-22 NOTE — RESULT ENCOUNTER NOTE
Please notify patient: TSH is mildly high but with normal free T4--she is currently taking Synthroid 100 mcg daily  We can increase very little and add a 25 mcg on Saturday on top of 100mg  So, take Synthroid 100 mcg 6 days a week, 125 mcg on Saturdays--- new prescription sent to the pharmacy      Lipids are still high same as before  Otherwise labs within normal limits  continue current treatment    Future Appointments  2/24/2023  11:00 AM   Blanca Jackson [facility]             [facility]  7/27/2023  3:45 PM    Lamin Barron MD     [facility]

## 2023-02-24 ENCOUNTER — TELEMEDICINE (OUTPATIENT)
Dept: BEHAVIORAL/MENTAL HEALTH CLINIC | Age: 63
End: 2023-02-24

## 2023-02-24 DIAGNOSIS — F40.01 AGORAPHOBIA WITH PANIC DISORDER: Primary | ICD-10-CM

## 2023-02-24 NOTE — PROGRESS NOTES
ADULT BEHAVIORAL HEALTH FOLLOW UP  [facility]      Visit Date: 2/24/2023   Time of appointment:  11:04  Time spent with Patient: 55 minutes.   This is patient's fifth appointment.    Reason for Consult:  Anxiety     Referring Provider/PCP:    No ref. provider found  Urmila Hathaway MD      Pt provided informed consent for the behavioral health program. Discussed with patient model of service to include the limits of confidentiality (i.e. abuse reporting, suicide intervention, etc.) and short-term intervention focused approach.  Pt indicated understanding.    ZOHRA Dorman is a 63 y.o. female who presents for follow up of Agoraphobia with Panic Disorder. Client reported her sleeping has increased to an average of 5-7 hours from previous average of 2-3 hours. Client stated she has noticed increased motivation and energy. Client reported she has continued to be able to leave the home, actually went out twice last Saturday including an evening out an axe throwing establishment with her  and children. Therapist praised client for her continued efforts, emphasizing her efforts have been the source of her improvements. Client was highly resistant to this praise. Therapist transitioned to exploring client's significant negative self-talk. Client revealed a strong belief that she is, \"The ugliest person there is, so I don't want people to notice me. That way I don't bother them.\" Client acknowledged that this belief has been fixed for some time, and she feels unable to challenge it. Therapist requested client begin to track this belief, cautioning her to stop if the exercise becomes overwhelming. Client agreed to begin tracking.    Previous Plan:  Client to continue sleep schedule and improve sleep hygiene, will introduce use of nightlight and music/white noise. Client will continue to reduce caffeine intake. Client to complete boundary worksheet before next session.      MENTAL STATUS EXAM  Mood was  anxious with anxious affect. Suicidal ideation was denied. Homicidal ideation was denied. Hygiene was good . Dress was neat. Behavior was Restless & fidgety with No observation or self-report of difficulties ambulating. Attitude was Cooperative. Eye-contact was fair. Speech: rate - WNL, rhythm - WNL, volume - WNL. Verbalizations were goal directed. Thought processes were intact and goal-oriented without evidence of delusions, hallucinations, obsessions, or florencia; with moderate cognitive distortions. Associations were characterized by intact cognitive processes. Pt was oriented to person, place, time, and general circumstances;  recent:  good and remote:  good. Insight and judgment were estimated to be fair, AEB, a fair  understanding of cyclical maladaptive patterns, and the ability to use insight to inform behavior change. Attention span and concentration appear within functional limits  Language use and knowledge base appear within functional limits        ASSESSMENT  Madhuri Coon presented to the appointment today for evaluation and treatment of symptoms of anxiety. 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 Chad Lopes was in agreement with recommendations. PHQ Scores 1/17/2023 1/10/2023 9/15/2022 4/25/2022 11/29/2021 8/27/2021 4/8/2021   PHQ2 Score 4 1 1 0 6 5 5   PHQ9 Score 17 13 1 6 16 20 17     Interpretation of Total Score Depression Severity: 1-4 = Minimal depression, 5-9 = Mild depression, 10-14 = Moderate depression, 15-19 = Moderately severe depression, 20-27 = Severe depression    How often pt has had thoughts of death or hurting self (if PHQ positive for depression):       PETE 7 SCORE 1/17/2023 1/16/2023 1/10/2023 12/29/2020 5/3/2019   PETE-7 Total Score 13 17 17 - -   PETE-7 Total Score - - - 17 7     Interpretation of PETE-7 score: 5-9 = mild anxiety, 10-14 = moderate anxiety, 15+ = severe anxiety.  Recommend referral to behavioral health for scores 10 or greater. DIAGNOSIS  Lilian Cheung was seen today for anxiety. Diagnoses and all orders for this visit:    Agoraphobia with panic disorder        INTERVENTION  Discussed and set plan for behavioral activation, Provided education, Discussed self-care (sleep, nutrition, rewarding activities, social support, exercise), Emphasized self-care as important for managing overall health, Provided Psychoeducation re: boundaries, and CBT to target negative self-talk    PLAN  Client to continue sleep schedule and improve sleep hygiene, will introduce use of nightlight and music/white noise. Client will continue to reduce caffeine intake. Client to begin tracking belief she is \"the ugliest\". Will return to boundary content next session if possible. INTERACTIVE COMPLEXITY  Is interactive complexity present? No  Reason:      Additional Supporting Information:  N/A     Electronically signed by Mckenna Isidro on 3/6/2023 at 8:24 AM    Mi Juan, was evaluated through a synchronous (real-time) audio-video encounter. The patient (or guardian if applicable) is aware that this is a billable service. Verbal consent to proceed has been obtained within the past 12 months. The visit was conducted pursuant to the emergency declaration under the [facility], [address] [place] [address] waiver authority and the [employer] General Act. Patient identification was verified, and a caregiver was present when appropriate. The patient was located in a state where the provider was credentialed to provide care.

## 2023-02-27 DIAGNOSIS — F41.1 GAD (GENERALIZED ANXIETY DISORDER): ICD-10-CM

## 2023-02-27 DIAGNOSIS — F40.01 AGORAPHOBIA WITH PANIC ATTACKS: ICD-10-CM

## 2023-02-27 DIAGNOSIS — F33.1 MODERATE EPISODE OF RECURRENT MAJOR DEPRESSIVE DISORDER (HCC): ICD-10-CM

## 2023-02-27 RX ORDER — PAROXETINE HYDROCHLORIDE 40 MG/1
40 TABLET, FILM COATED ORAL
Qty: 30 TABLET | Refills: 0 | Status: SHIPPED | OUTPATIENT
Start: 2023-02-27

## 2023-02-27 NOTE — TELEPHONE ENCOUNTER
Please Approve or Refuse.   Send to Pharmacy per Pt's Request:      Next Visit Date:  7/27/2023   Last Visit Date: 1/10/2023    Hemoglobin A1C (%)   Date Value   09/19/2022 5.6   05/13/2022 5.7   03/21/2018 5.5             ( goal A1C is < 7)   BP Readings from Last 3 Encounters:   09/15/22 138/80   05/04/22 122/86   03/14/22 125/65          (goal 120/80)  BUN   Date Value Ref Range Status   02/21/2023 12 8 - 23 mg/dL Final     Creatinine   Date Value Ref Range Status   02/21/2023 0.77 0.50 - 0.90 mg/dL Final     Potassium   Date Value Ref Range Status   02/21/2023 4.1 3.7 - 5.3 mmol/L Final

## 2023-03-10 ENCOUNTER — TELEMEDICINE (OUTPATIENT)
Dept: BEHAVIORAL/MENTAL HEALTH CLINIC | Age: 63
End: 2023-03-10

## 2023-03-10 DIAGNOSIS — F40.01 AGORAPHOBIA WITH PANIC DISORDER: Primary | ICD-10-CM

## 2023-03-10 NOTE — PROGRESS NOTES
to reduce caffeine intake. Client to begin tracking belief she is \"the ugliest\". Will return to boundary content next session if possible. MENTAL STATUS EXAM  Mood was anxious with anxious affect. Suicidal ideation was denied. Homicidal ideation was denied. Hygiene was good . Dress was neat. Behavior was Restless & fidgety with No observation or self-report of difficulties ambulating. Attitude was Cooperative. Eye-contact was fair. Speech: rate - WNL, rhythm - WNL, volume - WNL. Verbalizations were goal directed. Thought processes were intact and goal-oriented without evidence of delusions, hallucinations, obsessions, or florencia; with moderate cognitive distortions. Associations were characterized by intact cognitive processes. Pt was oriented to person, place, time, and general circumstances;  recent:  good and remote:  good. Insight and judgment were estimated to be fair, AEB, a fair  understanding of cyclical maladaptive patterns, and the ability to use insight to inform behavior change. Attention span and concentration appear within functional limits  Language use and knowledge base appear within functional limits        ASSESSMENT  Brook Crandall presented to the appointment today for evaluation and treatment of symptoms of anxiety. 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 [facility] was in agreement with recommendations.        PHQ Scores 1/17/2023 1/10/2023 9/15/2022 4/25/2022 11/29/2021 8/27/2021 4/8/2021   PHQ2 Score 4 1 1 0 6 5 5   PHQ9 Score 17 13 1 6 16 20 17     Interpretation of Total Score Depression Severity: 1-4 = Minimal depression, 5-9 = Mild depression, 10-14 = Moderate depression, 15-19 = Moderately severe depression, 20-27 = Severe depression    How often pt has had thoughts of death or hurting self (if PHQ positive for depression):       PETE 7 SCORE 1/17/2023 1/16/2023 1/10/2023 12/29/2020 5/3/2019   PETE-7

## 2023-03-15 DIAGNOSIS — F41.1 GAD (GENERALIZED ANXIETY DISORDER): ICD-10-CM

## 2023-03-15 DIAGNOSIS — F33.1 MODERATE EPISODE OF RECURRENT MAJOR DEPRESSIVE DISORDER (HCC): ICD-10-CM

## 2023-03-15 DIAGNOSIS — F40.01 AGORAPHOBIA WITH PANIC ATTACKS: ICD-10-CM

## 2023-03-15 RX ORDER — PAROXETINE HYDROCHLORIDE 40 MG/1
40 TABLET, FILM COATED ORAL
Qty: 90 TABLET | Refills: 1 | Status: SHIPPED | OUTPATIENT
Start: 2023-03-15

## 2023-04-11 ENCOUNTER — TELEMEDICINE (OUTPATIENT)
Dept: BEHAVIORAL/MENTAL HEALTH CLINIC | Age: 63
End: 2023-04-11

## 2023-04-11 DIAGNOSIS — F40.01 AGORAPHOBIA WITH PANIC DISORDER: Primary | ICD-10-CM

## 2023-04-20 ENCOUNTER — TELEPHONE (OUTPATIENT)
Dept: FAMILY MEDICINE CLINIC | Age: 63
End: 2023-04-20

## 2023-04-20 DIAGNOSIS — R25.1 TREMOR OF BOTH HANDS: Primary | ICD-10-CM

## 2023-04-20 NOTE — TELEPHONE ENCOUNTER
----- Message from [facility] sent at 4/20/2023  9:27 AM EDT -----  Subject: Referral Request    Reason for referral request? Patient is requesting a referral to see a   Neurologist. Patient has been experiencing times when she feel like she is   going to pass out and lost of hand control. Patient has mention this to   the doctor in the past.  Provider patient wants to be referred to(if known):     Provider Phone Number(if known):     Additional Information for Provider?   ---------------------------------------------------------------------------  --------------  [address]    [phone]; OK to leave message on voicemail  ---------------------------------------------------------------------------  --------------

## 2023-04-20 NOTE — TELEPHONE ENCOUNTER
Diagnosis Orders   1.  Tremor of both hands  Kathy Lamar, Neurology, [address]           Future Appointments   Date Time Provider Ulysses Hickman   4/25/2023  1:00 PM Aakash Villarreal [facility]   7/27/2023  3:45 PM [facility]

## 2023-04-21 NOTE — TELEPHONE ENCOUNTER
Please help the patient with appointment with neurologist    Future Appointments   Date Time Provider Ulysses Hickman   4/25/2023  1:00 PM Jazmine Roseline, [facility] [place] [facility]   7/27/2023  3:45 PM Lavelle Daniels MD fp [facility]

## 2023-04-21 NOTE — TELEPHONE ENCOUNTER
Patient called stated that the referral that was placed , is unable to see her within a year.  So she will like a new referral. Placed

## 2023-04-21 NOTE — TELEPHONE ENCOUNTER
I closed prior referral and I placed a new 1   Diagnosis Orders   1.  Tremor of both hands  [facility], Neurology, [facility]           Future Appointments   Date Time Provider Ulysses Hickman   4/25/2023  1:00 PM Joe Barry, [facility]   7/27/2023  3:45 PM [facility]

## 2023-04-25 ENCOUNTER — TELEMEDICINE (OUTPATIENT)
Dept: BEHAVIORAL/MENTAL HEALTH CLINIC | Age: 63
End: 2023-04-25
Payer: COMMERCIAL

## 2023-04-25 DIAGNOSIS — F40.01 AGORAPHOBIA WITH PANIC DISORDER: Primary | ICD-10-CM

## 2023-04-25 PROCEDURE — 90834 PSYTX W PT 45 MINUTES: CPT | Performed by: COUNSELOR

## 2023-05-09 ENCOUNTER — TELEMEDICINE (OUTPATIENT)
Dept: BEHAVIORAL/MENTAL HEALTH CLINIC | Age: 63
End: 2023-05-09

## 2023-05-09 DIAGNOSIS — F40.01 AGORAPHOBIA WITH PANIC DISORDER: Primary | ICD-10-CM

## 2023-05-24 ENCOUNTER — TELEMEDICINE (OUTPATIENT)
Dept: BEHAVIORAL/MENTAL HEALTH CLINIC | Age: 63
End: 2023-05-24

## 2023-05-24 ENCOUNTER — NURSE TRIAGE (OUTPATIENT)
Dept: OTHER | Facility: CLINIC | Age: 63
End: 2023-05-24

## 2023-05-24 DIAGNOSIS — F40.01 AGORAPHOBIA WITH PANIC DISORDER: Primary | ICD-10-CM

## 2023-05-24 PROCEDURE — 90837 PSYTX W PT 60 MINUTES: CPT | Performed by: COUNSELOR

## 2023-05-24 NOTE — TELEPHONE ENCOUNTER
Location of patient: [address] call from [place] at [facility]; Patient with [employer] requesting to establish care with [address]. Calling for new PCP. Subjective: Caller states \"Heart palpitations\"     Pt has been seen several times for these symptoms, by cardiologist, PCP, and in ED. Symptoms are not new or worse, no triage indicated at this time. Hx sjogrens disease    Onset: 4-5 years    Associated Symptoms:  ADLs normal, fatigued at times. Denies any other questions or concerns; instructed to call back for any new or worsening symptoms. Caller connected with [employer] at [facility] for appt scheduling to establish care. Attention Provider: Thank you for allowing me to participate in the care of your patient. The patient was connected to triage in response to information provided to the [facility]. Please do not respond through this encounter as the response is not directed to a shared pool.     Reason for Disposition   Caller has already spoken with the PCP (or office), and has no further questions    Protocols used: No Contact or Duplicate Contact Call-ADULT-[place]

## 2023-06-16 PROBLEM — F32.4 MAJOR DEPRESSIVE DISORDER WITH SINGLE EPISODE, IN PARTIAL REMISSION (HCC): Status: RESOLVED | Noted: 2019-01-24 | Resolved: 2023-06-16

## 2023-06-16 PROBLEM — E03.9 HYPOTHYROIDISM: Status: ACTIVE | Noted: 2023-06-16

## 2023-06-28 ENCOUNTER — HOSPITAL ENCOUNTER (OUTPATIENT)
Dept: MRI IMAGING | Facility: CLINIC | Age: 63
Discharge: HOME | End: 2023-06-30
Payer: COMMERCIAL

## 2023-06-28 DIAGNOSIS — R51.9 DAILY HEADACHE: ICD-10-CM

## 2023-06-28 PROCEDURE — 70551 MRI BRAIN STEM W/O DYE: CPT

## 2023-07-06 ENCOUNTER — TELEPHONE (OUTPATIENT)
Dept: ONCOLOGY | Age: 63
End: 2023-07-06

## 2023-07-07 ENCOUNTER — HOSPITAL ENCOUNTER (OUTPATIENT)
Dept: VASCULAR LAB | Age: 63
Discharge: HOME | End: 2023-07-07
Payer: COMMERCIAL

## 2023-07-07 DIAGNOSIS — G45.9 TIA (TRANSIENT ISCHEMIC ATTACK): ICD-10-CM

## 2023-07-07 PROCEDURE — 93880 EXTRACRANIAL BILAT STUDY: CPT

## 2023-07-10 ENCOUNTER — HOSPITAL ENCOUNTER (OUTPATIENT)
Dept: MAMMOGRAPHY | Age: 63
Discharge: HOME | End: 2023-07-12
Payer: COMMERCIAL

## 2023-07-10 DIAGNOSIS — Z12.31 ENCOUNTER FOR SCREENING MAMMOGRAM FOR MALIGNANT NEOPLASM OF BREAST: ICD-10-CM

## 2023-07-10 PROCEDURE — 77063 BREAST TOMOSYNTHESIS BI: CPT

## 2023-07-14 ENCOUNTER — HOSPITAL ENCOUNTER (OUTPATIENT)
Dept: CT IMAGING | Age: 63
Discharge: HOME | End: 2023-07-14
Payer: COMMERCIAL

## 2023-07-14 VITALS — BODY MASS INDEX: 27.6 KG/M2 | HEIGHT: 62 IN | WEIGHT: 150 LBS

## 2023-07-14 DIAGNOSIS — Z87.891 PERSONAL HISTORY OF TOBACCO USE: ICD-10-CM

## 2023-07-14 PROCEDURE — 71271 CT THORAX LUNG CANCER SCR C-: CPT

## 2023-07-17 DIAGNOSIS — R93.2 ABNORMAL CT OF LIVER: Primary | ICD-10-CM

## 2023-07-17 NOTE — RESULT ENCOUNTER NOTE
Call and advise patient that the results showed small nodule which only need yearly follow up. Cysts on liver which they recommend U/s order placed.

## 2023-07-21 ENCOUNTER — HOSPITAL ENCOUNTER (OUTPATIENT)
Dept: ULTRASOUND IMAGING | Age: 63
Discharge: HOME | End: 2023-07-21
Payer: COMMERCIAL

## 2023-07-21 DIAGNOSIS — R93.2 ABNORMAL CT OF LIVER: ICD-10-CM

## 2023-07-21 PROCEDURE — 76705 ECHO EXAM OF ABDOMEN: CPT

## 2023-08-03 DIAGNOSIS — E89.0 POSTOPERATIVE HYPOTHYROIDISM: ICD-10-CM

## 2023-08-04 RX ORDER — LEVOTHYROXINE SODIUM 0.03 MG/1
TABLET ORAL
Qty: 12 TABLET | Refills: 1 | Status: SHIPPED | OUTPATIENT
Start: 2023-08-04

## 2023-09-06 DIAGNOSIS — F33.0 MILD EPISODE OF RECURRENT MAJOR DEPRESSIVE DISORDER (HCC): ICD-10-CM

## 2023-09-06 DIAGNOSIS — F41.9 ANXIETY: ICD-10-CM

## 2023-09-06 DIAGNOSIS — F33.1 MODERATE EPISODE OF RECURRENT MAJOR DEPRESSIVE DISORDER (HCC): ICD-10-CM

## 2023-09-06 DIAGNOSIS — I10 ESSENTIAL HYPERTENSION: ICD-10-CM

## 2023-09-06 DIAGNOSIS — F41.1 GAD (GENERALIZED ANXIETY DISORDER): ICD-10-CM

## 2023-09-06 DIAGNOSIS — F40.01 AGORAPHOBIA WITH PANIC ATTACKS: ICD-10-CM

## 2023-09-06 RX ORDER — METOPROLOL SUCCINATE 25 MG/1
TABLET, EXTENDED RELEASE ORAL
Qty: 90 TABLET | Refills: 3 | OUTPATIENT
Start: 2023-09-06

## 2023-09-06 RX ORDER — BUSPIRONE HYDROCHLORIDE 10 MG/1
TABLET ORAL
Qty: 270 TABLET | Refills: 3 | OUTPATIENT
Start: 2023-09-06

## 2023-09-06 RX ORDER — PAROXETINE HYDROCHLORIDE 40 MG/1
TABLET, FILM COATED ORAL
Qty: 90 TABLET | Refills: 1 | OUTPATIENT
Start: 2023-09-06

## 2023-09-19 ENCOUNTER — OFFICE VISIT (OUTPATIENT)
Dept: PRIMARY CARE CLINIC | Age: 63
End: 2023-09-19
Payer: COMMERCIAL

## 2023-09-19 VITALS
DIASTOLIC BLOOD PRESSURE: 90 MMHG | HEIGHT: 62 IN | WEIGHT: 152 LBS | SYSTOLIC BLOOD PRESSURE: 132 MMHG | HEART RATE: 75 BPM | BODY MASS INDEX: 27.97 KG/M2 | OXYGEN SATURATION: 95 %

## 2023-09-19 DIAGNOSIS — Z87.891 PERSONAL HISTORY OF TOBACCO USE, PRESENTING HAZARDS TO HEALTH: ICD-10-CM

## 2023-09-19 DIAGNOSIS — F33.1 MODERATE EPISODE OF RECURRENT MAJOR DEPRESSIVE DISORDER (HCC): Primary | ICD-10-CM

## 2023-09-19 DIAGNOSIS — F40.01 AGORAPHOBIA WITH PANIC ATTACKS: ICD-10-CM

## 2023-09-19 DIAGNOSIS — F41.1 GAD (GENERALIZED ANXIETY DISORDER): ICD-10-CM

## 2023-09-19 DIAGNOSIS — Z85.850 HISTORY OF THYROID CANCER: ICD-10-CM

## 2023-09-19 DIAGNOSIS — F33.0 MILD EPISODE OF RECURRENT MAJOR DEPRESSIVE DISORDER (HCC): ICD-10-CM

## 2023-09-19 DIAGNOSIS — M35.01 SJOGREN'S SYNDROME WITH KERATOCONJUNCTIVITIS SICCA (HCC): ICD-10-CM

## 2023-09-19 DIAGNOSIS — J44.9 CHRONIC OBSTRUCTIVE PULMONARY DISEASE, UNSPECIFIED COPD TYPE (HCC): ICD-10-CM

## 2023-09-19 DIAGNOSIS — Z12.11 SCREENING FOR MALIGNANT NEOPLASM OF COLON: ICD-10-CM

## 2023-09-19 DIAGNOSIS — E89.0 POSTOPERATIVE HYPOTHYROIDISM: ICD-10-CM

## 2023-09-19 DIAGNOSIS — N39.46 MIXED STRESS AND URGE URINARY INCONTINENCE: ICD-10-CM

## 2023-09-19 DIAGNOSIS — F41.9 ANXIETY: ICD-10-CM

## 2023-09-19 DIAGNOSIS — E78.5 HYPERLIPIDEMIA WITH TARGET LDL LESS THAN 100: ICD-10-CM

## 2023-09-19 DIAGNOSIS — I10 ESSENTIAL HYPERTENSION: ICD-10-CM

## 2023-09-19 PROCEDURE — 3075F SYST BP GE 130 - 139MM HG: CPT | Performed by: PHYSICIAN ASSISTANT

## 2023-09-19 PROCEDURE — 3080F DIAST BP >= 90 MM HG: CPT | Performed by: PHYSICIAN ASSISTANT

## 2023-09-19 PROCEDURE — 99214 OFFICE O/P EST MOD 30 MIN: CPT | Performed by: PHYSICIAN ASSISTANT

## 2023-09-19 PROCEDURE — 99406 BEHAV CHNG SMOKING 3-10 MIN: CPT | Performed by: PHYSICIAN ASSISTANT

## 2023-09-19 RX ORDER — FLUTICASONE FUROATE, UMECLIDINIUM BROMIDE AND VILANTEROL TRIFENATATE 100; 62.5; 25 UG/1; UG/1; UG/1
1 POWDER RESPIRATORY (INHALATION) DAILY
Qty: 28 EACH | Refills: 5 | Status: SHIPPED | OUTPATIENT
Start: 2023-09-19

## 2023-09-19 RX ORDER — METOPROLOL SUCCINATE 25 MG/1
25 TABLET, EXTENDED RELEASE ORAL DAILY
Qty: 90 TABLET | Refills: 3 | Status: SHIPPED | OUTPATIENT
Start: 2023-09-19

## 2023-09-19 RX ORDER — TIOTROPIUM BROMIDE 18 UG/1
18 CAPSULE ORAL; RESPIRATORY (INHALATION) DAILY
Qty: 90 CAPSULE | Refills: 3 | Status: SHIPPED | OUTPATIENT
Start: 2023-09-19

## 2023-09-19 RX ORDER — LEVOTHYROXINE SODIUM 0.03 MG/1
TABLET ORAL
Qty: 12 TABLET | Refills: 1 | Status: SHIPPED | OUTPATIENT
Start: 2023-09-19

## 2023-09-19 RX ORDER — PAROXETINE HYDROCHLORIDE 40 MG/1
40 TABLET, FILM COATED ORAL
Qty: 90 TABLET | Refills: 1 | Status: SHIPPED | OUTPATIENT
Start: 2023-09-19

## 2023-09-19 RX ORDER — NICOTINE 21 MG/24HR
1 PATCH, TRANSDERMAL 24 HOURS TRANSDERMAL DAILY
Qty: 42 PATCH | Refills: 0 | Status: SHIPPED | OUTPATIENT
Start: 2023-09-19 | End: 2023-10-31

## 2023-09-19 RX ORDER — LISINOPRIL 10 MG/1
10 TABLET ORAL DAILY
Qty: 30 TABLET | Refills: 0 | Status: SHIPPED | OUTPATIENT
Start: 2023-09-19

## 2023-09-19 RX ORDER — LEVOTHYROXINE SODIUM 0.1 MG/1
100 TABLET ORAL
Qty: 90 TABLET | Refills: 3 | Status: SHIPPED | OUTPATIENT
Start: 2023-09-19

## 2023-09-19 RX ORDER — BUSPIRONE HYDROCHLORIDE 10 MG/1
10 TABLET ORAL 3 TIMES DAILY
Qty: 270 TABLET | Refills: 3 | Status: SHIPPED | OUTPATIENT
Start: 2023-09-19

## 2023-09-19 ASSESSMENT — ENCOUNTER SYMPTOMS
CONSTIPATION: 0
VOMITING: 0
ABDOMINAL PAIN: 0
DIARRHEA: 0
SORE THROAT: 0
CHEST TIGHTNESS: 0
ABDOMINAL DISTENTION: 0
RHINORRHEA: 0
SINUS PRESSURE: 0
SHORTNESS OF BREATH: 0
COUGH: 0

## 2023-09-19 NOTE — PROGRESS NOTES
tablet by mouth Daily with supper, Disp-90 tablet, R-1Normal  11. History of thyroid cancer  -     levothyroxine (SYNTHROID) 100 MCG tablet; Take 1 tablet by mouth every morning (before breakfast) OK for early refill, Disp-90 tablet, R-3Normal  12. Mild episode of recurrent major depressive disorder (HCC)  -     busPIRone (BUSPAR) 10 MG tablet; Take 1 tablet by mouth 3 times daily, Disp-270 tablet, R-3Normal  13. Anxiety  -     busPIRone (BUSPAR) 10 MG tablet; Take 1 tablet by mouth 3 times daily, Disp-270 tablet, R-3Normal  14. Mixed stress and urge urinary incontinence  -     AFL - Bereket Cooley DO, Urogynecology, [place]     Continue thyroid meds as is, blood pressure not at goal.  We will recheck. Return in about 1 month (around 10/19/2023) for Smoking check and HTN check. .     Patient given educational materials - see patient instructions. Discussed use, benefit, and side effects of prescribed medications. All patient questions answered. Pt voiced understanding. Reviewed health maintenance. Instructed to continue current medications, diet and exercise. Patient agreed with treatment plan. Follow up as directed. Electronically signed by Lura Mcardle, PA on 9/19/2023 at 11:18 AM  Tobacco Cessation Counseling: Patient advised about behavior change, including information about personal health harms, usage of appropriate cessation measures and benefits of cessation.   Time spent (minutes): 5 min

## 2023-10-12 DIAGNOSIS — I10 ESSENTIAL HYPERTENSION: ICD-10-CM

## 2023-10-12 DIAGNOSIS — E89.0 POSTOPERATIVE HYPOTHYROIDISM: ICD-10-CM

## 2023-10-12 RX ORDER — LISINOPRIL 10 MG/1
10 TABLET ORAL DAILY
Qty: 90 TABLET | Refills: 1 | Status: SHIPPED | OUTPATIENT
Start: 2023-10-12 | End: 2023-10-19 | Stop reason: SDUPTHER

## 2023-10-12 RX ORDER — LEVOTHYROXINE SODIUM 0.03 MG/1
TABLET ORAL
Qty: 36 TABLET | Refills: 1 | Status: SHIPPED | OUTPATIENT
Start: 2023-10-12

## 2023-10-12 NOTE — TELEPHONE ENCOUNTER
LAST VISIT:   9/19/2023     Future Appointments   Date Time Provider [address]   10/19/2023 10:00 AM Merline Crawley, PA [facility] Cordella Silos

## 2023-10-19 ENCOUNTER — OFFICE VISIT (OUTPATIENT)
Dept: PRIMARY CARE CLINIC | Age: 63
End: 2023-10-19
Payer: COMMERCIAL

## 2023-10-19 VITALS
HEIGHT: 62 IN | HEART RATE: 76 BPM | OXYGEN SATURATION: 96 % | WEIGHT: 160.4 LBS | SYSTOLIC BLOOD PRESSURE: 130 MMHG | DIASTOLIC BLOOD PRESSURE: 84 MMHG | BODY MASS INDEX: 29.52 KG/M2

## 2023-10-19 DIAGNOSIS — I10 ESSENTIAL HYPERTENSION: ICD-10-CM

## 2023-10-19 DIAGNOSIS — E03.9 HYPOTHYROIDISM, UNSPECIFIED TYPE: ICD-10-CM

## 2023-10-19 DIAGNOSIS — E89.0 POSTOPERATIVE HYPOTHYROIDISM: ICD-10-CM

## 2023-10-19 DIAGNOSIS — J44.9 CHRONIC OBSTRUCTIVE PULMONARY DISEASE, UNSPECIFIED COPD TYPE (HCC): Primary | ICD-10-CM

## 2023-10-19 DIAGNOSIS — F41.1 GAD (GENERALIZED ANXIETY DISORDER): ICD-10-CM

## 2023-10-19 DIAGNOSIS — F32.A DEPRESSION, UNSPECIFIED DEPRESSION TYPE: ICD-10-CM

## 2023-10-19 PROCEDURE — 3079F DIAST BP 80-89 MM HG: CPT | Performed by: PHYSICIAN ASSISTANT

## 2023-10-19 PROCEDURE — 99214 OFFICE O/P EST MOD 30 MIN: CPT | Performed by: PHYSICIAN ASSISTANT

## 2023-10-19 PROCEDURE — 3075F SYST BP GE 130 - 139MM HG: CPT | Performed by: PHYSICIAN ASSISTANT

## 2023-10-19 RX ORDER — LISINOPRIL 20 MG/1
20 TABLET ORAL DAILY
Qty: 30 TABLET | Refills: 2 | Status: SHIPPED | OUTPATIENT
Start: 2023-10-19

## 2023-10-19 RX ORDER — BUPROPION HYDROCHLORIDE 150 MG/1
150 TABLET ORAL EVERY MORNING
Qty: 30 TABLET | Refills: 3 | Status: SHIPPED | OUTPATIENT
Start: 2023-10-19

## 2023-10-19 ASSESSMENT — ENCOUNTER SYMPTOMS
COUGH: 0
CONSTIPATION: 0
ABDOMINAL DISTENTION: 0
SORE THROAT: 0
ABDOMINAL PAIN: 0
SHORTNESS OF BREATH: 0
CHEST TIGHTNESS: 0
DIARRHEA: 0

## 2023-10-19 NOTE — PROGRESS NOTES
patient instructions. Discussed use, benefit, and side effects of prescribed medications. All patient questions answered. Pt voiced understanding. Reviewed health maintenance. Instructed to continue current medications, diet and exercise. Patient agreed with treatment plan. Follow up as directed.      Electronically signed by DESIRAE Franklin on 10/19/2023 at 10:11 AM

## 2023-10-23 ENCOUNTER — TELEPHONE (OUTPATIENT)
Dept: FAMILY MEDICINE CLINIC | Age: 63
End: 2023-10-23

## 2023-10-23 NOTE — TELEPHONE ENCOUNTER
Hello! Desiccation from her insurance received    Noncompliant with [employer]  apparently she did change provider, I forwarded it to you. Thank you! FYI  Non-adherent to COPD medication    Action requested: Evaluate drug refill patterns    The Adherence COPD program identifies patients whose proportion of days covered [facility]) is less than 80%. This patient's PDC is 48% over the previous 365 days. Common reasons for non-adherence may include: side effects, affordability, or changes in the drug regimen. Please provide a new prescription if the regimen has changed, and/or discuss the importance of adherence and ways to address barriers with your patient. The chart below details the patient's pharmacy claims used to identify the above drug therapy opportunity. The medication list is current as of [id number].      Non-adherent to COPD medication  Date Filled Drug Name / Shay Heart Days Supply Pharmacy Name Pharmacy Phone Prescriber of Record Prescriber Phone Number   [phone] [facility] 90 [phone] [address] [place] [address] [phone] [facility] [phone]     Health Guide Survey          Future Appointments   Date Time Provider [address] [place] [address]   11/20/2023 11:45 AM DESIRAE Boyd [facility]

## 2023-10-24 NOTE — TELEPHONE ENCOUNTER
Noted  Future Appointments   Date Time Provider [address]   11/20/2023 11:45 AM [facility]

## 2023-11-11 ENCOUNTER — HOSPITAL ENCOUNTER (INPATIENT)
Age: 63
LOS: 3 days | Discharge: HOME | DRG: 287 | End: 2023-11-14
Attending: EMERGENCY MEDICINE | Admitting: INTERNAL MEDICINE
Payer: COMMERCIAL

## 2023-11-11 ENCOUNTER — APPOINTMENT (OUTPATIENT)
Dept: GENERAL RADIOLOGY | Age: 63
DRG: 287 | End: 2023-11-11
Payer: COMMERCIAL

## 2023-11-11 DIAGNOSIS — I21.4 NSTEMI (NON-ST ELEVATED MYOCARDIAL INFARCTION) (HCC): ICD-10-CM

## 2023-11-11 DIAGNOSIS — I47.10 PAROXYSMAL SUPRAVENTRICULAR TACHYCARDIA: Primary | ICD-10-CM

## 2023-11-11 DIAGNOSIS — R94.31 ABNORMAL EKG: ICD-10-CM

## 2023-11-11 LAB
ANION GAP SERPL CALCULATED.3IONS-SCNC: 19 MMOL/L (ref 9–17)
BASOPHILS # BLD: 0.09 K/UL (ref 0–0.2)
BASOPHILS NFR BLD: 1 % (ref 0–2)
BNP SERPL-MCNC: 64 PG/ML
BUN SERPL-MCNC: 17 MG/DL (ref 8–23)
CALCIUM SERPL-MCNC: 9.5 MG/DL (ref 8.6–10.4)
CHLORIDE SERPL-SCNC: 96 MMOL/L (ref 98–107)
CO2 SERPL-SCNC: 21 MMOL/L (ref 20–31)
CREAT SERPL-MCNC: 1.2 MG/DL (ref 0.5–0.9)
EOSINOPHIL # BLD: 0.2 K/UL (ref 0–0.44)
EOSINOPHILS RELATIVE PERCENT: 2 % (ref 1–4)
ERYTHROCYTE [DISTWIDTH] IN BLOOD BY AUTOMATED COUNT: 13.4 % (ref 11.8–14.4)
GFR SERPL CREATININE-BSD FRML MDRD: 51 ML/MIN/1.73M2
GLUCOSE SERPL-MCNC: 231 MG/DL (ref 70–99)
HCT VFR BLD AUTO: 46 % (ref 36.3–47.1)
HGB BLD-MCNC: 15.1 G/DL (ref 11.9–15.1)
IMM GRANULOCYTES # BLD AUTO: 0.03 K/UL (ref 0–0.3)
IMM GRANULOCYTES NFR BLD: 0 %
LACTIC ACID, WHOLE BLOOD: 2.2 MMOL/L (ref 0.7–2.1)
LACTIC ACID, WHOLE BLOOD: 7.1 MMOL/L (ref 0.7–2.1)
LYMPHOCYTES NFR BLD: 4.52 K/UL (ref 1.1–3.7)
LYMPHOCYTES RELATIVE PERCENT: 48 % (ref 24–43)
MCH RBC QN AUTO: 30.8 PG (ref 25.2–33.5)
MCHC RBC AUTO-ENTMCNC: 32.8 G/DL (ref 28.4–34.8)
MCV RBC AUTO: 93.7 FL (ref 82.6–102.9)
MONOCYTES NFR BLD: 0.68 K/UL (ref 0.1–1.2)
MONOCYTES NFR BLD: 7 % (ref 3–12)
NEUTROPHILS NFR BLD: 42 % (ref 36–65)
NEUTS SEG NFR BLD: 4.03 K/UL (ref 1.5–8.1)
NRBC BLD-RTO: 0 PER 100 WBC
PLATELET # BLD AUTO: 335 K/UL (ref 138–453)
PMV BLD AUTO: 9.9 FL (ref 8.1–13.5)
POTASSIUM SERPL-SCNC: 4 MMOL/L (ref 3.7–5.3)
RBC # BLD AUTO: 4.91 M/UL (ref 3.95–5.11)
SODIUM SERPL-SCNC: 136 MMOL/L (ref 135–144)
T3 SERPL-MCNC: 67 NG/DL (ref 80–200)
T4 SERPL-MCNC: 6.2 UG/DL (ref 4.5–11.7)
TROPONIN I SERPL HS-MCNC: 11 NG/L (ref 0–14)
TROPONIN I SERPL HS-MCNC: 26 NG/L (ref 0–14)
TROPONIN I SERPL HS-MCNC: 60 NG/L (ref 0–14)
TSH SERPL DL<=0.05 MIU/L-ACNC: 41.32 UIU/ML (ref 0.3–5)
WBC OTHER # BLD: 9.6 K/UL (ref 3.5–11.3)

## 2023-11-11 PROCEDURE — 96374 THER/PROPH/DIAG INJ IV PUSH: CPT

## 2023-11-11 PROCEDURE — 80048 BASIC METABOLIC PNL TOTAL CA: CPT

## 2023-11-11 PROCEDURE — 6370000000 HC RX 637 (ALT 250 FOR IP)

## 2023-11-11 PROCEDURE — 84443 ASSAY THYROID STIM HORMONE: CPT

## 2023-11-11 PROCEDURE — 6360000002 HC RX W HCPCS

## 2023-11-11 PROCEDURE — 2060000000 HC ICU INTERMEDIATE R&B

## 2023-11-11 PROCEDURE — 96361 HYDRATE IV INFUSION ADD-ON: CPT

## 2023-11-11 PROCEDURE — 84480 ASSAY TRIIODOTHYRONINE (T3): CPT

## 2023-11-11 PROCEDURE — 84484 ASSAY OF TROPONIN QUANT: CPT

## 2023-11-11 PROCEDURE — 83880 ASSAY OF NATRIURETIC PEPTIDE: CPT

## 2023-11-11 PROCEDURE — 71045 X-RAY EXAM CHEST 1 VIEW: CPT

## 2023-11-11 PROCEDURE — 99285 EMERGENCY DEPT VISIT HI MDM: CPT

## 2023-11-11 PROCEDURE — 2580000003 HC RX 258

## 2023-11-11 PROCEDURE — 84436 ASSAY OF TOTAL THYROXINE: CPT

## 2023-11-11 PROCEDURE — 83605 ASSAY OF LACTIC ACID: CPT

## 2023-11-11 PROCEDURE — 85025 COMPLETE CBC W/AUTO DIFF WBC: CPT

## 2023-11-11 PROCEDURE — 2580000003 HC RX 258: Performed by: STUDENT IN AN ORGANIZED HEALTH CARE EDUCATION/TRAINING PROGRAM

## 2023-11-11 PROCEDURE — 93005 ELECTROCARDIOGRAM TRACING: CPT | Performed by: STUDENT IN AN ORGANIZED HEALTH CARE EDUCATION/TRAINING PROGRAM

## 2023-11-11 RX ORDER — ACETAMINOPHEN 325 MG/1
650 TABLET ORAL EVERY 6 HOURS PRN
Status: DISCONTINUED | OUTPATIENT
Start: 2023-11-11 | End: 2023-11-14 | Stop reason: HOSPADM

## 2023-11-11 RX ORDER — ENOXAPARIN SODIUM 100 MG/ML
40 INJECTION SUBCUTANEOUS DAILY
Status: DISCONTINUED | OUTPATIENT
Start: 2023-11-12 | End: 2023-11-12

## 2023-11-11 RX ORDER — SODIUM CHLORIDE 0.9 % (FLUSH) 0.9 %
5-40 SYRINGE (ML) INJECTION PRN
Status: DISCONTINUED | OUTPATIENT
Start: 2023-11-11 | End: 2023-11-14 | Stop reason: HOSPADM

## 2023-11-11 RX ORDER — ADENOSINE 3 MG/ML
INJECTION, SOLUTION INTRAVENOUS
Status: COMPLETED
Start: 2023-11-11 | End: 2023-11-11

## 2023-11-11 RX ORDER — ONDANSETRON 4 MG/1
4 TABLET, ORALLY DISINTEGRATING ORAL EVERY 8 HOURS PRN
Status: DISCONTINUED | OUTPATIENT
Start: 2023-11-11 | End: 2023-11-14 | Stop reason: HOSPADM

## 2023-11-11 RX ORDER — 0.9 % SODIUM CHLORIDE 0.9 %
1000 INTRAVENOUS SOLUTION INTRAVENOUS ONCE
Status: COMPLETED | OUTPATIENT
Start: 2023-11-11 | End: 2023-11-11

## 2023-11-11 RX ORDER — MAGNESIUM SULFATE IN WATER 40 MG/ML
2000 INJECTION, SOLUTION INTRAVENOUS PRN
Status: DISCONTINUED | OUTPATIENT
Start: 2023-11-11 | End: 2023-11-14 | Stop reason: HOSPADM

## 2023-11-11 RX ORDER — POTASSIUM CHLORIDE 20 MEQ/1
40 TABLET, EXTENDED RELEASE ORAL PRN
Status: DISCONTINUED | OUTPATIENT
Start: 2023-11-11 | End: 2023-11-14 | Stop reason: HOSPADM

## 2023-11-11 RX ORDER — SODIUM CHLORIDE 9 MG/ML
INJECTION, SOLUTION INTRAVENOUS PRN
Status: DISCONTINUED | OUTPATIENT
Start: 2023-11-11 | End: 2023-11-14 | Stop reason: HOSPADM

## 2023-11-11 RX ORDER — ACETAMINOPHEN 650 MG/1
650 SUPPOSITORY RECTAL EVERY 6 HOURS PRN
Status: DISCONTINUED | OUTPATIENT
Start: 2023-11-11 | End: 2023-11-14 | Stop reason: HOSPADM

## 2023-11-11 RX ORDER — ONDANSETRON 2 MG/ML
4 INJECTION INTRAMUSCULAR; INTRAVENOUS EVERY 6 HOURS PRN
Status: DISCONTINUED | OUTPATIENT
Start: 2023-11-11 | End: 2023-11-14 | Stop reason: HOSPADM

## 2023-11-11 RX ORDER — SODIUM CHLORIDE 9 MG/ML
INJECTION, SOLUTION INTRAVENOUS CONTINUOUS
Status: DISCONTINUED | OUTPATIENT
Start: 2023-11-11 | End: 2023-11-14 | Stop reason: HOSPADM

## 2023-11-11 RX ORDER — POLYETHYLENE GLYCOL 3350 17 G/17G
17 POWDER, FOR SOLUTION ORAL DAILY PRN
Status: DISCONTINUED | OUTPATIENT
Start: 2023-11-11 | End: 2023-11-14 | Stop reason: HOSPADM

## 2023-11-11 RX ORDER — POTASSIUM CHLORIDE 7.45 MG/ML
10 INJECTION INTRAVENOUS PRN
Status: DISCONTINUED | OUTPATIENT
Start: 2023-11-11 | End: 2023-11-14 | Stop reason: HOSPADM

## 2023-11-11 RX ORDER — ADENOSINE 3 MG/ML
6 INJECTION, SOLUTION INTRAVENOUS ONCE
Status: COMPLETED | OUTPATIENT
Start: 2023-11-11 | End: 2023-11-11

## 2023-11-11 RX ORDER — SODIUM CHLORIDE 0.9 % (FLUSH) 0.9 %
5-40 SYRINGE (ML) INJECTION EVERY 12 HOURS SCHEDULED
Status: DISCONTINUED | OUTPATIENT
Start: 2023-11-11 | End: 2023-11-14 | Stop reason: HOSPADM

## 2023-11-11 RX ADMIN — METOPROLOL TARTRATE 25 MG: 25 TABLET ORAL at 20:24

## 2023-11-11 RX ADMIN — ADENOSINE 6 MG: 3 INJECTION, SOLUTION INTRAVENOUS at 18:18

## 2023-11-11 RX ADMIN — SODIUM CHLORIDE 1000 ML: 9 INJECTION, SOLUTION INTRAVENOUS at 18:18

## 2023-11-11 RX ADMIN — SODIUM CHLORIDE: 9 INJECTION, SOLUTION INTRAVENOUS at 20:23

## 2023-11-11 ASSESSMENT — LIFESTYLE VARIABLES
HOW OFTEN DO YOU HAVE A DRINK CONTAINING ALCOHOL: NEVER
HOW MANY STANDARD DRINKS CONTAINING ALCOHOL DO YOU HAVE ON A TYPICAL DAY: PATIENT DOES NOT DRINK

## 2023-11-11 ASSESSMENT — ENCOUNTER SYMPTOMS
ABDOMINAL PAIN: 0
SHORTNESS OF BREATH: 1

## 2023-11-11 NOTE — ED TRIAGE NOTES
Patient presents to the ED via Triage in SVT. Patient states that she started feeling like her heart was racing approximately 30 minutes PTA. Patient appears SOB, patient was given a 10mL syringe and attempted a vagel maneuver several times with no success. Patient was placed on bedside monitor, bilateral IV access obtained, patient was placed on life pack. Resident and attending bedside for evaluation. Writer will continue with plan of care.

## 2023-11-11 NOTE — ED NOTES
Patient presents to the ED with SVT rates in the mid 240's.  Patient states that she started to have a rapid heart rate about 30 minutes PTA     Shereen Miller RN  11/11/23 [id number]

## 2023-11-11 NOTE — ED NOTES
EKG completed with Dr Candie Fleming bedside      Kavin Apple, RN  11/11/23 Wiley Taylor The skin at the access site was anesthetized.  Using a micropuncture needle with the Seldinger technique the right femoral vein was succesfully accessed in an antegrade fashion over the guidewire, under fluoroscopic guidance using a Introducer Shth 6fr 50cm 11cm Grn Hub Snpft Dil.

## 2023-11-11 NOTE — ED PROVIDER NOTES
I did not see, evaluate, or staff this patient. Patient seen by Dr. Judd Guzman.       Luz Schmidt MD  11/11/23 4836

## 2023-11-12 PROBLEM — I21.4 NSTEMI (NON-ST ELEVATED MYOCARDIAL INFARCTION) (HCC): Status: ACTIVE | Noted: 2023-11-11

## 2023-11-12 LAB
ANION GAP SERPL CALCULATED.3IONS-SCNC: 9 MMOL/L (ref 9–17)
ANTI-XA UNFRAC HEPARIN: 0.3 IU/L
ANTI-XA UNFRAC HEPARIN: 0.48 IU/L
ANTI-XA UNFRAC HEPARIN: <0.1 IU/L
BASOPHILS # BLD: 0.07 K/UL (ref 0–0.2)
BASOPHILS NFR BLD: 1 % (ref 0–2)
BUN SERPL-MCNC: 12 MG/DL (ref 8–23)
CALCIUM SERPL-MCNC: 8.5 MG/DL (ref 8.6–10.4)
CHLORIDE SERPL-SCNC: 106 MMOL/L (ref 98–107)
CO2 SERPL-SCNC: 23 MMOL/L (ref 20–31)
CREAT SERPL-MCNC: 0.7 MG/DL (ref 0.5–0.9)
EOSINOPHIL # BLD: 0.17 K/UL (ref 0–0.44)
EOSINOPHILS RELATIVE PERCENT: 3 % (ref 1–4)
ERYTHROCYTE [DISTWIDTH] IN BLOOD BY AUTOMATED COUNT: 13.3 % (ref 11.8–14.4)
EST. AVERAGE GLUCOSE BLD GHB EST-MCNC: 120 MG/DL
GFR SERPL CREATININE-BSD FRML MDRD: >60 ML/MIN/1.73M2
GLUCOSE SERPL-MCNC: 91 MG/DL (ref 70–99)
HBA1C MFR BLD: 5.8 % (ref 4–6)
HCT VFR BLD AUTO: 40.5 % (ref 36.3–47.1)
HGB BLD-MCNC: 13.3 G/DL (ref 11.9–15.1)
IMM GRANULOCYTES # BLD AUTO: <0.03 K/UL (ref 0–0.3)
IMM GRANULOCYTES NFR BLD: 0 %
INR PPP: 1
LACTIC ACID, WHOLE BLOOD: 1 MMOL/L (ref 0.7–2.1)
LYMPHOCYTES NFR BLD: 2.91 K/UL (ref 1.1–3.7)
LYMPHOCYTES RELATIVE PERCENT: 42 % (ref 24–43)
MCH RBC QN AUTO: 30.6 PG (ref 25.2–33.5)
MCHC RBC AUTO-ENTMCNC: 32.8 G/DL (ref 28.4–34.8)
MCV RBC AUTO: 93.1 FL (ref 82.6–102.9)
MONOCYTES NFR BLD: 0.82 K/UL (ref 0.1–1.2)
MONOCYTES NFR BLD: 12 % (ref 3–12)
NEUTROPHILS NFR BLD: 42 % (ref 36–65)
NEUTS SEG NFR BLD: 2.94 K/UL (ref 1.5–8.1)
NRBC BLD-RTO: 0 PER 100 WBC
PARTIAL THROMBOPLASTIN TIME: 28.3 SEC (ref 23–36.5)
PLATELET # BLD AUTO: 268 K/UL (ref 138–453)
PMV BLD AUTO: 9.6 FL (ref 8.1–13.5)
POTASSIUM SERPL-SCNC: 4.1 MMOL/L (ref 3.7–5.3)
PROTHROMBIN TIME: 13.3 SEC (ref 11.7–14.9)
RBC # BLD AUTO: 4.35 M/UL (ref 3.95–5.11)
SODIUM SERPL-SCNC: 138 MMOL/L (ref 135–144)
T4 FREE SERPL-MCNC: 1 NG/DL (ref 0.9–1.7)
WBC OTHER # BLD: 6.9 K/UL (ref 3.5–11.3)

## 2023-11-12 PROCEDURE — 36415 COLL VENOUS BLD VENIPUNCTURE: CPT

## 2023-11-12 PROCEDURE — 99222 1ST HOSP IP/OBS MODERATE 55: CPT | Performed by: INTERNAL MEDICINE

## 2023-11-12 PROCEDURE — 83036 HEMOGLOBIN GLYCOSYLATED A1C: CPT

## 2023-11-12 PROCEDURE — 6360000002 HC RX W HCPCS

## 2023-11-12 PROCEDURE — 83605 ASSAY OF LACTIC ACID: CPT

## 2023-11-12 PROCEDURE — 2060000000 HC ICU INTERMEDIATE R&B

## 2023-11-12 PROCEDURE — 6370000000 HC RX 637 (ALT 250 FOR IP): Performed by: STUDENT IN AN ORGANIZED HEALTH CARE EDUCATION/TRAINING PROGRAM

## 2023-11-12 PROCEDURE — 85730 THROMBOPLASTIN TIME PARTIAL: CPT

## 2023-11-12 PROCEDURE — 84439 ASSAY OF FREE THYROXINE: CPT

## 2023-11-12 PROCEDURE — 80048 BASIC METABOLIC PNL TOTAL CA: CPT

## 2023-11-12 PROCEDURE — 85520 HEPARIN ASSAY: CPT

## 2023-11-12 PROCEDURE — 2580000003 HC RX 258

## 2023-11-12 PROCEDURE — 99254 IP/OBS CNSLTJ NEW/EST MOD 60: CPT | Performed by: INTERNAL MEDICINE

## 2023-11-12 PROCEDURE — 6370000000 HC RX 637 (ALT 250 FOR IP)

## 2023-11-12 PROCEDURE — 85025 COMPLETE CBC W/AUTO DIFF WBC: CPT

## 2023-11-12 PROCEDURE — 85610 PROTHROMBIN TIME: CPT

## 2023-11-12 RX ORDER — HEPARIN SODIUM 1000 [USP'U]/ML
4000 INJECTION, SOLUTION INTRAVENOUS; SUBCUTANEOUS ONCE
Status: COMPLETED | OUTPATIENT
Start: 2023-11-12 | End: 2023-11-12

## 2023-11-12 RX ORDER — ATORVASTATIN CALCIUM 40 MG/1
40 TABLET, FILM COATED ORAL NIGHTLY
Status: DISCONTINUED | OUTPATIENT
Start: 2023-11-12 | End: 2023-11-14 | Stop reason: HOSPADM

## 2023-11-12 RX ORDER — HEPARIN SODIUM 1000 [USP'U]/ML
2000 INJECTION, SOLUTION INTRAVENOUS; SUBCUTANEOUS PRN
Status: DISCONTINUED | OUTPATIENT
Start: 2023-11-12 | End: 2023-11-14 | Stop reason: HOSPADM

## 2023-11-12 RX ORDER — ASPIRIN 81 MG/1
81 TABLET, CHEWABLE ORAL DAILY
Status: DISCONTINUED | OUTPATIENT
Start: 2023-11-13 | End: 2023-11-14 | Stop reason: HOSPADM

## 2023-11-12 RX ORDER — HEPARIN SODIUM 1000 [USP'U]/ML
4000 INJECTION, SOLUTION INTRAVENOUS; SUBCUTANEOUS PRN
Status: DISCONTINUED | OUTPATIENT
Start: 2023-11-12 | End: 2023-11-14 | Stop reason: HOSPADM

## 2023-11-12 RX ORDER — HEPARIN SODIUM 10000 [USP'U]/100ML
5-30 INJECTION, SOLUTION INTRAVENOUS CONTINUOUS
Status: DISCONTINUED | OUTPATIENT
Start: 2023-11-12 | End: 2023-11-14 | Stop reason: HOSPADM

## 2023-11-12 RX ORDER — ASPIRIN 325 MG
325 TABLET, DELAYED RELEASE (ENTERIC COATED) ORAL ONCE
Status: COMPLETED | OUTPATIENT
Start: 2023-11-12 | End: 2023-11-12

## 2023-11-12 RX ADMIN — METOPROLOL TARTRATE 25 MG: 25 TABLET ORAL at 19:46

## 2023-11-12 RX ADMIN — SODIUM CHLORIDE, PRESERVATIVE FREE 10 ML: 5 INJECTION INTRAVENOUS at 19:47

## 2023-11-12 RX ADMIN — SODIUM CHLORIDE: 9 INJECTION, SOLUTION INTRAVENOUS at 21:32

## 2023-11-12 RX ADMIN — HEPARIN SODIUM 12 UNITS/KG/HR: 10000 INJECTION, SOLUTION INTRAVENOUS at 02:49

## 2023-11-12 RX ADMIN — HEPARIN SODIUM 4000 UNITS: 1000 INJECTION INTRAVENOUS; SUBCUTANEOUS at 02:46

## 2023-11-12 RX ADMIN — SODIUM CHLORIDE: 9 INJECTION, SOLUTION INTRAVENOUS at 10:04

## 2023-11-12 RX ADMIN — METOPROLOL TARTRATE 25 MG: 25 TABLET ORAL at 10:17

## 2023-11-12 RX ADMIN — ASPIRIN 325 MG: 325 TABLET, COATED ORAL at 10:18

## 2023-11-12 RX ADMIN — DESMOPRESSIN ACETATE 40 MG: 0.2 TABLET ORAL at 19:46

## 2023-11-12 ASSESSMENT — ENCOUNTER SYMPTOMS: GASTROINTESTINAL NEGATIVE: 1

## 2023-11-12 NOTE — ED PROVIDER NOTES
[facility] ED  Emergency Department Encounter  Emergency Medicine Resident     Pt Cony Magaña  MRN: [id number]  [facility] 1960  Date of evaluation: 23  PCP:  DESIRAE Giron  Note Started: 8:20 PM EST      CHIEF COMPLAINT       Chief Complaint   Patient presents with    Chest Pain       HISTORY OF PRESENT ILLNESS  (Location/Symptom, Timing/Onset, Context/Setting, Quality, Duration, Modifying Factors, Severity.)      Sheryle Burner is a 61 y.o. female who presents with palpitations that have been ongoing for the past couple hours. Patient reports that she had just been leaning over and the racing in her chest that started. She states happen a couple of times it usually went away on its own. She has constant been progressively worsening her  brought her into the emergency department for evaluation. She denies any recent illness and reports recently quitting smoking. PAST MEDICAL / SURGICAL / SOCIAL / FAMILY HISTORY      has a past medical history of Acute suppurative otitis media of right ear without spontaneous rupture of tympanic membrane, AMAIRANI positive, Anxiety, COPD (chronic obstructive pulmonary disease) ([address]), Essential hypertension, History of depression, History of thyroid cancer, Major depressive disorder with single episode, in partial remission ([address]), Osteoarthritis, Panic attacks, Postoperative hypothyroidism, and Sjogren's syndrome with keratoconjunctivitis sicca ([address] has a past surgical history that includes  section; Thyroidectomy; hernia repair; Bunionectomy; Tubal ligation (4507); and Umbilical hernia repair.     Social History     Socioeconomic History    Marital status:      Spouse name: Not on file    Number of children: Not on file    Years of education: Not on file    Highest education level: Not on file   Occupational History    Not on file   Tobacco Use    Smoking status: Former     Packs/day: 1.00     Years: 30.00 Comment: g:3 p:3  SpO2 90%     Physical Exam  Constitutional:       General: She is not in acute distress. Appearance: She is not diaphoretic. HENT:      Head: Normocephalic and atraumatic. Right Ear: External ear normal.      Left Ear: External ear normal.      Nose: Nose normal.   Eyes:      Conjunctiva/sclera: Conjunctivae normal.   Cardiovascular:      Rate and Rhythm: Regular rhythm. Tachycardia present. Pulses: Normal pulses. Heart sounds: No murmur heard. Pulmonary:      Effort: Pulmonary effort is normal. No respiratory distress. Breath sounds: Normal breath sounds. Abdominal:      General: Abdomen is flat. There is no distension. Palpations: Abdomen is soft. Tenderness: There is no abdominal tenderness. There is no guarding or rebound. Musculoskeletal:         General: No swelling. Cervical back: No tenderness. Right lower leg: No edema. Left lower leg: No edema. Skin:     General: Skin is warm. Capillary Refill: Capillary refill takes less than 2 seconds. Neurological:      Mental Status: She is alert and oriented to person, place, and time. Psychiatric:         Mood and Affect: Mood normal.           DDX/DIAGNOSTIC RESULTS / EMERGENCY DEPARTMENT COURSE / MDM     Medical Decision Making  This is a 80-year-old female with a history of Sjogren's syndrome, thyroid cancer for which she had at thyroidectomy and is currently on thyroid medications presenting the emergency department in SVT. Patient was trialed with vagal maneuvers which were unsuccessful. Patient was given a single dose 6 mg of adenosine. Patient became out of this rhythm. Etiology concerning for possible underlying ACS, electrolyte abnormality. Will obtaining blood work including CBC, troponin. Repeat EKG was nonspecific, no T wave inversions. Amount and/or Complexity of Data Reviewed  Independent Historian: spouse  External Data Reviewed: notes. Labs: ordered.

## 2023-11-12 NOTE — H&P
[address]     Department of Internal Medicine - Staff Internal Medicine Teaching Service          ADMISSION NOTE/HISTORY AND PHYSICAL EXAMINATION   Date: 11/11/2023  Patient Name: Sean Bolanos  Date of admission: 11/11/2023  6:11 PM  YOB: 1960  PCP: DESIRAE Ambriz  History Obtained From:  patient    CHIEF COMPLAINT     Chief complaint: Palpitations    HISTORY OF PRESENTING ILLNESS     The patient is a pleasant 61 y.o. female presents with a chief complaint of palpitations and chest pain since this morning. Patient states that this morning when she was bending she suddenly felt palpitations and chest pain, almost felt like passing out so she came to ER. She describes chest pain as squeezing all over the chest, radiating to left jaw, non-reproducible. Also states that she has been having palpitations for last few days along with chest pain but the chest pain yesterday's was severe than the ones before. She denies any shortness of breath, fevers, nausea, vomiting, diarrhea, urinary symptoms, cough, eating and drinking okay. Her past medical history is significant for history of thyroid cancer s/p thyroidectomy, sjogrens syndrome with keratoconjunctivitis sicca, tobacco abuse, COPD, hypertension, generalized anxiety disorder    On arrival to ER the patient's heart rate was about 240 and EKG showed supraventricular tachycardia  Work-up done include TSH about 41.3, T4 6.2,T3 67, BNP within range, dated creatinine about 1.2 with EGFR 51, lactic acid about 7.1, troponins trending upwards from 26-60    Patient is taking lisinopril 20 mg, metoprolol succinate 25 mg once daily, buspirone 10 mg 3 times daily, bupropion 150 mg daily, she takes inhalers albuterol, Spiriva, Trelegy Ellipta      Review of Systems   Constitutional:  Positive for activity change. HENT: Negative.           Dry Mouth secondary to Sjogren's syndrome   Eyes:         Dry Eyes   Cardiovascular: PM   Result Value Ref Range    Troponin, High Sensitivity 11 0 - 14 ng/L   TSH    Collection Time: 11/11/23  6:31 PM   Result Value Ref Range    TSH 41.32 (H) 0.30 - 5.00 uIU/mL   T4    Collection Time: 11/11/23  6:31 PM   Result Value Ref Range    T4, Total 6.2 4.5 - 11.7 ug/dL   T3    Collection Time: 11/11/23  6:31 PM   Result Value Ref Range    T3, Total 67 (L) 80 - 200 ng/dL   Lactic Acid    Collection Time: 11/11/23  6:31 PM   Result Value Ref Range    Lactic Acid, Whole Blood 7.1 (H) 0.7 - 2.1 mmol/L   Lactic Acid    Collection Time: 11/11/23  6:59 PM   Result Value Ref Range    Lactic Acid, Whole Blood 2.2 (H) 0.7 - 2.1 mmol/L   Troponin    Collection Time: 11/11/23  7:20 PM   Result Value Ref Range    Troponin, High Sensitivity 26 (H) 0 - 14 ng/L   EKG 12 Lead    Collection Time: 11/11/23  8:10 PM   Result Value Ref Range    Ventricular Rate 89 BPM    Atrial Rate 89 BPM    P-R Interval 162 ms    QRS Duration 76 ms    Q-T Interval 360 ms    QTc Calculation (Bazett) 438 ms    P Axis 77 degrees    R Axis 11 degrees    T Axis 14 degrees       Imaging:   XR CHEST PORTABLE    Result Date: 11/11/2023  Some platelike atelectasis or scarring in the the left lung base. ASSESSMENT & PLAN     ASSESSMENT / PLAN:     IMPRESSION  This is a 61 y.o. female who presented with palpitations  and found to have supraventricular tachycardia.      Principal Problem:    SVT (supraventricular tachycardia)  Active problems:     Elevated troponins     BHARGAV     Essential hypertension     History of thyroid cancer s/p thyroidectomy     H/O COPD    SVT (supraventricular tachycardia)   -On admission the heart rate was 240, EKG showed nonspecific ST-T wave changes  -History of PVCs in the past  -Received 2 doses of adenosine 6 mg in the ER  -Started lopressor 25 mg BD  -Telemetry monitor    Elevated troponins   -On admission troponins were at her baseline but slowly increased from 26 to 60  -Trend troponins  -Started on heparin

## 2023-11-12 NOTE — ED NOTES
The following labs were labeled with appropriate pt sticker and tubed to lab:     [] Blue     [] Lavender   [] on ice  [x] Green/yellow  [] Green/black [] on ice  [] Asiya Signs  [] on ice  [] Yellow  [] Red  [] Pink  [] Type/ Screen  [] ABG  [] VBG    [] COVID-19 swab    [] Rapid  [] PCR  [] Flu swab  [] Peds Viral Panel     [] Urine Sample  [] Fecal Sample  [] Pelvic Cultures  [] Blood Cultures  [] X 2  [] STREP Cultures       Kyle Callaway RN  11/11/23 1923

## 2023-11-12 NOTE — ED NOTES
Report received from [facility]. Pt A&Ox4, rr even and nonlabored, nad. Pt laying on cot with full cardiac monitor on, remains on life pack. Pt denies pain at this time. Pt denies needs at this time. Family at bedside.       Nida Cervantes, EVELYN  11/11/23 1924

## 2023-11-12 NOTE — ED NOTES
Pt A&Ox4, rr even and nonlabored, nad. Pt laying on cot with full cardiac monitor on, remains on lifepack. Pt denies needs at this time, family at bedside.      Darry Schwab, RN  11/11/23 2017

## 2023-11-12 NOTE — PLAN OF CARE
Problem: Discharge Planning  Goal: Discharge to home or other facility with appropriate resources  Outcome: Progressing     Problem: Discharge Planning  Goal: Discharge to home or other facility with appropriate resources  11/12/2023 0024 by Patricia Hernandez RN  Outcome: Progressing  11/12/2023 0024 by Patricia Hernandez RN  Outcome: Progressing     Problem: Safety - Adult  Goal: Free from fall injury  Outcome: Progressing     Problem: Cardiovascular - Adult  Goal: Maintains optimal cardiac output and hemodynamic stability  Outcome: Progressing  Goal: Absence of cardiac dysrhythmias or at baseline  Outcome: Progressing

## 2023-11-12 NOTE — ED NOTES
safety awaremess, or inability to follow instructions): No, Impaired Mobility: Ambulates or transfers with assistive devices or assistance; Unable to ambulate or transer.: No    Diagnosis:  DISPOSITION Decision To Admit 2023 07:31:18 PM   Final diagnoses:   Paroxysmal supraventricular tachycardia        Consults:  IP CONSULT TO CARDIOLOGY  IP CONSULT TO INTERNAL MEDICINE     Treatment Team:   Treatment Team: Attending Provider: Lilibeth Estrada MD; Resident: Mavis Mcmahan DO; Consulting Physician: Carlene Dixon MD; Registered Nurse: Viviana Navarrete RN    Treatment:  ED Course as of 23   Sat  WBC: 9.6 [ML]    Hemoglobin Quant: 15.1 [ML]   6851 Spoke with cardiology, recommend Lopressor BID and they will see her in the morning.  [ML]    Troponin, High Sensitivity: 11 [ML]      ED Course User Index  [ML] Marnie Moe DO          Skin Assessment:        Pain Score:         SOCIAL HISTORY       Social History     Socioeconomic History    Marital status:    Tobacco Use    Smoking status: Former     Packs/day: 1.00     Years: 30.00     Additional pack years: 0.00     Total pack years: 30.00     Types: Cigarettes     Quit date: 2023     Years since quittin.1    Smokeless tobacco: Never    Tobacco comments:     started smoking at 33 yo, cutting down now 1/2 PPD or less   Substance and Sexual Activity    Alcohol use: No    Drug use: No    Sexual activity: Not Currently     Partners: Male     Social Determinants of Health     Financial Resource Strain: Low Risk  (2023)    Overall Financial Resource Strain (CARDIA)     Difficulty of Paying Living Expenses: Not hard at all   Food Insecurity: No Food Insecurity (2023)    Hunger Vital Sign     Worried About Running Out of Food in the Last Year: Never true     Ran Out of Food in the Last Year: Never true   Transportation Needs: Unknown (2023)    PRAPARE - Transportation     Lack of Transportation T4   T3       Electronically signed by Fernando Juárez RN on 11/11/2023 at 7:32 PM       Fernando Juárez RN  11/11/23 1935

## 2023-11-12 NOTE — PLAN OF CARE
Problem: Discharge Planning  Goal: Discharge to home or other facility with appropriate resources  Outcome: Progressing     Problem: Safety - Adult  Goal: Free from fall injury  Outcome: Progressing  Flowsheets (Taken 11/12/2023 0800)  Free From Fall Injury: Instruct family/caregiver on patient safety     Problem: Cardiovascular - Adult  Goal: Maintains optimal cardiac output and hemodynamic stability  Outcome: Progressing  Goal: Absence of cardiac dysrhythmias or at baseline  Outcome: Progressing

## 2023-11-12 NOTE — ED NOTES
The following labs were labeled with appropriate pt sticker and tubed to lab:     [] Blue     [] Lavender   [] on ice  [] Green/yellow  [x] Green/black [] on ice  [] Jacquie Roch  [] on ice  [] Yellow  [] Red  [] Pink  [] Type/ Screen  [] ABG  [] VBG    [] COVID-19 swab    [] Rapid  [] PCR  [] Flu swab  [] Peds Viral Panel     [] Urine Sample  [] Fecal Sample  [] Pelvic Cultures  [] Blood Cultures  [] X 2  [] STREP Cultures      Taylor Otto RN  11/11/23 8708

## 2023-11-12 NOTE — CONSULTS
Attestation signed by      Attending Physician Statement:    I have discussed the care of  Selma Meeks , including pertinent history and exam findings, with the Cardiology fellow/resident. I have seen and examined the patient and the key elements of all parts of the encounter have been performed by me. I agree with the assessment, plan and orders as documented by the fellow/resident, after I modified exam findings and plan of treatments, and the final version is my approved version of the assessment. Additional Comments: NSTEMI. SVT. ASA, IV heparin. BB started. Chronic smoker quit one month ago. Add statin. Obtain TTE. Need cardiac cath for risk stratification. Risk and benefits of cardiac catheterization were discussed in detail. Risk of bleeding, requiring blood transfusion, vascular complication requiring surgery, renal insufficieny with need of dialysis, CVA, MI, death and anesthesia complications including intubation were discussed. Patient agrees to proceed and verbalizes understanding. Saul Morocho MD               [facility] Cardiology    Consult / H&P               Today's Date: 11/12/2023  Patient Name: Selma Meeks  Date of admission: 11/11/2023  6:11 PM  Patient's age: 61 y.o., 1960  Admission Dx: Paroxysmal supraventricular tachycardia [I47.10]  SVT (supraventricular tachycardia) [I47.10]    Reason for Consult:  Cardiac evaluation    Requesting Physician: Sunday Weiss MD    CHIEF COMPLAINT:  SVT    History Obtained From:  patient    HISTORY OF PRESENT ILLNESS:      The patient is a 61 y.o.  female past medical history of Sjogren's syndrome, thyroid cancer status post thyroidectomy, tobacco abuse, COPD, hypertension who is admitted to the hospital for palpitations and chest pain. Patient states that in the morning she suddenly felt palpitations or chest pain. Presented with presyncopal episodes.   Chest pain was described as squeezing and rating to left jaw nonreproducible. Patient states that symptoms has been going on for the last 2 days. But this episode was worse than previous. In the ED EKG showed heart rate was about 240. Showed SVT. Patient was given adenosine and was placed on Lopressor twice daily heart rate is currently normal.  Patient has not had a previous echo. Troponins are uptrending initially 11 then 26 now 60. Patient is on a heparin drip. TSH is elevated at 42 however T4 and T3 are normal.  Patient does have a history of thyroidectomy. Patient does take levothyroxine. Past Medical History:   has a past medical history of Acute suppurative otitis media of right ear without spontaneous rupture of tympanic membrane, AMAIRANI positive, Anxiety, COPD (chronic obstructive pulmonary disease) ([address]), Essential hypertension, History of depression, History of thyroid cancer, Major depressive disorder with single episode, in partial remission ([address]), Osteoarthritis, Panic attacks, Postoperative hypothyroidism, and Sjogren's syndrome with keratoconjunctivitis sicca ([address] Past Surgical History:   has a past surgical history that includes  section; Thyroidectomy; hernia repair; Bunionectomy; Tubal ligation (3084); and Umbilical hernia repair. Home Medications:    Prior to Admission medications    Medication Sig Start Date End Date Taking? Authorizing Provider   lisinopril (PRINIVIL;ZESTRIL) 20 MG tablet Take 1 tablet by mouth daily 10/19/23   DESIRAE Conroy   buPROPion (WELLBUTRIN XL) 150 MG extended release tablet Take 1 tablet by mouth every morning 10/19/23   DESIRAE Conroy   levothyroxine (SYNTHROID) 25 MCG tablet TAKE 1 TAB ONCE A WEEK ON SAT IN COMBO WITH 100MCG TO =125 MCG.  100MCG REST OF THE DAYS OF THE WEEK 10/12/23   DESIRAE Conroy   PARoxetine (PAXIL) 40 MG tablet Take 1 tablet by mouth Daily with supper 23   DESIRAE Conroy   levothyroxine (SYNTHROID) 100 MCG tablet Take 1 tablet by mouth every morning

## 2023-11-13 ENCOUNTER — APPOINTMENT (OUTPATIENT)
Age: 63
DRG: 287 | End: 2023-11-13
Payer: COMMERCIAL

## 2023-11-13 ENCOUNTER — OFFICE VISIT (OUTPATIENT)
Dept: OPERATING ROOM | Age: 63
End: 2023-11-13
Attending: INTERNAL MEDICINE

## 2023-11-13 DIAGNOSIS — F32.A DEPRESSION, UNSPECIFIED DEPRESSION TYPE: ICD-10-CM

## 2023-11-13 DIAGNOSIS — I10 ESSENTIAL HYPERTENSION: ICD-10-CM

## 2023-11-13 LAB
ANION GAP SERPL CALCULATED.3IONS-SCNC: 11 MMOL/L (ref 9–17)
ANTI-XA UNFRAC HEPARIN: 0.31 IU/L
BASOPHILS # BLD: 0.08 K/UL (ref 0–0.2)
BASOPHILS NFR BLD: 1 % (ref 0–2)
BUN SERPL-MCNC: 11 MG/DL (ref 8–23)
CALCIUM SERPL-MCNC: 8.5 MG/DL (ref 8.6–10.4)
CHLORIDE SERPL-SCNC: 110 MMOL/L (ref 98–107)
CO2 SERPL-SCNC: 21 MMOL/L (ref 20–31)
CREAT SERPL-MCNC: 0.7 MG/DL (ref 0.5–0.9)
ECHO AO ASC DIAM: 2.6 CM
ECHO AO ASCENDING AORTA INDEX: 1.48 CM/M2
ECHO AO ROOT DIAM: 2.3 CM
ECHO AO ROOT INDEX: 1.31 CM/M2
ECHO AV AREA PEAK VELOCITY: 1.5 CM2
ECHO AV AREA VTI: 1.6 CM2
ECHO AV AREA/BSA PEAK VELOCITY: 0.9 CM2/M2
ECHO AV AREA/BSA VTI: 0.9 CM2/M2
ECHO AV MEAN GRADIENT: 6 MMHG
ECHO AV MEAN VELOCITY: 1.1 M/S
ECHO AV PEAK GRADIENT: 10 MMHG
ECHO AV PEAK VELOCITY: 1.6 M/S
ECHO AV VELOCITY RATIO: 0.69
ECHO AV VTI: 34.4 CM
ECHO BSA: 1.81 M2
ECHO IVC EXP: 1.8 CM
ECHO LA AREA 2C: 10.5 CM2
ECHO LA AREA 4C: 9.6 CM2
ECHO LA MAJOR AXIS: 4 CM
ECHO LA MINOR AXIS: 4.7 CM
ECHO LA VOL BP: 20 ML (ref 22–52)
ECHO LA VOL MOD A2C: 19 ML (ref 22–52)
ECHO LA VOL MOD A4C: 19 ML (ref 22–52)
ECHO LA VOL/BSA BIPLANE: 11 ML/M2 (ref 16–34)
ECHO LA VOLUME INDEX MOD A2C: 11 ML/M2 (ref 16–34)
ECHO LA VOLUME INDEX MOD A4C: 11 ML/M2 (ref 16–34)
ECHO LV E' LATERAL VELOCITY: 10 CM/S
ECHO LV E' SEPTAL VELOCITY: 8 CM/S
ECHO LV EDV A2C: 51 ML
ECHO LV EDV A4C: 68 ML
ECHO LV EDV INDEX A4C: 39 ML/M2
ECHO LV EDV NDEX A2C: 29 ML/M2
ECHO LV EJECTION FRACTION A2C: 48 %
ECHO LV EJECTION FRACTION A4C: 60 %
ECHO LV EJECTION FRACTION BIPLANE: 55 % (ref 55–100)
ECHO LV ESV A2C: 26 ML
ECHO LV ESV A4C: 27 ML
ECHO LV ESV INDEX A2C: 15 ML/M2
ECHO LV ESV INDEX A4C: 15 ML/M2
ECHO LV FRACTIONAL SHORTENING: 19 % (ref 28–44)
ECHO LV INTERNAL DIMENSION DIASTOLE INDEX: 2.67 CM/M2
ECHO LV INTERNAL DIMENSION DIASTOLIC: 4.7 CM (ref 3.9–5.3)
ECHO LV INTERNAL DIMENSION SYSTOLIC INDEX: 2.16 CM/M2
ECHO LV INTERNAL DIMENSION SYSTOLIC: 3.8 CM
ECHO LV IVSD: 1 CM (ref 0.6–0.9)
ECHO LV MASS 2D: 153.4 G (ref 67–162)
ECHO LV MASS INDEX 2D: 87.2 G/M2 (ref 43–95)
ECHO LV POSTERIOR WALL DIASTOLIC: 0.9 CM (ref 0.6–0.9)
ECHO LV RELATIVE WALL THICKNESS RATIO: 0.38
ECHO LVOT AREA: 2.3 CM2
ECHO LVOT AV VTI INDEX: 0.69
ECHO LVOT DIAM: 1.7 CM
ECHO LVOT MEAN GRADIENT: 2 MMHG
ECHO LVOT PEAK GRADIENT: 4 MMHG
ECHO LVOT PEAK VELOCITY: 1.1 M/S
ECHO LVOT STROKE VOLUME INDEX: 30.8 ML/M2
ECHO LVOT SV: 54.2 ML
ECHO LVOT VTI: 23.9 CM
ECHO MV A VELOCITY: 0.67 M/S
ECHO MV AREA VTI: 1.9 CM2
ECHO MV E DECELERATION TIME (DT): 235 MS
ECHO MV E VELOCITY: 0.83 M/S
ECHO MV E/A RATIO: 1.24
ECHO MV E/E' LATERAL: 8.3
ECHO MV E/E' RATIO (AVERAGED): 9.34
ECHO MV LVOT VTI INDEX: 1.21
ECHO MV MAX VELOCITY: 0.9 M/S
ECHO MV MEAN GRADIENT: 1 MMHG
ECHO MV MEAN VELOCITY: 0.5 M/S
ECHO MV PEAK GRADIENT: 3 MMHG
ECHO MV VTI: 29 CM
ECHO RV FREE WALL PEAK S': 9 CM/S
ECHO RV TAPSE: 1.5 CM (ref 1.7–?)
EKG ATRIAL RATE: 241 BPM
EKG ATRIAL RATE: 89 BPM
EKG P AXIS: 77 DEGREES
EKG P-R INTERVAL: 162 MS
EKG Q-T INTERVAL: 172 MS
EKG Q-T INTERVAL: 360 MS
EKG QRS DURATION: 76 MS
EKG QRS DURATION: 84 MS
EKG QTC CALCULATION (BAZETT): 342 MS
EKG QTC CALCULATION (BAZETT): 438 MS
EKG R AXIS: -10 DEGREES
EKG R AXIS: 11 DEGREES
EKG T AXIS: 14 DEGREES
EKG T AXIS: 153 DEGREES
EKG VENTRICULAR RATE: 238 BPM
EKG VENTRICULAR RATE: 89 BPM
EOSINOPHIL # BLD: 0.31 K/UL (ref 0–0.44)
EOSINOPHILS RELATIVE PERCENT: 4 % (ref 1–4)
ERYTHROCYTE [DISTWIDTH] IN BLOOD BY AUTOMATED COUNT: 13.6 % (ref 11.8–14.4)
GFR SERPL CREATININE-BSD FRML MDRD: >60 ML/MIN/1.73M2
GLUCOSE SERPL-MCNC: 96 MG/DL (ref 70–99)
HCT VFR BLD AUTO: 41.1 % (ref 36.3–47.1)
HGB BLD-MCNC: 13.3 G/DL (ref 11.9–15.1)
IMM GRANULOCYTES # BLD AUTO: 0.03 K/UL (ref 0–0.3)
IMM GRANULOCYTES NFR BLD: 0 %
LYMPHOCYTES NFR BLD: 2.68 K/UL (ref 1.1–3.7)
LYMPHOCYTES RELATIVE PERCENT: 30 % (ref 24–43)
MCH RBC QN AUTO: 30.5 PG (ref 25.2–33.5)
MCHC RBC AUTO-ENTMCNC: 32.4 G/DL (ref 28.4–34.8)
MCV RBC AUTO: 94.3 FL (ref 82.6–102.9)
MONOCYTES NFR BLD: 0.74 K/UL (ref 0.1–1.2)
MONOCYTES NFR BLD: 8 % (ref 3–12)
NEUTROPHILS NFR BLD: 57 % (ref 36–65)
NEUTS SEG NFR BLD: 5.04 K/UL (ref 1.5–8.1)
NRBC BLD-RTO: 0 PER 100 WBC
PLATELET # BLD AUTO: 281 K/UL (ref 138–453)
PMV BLD AUTO: 10.8 FL (ref 8.1–13.5)
POTASSIUM SERPL-SCNC: 4 MMOL/L (ref 3.7–5.3)
RBC # BLD AUTO: 4.36 M/UL (ref 3.95–5.11)
SODIUM SERPL-SCNC: 142 MMOL/L (ref 135–144)
WBC OTHER # BLD: 8.9 K/UL (ref 3.5–11.3)

## 2023-11-13 PROCEDURE — 6360000004 HC RX CONTRAST MEDICATION: Performed by: INTERNAL MEDICINE

## 2023-11-13 PROCEDURE — 6370000000 HC RX 637 (ALT 250 FOR IP): Performed by: STUDENT IN AN ORGANIZED HEALTH CARE EDUCATION/TRAINING PROGRAM

## 2023-11-13 PROCEDURE — 2709999900 HC NON-CHARGEABLE SUPPLY: Performed by: INTERNAL MEDICINE

## 2023-11-13 PROCEDURE — 2060000000 HC ICU INTERMEDIATE R&B

## 2023-11-13 PROCEDURE — 2580000003 HC RX 258

## 2023-11-13 PROCEDURE — 6360000002 HC RX W HCPCS: Performed by: INTERNAL MEDICINE

## 2023-11-13 PROCEDURE — B2151ZZ FLUOROSCOPY OF LEFT HEART USING LOW OSMOLAR CONTRAST: ICD-10-PCS | Performed by: INTERNAL MEDICINE

## 2023-11-13 PROCEDURE — 93306 TTE W/DOPPLER COMPLETE: CPT

## 2023-11-13 PROCEDURE — 4A023N7 MEASUREMENT OF CARDIAC SAMPLING AND PRESSURE, LEFT HEART, PERCUTANEOUS APPROACH: ICD-10-PCS | Performed by: INTERNAL MEDICINE

## 2023-11-13 PROCEDURE — 93306 TTE W/DOPPLER COMPLETE: CPT | Performed by: INTERNAL MEDICINE

## 2023-11-13 PROCEDURE — 99232 SBSQ HOSP IP/OBS MODERATE 35: CPT | Performed by: INTERNAL MEDICINE

## 2023-11-13 PROCEDURE — 7100000011 HC PHASE II RECOVERY - ADDTL 15 MIN

## 2023-11-13 PROCEDURE — 93458 L HRT ARTERY/VENTRICLE ANGIO: CPT | Performed by: INTERNAL MEDICINE

## 2023-11-13 PROCEDURE — B2111ZZ FLUOROSCOPY OF MULTIPLE CORONARY ARTERIES USING LOW OSMOLAR CONTRAST: ICD-10-PCS | Performed by: INTERNAL MEDICINE

## 2023-11-13 PROCEDURE — 85520 HEPARIN ASSAY: CPT

## 2023-11-13 PROCEDURE — 85025 COMPLETE CBC W/AUTO DIFF WBC: CPT

## 2023-11-13 PROCEDURE — 80048 BASIC METABOLIC PNL TOTAL CA: CPT

## 2023-11-13 PROCEDURE — 7100000010 HC PHASE II RECOVERY - FIRST 15 MIN

## 2023-11-13 PROCEDURE — C1769 GUIDE WIRE: HCPCS | Performed by: INTERNAL MEDICINE

## 2023-11-13 PROCEDURE — 6370000000 HC RX 637 (ALT 250 FOR IP)

## 2023-11-13 PROCEDURE — C1894 INTRO/SHEATH, NON-LASER: HCPCS | Performed by: INTERNAL MEDICINE

## 2023-11-13 PROCEDURE — 93010 ELECTROCARDIOGRAM REPORT: CPT | Performed by: INTERNAL MEDICINE

## 2023-11-13 PROCEDURE — 36415 COLL VENOUS BLD VENIPUNCTURE: CPT

## 2023-11-13 PROCEDURE — 2500000003 HC RX 250 WO HCPCS: Performed by: INTERNAL MEDICINE

## 2023-11-13 PROCEDURE — 6360000002 HC RX W HCPCS

## 2023-11-13 RX ORDER — DIPHENHYDRAMINE HYDROCHLORIDE 50 MG/ML
25 INJECTION INTRAMUSCULAR; INTRAVENOUS ONCE
Status: COMPLETED | OUTPATIENT
Start: 2023-11-13 | End: 2023-11-13

## 2023-11-13 RX ORDER — SODIUM CHLORIDE 9 MG/ML
INJECTION, SOLUTION INTRAVENOUS CONTINUOUS
Status: DISCONTINUED | OUTPATIENT
Start: 2023-11-13 | End: 2023-11-14 | Stop reason: HOSPADM

## 2023-11-13 RX ORDER — MIDAZOLAM HYDROCHLORIDE 1 MG/ML
INJECTION INTRAMUSCULAR; INTRAVENOUS PRN
Status: DISCONTINUED | OUTPATIENT
Start: 2023-11-13 | End: 2023-11-13 | Stop reason: HOSPADM

## 2023-11-13 RX ORDER — SODIUM CHLORIDE 0.9 % (FLUSH) 0.9 %
5-40 SYRINGE (ML) INJECTION EVERY 12 HOURS SCHEDULED
OUTPATIENT
Start: 2023-11-13

## 2023-11-13 RX ORDER — SODIUM CHLORIDE 9 MG/ML
INJECTION, SOLUTION INTRAVENOUS PRN
OUTPATIENT
Start: 2023-11-13

## 2023-11-13 RX ORDER — LISINOPRIL 20 MG/1
20 TABLET ORAL DAILY
Qty: 90 TABLET | Refills: 0 | Status: SHIPPED | OUTPATIENT
Start: 2023-11-13

## 2023-11-13 RX ORDER — BUPROPION HYDROCHLORIDE 150 MG/1
150 TABLET ORAL EVERY MORNING
Qty: 90 TABLET | Refills: 0 | Status: SHIPPED | OUTPATIENT
Start: 2023-11-13

## 2023-11-13 RX ORDER — SODIUM CHLORIDE 0.9 % (FLUSH) 0.9 %
5-40 SYRINGE (ML) INJECTION PRN
OUTPATIENT
Start: 2023-11-13

## 2023-11-13 RX ORDER — NITROGLYCERIN 20 MG/100ML
INJECTION INTRAVENOUS PRN
Status: DISCONTINUED | OUTPATIENT
Start: 2023-11-13 | End: 2023-11-13 | Stop reason: HOSPADM

## 2023-11-13 RX ORDER — HEPARIN SODIUM 1000 [USP'U]/ML
INJECTION, SOLUTION INTRAVENOUS; SUBCUTANEOUS PRN
Status: DISCONTINUED | OUTPATIENT
Start: 2023-11-13 | End: 2023-11-13 | Stop reason: HOSPADM

## 2023-11-13 RX ORDER — ACETAMINOPHEN 325 MG/1
650 TABLET ORAL EVERY 4 HOURS PRN
OUTPATIENT
Start: 2023-11-13

## 2023-11-13 RX ORDER — FENTANYL CITRATE 50 UG/ML
INJECTION, SOLUTION INTRAMUSCULAR; INTRAVENOUS PRN
Status: DISCONTINUED | OUTPATIENT
Start: 2023-11-13 | End: 2023-11-13 | Stop reason: HOSPADM

## 2023-11-13 RX ORDER — LEVOTHYROXINE SODIUM 0.1 MG/1
100 TABLET ORAL
Status: DISCONTINUED | OUTPATIENT
Start: 2023-11-14 | End: 2023-11-14 | Stop reason: HOSPADM

## 2023-11-13 RX ORDER — VERAPAMIL HYDROCHLORIDE 2.5 MG/ML
INJECTION, SOLUTION INTRAVENOUS PRN
Status: DISCONTINUED | OUTPATIENT
Start: 2023-11-13 | End: 2023-11-13 | Stop reason: HOSPADM

## 2023-11-13 RX ADMIN — METOPROLOL TARTRATE 25 MG: 25 TABLET ORAL at 22:06

## 2023-11-13 RX ADMIN — DESMOPRESSIN ACETATE 40 MG: 0.2 TABLET ORAL at 22:06

## 2023-11-13 RX ADMIN — HEPARIN SODIUM 12 UNITS/KG/HR: 10000 INJECTION, SOLUTION INTRAVENOUS at 06:35

## 2023-11-13 RX ADMIN — SODIUM CHLORIDE: 9 INJECTION, SOLUTION INTRAVENOUS at 13:30

## 2023-11-13 RX ADMIN — HYDROCORTISONE SODIUM SUCCINATE 100 MG: 100 INJECTION, POWDER, FOR SOLUTION INTRAMUSCULAR; INTRAVENOUS at 10:41

## 2023-11-13 RX ADMIN — SODIUM CHLORIDE, PRESERVATIVE FREE 10 ML: 5 INJECTION INTRAVENOUS at 22:08

## 2023-11-13 RX ADMIN — DIPHENHYDRAMINE HYDROCHLORIDE 25 MG: 50 INJECTION INTRAMUSCULAR; INTRAVENOUS at 10:41

## 2023-11-13 NOTE — TELEPHONE ENCOUNTER
LAST VISIT:   10/19/2023     Future Appointments   Date Time Provider [address]   11[facility]20[facility]2023 11:45 [facility]

## 2023-11-13 NOTE — PROGRESS NOTES
Removed 3ml from right radial cath site. No s/s of bleeding/hematoma.  Denies any pain/discomfort or SOB in general . Resting in bed on left lateral side at this time

## 2023-11-13 NOTE — PROGRESS NOTES
Patient back, right radial approach. Safeguard dry/intact with 9ml of air. No s/s of hematoma/bleeding. Pulse noted and palpable. Patient denies any chest pain/nausea. Resting in bed at this time.

## 2023-11-13 NOTE — CARE COORDINATION
Transitional planning-patient off the floor.
none  Potential Assistance needed at discharge: (P) N/A            Potential DME:    Patient expects to discharge to: (P) [address] for transportation at discharge: (P) Family    Financial    Payor: BCBS / Plan: [place] / Product Type: *No Product type* /     Does insurance require precert for SNF: Yes,but plan is home    Potential assistance Purchasing Medications: (P) No  Meds-to-Beds request: Yes      [id number] [place], [place] - [address] [place] [id number]  Phone: [phone] Fax: [phone]      Notes:    Factors facilitating achievement of predicted outcomes: Family support, Motivated, Cooperative, and Pleasant    Barriers to discharge: Medical complications    Additional Case Management Notes: Transitional planning-talked with patient, plan is to go home with her  Deanna Molina and son Micki Lovett Has ride. Verified address, emergency contact and insurance with patient. The Plan for Transition of Care is related to the following treatment goals of Paroxysmal supraventricular tachycardia [I47.10]  SVT (supraventricular tachycardia) [V82.43]    IF APPLICABLE: The Patient and/or patient representative Eri Salazar and her family were provided with a choice of provider and agrees with the discharge plan. Freedom of choice list with basic dialogue that supports the patient's individualized plan of care/goals and shares the quality data associated with the providers was provided to: (P) Patient   Patient Representative Name:       The Patient and/or Patient Representative Agree with the Discharge Plan?  (P) Yes    Hilda Romero RN  Case Management Department  Ph: [phone] Fax: [phone]

## 2023-11-13 NOTE — PROGRESS NOTES
[address] [facility]  Internal Medicine Teaching Residency Program  Inpatient Daily Progress Note  ______________________________________________________________________________    Patient: Modesto Burr  YOB: 1960   GZN:[id number]    Acct: [de-identified]     Room: [facility]-  Admit date: 11/11/2023  Today's date: 11/13/23  Number of days in the hospital: 2    SUBJECTIVE   Admitting Diagnosis: Paroxysmal supraventricular tachycardia  CC: Palpitations     Pt examined at bedside. Chart & results reviewed. -Patient is alert, awake, oriented, hemodynamically stable, saturating well at room air  -No new Complaints and no acute events overnight  Cardiac cath and echo today    Review of Systems   Constitutional:  Positive for activity change. Cardiovascular:  Negative  Gastrointestinal: Negative. Genitourinary: Negative. Skin: Negative. Neurological: Negative. Hematological: Negative. Psychiatric/Behavioral: Negative. BRIEF HISTORY     The patient is a pleasant 61 y.o. female presents with a chief complaint of palpitations and chest pain since this morning. Patient states that this morning when she was bending she suddenly felt palpitations and chest pain, almost felt like passing out so she came to ER. She describes chest pain as squeezing all over the chest, radiating to left jaw, non-reproducible. Also states that she has been having palpitations for last few days along with chest pain but the chest pain yesterday's was severe than the ones before. She denies any shortness of breath, fevers, nausea, vomiting, diarrhea, urinary symptoms, cough, eating and drinking okay.      Her past medical history is significant for history of thyroid cancer s/p thyroidectomy, sjogrens syndrome with keratoconjunctivitis sicca, tobacco abuse, COPD, hypertension, generalized anxiety disorder     On arrival to ER the patient's heart rate was about 240 and EKG

## 2023-11-13 NOTE — PROGRESS NOTES
Patient admitted, consent signed and questions answered. Patient ready for procedure. Call light to reach with side rails up 2 of 2. B/L Groin clipped with writer and Joann present. No family at bedside with patient. History and physical complete.

## 2023-11-13 NOTE — PROCEDURES
[facility]        Date:   11/13/2023  Patient name: Mattie White  Date of admission:  11/11/2023  6:11 PM  MRN:   [id number]  YOB: 1960    CARDIAC CATHETERIZATION    Operators:  Primary: Giuseppe Hammond MD.  Assistant:     Indications for cath: NSTEMI, SVT    Procedure performed: Cardiac cath. Access: Right Radial artery      Procedure: After informed consent was obtained with explanation of the risks and benefits, patient was brought to the cath lab. The right wrist was prepped and draped in sterile fashion. 1% lidocaine was used for local block. The Radial artery was cannulated with 6  Fr sheath with brisk arterial blood return. The side port was frequently flushed and aspirated with normal saline. EBL is 5 mL    Findings:    Left main: Normal with 0% stenosis. LAD: Normal with 0% stenosis. LCX: Normal with 0% stenosis. RCA: Normal with 0% stenosis. The LV gram was performed in the COLVIN 30 position. LVEF: 55%. LV Wall Motion: Normal    Conclusions:   Normal Coronaries. Overall preserved LV function    Recommendation:  Medical treatments. Risk factors modifications.         Electronically signed by Alphonso Oakley MD on 11/13/2023 at [address] [facility]  [facility]  [phone]

## 2023-11-14 VITALS
OXYGEN SATURATION: 98 % | DIASTOLIC BLOOD PRESSURE: 76 MMHG | HEIGHT: 62 IN | BODY MASS INDEX: 30.47 KG/M2 | WEIGHT: 165.57 LBS | HEART RATE: 64 BPM | RESPIRATION RATE: 15 BRPM | SYSTOLIC BLOOD PRESSURE: 146 MMHG | TEMPERATURE: 98 F

## 2023-11-14 LAB
ANION GAP SERPL CALCULATED.3IONS-SCNC: 9 MMOL/L (ref 9–17)
ANTI-XA UNFRAC HEPARIN: <0.1 IU/L
BASOPHILS # BLD: 0.06 K/UL (ref 0–0.2)
BASOPHILS NFR BLD: 1 % (ref 0–2)
BUN SERPL-MCNC: 13 MG/DL (ref 8–23)
CALCIUM SERPL-MCNC: 8.6 MG/DL (ref 8.6–10.4)
CHLORIDE SERPL-SCNC: 110 MMOL/L (ref 98–107)
CO2 SERPL-SCNC: 23 MMOL/L (ref 20–31)
CREAT SERPL-MCNC: 0.7 MG/DL (ref 0.5–0.9)
EOSINOPHIL # BLD: 0.17 K/UL (ref 0–0.44)
EOSINOPHILS RELATIVE PERCENT: 2 % (ref 1–4)
ERYTHROCYTE [DISTWIDTH] IN BLOOD BY AUTOMATED COUNT: 13.7 % (ref 11.8–14.4)
GFR SERPL CREATININE-BSD FRML MDRD: >60 ML/MIN/1.73M2
GLUCOSE SERPL-MCNC: 90 MG/DL (ref 70–99)
HCT VFR BLD AUTO: 37.7 % (ref 36.3–47.1)
HGB BLD-MCNC: 12.1 G/DL (ref 11.9–15.1)
IMM GRANULOCYTES # BLD AUTO: <0.03 K/UL (ref 0–0.3)
IMM GRANULOCYTES NFR BLD: 0 %
LYMPHOCYTES NFR BLD: 3.3 K/UL (ref 1.1–3.7)
LYMPHOCYTES RELATIVE PERCENT: 35 % (ref 24–43)
MCH RBC QN AUTO: 30.7 PG (ref 25.2–33.5)
MCHC RBC AUTO-ENTMCNC: 32.1 G/DL (ref 28.4–34.8)
MCV RBC AUTO: 95.7 FL (ref 82.6–102.9)
MONOCYTES NFR BLD: 0.91 K/UL (ref 0.1–1.2)
MONOCYTES NFR BLD: 10 % (ref 3–12)
NEUTROPHILS NFR BLD: 52 % (ref 36–65)
NEUTS SEG NFR BLD: 4.97 K/UL (ref 1.5–8.1)
NRBC BLD-RTO: 0 PER 100 WBC
PLATELET # BLD AUTO: 250 K/UL (ref 138–453)
PMV BLD AUTO: 9.7 FL (ref 8.1–13.5)
POTASSIUM SERPL-SCNC: 3.7 MMOL/L (ref 3.7–5.3)
RBC # BLD AUTO: 3.94 M/UL (ref 3.95–5.11)
SODIUM SERPL-SCNC: 142 MMOL/L (ref 135–144)
WBC OTHER # BLD: 9.4 K/UL (ref 3.5–11.3)

## 2023-11-14 PROCEDURE — 94761 N-INVAS EAR/PLS OXIMETRY MLT: CPT

## 2023-11-14 PROCEDURE — 85520 HEPARIN ASSAY: CPT

## 2023-11-14 PROCEDURE — 6370000000 HC RX 637 (ALT 250 FOR IP)

## 2023-11-14 PROCEDURE — 36415 COLL VENOUS BLD VENIPUNCTURE: CPT

## 2023-11-14 PROCEDURE — 6370000000 HC RX 637 (ALT 250 FOR IP): Performed by: STUDENT IN AN ORGANIZED HEALTH CARE EDUCATION/TRAINING PROGRAM

## 2023-11-14 PROCEDURE — 80048 BASIC METABOLIC PNL TOTAL CA: CPT

## 2023-11-14 PROCEDURE — 99233 SBSQ HOSP IP/OBS HIGH 50: CPT

## 2023-11-14 PROCEDURE — 85025 COMPLETE CBC W/AUTO DIFF WBC: CPT

## 2023-11-14 PROCEDURE — 99232 SBSQ HOSP IP/OBS MODERATE 35: CPT | Performed by: INTERNAL MEDICINE

## 2023-11-14 RX ORDER — ASPIRIN 81 MG/1
81 TABLET, CHEWABLE ORAL DAILY
Qty: 30 TABLET | Refills: 3 | Status: SHIPPED | OUTPATIENT
Start: 2023-11-15

## 2023-11-14 RX ORDER — ATORVASTATIN CALCIUM 40 MG/1
40 TABLET, FILM COATED ORAL NIGHTLY
Qty: 30 TABLET | Refills: 3 | Status: SHIPPED | OUTPATIENT
Start: 2023-11-14

## 2023-11-14 RX ADMIN — LEVOTHYROXINE SODIUM 100 MCG: 100 TABLET ORAL at 08:42

## 2023-11-14 RX ADMIN — METOPROLOL TARTRATE 25 MG: 25 TABLET ORAL at 08:40

## 2023-11-14 RX ADMIN — ASPIRIN 81 MG: 81 TABLET, CHEWABLE ORAL at 08:40

## 2023-11-14 NOTE — DISCHARGE INSTRUCTIONS
If you begin to experience any symptoms such as chest pain shortness of breath nausea vomiting dizziness drowsiness abdominal pain loss of consciousness or any other symptoms you find concerning please come to the ED for follow-up evaluation. If you have been given medication please take them as prescribed. Do not take more medication than recommended at any given time. Please follow-up with your primary care provider within 5 to 7 days for continued care. Follow-up with cardiology  Follow-up with endocrinology    Please feel free return to the hospital if your symptoms worsen or any new concerning symptoms develop. Follow-up with your primary care physician as needed for all other the concerns.

## 2023-11-15 ENCOUNTER — CARE COORDINATION (OUTPATIENT)
Dept: CASE MANAGEMENT | Age: 63
End: 2023-11-15

## 2023-11-15 NOTE — PROGRESS NOTES
CLINICAL PHARMACY NOTE: MEDS TO BEDS    Total # of Prescriptions Filled: 3   The following medications were delivered to the patient:  Metoprolol Tartrate 25mg  Atorvastatin Calcium 40mg  Aspirin Low Dose 81mg    Additional Documentation: Medications were delivered to the patient in room 511 on 11/14/23 at 1606. Patient co-pay was $5 and was paid via [employer].

## 2023-11-15 NOTE — CARE COORDINATION
Care Transitions Outreach Attempt #1  Initial 24 hour call. Call within 2 business days of discharge: Yes   Attempted to reach patient for transitions of care follow up. Unable to reach patient. Unable to leave message on patient;s phone. HIPAA compliant message left on VM of  requesting a return call. Patient: Fady Walden Patient : 1960 MRN: [id number]    Last Discharge Facility       Date Complaint Diagnosis Description Type Department Provider    23 Chest Pain Paroxysmal supraventricular tachycardia . .. ED to Hosp-Admission (Discharged) (ADMITTED) JANET Patino MD; Steve Pulido. .. Was this an external facility discharge?  No Discharge Facility: JANET    Noted following upcoming appointments from discharge chart review:   [facility] follow up appointment(s):   Future Appointments   Date Time Provider [address]   2023 11:45 AM DES[facility]AE [facility] [facility] [place] ROACHN  Care Transition Nurse

## 2023-11-16 ENCOUNTER — CARE COORDINATION (OUTPATIENT)
Dept: CASE MANAGEMENT | Age: 63
End: 2023-11-16

## 2023-11-16 DIAGNOSIS — I21.4 NSTEMI (NON-ST ELEVATED MYOCARDIAL INFARCTION) (HCC): Primary | ICD-10-CM

## 2023-11-16 NOTE — CARE COORDINATION
understanding. The patient was given an opportunity to ask questions and does not have any further questions or concerns at this time. Were discharge instructions available to patient? Yes. Reviewed appropriate site of care based on symptoms and resources available to patient including: PCP  Specialist  When to call Caty Nicholson. The patient agrees to contact the PCP office for questions related to their healthcare. Medication reconciliation was performed with patient, who verbalizes understanding of administration of home medications. Medications reviewed, 1111F entered: yes    Was patient discharged with a pulse oximeter? no    Non-face-to-face services provided:  Obtained and reviewed discharge summary and/or continuity of care documents  Assessment and support for treatment adherence and medication management-1111f    Offered patient enrollment in the Remote Patient Monitoring (RPM) program for in-home monitoring:  did not discuss . Care Transitions 24 Hour Call    Do you have a copy of your discharge instructions?: Yes  Do you have all of your prescriptions and are they filled?: Yes  Have you been contacted by a [address]?: No  Have you scheduled your follow up appointment?: Yes  How are you going to get to your appointment?: Car - drive self  Do you feel like you have everything you need to keep you well at home?: Yes  Care Transitions Interventions                                   Discussed follow-up appointments. If no appointment was previously scheduled, appointment scheduling offered: Yes. Is follow up appointment scheduled within 7 days of discharge? Yes. Follow Up  Future Appointments   Date Time Provider [address]   11/20/2023 11:45 AM Monica Sullivan [facility] [address] [place] Transition Nurse provided contact information. Plan for follow-up call in 3-5 days based on severity of symptoms and risk factors.   Plan for next call: symptom management-rash from

## 2023-11-20 ENCOUNTER — OFFICE VISIT (OUTPATIENT)
Dept: PRIMARY CARE CLINIC | Age: 63
End: 2023-11-20

## 2023-11-20 VITALS
HEART RATE: 60 BPM | SYSTOLIC BLOOD PRESSURE: 130 MMHG | HEIGHT: 62 IN | WEIGHT: 161 LBS | DIASTOLIC BLOOD PRESSURE: 82 MMHG | BODY MASS INDEX: 29.63 KG/M2 | OXYGEN SATURATION: 95 %

## 2023-11-20 DIAGNOSIS — F32.A DEPRESSION, UNSPECIFIED DEPRESSION TYPE: ICD-10-CM

## 2023-11-20 DIAGNOSIS — I10 ESSENTIAL HYPERTENSION: ICD-10-CM

## 2023-11-20 DIAGNOSIS — Z09 HOSPITAL DISCHARGE FOLLOW-UP: Primary | ICD-10-CM

## 2023-11-20 DIAGNOSIS — R21 RASH: ICD-10-CM

## 2023-11-20 RX ORDER — BUPROPION HYDROCHLORIDE 150 MG/1
150 TABLET ORAL EVERY MORNING
Qty: 90 TABLET | Refills: 0 | Status: SHIPPED | OUTPATIENT
Start: 2023-11-20

## 2023-11-20 RX ORDER — TRIAMCINOLONE ACETONIDE 1 MG/G
1 OINTMENT TOPICAL 2 TIMES DAILY
Qty: 30 G | Refills: 0 | Status: SHIPPED | OUTPATIENT
Start: 2023-11-20 | End: 2023-12-05

## 2023-11-20 RX ORDER — LANOLIN ALCOHOL/MO/W.PET/CERES
1000 CREAM (GRAM) TOPICAL DAILY
COMMUNITY

## 2023-11-20 RX ORDER — LISINOPRIL 40 MG/1
40 TABLET ORAL DAILY
Qty: 90 TABLET | Refills: 3 | Status: SHIPPED | OUTPATIENT
Start: 2023-11-20

## 2023-11-20 NOTE — PROGRESS NOTES
polyps  Neck: supple and non-tender without mass, no thyromegaly or thyroid nodules, no cervical lymphadenopathy  Pulmonary/Chest: clear to auscultation bilaterally- no wheezes, rales or rhonchi, normal air movement, no respiratory distress  Cardiovascular: normal rate, regular rhythm, normal S1 and S2, no murmurs, rubs, clicks, or gallops, distal pulses intact, no carotid bruits  Abdomen: soft, non-tender, non-distended, normal bowel sounds, no masses or organomegaly  Extremities: no cyanosis, clubbing or edema  Musculoskeletal: normal range of motion, no joint swelling, deformity or tenderness  Neurologic: reflexes normal and symmetric, no cranial nerve deficit, gait, coordination and speech normal      An electronic signature was used to authenticate this note.   --DESIRAE Martinez

## 2023-11-24 ENCOUNTER — CARE COORDINATION (OUTPATIENT)
Dept: CASE MANAGEMENT | Age: 63
End: 2023-11-24

## 2023-12-01 ENCOUNTER — CARE COORDINATION (OUTPATIENT)
Dept: CASE MANAGEMENT | Age: 63
End: 2023-12-01

## 2023-12-01 DIAGNOSIS — Z87.891 PERSONAL HISTORY OF TOBACCO USE, PRESENTING HAZARDS TO HEALTH: ICD-10-CM

## 2023-12-01 NOTE — CARE COORDINATION
Care Transitions Follow Up Call    Patient Current Location:  Home: [address] [place] [id number]    [facility] Care Coordinator contacted the patient by telephone to follow up after admission on . Verified name and  with patient as identifiers. Patient: Selma Meeks  Patient : 1960   MRN: [id number]  Reason for Admission: Patoxysmal supraventricular tachycardia  Discharge Date: 23 RARS: Readmission Risk Score: 3.8      Needs to be reviewed by the provider   Additional needs identified to be addressed with provider: No  none             Method of communication with provider: none. Subsequent transitional call. Spoke to Brewer she states she not bad today. She denies HA, sob, dizziness, cp/palpitations. BP has not been bad it has come down did not provide numbers. She stated I have appointment on  to have it rechecked at office. She also has appointment with endocrinologist on  unsure of the provider name. Rash continues to heal.  She has not made a cardiologist appointment writer offered assistance. She declined stated I will call when we get off the phone. I have the number. Writer encouraged her to call. Addressed changes since last contact:   discussed making cardio appointment   Discussed follow-up appointments. If no appointment was previously scheduled, appointment scheduling offered: Yes. Is follow up appointment scheduled within 7 days of discharge? Yes. Follow Up  Future Appointments   Date Time Provider [address]   2023 10:30 AM Darshan [facility]   2023 11:30 AM Joann Levy [facility]     External follow up appointment(s):  [facility] Endocrinologist @12:45    [facility] Care Coordinator reviewed discharge instructions, medical action plan, and red flags with patient and discussed any barriers to care and/or understanding of plan of care after discharge.  Discussed appropriate site of care based on symptoms and

## 2023-12-04 ENCOUNTER — NURSE ONLY (OUTPATIENT)
Dept: PRIMARY CARE CLINIC | Age: 63
End: 2023-12-04

## 2023-12-04 VITALS
BODY MASS INDEX: 29.44 KG/M2 | SYSTOLIC BLOOD PRESSURE: 118 MMHG | DIASTOLIC BLOOD PRESSURE: 78 MMHG | HEART RATE: 87 BPM | WEIGHT: 161 LBS | OXYGEN SATURATION: 95 %

## 2023-12-04 DIAGNOSIS — I10 ESSENTIAL HYPERTENSION: Primary | ICD-10-CM

## 2023-12-04 RX ORDER — NICOTINE 21 MG/24HR
PATCH, TRANSDERMAL 24 HOURS TRANSDERMAL
Qty: 42 PATCH | Refills: 0 | Status: SHIPPED | OUTPATIENT
Start: 2023-12-04

## 2023-12-04 NOTE — PROGRESS NOTES
Patient is here today for a blood pressure check. Today's manual blood pressure readin/78  Per Pierre Suarez PA-C, patient should continue medications as prescribed. Patient should should keep the follow up appointment which is already scheduled. Plan discussed with patient who verbalized understanding.

## 2023-12-06 ENCOUNTER — CARE COORDINATION (OUTPATIENT)
Dept: CASE MANAGEMENT | Age: 63
End: 2023-12-06

## 2023-12-06 NOTE — CARE COORDINATION
Care Transitions Final  Call    Patient Current Location:  Home: [address] [facility] contacted the patient by telephone to follow up after admission on . Verified name and  with patient as identifiers. Patient: Vidal Felty  Patient : 1960   MRN: [id number]  Reason for Admission: Paroxysmal supraventricular tachycardia   Discharge Date: 23 RARS: Readmission Risk Score: 3.8      Needs to be reviewed by the provider   Additional needs identified to be addressed with provider: No  none             Method of communication with provider: none. Subsequent transitional call. Spoke to [employer] today she reports she's doing alright. She is still keeping a BP log and stated that her BP is coming down quite a bit. She  had a lab visit on  BP was 118/78 . She denies CP , dizziness, nausea , vomiting , fever or chills. She said she has a slight HA but otherwise feeling okay. Writer reminded her to follow up with her Cardiologist. She said that she has stopped smoking on her own, did not  nicotine patches. She has pcp appointment on  . She is okay with ending POLINA calls. She denies any needs at this time. She has writer's contact information. Addressed changes since last contact:   BP check at office. Has pcp appointment on    Discussed follow-up appointments. If no appointment was previously scheduled, appointment scheduling offered: Yes. Is follow up appointment scheduled within 7 days of discharge? Yes. Follow Up  Future Appointments   Date Time Provider [address]   2023 11:30 AM Monica Quinteros [facility] W[facility] Coordinator reviewed discharge instructions, medical action plan, and red flags with patient and discussed any barriers to care and/or understanding of plan of care after discharge.  Discussed appropriate site of care based on symptoms and resources available to patient including:

## 2023-12-07 ENCOUNTER — TELEPHONE (OUTPATIENT)
Dept: PRIMARY CARE CLINIC | Age: 63
End: 2023-12-07

## 2023-12-07 NOTE — TELEPHONE ENCOUNTER
----- Message from Everardo Conner sent at 12/7/2023  1:48 PM EST -----  Subject: Message to Provider    QUESTIONS  Information for Provider? Se Knapp- RN case manager from [facility] is faxing   over a document requesting info on this patient. If you have any   questions, call her at [phone]  ---------------------------------------------------------------------------  --------------  [address] [place] [address] [place]  [phone]; OK to leave message on voicemail  ---------------------------------------------------------------------------  --------------  SCRIPT ANSWERS  Relationship to Patient? Covered Entity  Covered Entity Type? Health Insurance? Representative Name?  Se Knapp from [facility] - case manger

## 2024-01-10 ENCOUNTER — OFFICE VISIT (OUTPATIENT)
Dept: PRIMARY CARE CLINIC | Age: 64
End: 2024-01-10
Payer: COMMERCIAL

## 2024-01-10 VITALS
HEART RATE: 94 BPM | OXYGEN SATURATION: 96 % | BODY MASS INDEX: 30.22 KG/M2 | SYSTOLIC BLOOD PRESSURE: 130 MMHG | DIASTOLIC BLOOD PRESSURE: 80 MMHG | WEIGHT: 164.2 LBS | HEIGHT: 62 IN

## 2024-01-10 DIAGNOSIS — E03.9 HYPOTHYROIDISM, UNSPECIFIED TYPE: ICD-10-CM

## 2024-01-10 DIAGNOSIS — I21.4 NSTEMI (NON-ST ELEVATED MYOCARDIAL INFARCTION) (HCC): ICD-10-CM

## 2024-01-10 DIAGNOSIS — F33.0 MILD EPISODE OF RECURRENT MAJOR DEPRESSIVE DISORDER (HCC): Primary | ICD-10-CM

## 2024-01-10 DIAGNOSIS — J44.9 CHRONIC OBSTRUCTIVE PULMONARY DISEASE, UNSPECIFIED COPD TYPE (HCC): ICD-10-CM

## 2024-01-10 DIAGNOSIS — M35.01 SJOGREN'S SYNDROME WITH KERATOCONJUNCTIVITIS SICCA (HCC): ICD-10-CM

## 2024-01-10 DIAGNOSIS — F33.1 MODERATE EPISODE OF RECURRENT MAJOR DEPRESSIVE DISORDER (HCC): ICD-10-CM

## 2024-01-10 PROCEDURE — 3079F DIAST BP 80-89 MM HG: CPT | Performed by: PHYSICIAN ASSISTANT

## 2024-01-10 PROCEDURE — 3075F SYST BP GE 130 - 139MM HG: CPT | Performed by: PHYSICIAN ASSISTANT

## 2024-01-10 PROCEDURE — 99214 OFFICE O/P EST MOD 30 MIN: CPT | Performed by: PHYSICIAN ASSISTANT

## 2024-01-10 ASSESSMENT — PATIENT HEALTH QUESTIONNAIRE - PHQ9
5. POOR APPETITE OR OVEREATING: 0
3. TROUBLE FALLING OR STAYING ASLEEP: 0
SUM OF ALL RESPONSES TO PHQ QUESTIONS 1-9: 2
6. FEELING BAD ABOUT YOURSELF - OR THAT YOU ARE A FAILURE OR HAVE LET YOURSELF OR YOUR FAMILY DOWN: 0
SUM OF ALL RESPONSES TO PHQ QUESTIONS 1-9: 2
SUM OF ALL RESPONSES TO PHQ9 QUESTIONS 1 & 2: 1
2. FEELING DOWN, DEPRESSED OR HOPELESS: 1
9. THOUGHTS THAT YOU WOULD BE BETTER OFF DEAD, OR OF HURTING YOURSELF: 0
4. FEELING TIRED OR HAVING LITTLE ENERGY: 1
7. TROUBLE CONCENTRATING ON THINGS, SUCH AS READING THE NEWSPAPER OR WATCHING TELEVISION: 0
SUM OF ALL RESPONSES TO PHQ QUESTIONS 1-9: 2
SUM OF ALL RESPONSES TO PHQ QUESTIONS 1-9: 2
1. LITTLE INTEREST OR PLEASURE IN DOING THINGS: 0
10. IF YOU CHECKED OFF ANY PROBLEMS, HOW DIFFICULT HAVE THESE PROBLEMS MADE IT FOR YOU TO DO YOUR WORK, TAKE CARE OF THINGS AT HOME, OR GET ALONG WITH OTHER PEOPLE: 2
8. MOVING OR SPEAKING SO SLOWLY THAT OTHER PEOPLE COULD HAVE NOTICED. OR THE OPPOSITE, BEING SO FIGETY OR RESTLESS THAT YOU HAVE BEEN MOVING AROUND A LOT MORE THAN USUAL: 0

## 2024-01-10 ASSESSMENT — ENCOUNTER SYMPTOMS
SHORTNESS OF BREATH: 0
ABDOMINAL DISTENTION: 0
CHEST TIGHTNESS: 0
COUGH: 0
DIARRHEA: 0
CONSTIPATION: 0
ABDOMINAL PAIN: 0
SORE THROAT: 0

## 2024-01-10 NOTE — PROGRESS NOTES
[facility]  [facility] PRIMARY CARE  [address] [facility] [address]  Dept: [phone]    Lakia Mcneil is a 63 y.o. female Established patient, who presents today for her medical conditions/complaints as noted below.      Chief Complaint   Patient presents with    Anxiety     F/u       HPI:     HPI: The patient is a pleasant 63-year-old female who presents today with concerns of anxiety follow-up.  Patient currently taking Paxil, BuSpar, Abilify.  Abilify 5 mg was just added 1 month ago.  Breztri I also added for patient's COPD.    Reviewed prior notes None  Reviewed previous Labs    LDL Cholesterol (mg/dL)   Date Value   02/21/2023 146 (H)   09/19/2022 141 (H)   05/13/2022 158 (H)       (goal LDL is <100)   AST (U/L)   Date Value   02/21/2023 17     ALT (U/L)   Date Value   02/21/2023 13     BUN (mg/dL)   Date Value   11/14/2023 13     Hemoglobin A1C (%)   Date Value   11/12/2023 5.8     TSH (uIU/mL)   Date Value   11/11/2023 41.32 (H)     BP Readings from Last 3 Encounters:   01/10/24 130/80   12/20/23 110/80   12/04/23 118/78          (goal 120/80)  Hemoglobin A1C   Date Value Ref Range Status   11/12/2023 5.8 4.0 - 6.0 % Final     Past Medical History:   Diagnosis Date    Acute suppurative otitis media of right ear without spontaneous rupture of tympanic membrane 03/18/2019    AMAIRANI positive 05/06/2019    Anxiety     COPD (chronic obstructive pulmonary disease) ([facility]) 2022    Essential hypertension 04/09/2021    History of depression     History of thyroid cancer     Major depressive disorder with single episode, in partial remission ([facility]) 01/24/2019    Osteoarthritis 2022    Panic attacks     Postoperative hypothyroidism 03/21/2018    Sjogren's syndrome with keratoconjunctivitis sicca ([facility]) 07/05/2019    Per rheumatology      Past Surgical History:   Procedure Laterality Date    BUNIONECTOMY      CARDIAC PROCEDURE N/A 11/13/2023    rachel / Coronary angiography / rm 511

## 2024-01-12 ENCOUNTER — TELEMEDICINE (OUTPATIENT)
Dept: BEHAVIORAL/MENTAL HEALTH CLINIC | Age: 64
End: 2024-01-12

## 2024-01-12 DIAGNOSIS — F40.01 AGORAPHOBIA WITH PANIC DISORDER: Primary | ICD-10-CM

## 2024-01-12 PROCEDURE — 90837 PSYTX W PT 60 MINUTES: CPT | Performed by: COUNSELOR

## 2024-01-12 NOTE — PROGRESS NOTES
[facility] [address]  Provider was located at Facility (Appt Dept): [address] [place] [address]       Total time spent for this encounter:  55    --KHLOE Young on 1/17/2024 at 8:58 AM    An electronic signature was used to authenticate this note.

## 2024-01-25 ENCOUNTER — OFFICE VISIT (OUTPATIENT)
Dept: PRIMARY CARE CLINIC | Age: 64
End: 2024-01-25
Payer: COMMERCIAL

## 2024-01-25 VITALS
DIASTOLIC BLOOD PRESSURE: 78 MMHG | BODY MASS INDEX: 29.26 KG/M2 | WEIGHT: 159 LBS | HEART RATE: 102 BPM | HEIGHT: 62 IN | OXYGEN SATURATION: 99 % | SYSTOLIC BLOOD PRESSURE: 124 MMHG

## 2024-01-25 DIAGNOSIS — R11.0 NAUSEA: ICD-10-CM

## 2024-01-25 DIAGNOSIS — F45.8 DIZZINESS, PSYCHOGENIC: Primary | ICD-10-CM

## 2024-01-25 DIAGNOSIS — E89.0 POSTOPERATIVE HYPOTHYROIDISM: ICD-10-CM

## 2024-01-25 PROCEDURE — 3078F DIAST BP <80 MM HG: CPT | Performed by: STUDENT IN AN ORGANIZED HEALTH CARE EDUCATION/TRAINING PROGRAM

## 2024-01-25 PROCEDURE — 3074F SYST BP LT 130 MM HG: CPT | Performed by: STUDENT IN AN ORGANIZED HEALTH CARE EDUCATION/TRAINING PROGRAM

## 2024-01-25 PROCEDURE — 99214 OFFICE O/P EST MOD 30 MIN: CPT | Performed by: STUDENT IN AN ORGANIZED HEALTH CARE EDUCATION/TRAINING PROGRAM

## 2024-01-25 RX ORDER — ONDANSETRON 4 MG/1
4 TABLET, FILM COATED ORAL 3 TIMES DAILY PRN
Qty: 15 TABLET | Refills: 0 | Status: SHIPPED | OUTPATIENT
Start: 2024-01-25

## 2024-01-25 ASSESSMENT — ENCOUNTER SYMPTOMS: SHORTNESS OF BREATH: 0

## 2024-01-25 NOTE — PROGRESS NOTES
[facility]  [facility] PRIMARY CARE  [address] [facility] [address] [address] [address]  Dept: [phone]    Lakia Mcneil is a 63 y.o. female established patient, who presents acutely for her complaints as noted below:    Chief Complaint   Patient presents with    Dizziness     1 week history, states it started with head twitching/spasms       HPI:     Dizziness started about a week ago. Described dizziness as a \"twitch\" or \"shock\" in her head. States that it had gone away, but today it's worse. Feels like it is on the left side. States that this seems to happen when she forgets to take her medicine.    Patient of DESIRAE [facility]. Hospital discharge 11/14/2023 for paroxysmal SVT, hypothyroidism. TSH from 12/11/2023 was elevated at 10.44. Has established with endocrinologist Dr. Weaver. No recent cardiology follow-up noted. Follows with behavioral health for agoraphobia with panic.    Reviewed prior notes from PCP.  Reviewed previous labs.    LDL Cholesterol (mg/dL)   Date Value   02/21/2023 146 (H)   09/19/2022 141 (H)   05/13/2022 158 (H)       (goal LDL is <100)   AST (U/L)   Date Value   02/21/2023 17     ALT (U/L)   Date Value   02/21/2023 13     BUN (mg/dL)   Date Value   11/14/2023 13     Hemoglobin A1C (%)   Date Value   11/12/2023 5.8     TSH (uIU/mL)   Date Value   11/11/2023 41.32 (H)     BP Readings from Last 3 Encounters:   01/25/24 124/78   01/10/24 130/80   12/20/23 110/80          (goal 120/80)    Past Medical History:   Diagnosis Date    Acute suppurative otitis media of right ear without spontaneous rupture of tympanic membrane 03/18/2019    AMAIRANI positive 05/06/2019    Anxiety     COPD (chronic obstructive pulmonary disease) ([facility]) 2022    Essential hypertension 04/09/2021    History of depression     History of thyroid cancer     Major depressive disorder with single episode, in partial remission ([facility]) 01/24/2019    Osteoarthritis 2022    Panic attacks     Postoperative

## 2024-01-26 ENCOUNTER — TELEMEDICINE (OUTPATIENT)
Dept: BEHAVIORAL/MENTAL HEALTH CLINIC | Age: 64
End: 2024-01-26
Payer: COMMERCIAL

## 2024-01-26 DIAGNOSIS — F40.01 AGORAPHOBIA WITH PANIC DISORDER: Primary | ICD-10-CM

## 2024-01-26 PROCEDURE — 90834 PSYTX W PT 45 MINUTES: CPT | Performed by: COUNSELOR

## 2024-01-26 NOTE — PROGRESS NOTES
ADULT BEHAVIORAL HEALTH FOLLOW UP  Tom Munguia [facility]      Visit Date: 1/26/2024   Time of appointment:  2:10  Time spent with Patient: 47 minutes.   This is patient's 12th appointment.    Reason for Consult:  Anxiety, Stress, and Depression       Referring Provider/PCP:    No ref. provider found  Geovany Zavaleta, PA      Pt provided informed consent for the behavioral health program. Discussed with patient model of service to include the limits of confidentiality (i.e. abuse reporting, suicide intervention, etc.) and short-term intervention focused approach.  Pt indicated understanding.    ZOHRA Dorman is a 63 y.o. female who presents for follow up of Agoraphobia with Panic Disorder. Client reported having severe dizziness and nausea between sessions. Client stated this begins with feeling \"twitching in my head\" and progresses to more severe dizziness and nausea. Client reported increased depression and anxiety since the onset of these symptoms. Client has been able to keep up with her son's needs during this time, but has done little else. Client stated that her inactivity, and napping when upset, have severely disrupted her sleep cycle. Client stated she is falling asleep at approximately 7:30 pm-11:30 pm, will be awake for at least 60 minutes and will then sleep from 12:30 am-4:30 am. Client is usually up at this point but may doze until 7:00 am. Client may take naps during the day. Client has been sleeping with the TV on every night. Client and therapist reviewed sleep hygiene and the sleep schedule developed in previous sessions. Therapist encouraged client to begin with advancing her initial bedtime later into the evening. Therapist suggested client change this by no more than 30 minutes and to give herself several days to adjust to each increment. Therapist also encouraged client to eliminate the use of her TV at night and discussed options for less intrusive sources of light and sound. Client agreed

## 2024-02-05 ENCOUNTER — TELEMEDICINE (OUTPATIENT)
Dept: BEHAVIORAL/MENTAL HEALTH CLINIC | Age: 64
End: 2024-02-05
Payer: COMMERCIAL

## 2024-02-05 DIAGNOSIS — F40.01 AGORAPHOBIA WITH PANIC DISORDER: Primary | ICD-10-CM

## 2024-02-05 PROCEDURE — 90837 PSYTX W PT 60 MINUTES: CPT | Performed by: COUNSELOR

## 2024-02-05 NOTE — PROGRESS NOTES
to manage significant frustration to maintain contact. Therapist did lightly encourage client to consider addressing her sleep, as she had previously acknowledged it would be beneficial.    Previous Plan:  Client to resume sleep schedule      MENTAL STATUS EXAM  Mood was anxious with anxious affect.   Suicidal ideation was denied.   Homicidal ideation was denied.   Hygiene was good .  Dress was neat.   Behavior was Restless & fidgety with No observation or self-report of difficulties ambulating.   Attitude was Cooperative.  Eye-contact was fair.  Speech: rate - WNL, rhythm - WNL, volume - WNL.  Verbalizations were goal directed.  Thought processes were intact and goal-oriented without evidence of delusions, hallucinations, obsessions, or florencia; with moderate cognitive distortions.   Associations were characterized by intact cognitive processes.  Pt was oriented to person, place, time, and general circumstances;  recent:  good and remote:  good.  Insight and judgment were estimated to be fair, AEB, a fair  understanding of cyclical maladaptive patterns, and the ability to use insight to inform behavior change.   Attention span and concentration appear within functional limits  Language use and knowledge base appear within functional limits        ASSESSMENT  Lakia GIL Tarun presented to the appointment today for evaluation and treatment of symptoms of anxiety.  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 Lakia was in agreement with recommendations.           1/10/2024     4:33 PM 12/20/2023    11:25 AM 1/17/2023     2:28 PM 1/10/2023     1:50 PM 9/15/2022     4:32 PM 4/25/2022     3:06 PM 11/29/2021    11:39 AM   PHQ Scores   PHQ2 Score 1 1 4 1 1 0 6   PHQ9 Score 2 5 17 13 1 6 16     Interpretation of Total Score Depression Severity: 1-4 = Minimal depression, 5-9 = Mild depression, 10-14 = Moderate depression, 15-19 = Moderately severe depression, 20-27 =

## 2024-02-14 ENCOUNTER — OFFICE VISIT (OUTPATIENT)
Dept: PRIMARY CARE CLINIC | Age: 64
End: 2024-02-14
Payer: COMMERCIAL

## 2024-02-14 ENCOUNTER — HOSPITAL ENCOUNTER (OUTPATIENT)
Age: 64
Setting detail: SPECIMEN
Discharge: HOME | End: 2024-02-14

## 2024-02-14 VITALS
OXYGEN SATURATION: 99 % | HEART RATE: 92 BPM | DIASTOLIC BLOOD PRESSURE: 80 MMHG | HEIGHT: 62 IN | WEIGHT: 158.2 LBS | SYSTOLIC BLOOD PRESSURE: 130 MMHG | BODY MASS INDEX: 29.11 KG/M2

## 2024-02-14 DIAGNOSIS — R11.0 NAUSEA: ICD-10-CM

## 2024-02-14 DIAGNOSIS — R11.0 NAUSEA: Primary | ICD-10-CM

## 2024-02-14 DIAGNOSIS — N30.00 ACUTE CYSTITIS WITHOUT HEMATURIA: ICD-10-CM

## 2024-02-14 LAB
BILIRUBIN, POC: NEGATIVE
BLOOD URINE, POC: ABNORMAL
CLARITY, POC: ABNORMAL
COLOR, POC: ABNORMAL
GLUCOSE URINE, POC: NEGATIVE
KETONES, POC: NEGATIVE
LEUKOCYTE EST, POC: ABNORMAL
NITRITE, POC: NEGATIVE
PH, POC: 6.5
PROTEIN, POC: NEGATIVE
SPECIFIC GRAVITY, POC: 1.01
UROBILINOGEN, POC: ABNORMAL

## 2024-02-14 PROCEDURE — 99214 OFFICE O/P EST MOD 30 MIN: CPT | Performed by: PHYSICIAN ASSISTANT

## 2024-02-14 PROCEDURE — 3075F SYST BP GE 130 - 139MM HG: CPT | Performed by: PHYSICIAN ASSISTANT

## 2024-02-14 PROCEDURE — 3079F DIAST BP 80-89 MM HG: CPT | Performed by: PHYSICIAN ASSISTANT

## 2024-02-14 PROCEDURE — 81003 URINALYSIS AUTO W/O SCOPE: CPT | Performed by: PHYSICIAN ASSISTANT

## 2024-02-14 RX ORDER — ONDANSETRON 4 MG/1
4 TABLET, FILM COATED ORAL 3 TIMES DAILY PRN
Qty: 30 TABLET | Refills: 2 | Status: SHIPPED | OUTPATIENT
Start: 2024-02-14

## 2024-02-14 RX ORDER — MECLIZINE HYDROCHLORIDE 25 MG/1
25 TABLET ORAL 3 TIMES DAILY PRN
Qty: 30 TABLET | Refills: 0 | Status: SHIPPED | OUTPATIENT
Start: 2024-02-14 | End: 2024-02-24

## 2024-02-14 RX ORDER — FAMOTIDINE 20 MG/1
20 TABLET, FILM COATED ORAL 2 TIMES DAILY
Qty: 60 TABLET | Refills: 5 | Status: SHIPPED | OUTPATIENT
Start: 2024-02-14

## 2024-02-14 NOTE — PROGRESS NOTES
[facility]  [facility] PRIMARY CARE  [address] [facility] [address]  Dept: [phone]    Lakia Mcneil is a 63 y.o. female Established patient, who presents today for her medical conditions/complaints as noted below.      Chief Complaint   Patient presents with    Nausea     On going the past 5 weeks, states zofran did help    Pain     Pt still getting sharp pains in neck & LT ear.       HPI:     HPI: The patient is a pleasant 63-year-old female who presents today with concerns of nausea over the last 5 weeks.  Did take Zofran which seemed to help.    Started with twitching in face and floaters in eye. No chest pain.     Zofran did help. Laying down helps.     Movement makes it worse. Turning head makes it worse. Had EKG and saw cardiology last week.     Reviewed prior notes None  Reviewed previous Labs    LDL Cholesterol (mg/dL)   Date Value   02/21/2023 146 (H)   09/19/2022 141 (H)   05/13/2022 158 (H)       (goal LDL is <100)   AST (U/L)   Date Value   02/21/2023 17     ALT (U/L)   Date Value   02/21/2023 13     BUN (mg/dL)   Date Value   11/14/2023 13     Hemoglobin A1C (%)   Date Value   11/12/2023 5.8     TSH (uIU/mL)   Date Value   11/11/2023 41.32 (H)     BP Readings from Last 3 Encounters:   02/14/24 130/80   02/09/24 128/82   01/25/24 124/78          (goal 120/80)  Hemoglobin A1C   Date Value Ref Range Status   11/12/2023 5.8 4.0 - 6.0 % Final     Past Medical History:   Diagnosis Date    Acute suppurative otitis media of right ear without spontaneous rupture of tympanic membrane 03/18/2019    AMAIRANI positive 05/06/2019    Anxiety     COPD (chronic obstructive pulmonary disease) ([facility]) 2022    Essential hypertension 04/09/2021    History of depression     History of thyroid cancer     Major depressive disorder with single episode, in partial remission ([facility]) 01/24/2019    Osteoarthritis 2022    Panic attacks     Postoperative hypothyroidism 03/21/2018    Sjogren's syndrome

## 2024-02-15 RX ORDER — CEPHALEXIN 500 MG/1
500 CAPSULE ORAL 2 TIMES DAILY
Qty: 14 CAPSULE | Refills: 0 | Status: SHIPPED | OUTPATIENT
Start: 2024-02-15

## 2024-02-15 NOTE — RESULT ENCOUNTER NOTE
Call and advise patient that the results showed positive for UTI. I will send in antibiotic Keflex for patient to begin.

## 2024-02-16 ENCOUNTER — HOSPITAL ENCOUNTER (OUTPATIENT)
Age: 64
Discharge: HOME | End: 2024-02-16
Payer: COMMERCIAL

## 2024-02-16 DIAGNOSIS — E89.0 POSTOPERATIVE HYPOTHYROIDISM: ICD-10-CM

## 2024-02-16 DIAGNOSIS — R11.0 NAUSEA: ICD-10-CM

## 2024-02-16 LAB
ALBUMIN SERPL-MCNC: 4.3 G/DL (ref 3.5–5.2)
ALP SERPL-CCNC: 110 U/L (ref 35–104)
ALT SERPL-CCNC: 18 U/L (ref 5–33)
ANION GAP SERPL CALCULATED.3IONS-SCNC: 8 MMOL/L (ref 9–17)
AST SERPL-CCNC: 15 U/L
BILIRUB SERPL-MCNC: 0.4 MG/DL (ref 0.3–1.2)
BUN SERPL-MCNC: 15 MG/DL (ref 8–23)
CALCIUM SERPL-MCNC: 9.3 MG/DL (ref 8.6–10.4)
CHLORIDE SERPL-SCNC: 108 MMOL/L (ref 98–107)
CHOLEST SERPL-MCNC: 116 MG/DL
CHOLESTEROL/HDL RATIO: 2.3
CO2 SERPL-SCNC: 27 MMOL/L (ref 20–31)
CREAT SERPL-MCNC: 0.9 MG/DL (ref 0.5–0.9)
EST. AVERAGE GLUCOSE BLD GHB EST-MCNC: 117 MG/DL
GFR SERPL CREATININE-BSD FRML MDRD: >60 ML/MIN/1.73M2
GLUCOSE SERPL-MCNC: 89 MG/DL (ref 70–99)
HAV IGM SERPL QL IA: NONREACTIVE
HBA1C MFR BLD: 5.7 % (ref 4–6)
HBV CORE IGM SERPL QL IA: NONREACTIVE
HBV SURFACE AG SERPL QL IA: NONREACTIVE
HCV AB SERPL QL IA: NONREACTIVE
HDLC SERPL-MCNC: 50 MG/DL
LDLC SERPL CALC-MCNC: 47 MG/DL (ref 0–130)
LIPASE SERPL-CCNC: 44 U/L (ref 13–60)
MICROORGANISM SPEC CULT: ABNORMAL
POTASSIUM SERPL-SCNC: 4.2 MMOL/L (ref 3.7–5.3)
PROT SERPL-MCNC: 7.5 G/DL (ref 6.4–8.3)
SODIUM SERPL-SCNC: 143 MMOL/L (ref 135–144)
SPECIMEN DESCRIPTION: ABNORMAL
TRIGL SERPL-MCNC: 93 MG/DL
TSH SERPL DL<=0.05 MIU/L-ACNC: 1.25 UIU/ML (ref 0.3–5)

## 2024-02-16 PROCEDURE — 36415 COLL VENOUS BLD VENIPUNCTURE: CPT

## 2024-02-16 PROCEDURE — 80053 COMPREHEN METABOLIC PANEL: CPT

## 2024-02-16 PROCEDURE — 80061 LIPID PANEL: CPT

## 2024-02-16 PROCEDURE — 83036 HEMOGLOBIN GLYCOSYLATED A1C: CPT

## 2024-02-16 PROCEDURE — 83690 ASSAY OF LIPASE: CPT

## 2024-02-16 PROCEDURE — 84443 ASSAY THYROID STIM HORMONE: CPT

## 2024-02-16 PROCEDURE — 80074 ACUTE HEPATITIS PANEL: CPT

## 2024-02-19 ENCOUNTER — TELEMEDICINE (OUTPATIENT)
Dept: BEHAVIORAL/MENTAL HEALTH CLINIC | Age: 64
End: 2024-02-19
Payer: COMMERCIAL

## 2024-02-19 DIAGNOSIS — F40.01 AGORAPHOBIA WITH PANIC DISORDER: Primary | ICD-10-CM

## 2024-02-19 PROCEDURE — 90837 PSYTX W PT 60 MINUTES: CPT | Performed by: COUNSELOR

## 2024-02-19 NOTE — PROGRESS NOTES
[place] [address]       Total time spent for this encounter:  56    --Tom Munguia [facility] on 2/19/2024 at 9:09 AM    An electronic signature was used to authenticate this note.

## 2024-02-26 ENCOUNTER — OFFICE VISIT (OUTPATIENT)
Dept: PRIMARY CARE CLINIC | Age: 64
End: 2024-02-26
Payer: COMMERCIAL

## 2024-02-26 VITALS
HEIGHT: 62 IN | OXYGEN SATURATION: 99 % | BODY MASS INDEX: 29.37 KG/M2 | WEIGHT: 159.6 LBS | DIASTOLIC BLOOD PRESSURE: 80 MMHG | SYSTOLIC BLOOD PRESSURE: 138 MMHG | HEART RATE: 82 BPM

## 2024-02-26 DIAGNOSIS — F45.8 DIZZINESS, PSYCHOGENIC: Primary | ICD-10-CM

## 2024-02-26 DIAGNOSIS — F41.1 GAD (GENERALIZED ANXIETY DISORDER): ICD-10-CM

## 2024-02-26 DIAGNOSIS — F41.9 ANXIETY: ICD-10-CM

## 2024-02-26 PROCEDURE — 3075F SYST BP GE 130 - 139MM HG: CPT | Performed by: PHYSICIAN ASSISTANT

## 2024-02-26 PROCEDURE — 3080F DIAST BP >= 90 MM HG: CPT | Performed by: PHYSICIAN ASSISTANT

## 2024-02-26 PROCEDURE — 99214 OFFICE O/P EST MOD 30 MIN: CPT | Performed by: PHYSICIAN ASSISTANT

## 2024-02-26 RX ORDER — ESCITALOPRAM OXALATE 5 MG/1
5 TABLET ORAL DAILY
Qty: 30 TABLET | Refills: 0 | Status: SHIPPED | OUTPATIENT
Start: 2024-02-26

## 2024-02-26 ASSESSMENT — ENCOUNTER SYMPTOMS
DIARRHEA: 0
NAUSEA: 1
COUGH: 0
ABDOMINAL DISTENTION: 0
CHEST TIGHTNESS: 0
SHORTNESS OF BREATH: 0
ABDOMINAL PAIN: 0
CONSTIPATION: 0

## 2024-02-26 NOTE — PROGRESS NOTES
bedtime 10.7 g 5    vitamin B-12 (CYANOCOBALAMIN) 1000 MCG tablet Take 1 tablet by mouth daily      lisinopril (PRINIVIL;ZESTRIL) 40 MG tablet Take 1 tablet by mouth daily 90 tablet 3    buPROPion (WELLBUTRIN XL) 150 MG extended release tablet Take 1 tablet by mouth every morning 90 tablet 0    atorvastatin (LIPITOR) 40 MG tablet Take 1 tablet by mouth nightly 30 tablet 3    aspirin 81 MG chewable tablet Take 1 tablet by mouth daily 30 tablet 3    tiotropium (SPIRIVA HANDIHALER) 18 MCG inhalation capsule Inhale 1 capsule into the lungs daily 90 capsule 3    busPIRone (BUSPAR) 10 MG tablet Take 1 tablet by mouth 3 times daily 270 tablet 3    vitamin D3 (CHOLECALCIFEROL) 25 MCG (1000 UT) TABS tablet Take 2 tablets by mouth daily Dose increased 12/5/2021 180 tablet 3     No current facility-administered medications for this visit.     Allergies   Allergen Reactions    Sulfa Antibiotics Other (See Comments)     bruise    Trazodone And Nefazodone      GI upset\" horrible\"    Contrast [Iodides] Other (See Comments)     Nausea, vomiting, dizziness.     Flexeril [Cyclobenzaprine] Nausea And Vomiting       Health Maintenance   Topic Date Due    COVID-19 Vaccine (1) Never done    Pneumococcal 0-64 years Vaccine (1 - PCV) Never done    Colorectal Cancer Screen  Never done    Shingles vaccine (1 of 2) Never done    Respiratory Syncytial Virus (RSV) Pregnant or age 60 yrs+ (1 - 1-dose 60+ series) Never done    Flu vaccine (1) Never done    Low dose CT lung screening &/or counseling  07/14/2024    Depression Monitoring  01/10/2025    Cervical cancer screen  01/15/2025    A1C test (Diabetic or Prediabetic)  02/16/2025    Lipids  02/16/2025    Breast cancer screen  07/10/2025    DTaP/Tdap/Td vaccine (2 - Td or Tdap) 07/31/2027    Hepatitis C screen  Completed    HIV screen  Completed    Hepatitis A vaccine  Aged Out    Hepatitis B vaccine  Aged Out    Hib vaccine  Aged Out    Polio vaccine  Aged Out    Meningococcal (ACWY) vaccine

## 2024-03-04 ENCOUNTER — PATIENT MESSAGE (OUTPATIENT)
Dept: PRIMARY CARE CLINIC | Age: 64
End: 2024-03-04

## 2024-03-04 ENCOUNTER — TELEMEDICINE (OUTPATIENT)
Dept: BEHAVIORAL/MENTAL HEALTH CLINIC | Age: 64
End: 2024-03-04
Payer: COMMERCIAL

## 2024-03-04 DIAGNOSIS — F40.01 AGORAPHOBIA WITH PANIC DISORDER: Primary | ICD-10-CM

## 2024-03-04 PROCEDURE — 90837 PSYTX W PT 60 MINUTES: CPT | Performed by: COUNSELOR

## 2024-03-04 NOTE — PROGRESS NOTES
ADULT BEHAVIORAL HEALTH FOLLOW UP  Tom Munguia [facility]      Visit Date: 3/4/2024   Time of appointment:  1:02  Time spent with Patient: 55 minutes.   This is patient's 15th appointment.    Reason for Consult:  Stress, Anxiety, and Depression       Referring Provider/PCP:    No ref. provider found  Geovany Zavaleta PA      Pt provided informed consent for the behavioral health program. Discussed with patient model of service to include the limits of confidentiality (i.e. abuse reporting, suicide intervention, etc.) and short-term intervention focused approach.  Pt indicated understanding.    ZOHRA Dorman is a 64 y.o. female who presents for follow up of Agoraphobia with Panic Disorder. Client stated she has not been able to continue using the medications she was taking previously. Client became tearful when discussing this, and referred to herself as a \"hypochondriac\", and elaborated that she feels she is complaining about nothing, upsetting her providers, and being seen as a nuisance. Therapist highlighted the mind reading, negative filtering, and negative self-talk in these statements and utilized CBT techniques to challenge them. Client fears she is being given medications to \"placate\" her so she will \"leave them alone and be quiet\". Client and therapist discussed utilizing assertive communication to address her needs with her providers. Client stated she hesitates to be more assertive as she fears she will anger a provider and lose their services. Therapist highlighted the difference between utilizing assertive communication and being direct about the impact of her symptoms, versus using aggressive or challenging language. Therapist encouraged client to return to use of previous coping skills, focusing on success she has had with crafting and other expressive self care in the past.     Previous Plan:  Client to explore desired outcomes in situations with others instead of focusing on the other party's

## 2024-03-04 NOTE — TELEPHONE ENCOUNTER
From: Lakia Mcneil  To: Geovany Zavaleta  Sent: 3/4/2024 2:21 PM EST  Subject: Rx ESCOTALOPRAM    My pharmacy has sent me so many articles about the interaction of this medication with 2 that I am already taking I am afraid to start this medication.

## 2024-03-07 ENCOUNTER — APPOINTMENT (OUTPATIENT)
Dept: CT IMAGING | Age: 64
End: 2024-03-07
Payer: COMMERCIAL

## 2024-03-07 ENCOUNTER — HOSPITAL ENCOUNTER (INPATIENT)
Age: 64
LOS: 1 days | Discharge: HOME | End: 2024-03-08
Attending: EMERGENCY MEDICINE | Admitting: INTERNAL MEDICINE
Payer: COMMERCIAL

## 2024-03-07 DIAGNOSIS — N12 PYELONEPHRITIS: ICD-10-CM

## 2024-03-07 DIAGNOSIS — N13.2 HYDRONEPHROSIS WITH URINARY OBSTRUCTION DUE TO URETERAL CALCULUS: Primary | ICD-10-CM

## 2024-03-07 DIAGNOSIS — N20.1 URETERAL STONE: ICD-10-CM

## 2024-03-07 DIAGNOSIS — N30.01 ACUTE CYSTITIS WITH HEMATURIA: ICD-10-CM

## 2024-03-07 PROBLEM — N39.0 UTI (URINARY TRACT INFECTION): Status: ACTIVE | Noted: 2024-03-07

## 2024-03-07 LAB
ABSOLUTE BANDS: 0.32 K/UL (ref 0–1)
ALBUMIN SERPL-MCNC: 4.1 G/DL (ref 3.5–5.2)
ALP SERPL-CCNC: 138 U/L (ref 35–104)
ALT SERPL-CCNC: 23 U/L (ref 5–33)
ANION GAP SERPL CALCULATED.3IONS-SCNC: 13 MMOL/L (ref 9–17)
AST SERPL-CCNC: 16 U/L
ATYPICAL LYMPHOCYTE ABSOLUTE COUNT: 0.11 K/UL
ATYPICAL LYMPHOCYTES: 1 %
BACTERIA URNS QL MICRO: ABNORMAL
BANDS: 3 % (ref 0–10)
BASOPHILS # BLD: 0 K/UL (ref 0–0.2)
BASOPHILS NFR BLD: 0 % (ref 0–2)
BILIRUB SERPL-MCNC: 0.4 MG/DL (ref 0.3–1.2)
BILIRUB UR QL STRIP: NEGATIVE
BUN SERPL-MCNC: 14 MG/DL (ref 8–23)
CALCIUM SERPL-MCNC: 9.1 MG/DL (ref 8.6–10.4)
CASTS #/AREA URNS LPF: ABNORMAL /LPF
CHLORIDE SERPL-SCNC: 103 MMOL/L (ref 98–107)
CLARITY UR: ABNORMAL
CO2 SERPL-SCNC: 24 MMOL/L (ref 20–31)
COLOR UR: YELLOW
CREAT SERPL-MCNC: 0.9 MG/DL (ref 0.5–0.9)
EOSINOPHIL # BLD: 0.43 K/UL (ref 0–0.4)
EOSINOPHILS RELATIVE PERCENT: 4 % (ref 0–4)
EPI CELLS #/AREA URNS HPF: ABNORMAL /HPF
ERYTHROCYTE [DISTWIDTH] IN BLOOD BY AUTOMATED COUNT: 13.6 % (ref 11.5–14.9)
GFR SERPL CREATININE-BSD FRML MDRD: >60 ML/MIN/1.73M2
GLUCOSE SERPL-MCNC: 104 MG/DL (ref 70–99)
GLUCOSE UR STRIP-MCNC: NEGATIVE MG/DL
HCT VFR BLD AUTO: 43.7 % (ref 36–46)
HGB BLD-MCNC: 14.4 G/DL (ref 12–16)
HGB UR QL STRIP.AUTO: ABNORMAL
KETONES UR STRIP-MCNC: ABNORMAL MG/DL
LEUKOCYTE ESTERASE UR QL STRIP: ABNORMAL
LYMPHOCYTES NFR BLD: 1.82 K/UL (ref 1–4.8)
LYMPHOCYTES RELATIVE PERCENT: 17 % (ref 24–44)
MCH RBC QN AUTO: 30.4 PG (ref 26–34)
MCHC RBC AUTO-ENTMCNC: 32.9 G/DL (ref 31–37)
MCV RBC AUTO: 92.2 FL (ref 80–100)
MONOCYTES NFR BLD: 0.43 K/UL (ref 0.1–1.3)
MONOCYTES NFR BLD: 4 % (ref 1–7)
MORPHOLOGY: ABNORMAL
MORPHOLOGY: ABNORMAL
NEUTROPHILS NFR BLD: 71 % (ref 36–66)
NEUTS SEG NFR BLD: 7.59 K/UL (ref 1.3–9.1)
NITRITE UR QL STRIP: NEGATIVE
PH UR STRIP: 5.5 [PH] (ref 5–8)
PLATELET # BLD AUTO: 302 K/UL (ref 150–450)
PMV BLD AUTO: 8.1 FL (ref 6–12)
POTASSIUM SERPL-SCNC: 4.5 MMOL/L (ref 3.7–5.3)
PROT SERPL-MCNC: 7.3 G/DL (ref 6.4–8.3)
PROT UR STRIP-MCNC: ABNORMAL MG/DL
RBC # BLD AUTO: 4.74 M/UL (ref 4–5.2)
RBC #/AREA URNS HPF: ABNORMAL /HPF
SODIUM SERPL-SCNC: 140 MMOL/L (ref 135–144)
SP GR UR STRIP: 1.02 (ref 1–1.03)
UROBILINOGEN UR STRIP-ACNC: NORMAL EU/DL (ref 0–1)
WBC #/AREA URNS HPF: ABNORMAL /HPF
WBC OTHER # BLD: 10.7 K/UL (ref 3.5–11)

## 2024-03-07 PROCEDURE — 6370000000 HC RX 637 (ALT 250 FOR IP)

## 2024-03-07 PROCEDURE — 2580000003 HC RX 258: Performed by: EMERGENCY MEDICINE

## 2024-03-07 PROCEDURE — 99285 EMERGENCY DEPT VISIT HI MDM: CPT

## 2024-03-07 PROCEDURE — 74176 CT ABD & PELVIS W/O CONTRAST: CPT

## 2024-03-07 PROCEDURE — 94760 N-INVAS EAR/PLS OXIMETRY 1: CPT

## 2024-03-07 PROCEDURE — 81001 URINALYSIS AUTO W/SCOPE: CPT

## 2024-03-07 PROCEDURE — 94640 AIRWAY INHALATION TREATMENT: CPT

## 2024-03-07 PROCEDURE — 80053 COMPREHEN METABOLIC PANEL: CPT

## 2024-03-07 PROCEDURE — 96365 THER/PROPH/DIAG IV INF INIT: CPT

## 2024-03-07 PROCEDURE — 36415 COLL VENOUS BLD VENIPUNCTURE: CPT

## 2024-03-07 PROCEDURE — 2580000003 HC RX 258

## 2024-03-07 PROCEDURE — 1200000000 HC SEMI PRIVATE

## 2024-03-07 PROCEDURE — 99223 1ST HOSP IP/OBS HIGH 75: CPT | Performed by: INTERNAL MEDICINE

## 2024-03-07 PROCEDURE — 85025 COMPLETE CBC W/AUTO DIFF WBC: CPT

## 2024-03-07 PROCEDURE — 6360000002 HC RX W HCPCS: Performed by: EMERGENCY MEDICINE

## 2024-03-07 RX ORDER — POLYETHYLENE GLYCOL 3350 17 G/17G
17 POWDER, FOR SOLUTION ORAL DAILY PRN
Status: DISCONTINUED | OUTPATIENT
Start: 2024-03-07 | End: 2024-03-08 | Stop reason: HOSPADM

## 2024-03-07 RX ORDER — LANOLIN ALCOHOL/MO/W.PET/CERES
3 CREAM (GRAM) TOPICAL NIGHTLY PRN
Status: DISCONTINUED | OUTPATIENT
Start: 2024-03-07 | End: 2024-03-08 | Stop reason: HOSPADM

## 2024-03-07 RX ORDER — ATORVASTATIN CALCIUM 40 MG/1
40 TABLET, FILM COATED ORAL NIGHTLY
Status: DISCONTINUED | OUTPATIENT
Start: 2024-03-07 | End: 2024-03-08 | Stop reason: HOSPADM

## 2024-03-07 RX ORDER — ACETAMINOPHEN 650 MG/1
650 SUPPOSITORY RECTAL EVERY 6 HOURS PRN
Status: DISCONTINUED | OUTPATIENT
Start: 2024-03-07 | End: 2024-03-08 | Stop reason: HOSPADM

## 2024-03-07 RX ORDER — ONDANSETRON 4 MG/1
4 TABLET, ORALLY DISINTEGRATING ORAL EVERY 8 HOURS PRN
Status: DISCONTINUED | OUTPATIENT
Start: 2024-03-07 | End: 2024-03-08 | Stop reason: HOSPADM

## 2024-03-07 RX ORDER — BUPROPION HYDROCHLORIDE 150 MG/1
150 TABLET ORAL EVERY MORNING
Status: DISCONTINUED | OUTPATIENT
Start: 2024-03-08 | End: 2024-03-08 | Stop reason: HOSPADM

## 2024-03-07 RX ORDER — LEVOTHYROXINE SODIUM 0.03 MG/1
25 TABLET ORAL
COMMUNITY
End: 2024-03-07 | Stop reason: DRUGHIGH

## 2024-03-07 RX ORDER — SODIUM CHLORIDE 9 MG/ML
INJECTION, SOLUTION INTRAVENOUS PRN
Status: DISCONTINUED | OUTPATIENT
Start: 2024-03-07 | End: 2024-03-08 | Stop reason: HOSPADM

## 2024-03-07 RX ORDER — ESCITALOPRAM OXALATE 10 MG/1
5 TABLET ORAL DAILY
Status: DISCONTINUED | OUTPATIENT
Start: 2024-03-07 | End: 2024-03-08 | Stop reason: HOSPADM

## 2024-03-07 RX ORDER — MAGNESIUM SULFATE HEPTAHYDRATE 40 MG/ML
2000 INJECTION, SOLUTION INTRAVENOUS PRN
Status: DISCONTINUED | OUTPATIENT
Start: 2024-03-07 | End: 2024-03-08 | Stop reason: HOSPADM

## 2024-03-07 RX ORDER — ARIPIPRAZOLE 5 MG/1
5 TABLET ORAL DAILY
Status: DISCONTINUED | OUTPATIENT
Start: 2024-03-07 | End: 2024-03-08 | Stop reason: HOSPADM

## 2024-03-07 RX ORDER — POTASSIUM CHLORIDE 7.45 MG/ML
10 INJECTION INTRAVENOUS PRN
Status: DISCONTINUED | OUTPATIENT
Start: 2024-03-07 | End: 2024-03-08 | Stop reason: HOSPADM

## 2024-03-07 RX ORDER — ACETAMINOPHEN 325 MG/1
650 TABLET ORAL EVERY 6 HOURS PRN
Status: DISCONTINUED | OUTPATIENT
Start: 2024-03-07 | End: 2024-03-08 | Stop reason: HOSPADM

## 2024-03-07 RX ORDER — VITAMIN B COMPLEX
2000 TABLET ORAL DAILY
Status: DISCONTINUED | OUTPATIENT
Start: 2024-03-07 | End: 2024-03-08 | Stop reason: HOSPADM

## 2024-03-07 RX ORDER — SODIUM CHLORIDE 0.9 % (FLUSH) 0.9 %
5-40 SYRINGE (ML) INJECTION EVERY 12 HOURS SCHEDULED
Status: DISCONTINUED | OUTPATIENT
Start: 2024-03-07 | End: 2024-03-08 | Stop reason: HOSPADM

## 2024-03-07 RX ORDER — PAROXETINE HYDROCHLORIDE 40 MG/1
40 TABLET, FILM COATED ORAL EVERY MORNING
COMMUNITY
End: 2024-03-07

## 2024-03-07 RX ORDER — LANOLIN ALCOHOL/MO/W.PET/CERES
1000 CREAM (GRAM) TOPICAL DAILY
Status: DISCONTINUED | OUTPATIENT
Start: 2024-03-07 | End: 2024-03-08 | Stop reason: HOSPADM

## 2024-03-07 RX ORDER — ASPIRIN 81 MG/1
81 TABLET, CHEWABLE ORAL DAILY
Status: DISCONTINUED | OUTPATIENT
Start: 2024-03-07 | End: 2024-03-08 | Stop reason: HOSPADM

## 2024-03-07 RX ORDER — LEVOTHYROXINE SODIUM 0.03 MG/1
25 TABLET ORAL
Status: DISCONTINUED | OUTPATIENT
Start: 2024-03-08 | End: 2024-03-07 | Stop reason: SDUPTHER

## 2024-03-07 RX ORDER — POTASSIUM CHLORIDE 20 MEQ/1
40 TABLET, EXTENDED RELEASE ORAL PRN
Status: DISCONTINUED | OUTPATIENT
Start: 2024-03-07 | End: 2024-03-08 | Stop reason: HOSPADM

## 2024-03-07 RX ORDER — SODIUM CHLORIDE 0.9 % (FLUSH) 0.9 %
5-40 SYRINGE (ML) INJECTION PRN
Status: DISCONTINUED | OUTPATIENT
Start: 2024-03-07 | End: 2024-03-08 | Stop reason: HOSPADM

## 2024-03-07 RX ORDER — BUSPIRONE HYDROCHLORIDE 10 MG/1
10 TABLET ORAL 3 TIMES DAILY
Status: DISCONTINUED | OUTPATIENT
Start: 2024-03-07 | End: 2024-03-08 | Stop reason: HOSPADM

## 2024-03-07 RX ORDER — LEVOTHYROXINE SODIUM 0.12 MG/1
125 TABLET ORAL DAILY
Status: DISCONTINUED | OUTPATIENT
Start: 2024-03-07 | End: 2024-03-08 | Stop reason: HOSPADM

## 2024-03-07 RX ORDER — ENOXAPARIN SODIUM 100 MG/ML
40 INJECTION SUBCUTANEOUS DAILY
Status: DISCONTINUED | OUTPATIENT
Start: 2024-03-07 | End: 2024-03-08 | Stop reason: HOSPADM

## 2024-03-07 RX ORDER — METOPROLOL SUCCINATE 25 MG/1
25 TABLET, EXTENDED RELEASE ORAL NIGHTLY
Status: DISCONTINUED | OUTPATIENT
Start: 2024-03-07 | End: 2024-03-08 | Stop reason: HOSPADM

## 2024-03-07 RX ORDER — FAMOTIDINE 20 MG/1
20 TABLET, FILM COATED ORAL 2 TIMES DAILY PRN
COMMUNITY

## 2024-03-07 RX ORDER — LISINOPRIL 20 MG/1
40 TABLET ORAL DAILY
Status: DISCONTINUED | OUTPATIENT
Start: 2024-03-07 | End: 2024-03-08 | Stop reason: HOSPADM

## 2024-03-07 RX ORDER — ARIPIPRAZOLE 5 MG/1
5 TABLET ORAL DAILY
COMMUNITY

## 2024-03-07 RX ORDER — ONDANSETRON 2 MG/ML
4 INJECTION INTRAMUSCULAR; INTRAVENOUS EVERY 6 HOURS PRN
Status: DISCONTINUED | OUTPATIENT
Start: 2024-03-07 | End: 2024-03-08 | Stop reason: HOSPADM

## 2024-03-07 RX ADMIN — BUSPIRONE HYDROCHLORIDE 10 MG: 10 TABLET ORAL at 19:44

## 2024-03-07 RX ADMIN — Medication 5 ML: at 21:23

## 2024-03-07 RX ADMIN — TIOTROPIUM BROMIDE INHALATION SPRAY 2 PUFF: 3.12 SPRAY, METERED RESPIRATORY (INHALATION) at 15:28

## 2024-03-07 RX ADMIN — ATORVASTATIN CALCIUM 40 MG: 40 TABLET, FILM COATED ORAL at 19:44

## 2024-03-07 RX ADMIN — METOPROLOL SUCCINATE 25 MG: 25 TABLET, EXTENDED RELEASE ORAL at 19:45

## 2024-03-07 RX ADMIN — CEFTRIAXONE SODIUM 1000 MG: 1 INJECTION, POWDER, FOR SOLUTION INTRAMUSCULAR; INTRAVENOUS at 14:37

## 2024-03-07 ASSESSMENT — PAIN DESCRIPTION - LOCATION: LOCATION: ABDOMEN

## 2024-03-07 ASSESSMENT — ENCOUNTER SYMPTOMS
SHORTNESS OF BREATH: 0
SORE THROAT: 0
ABDOMINAL PAIN: 0
ABDOMINAL PAIN: 1
NAUSEA: 1
SINUS PAIN: 0
VOMITING: 0
DIARRHEA: 0
NAUSEA: 0
COUGH: 0
SINUS PRESSURE: 0
CONSTIPATION: 0

## 2024-03-07 ASSESSMENT — PAIN - FUNCTIONAL ASSESSMENT: PAIN_FUNCTIONAL_ASSESSMENT: 0-10

## 2024-03-07 ASSESSMENT — PAIN SCALES - GENERAL: PAINLEVEL_OUTOF10: 1

## 2024-03-07 NOTE — H&P
[facility]  IN-PATIENT SERVICE  [facility]  IN-PATIENT SERVICE   [facility]     HISTORY AND PHYSICAL EXAMINATION            Date:   3/7/2024  Patient name:  Lakia Mcneil  Date of admission:  3/7/2024 12:47 PM  MRN:   [id number]  Account:  [id number]  YOB: 1960  PCP:    Geovany Zavaleta PA  Room:   03/03  Code Status:    Prior    Chief Complaint:     Chief Complaint   Patient presents with    Groin Pain    Dysuria    Flank Pain     Lt flank pain       History Obtained From:     patient, electronic medical record    History of Present Illness:     Lakia Mcneil is a 64 y.o. female with a past medical history of depression, anxiety, COPD, hypertension, Sjogren's syndrome, and thyroid cancer s/p thyroidectomy who presents to the ED with 2-week history of suprapubic abdominal pain.  The pain is intermittent, occurring 3-4 times per day.  Pain can last anywhere from 1 to 12 hours.  Currently she rates the pain as a 1/10, but can reach up to 10/10 at its worst.  She endorses associated left flank pain which started this morning, prompting her to present to the ED.  There are no alleviating or exacerbating factors.  She also endorses 6 to 8-week history of nausea since quitting smoking.  Patient was seen prior and found to have a UTI, and was given oral antibiotics.  Patient reports a 30-pack-year smoking history, having quit 4 months ago.  She denies alcohol or illicit drug use.  In the ED, patient was found to have UTI on urinalysis, and CT abdomen/pelvis demonstrated 7 mm obstructing left ureteral calculus with mild to moderate left-sided hydronephrosis and hydroureter.  CBC and CMP were unremarkable.  Urology was consulted and patient was admitted to the Medr unit for management of pyelonephritis.        Past Medical History:     Past Medical History:   Diagnosis Date    Acute suppurative otitis media  p.o. daily  -Continue home Aripiprazole 5 mg p.o. daily  -Continue home Wellbutrin 150 mg p.o. daily  -Continue home BuSpar 10 mg p.o. 3 times daily    Hypothyroidism  -s/p thyroidectomy d/t thyroid cancer  -Continue home Synthroid 125 mcg p.o. daily       DVT prophylaxis: Lovenox 40 mg SC daily  GI prophylaxis: None  Diet: NPO  Disposition: Likely home    OT/PT/SW all consulted    Code Status: Full code    Consultations:   IP CONSULT TO UROLOGY     Patient is admitted as inpatient status because of co-morbiditieslisted above, severity of signs and symptoms as outlined, requirement for current medical therapies and most importantly because of direct risk to patient if care not provided in a hospital setting.    Jf Gloria MD  PGY1- Transitional Year Resident  3/7/2024  3:19 PM    This note is created with the assistance of the speech recognition program. While intending to generate a document that actually reflects the content of the visit, it can still have some errors including those of syntax and sound-a-like substitutions which may escape proof reading. Actual meaning can be extrapolated by contextual inference.    Copy sent to Dr. Zavaleta, Geovany VAIL, PA

## 2024-03-07 NOTE — PROGRESS NOTES
Notified Dr. Kemp of consult. OK to feed patient, make NPO at midnight and will see patient first thing tomorrow. Please notify urology if patient spikes fever overnight, becomes tachycardic or her general condition worsens.

## 2024-03-07 NOTE — PROGRESS NOTES
Patient, has past medical history of Sjogren's disease, COPD, admitted with left-sided flank pain, dysuria  Had UTI, urine culture growing E. coli, started on antibiotics by outpatient PCP  CT abdomen pelvis suggestive of obstructive renal stone on left side  Requesting urology consult  Starting patient on Rocephin  IV fluid  Will keep patient n.p.o.  Full dictation to follow

## 2024-03-07 NOTE — ED NOTES
Report given to EVELYN Cabrera from [facility].   Report method by phone   The following was reviewed with receiving RN:   Current vital signs:  /70   Pulse 81   Temp 98.2 °F (36.8 °C) (Oral)   Resp 16   Ht 1.575 m (5' 2\")   Wt 72.1 kg (159 lb)   LMP 06/04/2015 Comment: g:3 p:3  SpO2 95%   BMI 29.08 kg/m²                MEWS Score: 1     Any medication or safety alerts were reviewed. Any pending diagnostics and notifications were also reviewed, as well as any safety concerns or issues, abnormal labs, abnormal imaging, and abnormal assessment findings. Questions were answered.

## 2024-03-07 NOTE — ED PROVIDER NOTES
EMERGENCY DEPARTMENT ENCOUNTER    Pt Name: Lakia Mcneil  MRN: [id number]  Birthdate 1960  Date of evaluation: 3/7/24  CHIEF COMPLAINT       Chief Complaint   Patient presents with    Groin Pain    Dysuria    Flank Pain     Lt flank pain     HISTORY OF PRESENT ILLNESS   64-year-old female presenting with chief complaint of urinary complaints and left flank pain.  Patient has been on antibiotics for urinary tract infection but her symptoms are worsening prompting her visit.  She admits to dysuria with increased urinary frequency and urgency.  She also admits to discoloration of her urine and a foul odor.    The history is provided by the patient.           REVIEW OF SYSTEMS     Review of Systems   Constitutional:  Negative for chills and fever.   HENT:  Negative for congestion.    Eyes:  Negative for visual disturbance.   Respiratory:  Negative for cough and shortness of breath.    Cardiovascular:  Negative for chest pain.   Gastrointestinal:  Negative for abdominal pain, constipation, diarrhea, nausea and vomiting.   Genitourinary:  Positive for dysuria, flank pain, frequency and urgency.   Musculoskeletal:  Negative for myalgias.   Neurological:  Negative for dizziness, light-headedness and headaches.   Psychiatric/Behavioral:  Negative for behavioral problems.      PASTMEDICAL HISTORY     Past Medical History:   Diagnosis Date    Acute suppurative otitis media of right ear without spontaneous rupture of tympanic membrane 03/18/2019    AMAIRANI positive 05/06/2019    Anxiety     COPD (chronic obstructive pulmonary disease) ([facility]) 2022    Essential hypertension 04/09/2021    History of depression     History of thyroid cancer     Major depressive disorder with single episode, in partial remission ([facility]) 01/24/2019    Osteoarthritis 2022    Panic attacks     Postoperative hypothyroidism 03/21/2018    Sjogren's syndrome with keratoconjunctivitis sicca ([facility]) 07/05/2019    Per rheumatology     Past Problem List  Patient Active  metoprolol succinate (TOPROL XL) extended release tablet 25 mg    lisinopril (PRINIVIL;ZESTRIL) tablet 40 mg    tiotropium (SPIRIVA RESPIMAT) 2.5 MCG/ACT inhaler 2 puff    vitamin B-12 (CYANOCOBALAMIN) tablet 1,000 mcg    Vitamin D (CHOLECALCIFEROL) tablet 2,000 Units    ARIPiprazole (ABILIFY) tablet 5 mg    aspirin chewable tablet 81 mg     DISCHARGE PRESCRIPTIONS:  New Prescriptions    No medications on file     PHYSICIAN CONSULTS ORDERED THIS ENCOUNTER:  IP CONSULT TO UROLOGY  IP CONSULT TO SOCIAL WORK  FINAL IMPRESSION      1. Hydronephrosis with urinary obstruction due to ureteral calculus    2. Acute cystitis with hematuria    3. Pyelonephritis          DISPOSITION/PLAN   DISPOSITION Admitted 03/07/2024 03:19:27 PM      OUTPATIENT FOLLOW UP THE PATIENT:  No follow-up provider specified.    DO Shelby Alcantara, Shelby SYED DO  03/07/24 1554

## 2024-03-07 NOTE — PROGRESS NOTES
Pharmacy Medication History Note      List of current medications patient is taking is complete.     Source of information: patient, dispense report, OARRS     Changes made to medication list:  Medications flagged for removal (include reason, ex. noncompliance):  Bevespi - patient reports not using this medication  Spiriva - patient reports not using this medication     Medications removed (include reason, ex. therapy complete or physician discontinued):  None     Medications added/doses adjusted:  Levothyroxine changed to 125 mcg daily   Famotidine 20 mg twice daily as needed  Aripiprazole 5 mg daily     Other notes (ex. Recent course of antibiotics, Coumadin dosing):  While bother the escitalopram and paroxetine have current fills, the patient reports that paroxetine was discontinued and escitalopram replaced it.   The patient has several different fills of levothyroxine recently, however she reports the current dose is 125 mcg daily, which was filled on 12/12/23 for 90 days.   OARRS negative     Denies use of other OTC or herbal medications.      Allergies clarified    Medication list provided to the patient: no  Medication education provided to the patient: none    Prior to Admission Medications   Prescriptions Last Dose Informant Patient Reported? Taking?   ARIPiprazole (ABILIFY) 5 MG tablet 3/6/2024  Yes Yes   Sig: Take 1 tablet by mouth daily   Budeson-Glycopyrrol-Formoterol 160-9-4.8 MCG/ACT AERO Not Taking  No No   Sig: Inhale 2 actuation into the lungs in the morning and at bedtime   Patient not taking: Reported on 3/7/2024             aspirin 81 MG chewable tablet 3/6/2024  No No   Sig: Take 1 tablet by mouth daily   atorvastatin (LIPITOR) 40 MG tablet 3/6/2024  No No   Sig: Take 1 tablet by mouth nightly   buPROPion (WELLBUTRIN XL) 150 MG extended release tablet 3/6/2024  No No   Sig: Take 1 tablet by mouth every morning   busPIRone (BUSPAR) 10 MG tablet 3/6/2024  No No   Sig: Take 1 tablet by mouth 3  times daily   escitalopram (LEXAPRO) 5 MG tablet 3/6/2024  No No   Sig: Take 1 tablet by mouth daily   famotidine (PEPCID) 20 MG tablet Past Month  Yes Yes   Sig: Take 1 tablet by mouth 2 times daily as needed   levothyroxine (SYNTHROID) 125 MCG tablet 3/6/2024  Yes No   Sig: Take 1 tablet by mouth Daily   lisinopril (PRINIVIL;ZESTRIL) 40 MG tablet 3/6/2024  No No   Sig: Take 1 tablet by mouth daily   metoprolol succinate (TOPROL XL) 25 MG extended release tablet 3/6/2024  No No   Sig: Take 1 tablet by mouth nightly   tiotropium (SPIRIVA HANDIHALER) 18 MCG inhalation capsule Not Taking  No No   Sig: Inhale 1 capsule into the lungs daily   Patient not taking: Reported on 3/7/2024   vitamin B-12 (CYANOCOBALAMIN) 1000 MCG tablet 3/6/2024  Yes No   Sig: Take 1 tablet by mouth daily   vitamin D3 (CHOLECALCIFEROL) 25 MCG (1000 UT) TABS tablet 3/6/2024  No No   Sig: Take 2 tablets by mouth daily Dose increased 12/5/2021      Facility-Administered Medications: None           Electronically signed by Marian Wahl RPH on 3/7/2024 at 3:26 PM

## 2024-03-08 ENCOUNTER — ANESTHESIA (OUTPATIENT)
Dept: OPERATING ROOM | Age: 64
End: 2024-03-08
Payer: COMMERCIAL

## 2024-03-08 ENCOUNTER — APPOINTMENT (OUTPATIENT)
Dept: GENERAL RADIOLOGY | Age: 64
End: 2024-03-08
Payer: COMMERCIAL

## 2024-03-08 ENCOUNTER — ANESTHESIA EVENT (OUTPATIENT)
Dept: OPERATING ROOM | Age: 64
End: 2024-03-08
Payer: COMMERCIAL

## 2024-03-08 VITALS
SYSTOLIC BLOOD PRESSURE: 113 MMHG | HEART RATE: 75 BPM | OXYGEN SATURATION: 97 % | DIASTOLIC BLOOD PRESSURE: 66 MMHG | TEMPERATURE: 97.4 F | HEIGHT: 62 IN | WEIGHT: 159 LBS | RESPIRATION RATE: 14 BRPM | BODY MASS INDEX: 29.26 KG/M2

## 2024-03-08 LAB
ANION GAP SERPL CALCULATED.3IONS-SCNC: 10 MMOL/L (ref 9–17)
BASOPHILS # BLD: 0.09 K/UL (ref 0–0.2)
BASOPHILS NFR BLD: 1 % (ref 0–2)
BUN SERPL-MCNC: 14 MG/DL (ref 8–23)
CALCIUM SERPL-MCNC: 9 MG/DL (ref 8.6–10.4)
CHLORIDE SERPL-SCNC: 104 MMOL/L (ref 98–107)
CO2 SERPL-SCNC: 27 MMOL/L (ref 20–31)
CREAT SERPL-MCNC: 0.9 MG/DL (ref 0.5–0.9)
EOSINOPHIL # BLD: 2.42 K/UL (ref 0–0.4)
EOSINOPHILS RELATIVE PERCENT: 26 % (ref 0–4)
ERYTHROCYTE [DISTWIDTH] IN BLOOD BY AUTOMATED COUNT: 13.7 % (ref 11.5–14.9)
GFR SERPL CREATININE-BSD FRML MDRD: >60 ML/MIN/1.73M2
GLUCOSE SERPL-MCNC: 104 MG/DL (ref 70–99)
HCT VFR BLD AUTO: 40.5 % (ref 36–46)
HGB BLD-MCNC: 13.5 G/DL (ref 12–16)
LYMPHOCYTES NFR BLD: 2.42 K/UL (ref 1–4.8)
LYMPHOCYTES RELATIVE PERCENT: 26 % (ref 24–44)
MCH RBC QN AUTO: 30.4 PG (ref 26–34)
MCHC RBC AUTO-ENTMCNC: 33.4 G/DL (ref 31–37)
MCV RBC AUTO: 91 FL (ref 80–100)
MONOCYTES NFR BLD: 0.74 K/UL (ref 0.1–1.3)
MONOCYTES NFR BLD: 8 % (ref 1–7)
MORPHOLOGY: ABNORMAL
NEUTROPHILS NFR BLD: 39 % (ref 36–66)
NEUTS SEG NFR BLD: 3.63 K/UL (ref 1.3–9.1)
PLATELET # BLD AUTO: 294 K/UL (ref 150–450)
PMV BLD AUTO: 7.8 FL (ref 6–12)
POTASSIUM SERPL-SCNC: 4.1 MMOL/L (ref 3.7–5.3)
RBC # BLD AUTO: 4.45 M/UL (ref 4–5.2)
SODIUM SERPL-SCNC: 141 MMOL/L (ref 135–144)
WBC OTHER # BLD: 9.3 K/UL (ref 3.5–11)

## 2024-03-08 PROCEDURE — 7100000001 HC PACU RECOVERY - ADDTL 15 MIN: Performed by: UROLOGY

## 2024-03-08 PROCEDURE — 80048 BASIC METABOLIC PNL TOTAL CA: CPT

## 2024-03-08 PROCEDURE — 6370000000 HC RX 637 (ALT 250 FOR IP): Performed by: ANESTHESIOLOGY

## 2024-03-08 PROCEDURE — 97165 OT EVAL LOW COMPLEX 30 MIN: CPT

## 2024-03-08 PROCEDURE — 6360000002 HC RX W HCPCS: Performed by: UROLOGY

## 2024-03-08 PROCEDURE — 3600000013 HC SURGERY LEVEL 3 ADDTL 15MIN: Performed by: UROLOGY

## 2024-03-08 PROCEDURE — 2720000010 HC SURG SUPPLY STERILE: Performed by: UROLOGY

## 2024-03-08 PROCEDURE — 6370000000 HC RX 637 (ALT 250 FOR IP)

## 2024-03-08 PROCEDURE — 94761 N-INVAS EAR/PLS OXIMETRY MLT: CPT

## 2024-03-08 PROCEDURE — 0TC78ZZ EXTIRPATION OF MATTER FROM LEFT URETER, VIA NATURAL OR ARTIFICIAL OPENING ENDOSCOPIC: ICD-10-PCS | Performed by: UROLOGY

## 2024-03-08 PROCEDURE — 0T778DZ DILATION OF LEFT URETER WITH INTRALUMINAL DEVICE, VIA NATURAL OR ARTIFICIAL OPENING ENDOSCOPIC: ICD-10-PCS | Performed by: UROLOGY

## 2024-03-08 PROCEDURE — 85025 COMPLETE CBC W/AUTO DIFF WBC: CPT

## 2024-03-08 PROCEDURE — 99239 HOSP IP/OBS DSCHRG MGMT >30: CPT | Performed by: INTERNAL MEDICINE

## 2024-03-08 PROCEDURE — 3600000003 HC SURGERY LEVEL 3 BASE: Performed by: UROLOGY

## 2024-03-08 PROCEDURE — 2580000003 HC RX 258: Performed by: UROLOGY

## 2024-03-08 PROCEDURE — C1769 GUIDE WIRE: HCPCS | Performed by: UROLOGY

## 2024-03-08 PROCEDURE — 6370000000 HC RX 637 (ALT 250 FOR IP): Performed by: UROLOGY

## 2024-03-08 PROCEDURE — 2580000003 HC RX 258: Performed by: NURSE ANESTHETIST, CERTIFIED REGISTERED

## 2024-03-08 PROCEDURE — 97116 GAIT TRAINING THERAPY: CPT

## 2024-03-08 PROCEDURE — 6360000002 HC RX W HCPCS: Performed by: NURSE ANESTHETIST, CERTIFIED REGISTERED

## 2024-03-08 PROCEDURE — 97530 THERAPEUTIC ACTIVITIES: CPT

## 2024-03-08 PROCEDURE — C2617 STENT, NON-COR, TEM W/O DEL: HCPCS | Performed by: UROLOGY

## 2024-03-08 PROCEDURE — 2709999900 HC NON-CHARGEABLE SUPPLY: Performed by: UROLOGY

## 2024-03-08 PROCEDURE — 36415 COLL VENOUS BLD VENIPUNCTURE: CPT

## 2024-03-08 PROCEDURE — 3700000001 HC ADD 15 MINUTES (ANESTHESIA): Performed by: UROLOGY

## 2024-03-08 PROCEDURE — 97161 PT EVAL LOW COMPLEX 20 MIN: CPT

## 2024-03-08 PROCEDURE — 3700000000 HC ANESTHESIA ATTENDED CARE: Performed by: UROLOGY

## 2024-03-08 PROCEDURE — 7100000000 HC PACU RECOVERY - FIRST 15 MIN: Performed by: UROLOGY

## 2024-03-08 PROCEDURE — 2500000003 HC RX 250 WO HCPCS: Performed by: NURSE ANESTHETIST, CERTIFIED REGISTERED

## 2024-03-08 PROCEDURE — 94640 AIRWAY INHALATION TREATMENT: CPT

## 2024-03-08 DEVICE — URETERAL STENT
Type: IMPLANTABLE DEVICE | Site: URETER | Status: FUNCTIONAL
Brand: POLARIS™ ULTRA

## 2024-03-08 RX ORDER — CIPROFLOXACIN 500 MG/1
500 TABLET, FILM COATED ORAL 2 TIMES DAILY
Qty: 14 TABLET | Refills: 0 | Status: SHIPPED | OUTPATIENT
Start: 2024-03-08 | End: 2024-03-15

## 2024-03-08 RX ORDER — EPHEDRINE SULFATE/0.9% NACL/PF 25 MG/5 ML
SYRINGE (ML) INTRAVENOUS PRN
Status: DISCONTINUED | OUTPATIENT
Start: 2024-03-08 | End: 2024-03-08 | Stop reason: SDUPTHER

## 2024-03-08 RX ORDER — SODIUM CHLORIDE 0.9 % (FLUSH) 0.9 %
5-40 SYRINGE (ML) INJECTION PRN
Status: DISCONTINUED | OUTPATIENT
Start: 2024-03-08 | End: 2024-03-08 | Stop reason: HOSPADM

## 2024-03-08 RX ORDER — PROPOFOL 10 MG/ML
INJECTION, EMULSION INTRAVENOUS PRN
Status: DISCONTINUED | OUTPATIENT
Start: 2024-03-08 | End: 2024-03-08 | Stop reason: SDUPTHER

## 2024-03-08 RX ORDER — FENTANYL CITRATE 0.05 MG/ML
25 INJECTION, SOLUTION INTRAMUSCULAR; INTRAVENOUS EVERY 5 MIN PRN
Status: DISCONTINUED | OUTPATIENT
Start: 2024-03-08 | End: 2024-03-08 | Stop reason: HOSPADM

## 2024-03-08 RX ORDER — SODIUM CHLORIDE 9 MG/ML
INJECTION, SOLUTION INTRAVENOUS PRN
Status: DISCONTINUED | OUTPATIENT
Start: 2024-03-08 | End: 2024-03-08 | Stop reason: HOSPADM

## 2024-03-08 RX ORDER — SODIUM CHLORIDE 0.9 % (FLUSH) 0.9 %
5-40 SYRINGE (ML) INJECTION EVERY 12 HOURS SCHEDULED
Status: DISCONTINUED | OUTPATIENT
Start: 2024-03-08 | End: 2024-03-08 | Stop reason: HOSPADM

## 2024-03-08 RX ORDER — FENTANYL CITRATE 0.05 MG/ML
50 INJECTION, SOLUTION INTRAMUSCULAR; INTRAVENOUS EVERY 5 MIN PRN
Status: DISCONTINUED | OUTPATIENT
Start: 2024-03-08 | End: 2024-03-08 | Stop reason: HOSPADM

## 2024-03-08 RX ORDER — ONDANSETRON 2 MG/ML
4 INJECTION INTRAMUSCULAR; INTRAVENOUS
Status: DISCONTINUED | OUTPATIENT
Start: 2024-03-08 | End: 2024-03-08 | Stop reason: HOSPADM

## 2024-03-08 RX ORDER — SODIUM CHLORIDE, SODIUM LACTATE, POTASSIUM CHLORIDE, CALCIUM CHLORIDE 600; 310; 30; 20 MG/100ML; MG/100ML; MG/100ML; MG/100ML
INJECTION, SOLUTION INTRAVENOUS CONTINUOUS PRN
Status: DISCONTINUED | OUTPATIENT
Start: 2024-03-08 | End: 2024-03-08 | Stop reason: SDUPTHER

## 2024-03-08 RX ORDER — SCOLOPAMINE TRANSDERMAL SYSTEM 1 MG/1
1 PATCH, EXTENDED RELEASE TRANSDERMAL ONCE
Status: DISCONTINUED | OUTPATIENT
Start: 2024-03-08 | End: 2024-03-08

## 2024-03-08 RX ORDER — FENTANYL CITRATE 50 UG/ML
INJECTION, SOLUTION INTRAMUSCULAR; INTRAVENOUS PRN
Status: DISCONTINUED | OUTPATIENT
Start: 2024-03-08 | End: 2024-03-08 | Stop reason: SDUPTHER

## 2024-03-08 RX ORDER — LIDOCAINE HYDROCHLORIDE 20 MG/ML
INJECTION, SOLUTION EPIDURAL; INFILTRATION; INTRACAUDAL; PERINEURAL PRN
Status: DISCONTINUED | OUTPATIENT
Start: 2024-03-08 | End: 2024-03-08 | Stop reason: SDUPTHER

## 2024-03-08 RX ORDER — MIDAZOLAM HYDROCHLORIDE 1 MG/ML
INJECTION INTRAMUSCULAR; INTRAVENOUS PRN
Status: DISCONTINUED | OUTPATIENT
Start: 2024-03-08 | End: 2024-03-08 | Stop reason: SDUPTHER

## 2024-03-08 RX ORDER — OXYCODONE HYDROCHLORIDE AND ACETAMINOPHEN 5; 325 MG/1; MG/1
1 TABLET ORAL EVERY 6 HOURS PRN
Qty: 20 TABLET | Refills: 0 | Status: SHIPPED | OUTPATIENT
Start: 2024-03-08 | End: 2024-03-13

## 2024-03-08 RX ORDER — NALOXONE HYDROCHLORIDE 0.4 MG/ML
INJECTION, SOLUTION INTRAMUSCULAR; INTRAVENOUS; SUBCUTANEOUS PRN
Status: DISCONTINUED | OUTPATIENT
Start: 2024-03-08 | End: 2024-03-08 | Stop reason: HOSPADM

## 2024-03-08 RX ORDER — DEXAMETHASONE SODIUM PHOSPHATE 4 MG/ML
INJECTION, SOLUTION INTRA-ARTICULAR; INTRALESIONAL; INTRAMUSCULAR; INTRAVENOUS; SOFT TISSUE PRN
Status: DISCONTINUED | OUTPATIENT
Start: 2024-03-08 | End: 2024-03-08 | Stop reason: SDUPTHER

## 2024-03-08 RX ORDER — DIPHENHYDRAMINE HYDROCHLORIDE 50 MG/ML
12.5 INJECTION INTRAMUSCULAR; INTRAVENOUS
Status: DISCONTINUED | OUTPATIENT
Start: 2024-03-08 | End: 2024-03-08 | Stop reason: HOSPADM

## 2024-03-08 RX ORDER — ONDANSETRON 2 MG/ML
INJECTION INTRAMUSCULAR; INTRAVENOUS PRN
Status: DISCONTINUED | OUTPATIENT
Start: 2024-03-08 | End: 2024-03-08 | Stop reason: SDUPTHER

## 2024-03-08 RX ADMIN — FENTANYL CITRATE 50 MCG: 50 INJECTION, SOLUTION INTRAMUSCULAR; INTRAVENOUS at 09:18

## 2024-03-08 RX ADMIN — LIDOCAINE HYDROCHLORIDE 60 MG: 20 INJECTION, SOLUTION EPIDURAL; INFILTRATION; INTRACAUDAL; PERINEURAL at 09:18

## 2024-03-08 RX ADMIN — EPHEDRINE SULFATE 10 MG: 5 INJECTION INTRAVENOUS at 09:36

## 2024-03-08 RX ADMIN — BUSPIRONE HYDROCHLORIDE 10 MG: 10 TABLET ORAL at 13:47

## 2024-03-08 RX ADMIN — Medication 2000 UNITS: at 08:06

## 2024-03-08 RX ADMIN — ASPIRIN 81 MG: 81 TABLET, CHEWABLE ORAL at 08:06

## 2024-03-08 RX ADMIN — SODIUM CHLORIDE, POTASSIUM CHLORIDE, SODIUM LACTATE AND CALCIUM CHLORIDE: 600; 310; 30; 20 INJECTION, SOLUTION INTRAVENOUS at 09:12

## 2024-03-08 RX ADMIN — MIDAZOLAM 2 MG: 1 INJECTION INTRAMUSCULAR; INTRAVENOUS at 09:13

## 2024-03-08 RX ADMIN — ARIPIPRAZOLE 5 MG: 5 TABLET ORAL at 13:47

## 2024-03-08 RX ADMIN — FENTANYL CITRATE 25 MCG: 50 INJECTION, SOLUTION INTRAMUSCULAR; INTRAVENOUS at 09:53

## 2024-03-08 RX ADMIN — BUSPIRONE HYDROCHLORIDE 10 MG: 10 TABLET ORAL at 08:06

## 2024-03-08 RX ADMIN — LEVOTHYROXINE SODIUM 125 MCG: 125 TABLET ORAL at 08:06

## 2024-03-08 RX ADMIN — FENTANYL CITRATE 25 MCG: 50 INJECTION, SOLUTION INTRAMUSCULAR; INTRAVENOUS at 09:47

## 2024-03-08 RX ADMIN — BUPROPION HYDROCHLORIDE 150 MG: 150 TABLET, EXTENDED RELEASE ORAL at 08:06

## 2024-03-08 RX ADMIN — ONDANSETRON 4 MG: 2 INJECTION INTRAMUSCULAR; INTRAVENOUS at 09:22

## 2024-03-08 RX ADMIN — DEXAMETHASONE SODIUM PHOSPHATE 4 MG: 4 INJECTION INTRA-ARTICULAR; INTRALESIONAL; INTRAMUSCULAR; INTRAVENOUS; SOFT TISSUE at 09:22

## 2024-03-08 RX ADMIN — TIOTROPIUM BROMIDE INHALATION SPRAY 2 PUFF: 3.12 SPRAY, METERED RESPIRATORY (INHALATION) at 07:44

## 2024-03-08 RX ADMIN — LISINOPRIL 40 MG: 20 TABLET ORAL at 08:06

## 2024-03-08 RX ADMIN — EPHEDRINE SULFATE 10 MG: 5 INJECTION INTRAVENOUS at 09:34

## 2024-03-08 RX ADMIN — ESCITALOPRAM OXALATE 5 MG: 10 TABLET ORAL at 08:06

## 2024-03-08 RX ADMIN — PROPOFOL 120 MG: 10 INJECTION, EMULSION INTRAVENOUS at 09:18

## 2024-03-08 RX ADMIN — CYANOCOBALAMIN TAB 1000 MCG 1000 MCG: 1000 TAB at 08:06

## 2024-03-08 RX ADMIN — CEFTRIAXONE SODIUM 1000 MG: 1 INJECTION, POWDER, FOR SOLUTION INTRAMUSCULAR; INTRAVENOUS at 13:52

## 2024-03-08 ASSESSMENT — PAIN - FUNCTIONAL ASSESSMENT
PAIN_FUNCTIONAL_ASSESSMENT: CRITICAL CARE PAIN OBSERVATION TOOL (CPOT)
PAIN_FUNCTIONAL_ASSESSMENT: 0-10

## 2024-03-08 ASSESSMENT — ENCOUNTER SYMPTOMS: SHORTNESS OF BREATH: 1

## 2024-03-08 NOTE — PROGRESS NOTES
[facility]   Occupational Therapy Evaluation  Date: 3/8/24  Patient Name: Lakia Mcneil       Room:   MRN: [id number]  Account: [id number]   : 1960  (64 y.o.) Gender: female     Discharge Recommendations:  The patient's needs are being met with no further Occupational Therapy recommended at discharge.          Referring Practitioner: Bhaskar Kemp MD  Diagnosis: UTI           Past Medical History:  has a past medical history of Acute suppurative otitis media of right ear without spontaneous rupture of tympanic membrane, AMAIRANI positive, Anxiety, COPD (chronic obstructive pulmonary disease) ([facility]), Essential hypertension, History of depression, History of thyroid cancer, Major depressive disorder with single episode, in partial remission ([facility]), Osteoarthritis, Panic attacks, Postoperative hypothyroidism, and Sjogren's syndrome with keratoconjunctivitis sicca ([facility]).    Past Surgical History:   has a past surgical history that includes  section; Thyroidectomy; hernia repair; Bunionectomy; Tubal ligation (); Umbilical hernia repair; and Cardiac procedure (N/A, 2023).    Restrictions  Restrictions/Precautions  Restrictions/Precautions: General Precautions, Fall Risk  Required Braces or Orthoses?: No  Implants present? :  (pt denies)      Vitals  Vitals  O2 Device: None (Room air)     Subjective  Subjective: \"Im having a hot flash. It will pass.\" Pt pleasant and agreeable to engage in OT/PT eval  Comments: Ok per EVELYN Mcdonald for OT/PT eval  Subjective  Pain: Pt reports 4/10 pain/pressure in pelvic region    Social/Functional History  Social/Functional History  Lives With: Spouse  Type of Home: House  Home Layout: One level  Home Access: Stairs to enter without rails, Stairs to enter with rails  Entrance Stairs - Number of Steps: 3 RAYSA  Entrance Stairs - Rails: Both  Bathroom Shower/Tub: Curtain, Walk-in shower  Bathroom Toilet: Standard  Bathroom Equipment: Grab

## 2024-03-08 NOTE — PROGRESS NOTES
[facility]    PROGRESS NOTE             3/8/2024    8:20 AM    Name:   Lakia Mcneil  MRN:     [id number]     Acct:      [id number]   Room:   [facility] [place]/NONE  IP Day:  1  Admit Date:  3/7/2024 12:47 PM    PCP:  Geovany Zavaleta PA  Code Status:  Full Code    Subjective:     C/C:   Chief Complaint   Patient presents with    Groin Pain    Dysuria    Flank Pain     Lt flank pain     Interval History Status: not changed.    Patient not seen this morning, is in OR for ureteroscopy.     Will see patient after procedure.     Brief History:     Lakia Mcneil is a 64 y.o. female with a past medical history of depression, anxiety, COPD, hypertension, Sjogren's syndrome, and thyroid cancer s/p thyroidectomy who presents to the ED with 2-week history of suprapubic abdominal pain.  The pain is intermittent, occurring 3-4 times per day.  Pain can last anywhere from 1 to 12 hours.  Currently she rates the pain as a 1/10, but can reach up to 10/10 at its worst.  She endorses associated left flank pain which started this morning, prompting her to present to the ED.  There are no alleviating or exacerbating factors.  She also endorses 6 to 8-week history of nausea since quitting smoking.  Patient was seen prior and found to have a UTI, and was given oral antibiotics.  Patient reports a 30-pack-year smoking history, having quit 4 months ago.  She denies alcohol or illicit drug use.  In the ED, patient was found to have UTI on urinalysis, and CT abdomen/pelvis demonstrated 7 mm obstructing left ureteral calculus with mild to moderate left-sided hydronephrosis and hydroureter.  CBC and CMP were unremarkable.  Urology was consulted and patient was admitted to the MedSurg unit for management of pyelonephritis.       Review of Systems:     Review of Systems   Reason unable to perform ROS: pt not present.         Medications:     Allergies:    Allergies   Allergen  None  Diet: NPO  Disposition: Likely home     OT/PT/SW all consulted     Code Status: Full code    Tyrone Giron MD  3/8/2024  8:20 AM

## 2024-03-08 NOTE — PLAN OF CARE
Problem: Discharge Planning  Goal: Discharge to home or other facility with appropriate resources  Outcome: Progressing  Flowsheets (Taken 3/8/2024 0643)  Discharge to home or other facility with appropriate resources:   Identify barriers to discharge with patient and caregiver   Refer to discharge planning if patient needs post-hospital services based on physician order or complex needs related to functional status, cognitive ability or social support system     Problem: Pain  Goal: Verbalizes/displays adequate comfort level or baseline comfort level  Outcome: Progressing  Flowsheets (Taken 3/8/2024 0643)  Verbalizes/displays adequate comfort level or baseline comfort level:   Encourage patient to monitor pain and request assistance   Assess pain using appropriate pain scale   Administer analgesics based on type and severity of pain and evaluate response   Implement non-pharmacological measures as appropriate and evaluate response   Notify Licensed Independent Practitioner if interventions unsuccessful or patient reports new pain     Problem: Skin/Tissue Integrity  Goal: Absence of new skin breakdown  Description: 1.  Monitor for areas of redness and/or skin breakdown  2.  Assess vascular access sites hourly  3.  Every 4-6 hours minimum:  Change oxygen saturation probe site  4.  Every 4-6 hours:  If on nasal continuous positive airway pressure, respiratory therapy assess nares and determine need for appliance change or resting period.  Outcome: Progressing

## 2024-03-08 NOTE — PROGRESS NOTES
Patient underwent successful left ureteroscopy holmium laser lithotripsy.    Stent was placed.    Okay for discharge from urologic point of view.    Would discharge home with pain medication and antibiotics.  Okay to remove stent in 72 hours

## 2024-03-08 NOTE — CARE COORDINATION
Case Management Assessment  Initial Evaluation    Date/Time of Evaluation: 3/8/2024 11:08 AM  Assessment Completed by: Caren Carnes RN    If patient is discharged prior to next notation, then this note serves as note for discharge by case management.    Patient Name: Lakia Mcneil                   YOB: 1960  Diagnosis: UTI (urinary tract infection) [N39.0]  Pyelonephritis [N12]  Acute cystitis with hematuria [N30.01]  Hydronephrosis with urinary obstruction due to ureteral calculus [N13.2]                   Date / Time: 3/7/2024 12:47 PM    Patient Admission Status: Inpatient   Readmission Risk (Low < 19, Mod (19-27), High > 27): Readmission Risk Score: 7.3    Current PCP: Geovany Zavaleta PA  PCP verified by CM? Yes    Chart Reviewed: Yes      History Provided by: Patient  Patient Orientation: Alert and Oriented    Patient Cognition: Alert    Hospitalization in the last 30 days (Readmission):  No    If yes, Readmission Assessment in CM Navigator will be completed.    Advance Directives:      Code Status: Full Code   Patient's Primary Decision Maker is: Legal Next of Kin      Discharge Planning:    Patient lives with: Spouse/Significant Other Type of Home: House  Primary Care Giver: Self  Patient Support Systems include: Spouse/Significant Other, Family Members   Current Financial resources: None  Current community resources: None  Current services prior to admission: None            Current DME:              Type of Home Care services:  None    ADLS  Prior functional level: Independent in ADLs/IADLs  Current functional level: Independent in ADLs/IADLs    PT AM-PAC:   /24  OT AM-PAC:   /24    Family can provide assistance at DC: Yes  Would you like Case Management to discuss the discharge plan with any other family members/significant others, and if so, who? Yes (Spouse; Donta)  Plans to Return to Present Housing: Yes  Other Identified Issues/Barriers to RETURNING to current housing:

## 2024-03-08 NOTE — ANESTHESIA PRE PROCEDURE
29.08 kg/m².    CBC:   Lab Results   Component Value Date/Time    WBC 9.3 03/08/2024 06:58 AM    RBC 4.45 03/08/2024 06:58 AM    HGB 13.5 03/08/2024 06:58 AM    HCT 40.5 03/08/2024 06:58 AM    MCV 91.0 03/08/2024 06:58 AM    RDW 13.7 03/08/2024 06:58 AM     03/08/2024 06:58 AM       CMP:   Lab Results   Component Value Date/Time     03/08/2024 06:58 AM    K 4.1 03/08/2024 06:58 AM     03/08/2024 06:58 AM    CO2 27 03/08/2024 06:58 AM    BUN 14 03/08/2024 06:58 AM    CREATININE 0.9 03/08/2024 06:58 AM    GFRAA >60 09/19/2022 10:43 AM    AGRATIO NOT REPORTED 12/03/2021 10:08 AM    LABGLOM >60 03/08/2024 06:58 AM    GLUCOSE 104 03/08/2024 06:58 AM    PROT 7.3 03/07/2024 01:32 PM    CALCIUM 9.0 03/08/2024 06:58 AM    BILITOT 0.4 03/07/2024 01:32 PM    ALKPHOS 138 03/07/2024 01:32 PM    AST 16 03/07/2024 01:32 PM    ALT 23 03/07/2024 01:32 PM       POC Tests: No results for input(s): \"POCGLU\", \"POCNA\", \"POCK\", \"POCCL\", \"POCBUN\", \"POCHEMO\", \"POCHCT\" in the last 72 hours.    Coags:   Lab Results   Component Value Date/Time    PROTIME 13.3 11/12/2023 02:35 AM    INR 1.0 11/12/2023 02:35 AM    APTT 28.3 11/12/2023 02:35 AM       HCG (If Applicable): No results found for: \"PREGTESTUR\", \"PREGSERUM\", \"HCG\", \"HCGQUANT\"     ABGs: No results found for: \"PHART\", \"PO2ART\", \"VAO0HAY\", \"WTC0ISO\", \"BEART\", \"T4HUGQZI\"     Type & Screen (If Applicable):  No results found for: \"LABABO\", \"LABRH\"    Drug/Infectious Status (If Applicable):  Lab Results   Component Value Date/Time    HEPCAB NONREACTIVE 02/16/2024 10:35 AM       COVID-19 Screening (If Applicable): No results found for: \"COVID19\"        Anesthesia Evaluation  Patient summary reviewed and Nursing notes reviewed   history of anesthetic complications: PONV.  Airway: Mallampati: III  TM distance: >3 FB   Neck ROM: full  Mouth opening: > = 3 FB   Dental: normal exam         Pulmonary:normal exam  breath sounds clear to auscultation  (+)  COPD:  shortness of

## 2024-03-08 NOTE — CONSULTS
Social Connections: Not on file   Intimate Partner Violence: Not on file   Housing Stability: Unknown (6/16/2023)    Housing Stability Vital Sign     Unable to Pay for Housing in the Last Year: Not on file     Number of Places Lived in the Last Year: Not on file     Unstable Housing in the Last Year: No       Family History:    Family History   Problem Relation Age of Onset    Rheum Arthritis Mother     Hypertension Mother     Arthritis Mother     Cancer Mother     Vision Loss Mother     Mult Sclerosis Father     Diabetes Father     Heart Attack Father         x3    Hearing Loss Father     Arthritis Sister     Rheum Arthritis Sister     Miscarriages / Stillbirths Sister     Diabetes Maternal Grandmother     Cancer Maternal Grandmother         unsure of type    Cancer Paternal Grandfather         unsure of type    Diabetes Paternal Grandfather        Physical Exam:      This a 64 y.o. female   Patient Vitals for the past 24 hrs:   BP Temp Temp src Pulse Resp SpO2 Height Weight   03/08/24 0744 -- -- -- 74 14 96 % -- --   03/08/24 0730 101/70 98.1 °F (36.7 °C) -- 66 14 94 % -- --   03/08/24 0610 99/73 98.1 °F (36.7 °C) -- 64 16 96 % -- --   03/07/24 1945 118/71 -- -- -- -- -- -- --   03/07/24 1802 118/71 97.9 °F (36.6 °C) Oral 81 18 97 % -- --   03/07/24 1619 103/75 97.9 °F (36.6 °C) Oral 79 16 99 % -- --   03/07/24 1530 104/70 98.2 °F (36.8 °C) Oral 81 16 95 % -- --   03/07/24 1528 104/70 -- -- 77 16 95 % -- --   03/07/24 1515 (!) 134/93 -- -- -- -- -- -- --   03/07/24 1500 114/76 -- -- -- -- -- -- --   03/07/24 1445 123/82 -- -- -- -- -- -- --   03/07/24 1438 119/76 -- -- -- -- -- -- --   03/07/24 1244 109/83 98.2 °F (36.8 °C) Oral 95 16 98 % 1.575 m (5' 2\") 72.1 kg (159 lb)     Constitutional: Patient in no acute distress.  Neuro: Alert and oriented to person, place and time.  Psych: mood and affect normal  Abdomen: Soft, non-tender, non-distended with no CVA, flank pain.      LABS:   Recent Labs      03/07/24  1332 03/08/24  0658   WBC 10.7 9.3   HGB 14.4 13.5   HCT 43.7 40.5   MCV 92.2 91.0    294     Recent Labs     03/07/24  1332 03/08/24  0658    141   K 4.5 4.1    104   CO2 24 27   BUN 14 14   CREATININE 0.9 0.9       Additional Lab/culture results:    Urinalysis:   Recent Labs     03/07/24  1337   COLORU Yellow   PHUR 5.5   WBCUA 3 to 5*   RBCUA TOO NUMEROUS TO COUNT*   BACTERIA FEW*   SPECGRAV 1.021   LEUKOCYTESUR SMALL*   UROBILINOGEN Normal   BILIRUBINUR NEGATIVE        -----------------------------------------------------------------  Imaging Results:   CT abdomen and pelvis images reviewed.  Distal left ureteral stone with hydronephrosis noted.      Assessment and Plan   Impression:    Patient Active Problem List   Diagnosis    History of thyroid cancer    Anxiety    Panic attacks    Chronic neck pain    Postoperative hypothyroidism    Agoraphobia with panic attacks    Conductive hearing loss of right ear    Vitamin D deficiency    Hyperlipidemia with target LDL less than 100    RIZVI (dyspnea on exertion)    Family history of rheumatoid arthritis    Moderate episode of recurrent major depressive disorder (HCC)    Nodule of finger of both hands    Chronic fatigue    Sjogren's syndrome with keratoconjunctivitis sicca (HCC)    Influenza vaccination declined    Personal history of tobacco use    Acute hemorrhoid    Chronic RLQ pain    Abnormal EKG    Palpitations    Psychophysiological insomnia    Current smoker    Mild episode of recurrent major depressive disorder (HCC)    Easy bruising    Gastroesophageal reflux disease without esophagitis    Chronic diarrhea    Essential hypertension    Pneumococcal vaccination declined    Polycythemia    Hematuria    Chronic obstructive pulmonary disease (HCC)    COVID-19 vaccination declined    Osteoarthritis of knee    Cervical spondylosis without myelopathy    Hyperglycemia    Numbness and tingling in right hand    PETE (generalized anxiety

## 2024-03-08 NOTE — CARE COORDINATION
Writer notified by EVELYN Murillo that patient is going down for cysto at 0840. Pt's spouse, Donta notified via telephone. No questions or concerns from Donta at this time. CHG complete.

## 2024-03-08 NOTE — PLAN OF CARE
Problem: Pain  Goal: Verbalizes/displays adequate comfort level or baseline comfort level  3/8/2024 1534 by Danay Guzmán RN  Outcome: Adequate for Discharge  Flowsheets (Taken 3/8/2024 0730 by Renetta Tian)  Verbalizes/displays adequate comfort level or baseline comfort level: Encourage patient to monitor pain and request assistance  3/8/2024 0643 by Sandra Stinson RN  Outcome: Progressing  Flowsheets (Taken 3/8/2024 0643)  Verbalizes/displays adequate comfort level or baseline comfort level:   Encourage patient to monitor pain and request assistance   Assess pain using appropriate pain scale   Administer analgesics based on type and severity of pain and evaluate response   Implement non-pharmacological measures as appropriate and evaluate response   Notify Licensed Independent Practitioner if interventions unsuccessful or patient reports new pain

## 2024-03-08 NOTE — OP NOTE
Operative Note      Patient: Lakia Mcneil  YOB: 1960  MRN: [id number]    Date of Procedure: 3/8/2024    Pre-Op Diagnosis Codes:     * Kidney stone on left side [N20.0]    Post-Op Diagnosis: Same       Procedure(s):  CYSTOSCOPY URETEROSCOPY HOLMIUM LASER WITH STENT INSERTION LEFT    Surgeon(s):  Bhaskar Kemp MD    Assistant:   * No surgical staff found *    Anesthesia: General    Estimated Blood Loss (mL): Minimal    Complications: None    Specimens:   * No specimens in log *    Implants:  * No implants in log *      Drains: * No LDAs found *    Findings:     Lakia is a 64-year-old female admitted with left flank pain.  CT demonstrated a 7 mm distal left ureteral stone with hydronephrosis.  We discussed trial of stone passage versus definitive stone therapy she is opted to move forward with definitive stone therapy.        Detailed Description of Procedure:   Patient was brought back to the operating room and laid in the operatively supine position.  Once general esthesia was obtained, she was placed in the dorsal thigh position prepped and draped in the usual sterile fashion.  A timeout was performed she was properly identified Rocephin had been administered preoperatively.  The cystoscope was inserted through the urethra into the bladder.  Bladder was visualized in its entire was unremarkable.  A wire was advanced into the left ureter past the stone and into the collecting system.  The ureteral orifice was observed no purulent urine was returned.  I decided to move forward with ureteroscopy.  The semirigid ureteroscope was advanced over a second wire next to the safety wire into the distal ureter where the stone was readily identified.  A 365 µm fiber was then used to fragment the stone completely.  The stone broke up well.  All the pieces evacuated in the bladder.  The ureteroscope was removed.  A [id number] stent was advanced under fluoroscopic guidance.  A good proximal curl seen the renal pelvis and a  good distal curl seen in the bladder.  Strings were left on the stent.  The bladder was drained procedure was completed.  She woke up anesthesia and complications and was taken back to postoperative anesthesia care in good condition.  She will be readmitted to the floor discharged home today.  She remove her stent in 72 hours.    Electronically signed by Bhaskar Kemp MD on 3/8/2024 at 9:59 AM

## 2024-03-08 NOTE — ANESTHESIA POSTPROCEDURE EVALUATION
Department of Anesthesiology  Postprocedure Note    Patient: Lakia Mcneil  MRN: [id number]  YOB: 1960  Date of evaluation: 3/8/2024    Procedure Summary       Date: 03/08/24 Room / Location: [place] [address] / [facility]    Anesthesia Start: 0912 Anesthesia Stop: 1006    Procedure: CYSTOSCOPY URETEROSCOPY HOLMIUM LASER WITH STENT INSERTION LEFT (Left: Ureter) Diagnosis:       Kidney stone on left side      (Kidney stone on left side [N20.0])    Surgeons: Bhaskar Kemp MD Responsible Provider:     Anesthesia Type: general ASA Status: 3            Anesthesia Type: No value filed.    Alda Phase I: Alda Score: 10    Alda Phase II:      Anesthesia Post Evaluation    Comments: POST- ANESTHESIA EVALUATION       Pt Name: Lakia Mcneil  MRN: [id number]  YOB: 1960  Date of evaluation: 3/8/2024  Time:  10:46 AM      /70   Pulse 85   Temp 97.5 °F (36.4 °C) (Infrared)   Resp 14   Ht 1.575 m (5' 2\")   Wt 72.1 kg (159 lb)   LMP 06/04/2015 Comment: g:3 p:3  SpO2 95%   BMI 29.08 kg/m²      Consciousness Level  Awake  Cardiopulmonary Status  Stable  Pain Adequately Treated YES  Nausea / Vomiting  NO  Adequate Hydration  YES  Anesthesia Related Complications NONE      Electronically signed by Macrina Toure MD on 3/8/2024 at 10:46 AM           No notable events documented.

## 2024-03-08 NOTE — PROGRESS NOTES
Physical Therapy          Physical Therapy Cancel Note      DATE: 3/8/2024    NAME: Lakia Mcneil  MRN: [id number]   : 1960      Patient not seen this date for Physical Therapy due to:    Patient at testing and/or off the floor   -   Surgery, Cystoscopy ureteroscopy holmium laser with stent insertion left. PT will continue to follow and see patient for PT evaluation as time permits.       Electronically signed by Liban Zhao PT on 3/8/2024 at 10:28 AM

## 2024-03-08 NOTE — PROGRESS NOTES
[facility]   OCCUPATIONAL THERAPY MISSED TREATMENT NOTE   INPATIENT   Date: 3/8/24  Patient Name: Lakia Mcneil       Room: [facility] OR Pool/NONE  MRN: [id number]   Account #: [id number]    : 1960  (64 y.o.)  Gender: female                 REASON FOR MISSED TREATMENT:  Patient at testing and/or off the floor   -   Surgery, Cystoscopy ureteroscopy holmium laser with stent insertion left. OT will continue to follow and see patient for OT evaluation as time permits.    Electronically signed by ARIANA Mora on 3/8/24 at 9:56 AM EST

## 2024-03-08 NOTE — PROGRESS NOTES
Patient stated she is waiting to be discharged; blessing given     03/08/24 1550   Encounter Summary   Encounter Overview/Reason  Spiritual/Emotional Needs   Service Provided For: Patient and family together   Referral/Consult From: Ammy   Last Encounter  03/08/24   Complexity of Encounter Low   Spiritual/Emotional needs   Type Spiritual Support   Assessment/Intervention/Outcome   Assessment Calm;Coping;Hopeful   Intervention Discussed illness injury and it’s impact;Prayer (assurance of)/Allison   Outcome Engaged in conversation;Expressed feelings, needs, and concerns;Expressed Gratitude;Receptive

## 2024-03-08 NOTE — PROGRESS NOTES
Physical Therapy  Facility/Department: [facility] MED SURG  Physical Therapy Initial Assessment    Name: Lakia Mcneil  : 1960  MRN: [id number]  Date of Service: 3/8/2024    Discharge Recommendations:  No therapy recommended at discharge          Patient Diagnosis(es): The primary encounter diagnosis was Hydronephrosis with urinary obstruction due to ureteral calculus. Diagnoses of Acute cystitis with hematuria and Pyelonephritis were also pertinent to this visit.  Past Medical History:  has a past medical history of Acute suppurative otitis media of right ear without spontaneous rupture of tympanic membrane, AMAIRANI positive, Anxiety, COPD (chronic obstructive pulmonary disease) ([facility]), Essential hypertension, History of depression, History of thyroid cancer, Major depressive disorder with single episode, in partial remission ([facility]), Osteoarthritis, Panic attacks, Postoperative hypothyroidism, and Sjogren's syndrome with keratoconjunctivitis sicca ([facility]).  Past Surgical History:  has a past surgical history that includes  section; Thyroidectomy; hernia repair; Bunionectomy; Tubal ligation (); Umbilical hernia repair; and Cardiac procedure (N/A, 2023).    Assessment   Assessment: Demonstrates safe mobility, no need for skilled PT  Decision Making: Low Complexity        Plan   Physical Therapy Plan  General Plan: Discharge with evaluation only  Safety Devices  Type of Devices: Left in chair, Call light within reach, Gait belt     Restrictions  Restrictions/Precautions  Restrictions/Precautions: General Precautions, Fall Risk  Required Braces or Orthoses?: No  Implants present? :  (pt denies)  Position Activity Restriction  Other position/activity restrictions: CYSTOSCOPY URETEROSCOPY HOLMIUM LASER WITH STENT INSERTION LEFT 3/8/24.     Subjective   Pain: Pt reports 4/10 pain/pressure in pelvic region  General  Patient assessed for rehabilitation services?: Yes  Additional Pertinent Hx: Lakia Mcneil is a 64

## 2024-03-08 NOTE — CARE COORDINATION
Pt back from surgery. A/o x4. VSS. Family at bedside. Pt urination is red. Residents notified and aware.

## 2024-03-09 NOTE — DISCHARGE SUMMARY
01:37 PM    HGBUR LARGE 03/07/2024 01:37 PM    PHUR 5.5 03/07/2024 01:37 PM    PROTEINU 1+ 03/07/2024 01:37 PM    GLUCOSEU NEGATIVE 03/07/2024 01:37 PM    KETUA TRACE 03/07/2024 01:37 PM    BILIRUBINUR NEGATIVE 03/07/2024 01:37 PM    BILIRUBINUR negative 02/14/2024 05:11 PM    UROBILINOGEN Normal 03/07/2024 01:37 PM    NITRU NEGATIVE 03/07/2024 01:37 PM    LEUKOCYTESUR SMALL 03/07/2024 01:37 PM       ,   urine culture: positive for E. coli pansensitive on 2/14/2024,     Radiology:    FLUORO FOR SURGICAL PROCEDURES    Result Date: 3/8/2024  Radiology exam is complete. No Radiologist dictation. Please follow up with ordering provider.     CT ABDOMEN PELVIS WO CONTRAST Additional Contrast? None    Result Date: 3/7/2024  EXAMINATION: CT OF THE ABDOMEN AND PELVIS WITHOUT CONTRAST 3/7/2024 1:50 pm TECHNIQUE: CT of the abdomen and pelvis was performed without the administration of intravenous contrast. Multiplanar reformatted images are provided for review. Automated exposure control, iterative reconstruction, and/or weight based adjustment of the mA/kV was utilized to reduce the radiation dose to as low as reasonably achievable. COMPARISON: None HISTORY: ORDERING SYSTEM PROVIDED HISTORY: left flank pain, UTI TECHNOLOGIST PROVIDED HISTORY: left flank pain, UTI Decision Support Exception - unselect if not a suspected or confirmed emergency medical condition->Emergency Medical Condition (MA) Reason for Exam: left flank pain, UTI Relevant Medical/Surgical History: C sections, hernia repair, tubal 64-year-old female with left flank pain; UTI. FINDINGS: Lower Chest: Mild bibasilar atelectasis, parenchymal banding, and scarring. No free intra-abdominal air. Organs: 7 mm obstructing distal left ureteral calculus above the left UVJ with mild-to-moderate left-sided hydronephrosis and hydroureter. Nonobstructing calculus at the mid pole right kidney on image 59, series 2. No right-sided hydronephrosis. Phrygian cap at the  known as: PAXIL            ASK your doctor about these medications      Budeson-Glycopyrrol-Formoterol 160-9-4.8 MCG/ACT Aero  Inhale 2 actuation into the lungs in the morning and at bedtime     Spiriva HandiHaler 18 MCG inhalation capsule  Generic drug: tiotropium  Inhale 1 capsule into the lungs daily               Where to Get Your Medications        These medications were sent to [facility]/pharmacy #[id number] - [place], [place] - [address] -  [phone] - F [phone]  [address] [address]      Phone: [phone]   ciprofloxacin 500 MG tablet  oxyCODONE-acetaminophen 5-325 MG per tablet       Electronically signed by   Tyrone Giron MD  3/9/2024  2:26 PM      Thank you Geovany Mccauley, PA for the opportunity to be involved in this patient's care.     Attending Physician Statement  I have discussed the care of Lakia Mcneil and I have examined the patient myselft and taken ros and hpi , including pertinent history and exam findings,  with the resident. I have reviewed the key elements of all parts of the encounter with the resident.  I agree with the DC plan as documented by the resident.      Electronically signed by Solo Bales MD

## 2024-03-11 ENCOUNTER — CARE COORDINATION (OUTPATIENT)
Dept: CASE MANAGEMENT | Age: 64
End: 2024-03-11

## 2024-03-11 ENCOUNTER — ENROLLMENT (OUTPATIENT)
Dept: CASE MANAGEMENT | Age: 64
End: 2024-03-11

## 2024-03-11 NOTE — CARE COORDINATION
Care Transitions Outreach Attempt    Call within 2 business days of discharge: Yes   Attempted to reach patient for transitions of care follow up. Unable to reach patient.    Patient: Lakia Mcneil Patient : 1960 MRN: [id number]    Last Discharge Facility       Date Complaint Diagnosis Description Type Department Provider    3/7/24 Groin Pain; Dysuria; Flank Pain Hydronephrosis with urinary obstruction due to ureteral calculus ... ED to Hosp-Admission (Discharged) (ADMITTED) [facility] Aamir Ly MD; Harriett Ryan...          # 1 attempt-Attempted initial 24 hour hospital follow up call. Left a Hipaa compliant message with name and call back information. Requested return call to [phone].     Was this an external facility discharge? No Discharge Facility Name: [facility]    Noted following upcoming appointments from discharge chart review:   BS follow up appointment(s):   Future Appointments   Date Time Provider Department Center   3/18/2024 10:00 AM Tom Munguia [facility]   3/27/2024  2:00 PM Kt Restrepo MD [facility] -   2024  4:30 PM Geovany Zavaleta PA [facility]     Non-BS  follow up appointment(s):

## 2024-03-12 ENCOUNTER — CARE COORDINATION (OUTPATIENT)
Dept: CASE MANAGEMENT | Age: 64
End: 2024-03-12

## 2024-03-12 NOTE — CARE COORDINATION
Care Transitions Outreach Attempt    Call within 2 business days of discharge: Yes   Attempted to reach patient for transitions of care follow up. Unable to reach patient.    Patient: Lakia Mcneil Patient : 1960 MRN: [id number]    Last Discharge Facility       Date Complaint Diagnosis Description Type Department Provider    3/7/24 Groin Pain; Dysuria; Flank Pain Hydronephrosis with urinary obstruction due to ureteral calculus ... ED to Hosp-Admission (Discharged) (ADMITTED) [facility] Aamir Ly MD; Harriett Ryan...          # 2 attempt-Attempted initial 24 hour hospital follow up call. Left a Hipaa compliant message with name and call back information. Requested return call to [phone].   2 unsuccessful attempts to reach patient, care transitions episode resolved  Was this an external facility discharge? No Discharge Facility Name: [facility]    Noted following upcoming appointments from discharge chart review:   BS follow up appointment(s):   Future Appointments   Date Time Provider Department Center   3/18/2024 10:00 AM Tom Munguia [facility]   3/27/2024  2:00 PM Kt Restrepo MD [facility] Neurology -   2024  4:30 PM Geovany Zavaleta PA [facility]     Non-BS  follow up appointment(s):

## 2024-03-13 DIAGNOSIS — F41.1 GAD (GENERALIZED ANXIETY DISORDER): ICD-10-CM

## 2024-03-13 DIAGNOSIS — F41.9 ANXIETY: ICD-10-CM

## 2024-03-14 RX ORDER — ESCITALOPRAM OXALATE 5 MG/1
5 TABLET ORAL DAILY
Qty: 30 TABLET | Refills: 5 | Status: SHIPPED | OUTPATIENT
Start: 2024-03-14

## 2024-03-18 ENCOUNTER — TELEMEDICINE (OUTPATIENT)
Dept: BEHAVIORAL/MENTAL HEALTH CLINIC | Age: 64
End: 2024-03-18
Payer: COMMERCIAL

## 2024-03-18 DIAGNOSIS — F40.01 AGORAPHOBIA WITH PANIC DISORDER: Primary | ICD-10-CM

## 2024-03-18 PROCEDURE — 90837 PSYTX W PT 60 MINUTES: CPT | Performed by: COUNSELOR

## 2024-03-18 NOTE — PROGRESS NOTES
11:00 AM 5/3/2019    11:00 AM   PETE 7 SCORE   PETE-7 Total Score 13 17 17     PETE-7 Total Score    17 7     Interpretation of PETE-7 score: 5-9 = mild anxiety, 10-14 = moderate anxiety, 15+ = severe anxiety. Recommend referral to behavioral health for scores 10 or greater.      DIAGNOSIS  Lakia was seen today for anxiety, depression and stress.    Diagnoses and all orders for this visit:    Agoraphobia with panic disorder                INTERVENTION  Practiced assertive communication, Discussed and set plan for behavioral activation, Trained in improving communication skills, Provided education, and CBT to target internal control fallacies  encouraged follow up with medical providers,     PLAN  Client to monitor for and challenge internal control fallacies.     INTERACTIVE COMPLEXITY  Is interactive complexity present?  No  Reason:      Additional Supporting Information:  N/A     Electronically signed by KHLOE Young on 3/18/2024 at 10:05 AM    Lakia Mcneil, was evaluated through a synchronous (real-time) audio-video encounter. The patient (or guardian if applicable) is aware that this is a billable service, which includes applicable co-pays. This Virtual Visit was conducted with patient's (and/or legal guardian's) consent. Patient identification was verified, and a caregiver was present when appropriate.   The patient was located at Home: [address] [place] [address]  Provider was located at Facility (Appt Dept): [id number] [place] [address]       Total time spent for this encounter:  53    --KHLOE Young on 3/18/2024 at 10:05 AM    An electronic signature was used to authenticate this note.

## 2024-03-27 ENCOUNTER — OFFICE VISIT (OUTPATIENT)
Dept: NEUROLOGY | Age: 64
End: 2024-03-27
Payer: COMMERCIAL

## 2024-03-27 VITALS
HEIGHT: 62 IN | DIASTOLIC BLOOD PRESSURE: 101 MMHG | WEIGHT: 161.8 LBS | SYSTOLIC BLOOD PRESSURE: 157 MMHG | BODY MASS INDEX: 29.77 KG/M2

## 2024-03-27 DIAGNOSIS — M47.812 CERVICAL SPONDYLOSIS WITHOUT MYELOPATHY: Primary | ICD-10-CM

## 2024-03-27 PROCEDURE — 3077F SYST BP >= 140 MM HG: CPT | Performed by: STUDENT IN AN ORGANIZED HEALTH CARE EDUCATION/TRAINING PROGRAM

## 2024-03-27 PROCEDURE — 99204 OFFICE O/P NEW MOD 45 MIN: CPT | Performed by: STUDENT IN AN ORGANIZED HEALTH CARE EDUCATION/TRAINING PROGRAM

## 2024-03-27 PROCEDURE — 3080F DIAST BP >= 90 MM HG: CPT | Performed by: STUDENT IN AN ORGANIZED HEALTH CARE EDUCATION/TRAINING PROGRAM

## 2024-03-27 RX ORDER — TIZANIDINE 4 MG/1
2 TABLET ORAL EVERY 6 HOURS PRN
Qty: 30 TABLET | Refills: 0 | Status: SHIPPED | OUTPATIENT
Start: 2024-03-27

## 2024-03-27 RX ORDER — RIZATRIPTAN BENZOATE 10 MG/1
10 TABLET ORAL
Qty: 30 TABLET | Refills: 2 | Status: SHIPPED | OUTPATIENT
Start: 2024-03-27 | End: 2024-03-27

## 2024-03-27 RX ORDER — ONDANSETRON 4 MG/1
4 TABLET, FILM COATED ORAL 3 TIMES DAILY PRN
COMMUNITY
Start: 2024-03-06

## 2024-03-27 RX ORDER — ALBUTEROL SULFATE 90 UG/1
AEROSOL, METERED RESPIRATORY (INHALATION)
COMMUNITY
Start: 2024-03-14

## 2024-03-27 RX ORDER — LIDOCAINE 4 G/G
1 PATCH TOPICAL DAILY
Qty: 30 PATCH | Refills: 0 | Status: SHIPPED | OUTPATIENT
Start: 2024-03-27 | End: 2024-04-26

## 2024-03-27 NOTE — PROGRESS NOTES
kidney on image 59, series 2. No right-sided hydronephrosis. Phrygian cap at the gallbladder.  Multiple fluid density lesions throughout the liver likely representing hepatic cysts with the largest measuring 2.6 cm on image 31, series 2. Adrenal glands, spleen, and pancreas grossly unremarkable in appearance on noncontrast imaging. GI/Bowel: Normal gas-filled appendix.  Mild stool burden.  No significant dilation of small bowel loops to suggest small bowel obstruction. Fluid-filled nondilated small bowel loops.  Mild sigmoid colonic diverticulosis. Pelvis: Fat stranding of the decompressed urinary bladder.  Pelvic phleboliths.  No inguinal or pelvic sidewall lymphadenopathy.  No free fluid in the pelvis. Peritoneum/Retroperitoneum: Atherosclerotic calcification of the aorta and branch vasculature.  No retroperitoneal lymphadenopathy.  Psoas muscles normal in size and symmetric in appearance. Bones/Soft Tissues: Mild degenerative changes in the spine.     1. 7 mm obstructing distal left ureteral calculus with mild-to-moderate left-sided hydronephrosis and hydroureter.  Nonobstructing mid pole right renal calculus.  No right-sided hydronephrosis. 2. Phrygian cap of the gallbladder.  Multiple hepatic cysts measuring up to 2.6 cm. 3. Normal appendix. 4. Mild stool burden.  Mild sigmoid diverticulosis. 5. Fluid-filled nondilated small bowel loops, nonspecific without overt small bowel obstruction. 6. Appearance of the urinary bladder can be seen with cystitis.  Correlate with urinalysis.       ASSESSMENT:     Lakia Mcneil is a 64 y.o. female following in the clinic with    Neck stiffness  Left shoulder blade pain   Posterior headaches with migrainous features   Vertigo     Impression:  - Possible dystonia related neck stiffness and shoulder blade pain    - Discogenic pain.  - BPPV    PLAN:     MRI brain and C spine WO to rule out local neck pathologies and rule out intracranial pathologies given the age of the patient

## 2024-04-01 ENCOUNTER — TELEMEDICINE (OUTPATIENT)
Dept: BEHAVIORAL/MENTAL HEALTH CLINIC | Age: 64
End: 2024-04-01
Payer: COMMERCIAL

## 2024-04-01 DIAGNOSIS — F40.01 AGORAPHOBIA WITH PANIC DISORDER: Primary | ICD-10-CM

## 2024-04-01 PROCEDURE — 90837 PSYTX W PT 60 MINUTES: CPT | Performed by: COUNSELOR

## 2024-04-01 NOTE — PROGRESS NOTES
appropriate.   The patient was located at Home: [address] [facility] [address]  Provider was located at Facility (Appt Dept): [id number] [place] [id number]       Total time spent for this encounter:  55    --[facility] on 4/1/2024 at 11:02 AM    An electronic signature was used to authenticate this note.

## 2024-04-06 PROBLEM — N39.0 UTI (URINARY TRACT INFECTION): Status: RESOLVED | Noted: 2024-03-07 | Resolved: 2024-04-06

## 2024-04-11 ENCOUNTER — HOSPITAL ENCOUNTER (OUTPATIENT)
Dept: MRI IMAGING | Facility: CLINIC | Age: 64
Discharge: HOME | End: 2024-04-13
Payer: COMMERCIAL

## 2024-04-11 ENCOUNTER — HOSPITAL ENCOUNTER (OUTPATIENT)
Dept: CT IMAGING | Facility: CLINIC | Age: 64
Discharge: HOME | End: 2024-04-13
Payer: COMMERCIAL

## 2024-04-11 DIAGNOSIS — R91.1 SOLITARY PULMONARY NODULE: ICD-10-CM

## 2024-04-11 DIAGNOSIS — M47.812 CERVICAL SPONDYLOSIS WITHOUT MYELOPATHY: ICD-10-CM

## 2024-04-11 PROCEDURE — 71250 CT THORAX DX C-: CPT

## 2024-04-11 PROCEDURE — 72141 MRI NECK SPINE W/O DYE: CPT

## 2024-04-11 PROCEDURE — 70551 MRI BRAIN STEM W/O DYE: CPT

## 2024-04-12 DIAGNOSIS — R11.0 NAUSEA: ICD-10-CM

## 2024-04-15 RX ORDER — FAMOTIDINE 20 MG/1
20 TABLET, FILM COATED ORAL 2 TIMES DAILY
Qty: 180 TABLET | Refills: 5 | Status: SHIPPED | OUTPATIENT
Start: 2024-04-15

## 2024-04-15 NOTE — TELEPHONE ENCOUNTER
Last visit: 2/26/24  Last Med refill: 3/7/24  Does patient have enough medication for 72 hours:  Next Visit Date:  Future Appointments   Date Time Provider Department Center   4/16/2024  4:45 PM Geovany Zavaleta PA [facility]   4/17/2024 11:00 AM Tom [facility]ung[facility]ia [facility]   7/15/2024  4:00 PM Jacqueline Beavers MD Neuro [facility] Neurology -       Health Maintenance   Topic Date Due    COVID-19 Vaccine (1) Never done    Pneumococcal 0-64 years Vaccine (1 of 2 - PCV) Never done    Colorectal Cancer Screen  Never done    Shingles vaccine (1 of 2) Never done    Respiratory Syncytial Virus (RSV) Pregnant or age 60 yrs+ (1 - 1-dose 60+ series) Never done    Low dose CT lung screening &/or counseling  07/14/2024    Flu vaccine (Season Ended) 08/01/2024    Depression Monitoring  01/10/2025    Cervical cancer screen  01/15/2025    A1C test (Diabetic or Prediabetic)  02/16/2025    Lipids  02/16/2025    Breast cancer screen  07/10/2025    DTaP/Tdap/Td vaccine (2 - Td or Tdap) 07/31/2027    Hepatitis C screen  Completed    HIV screen  Completed    Hepatitis A vaccine  Aged Out    Hepatitis B vaccine  Aged Out    Hib vaccine  Aged Out    Polio vaccine  Aged Out    Meningococcal (ACWY) vaccine  Aged Out    GFR test (Diabetes, CKD 3-4, OR last GFR 15-59)  Discontinued    Diabetes screen  Discontinued       Hemoglobin A1C (%)   Date Value   02/16/2024 5.7   11/12/2023 5.8   09/19/2022 5.6             ( goal A1C is < 7)   No components found for: \"LABMICR\"  LDL Cholesterol (mg/dL)   Date Value   02/16/2024 47   02/21/2023 146 (H)       (goal LDL is <100)   AST (U/L)   Date Value   03/07/2024 16     ALT (U/L)   Date Value   03/07/2024 23     BUN (mg/dL)   Date Value   03/08/2024 14     BP Readings from Last 3 Encounters:   03/27/24 (!) 157/101   03/08/24 113/66   02/26/24 138/80          (goal 120/80)    All Future Testing planned in [facility]  Lab Frequency Next Occurrence               Patient Active

## 2024-04-16 ENCOUNTER — OFFICE VISIT (OUTPATIENT)
Dept: PRIMARY CARE CLINIC | Age: 64
End: 2024-04-16
Payer: COMMERCIAL

## 2024-04-16 VITALS
DIASTOLIC BLOOD PRESSURE: 80 MMHG | SYSTOLIC BLOOD PRESSURE: 130 MMHG | BODY MASS INDEX: 29 KG/M2 | OXYGEN SATURATION: 96 % | WEIGHT: 157.6 LBS | HEIGHT: 62 IN | HEART RATE: 82 BPM

## 2024-04-16 DIAGNOSIS — K21.9 GASTROESOPHAGEAL REFLUX DISEASE WITHOUT ESOPHAGITIS: ICD-10-CM

## 2024-04-16 DIAGNOSIS — F33.1 MODERATE EPISODE OF RECURRENT MAJOR DEPRESSIVE DISORDER (HCC): ICD-10-CM

## 2024-04-16 DIAGNOSIS — I47.10 PAROXYSMAL SUPRAVENTRICULAR TACHYCARDIA (HCC): ICD-10-CM

## 2024-04-16 DIAGNOSIS — J44.9 CHRONIC OBSTRUCTIVE PULMONARY DISEASE, UNSPECIFIED COPD TYPE (HCC): Primary | ICD-10-CM

## 2024-04-16 DIAGNOSIS — Z12.11 SCREENING FOR MALIGNANT NEOPLASM OF COLON: ICD-10-CM

## 2024-04-16 DIAGNOSIS — E03.9 HYPOTHYROIDISM, UNSPECIFIED TYPE: ICD-10-CM

## 2024-04-16 DIAGNOSIS — I10 ESSENTIAL HYPERTENSION: ICD-10-CM

## 2024-04-16 DIAGNOSIS — I21.4 NSTEMI (NON-ST ELEVATED MYOCARDIAL INFARCTION) (HCC): ICD-10-CM

## 2024-04-16 PROCEDURE — 99214 OFFICE O/P EST MOD 30 MIN: CPT | Performed by: PHYSICIAN ASSISTANT

## 2024-04-16 PROCEDURE — 3075F SYST BP GE 130 - 139MM HG: CPT | Performed by: PHYSICIAN ASSISTANT

## 2024-04-16 PROCEDURE — 3079F DIAST BP 80-89 MM HG: CPT | Performed by: PHYSICIAN ASSISTANT

## 2024-04-16 ASSESSMENT — ENCOUNTER SYMPTOMS
ABDOMINAL PAIN: 0
SORE THROAT: 0
ABDOMINAL DISTENTION: 0
CONSTIPATION: 0
DIARRHEA: 0
CHEST TIGHTNESS: 0
SHORTNESS OF BREATH: 0
COUGH: 0

## 2024-04-16 NOTE — PROGRESS NOTES
sounds.   Pulmonary:      Effort: Pulmonary effort is normal. No respiratory distress.      Breath sounds: Normal breath sounds.   Musculoskeletal:         General: Normal range of motion.      Cervical back: Normal range of motion and neck supple.   Lymphadenopathy:      Cervical: No cervical adenopathy.   Skin:     General: Skin is warm and dry.   Neurological:      Mental Status: She is alert and oriented to person, place, and time.   Psychiatric:         Mood and Affect: Mood normal.         Behavior: Behavior normal.         Thought Content: Thought content normal.         Assessment and Plan:          1. Chronic obstructive pulmonary disease, unspecified COPD type (HCC)  2. NSTEMI (non-ST elevated myocardial infarction) (HCC)  3. Paroxysmal supraventricular tachycardia (HCC)  4. Essential hypertension  5. Gastroesophageal reflux disease without esophagitis  6. Hypothyroidism, unspecified type  7. Moderate episode of recurrent major depressive disorder (HCC)  8. Screening for malignant neoplasm of colon  -     Fecal DNA Colorectal cancer screening (Cologuard)   Continue with cardiology, Pulmonology, Neurology, and psych.           Return in about 6 months (around 10/16/2024) for med check.     Patient given educational materials - see patient instructions.  Discussed use, benefit, and side effects of prescribed medications.  All patient questions answered. Pt voiced understanding. Reviewed health maintenance.  Instructed to continue current medications, diet and exercise.  Patient agreed with treatment plan. Follow up as directed.     Electronically signed by DESIRAE Bojorquez on 4/16/2024 at 5:04 PM

## 2024-04-17 ENCOUNTER — TELEMEDICINE (OUTPATIENT)
Dept: BEHAVIORAL/MENTAL HEALTH CLINIC | Age: 64
End: 2024-04-17
Payer: COMMERCIAL

## 2024-04-17 DIAGNOSIS — F40.01 AGORAPHOBIA WITH PANIC DISORDER: Primary | ICD-10-CM

## 2024-04-17 PROCEDURE — 90837 PSYTX W PT 60 MINUTES: CPT | Performed by: COUNSELOR

## 2024-04-17 NOTE — PROGRESS NOTES
Total Score    17 7     Interpretation of PETE-7 score: 5-9 = mild anxiety, 10-14 = moderate anxiety, 15+ = severe anxiety. Recommend referral to behavioral health for scores 10 or greater.      DIAGNOSIS  Lakia was seen today for anxiety, depression and stress.    Diagnoses and all orders for this visit:    Agoraphobia with panic disorder        INTERVENTION  Practiced assertive communication, Discussed and set plan for behavioral activation, Trained in improving communication skills, Provided education, Discussed potential barriers to change, and CBT to target negative self talk, minimizing  explored boundaries encouraged follow up with medical providers,     PLAN  Client to identify times in her past where caring for herself has benefited her to challenge this perception that she is unimportant.     INTERACTIVE COMPLEXITY  Is interactive complexity present?  No  Reason:      Additional Supporting Information:  N/A     Electronically signed by KHLOE Young on 4/17/2024 at 11:09 AM    Lakia Mcneil, was evaluated through a synchronous (real-time) audio-video encounter. The patient (or guardian if applicable) is aware that this is a billable service, which includes applicable co-pays. This Virtual Visit was conducted with patient's (and/or legal guardian's) consent. Patient identification was verified, and a caregiver was present when appropriate.   The patient was located at Home: [address] [place] [id number]  Provider was located at Facility (Appt Dept): [address] [place] [address]       Total time spent for this encounter:  53    --KHLOE Young on 4/17/2024 at 11:09 AM    An electronic signature was used to authenticate this note.

## 2024-04-18 NOTE — TELEPHONE ENCOUNTER
E-scribe requesting refill for Tizanidine. Please review and e-scribe if applicable.     Last Visit Date: 03/27/2024    Next Visit Date: 07/15/2024

## 2024-04-21 RX ORDER — TIZANIDINE 4 MG/1
2 TABLET ORAL EVERY 6 HOURS PRN
Qty: 30 TABLET | Refills: 0 | OUTPATIENT
Start: 2024-04-21

## 2024-05-01 ENCOUNTER — TELEMEDICINE (OUTPATIENT)
Dept: BEHAVIORAL/MENTAL HEALTH CLINIC | Age: 64
End: 2024-05-01
Payer: COMMERCIAL

## 2024-05-01 DIAGNOSIS — F40.01 AGORAPHOBIA WITH PANIC DISORDER: Primary | ICD-10-CM

## 2024-05-01 PROCEDURE — 90837 PSYTX W PT 60 MINUTES: CPT | Performed by: COUNSELOR

## 2024-05-01 NOTE — PROGRESS NOTES
54    --Tom Munguia [facility] on 5/1/2024 at 10:03 AM    An electronic signature was used to authenticate this note.

## 2024-05-11 DIAGNOSIS — R21 RASH: ICD-10-CM

## 2024-05-13 RX ORDER — TRIAMCINOLONE ACETONIDE 1 MG/G
1 OINTMENT TOPICAL 2 TIMES DAILY
Qty: 30 G | Refills: 0 | Status: SHIPPED | OUTPATIENT
Start: 2024-05-13 | End: 2024-05-28

## 2024-05-15 ENCOUNTER — TELEMEDICINE (OUTPATIENT)
Dept: BEHAVIORAL/MENTAL HEALTH CLINIC | Age: 64
End: 2024-05-15
Payer: COMMERCIAL

## 2024-05-15 DIAGNOSIS — F40.01 AGORAPHOBIA WITH PANIC DISORDER: Primary | ICD-10-CM

## 2024-05-15 PROCEDURE — 90834 PSYTX W PT 45 MINUTES: CPT | Performed by: COUNSELOR

## 2024-05-15 NOTE — PROGRESS NOTES
ADULT BEHAVIORAL HEALTH FOLLOW UP  Tom Munguia [facility]      Visit Date: 5/15/2024   Time of appointment:  11:10  Time spent with Patient: 45  minutes.   This is patient's  20th  appointment.    Reason for Consult:  Anxiety, Depression, and Stress       Referring Provider/PCP:    No ref. provider found  Geovany Zavaleta, PA      Pt provided informed consent for the behavioral health program. Discussed with patient model of service to include the limits of confidentiality (i.e. abuse reporting, suicide intervention, etc.) and short-term intervention focused approach.  Pt indicated understanding.    ZOHRA Dorman is a 64 y.o. female who presents for follow up of Agoraphobia with Panic Disorder. Client stated things have been \"quiet\", except for a brief verbal altercation with  last night. Client stated she did set a boundary with her  regarding the possibility of the mother-in-law. Client stated that they had a conversation with about this and she feels her  did listen and agreed with client that this would be too challenging of a situation. Client stated she feels good that she had this conversation and is proud of herself for doing so despite feeling nervous about the interaction. Client and therapist discussed the benefits of improving communication with others, and therapist noted mind reading, fortune telling, and should thinking related to client's view of others. Therapist utilized CBT techniques to challenge these beliefs, and encouraged client to monitor for them. Client and therapist discussed use of boundaries to assist in expanding communication with her .    Previous Plan:  Client to identify times in her past where caring for herself has benefited her to challenge this perception that she is unimportant.       MENTAL STATUS EXAM  Mood was anxious with anxious affect.   Suicidal ideation was denied.   Homicidal ideation was denied.   Hygiene was good .  Dress was neat.

## 2024-05-30 ENCOUNTER — TELEMEDICINE (OUTPATIENT)
Dept: BEHAVIORAL/MENTAL HEALTH CLINIC | Age: 64
End: 2024-05-30
Payer: COMMERCIAL

## 2024-05-30 DIAGNOSIS — F40.01 AGORAPHOBIA WITH PANIC DISORDER: Primary | ICD-10-CM

## 2024-05-30 PROCEDURE — 90834 PSYTX W PT 45 MINUTES: CPT | Performed by: COUNSELOR

## 2024-05-30 NOTE — PROGRESS NOTES
ADULT BEHAVIORAL HEALTH FOLLOW UP  Tom Munguia [facility]      Visit Date: 5/30/2024   Time of appointment:  9:12  Time spent with Patient: 46  minutes.   This is patient's  21st  appointment.    Reason for Consult:  Anxiety and Depression       Referring Provider/PCP:    No ref. provider found  Geoavny Zavaleta, PA      Pt provided informed consent for the behavioral health program. Discussed with patient model of service to include the limits of confidentiality (i.e. abuse reporting, suicide intervention, etc.) and short-term intervention focused approach.  Pt indicated understanding.    ZOHRA Dorman is a 64 y.o. female who presents for follow up of Agoraphobia with Panic Disorder. Client reported her  approached her about having a cookout for his family on Memorial Day. Client stated they had a discussion about this and she agreed to the cookout with certain concessions for her. Therapist praised client for engaging in this conversation and for setting boundaries to meet her needs. Client and therapist explored opportunities for continuing and increasing this type of communication including setting a boundary to encourage further communication and acknowledging when the boundary is met. Therapist noted minimizing and external locus of control in client statements, and utilized CBT techniques to challenge these beliefs.   Previous Plan:  Client to monitor for mind reading, fortune telling, and should thinking, set boundaries to create comfortable space for communication      MENTAL STATUS EXAM  Mood was anxious with anxious affect.   Suicidal ideation was denied.   Homicidal ideation was denied.   Hygiene was good .  Dress was neat.   Behavior was Restless & fidgety with No observation or self-report of difficulties ambulating.   Attitude was Cooperative.  Eye-contact was fair.  Speech: rate - WNL, rhythm - WNL, volume - WNL.  Verbalizations were goal directed.  Thought processes were intact and

## 2024-05-31 DIAGNOSIS — F32.A DEPRESSION, UNSPECIFIED DEPRESSION TYPE: ICD-10-CM

## 2024-05-31 RX ORDER — BUPROPION HYDROCHLORIDE 150 MG/1
150 TABLET ORAL EVERY MORNING
Qty: 90 TABLET | Refills: 0 | Status: SHIPPED | OUTPATIENT
Start: 2024-05-31

## 2024-06-13 ENCOUNTER — TELEMEDICINE (OUTPATIENT)
Dept: BEHAVIORAL/MENTAL HEALTH CLINIC | Age: 64
End: 2024-06-13
Payer: COMMERCIAL

## 2024-06-13 DIAGNOSIS — F40.01 AGORAPHOBIA WITH PANIC DISORDER: Primary | ICD-10-CM

## 2024-06-13 PROCEDURE — 90834 PSYTX W PT 45 MINUTES: CPT | Performed by: COUNSELOR

## 2024-06-13 NOTE — PROGRESS NOTES
ADULT BEHAVIORAL HEALTH FOLLOW UP  Tom Munguia [facility]      Visit Date: 6/13/2024   Time of appointment:  10:15  Time spent with Patient: 45  minutes.   This is patient's  22nd  appointment.    Reason for Consult:  Anxiety and Depression       Referring Provider/PCP:    No ref. provider found  Geovany Zavaleta, PA      Pt provided informed consent for the behavioral health program. Discussed with patient model of service to include the limits of confidentiality (i.e. abuse reporting, suicide intervention, etc.) and short-term intervention focused approach.  Pt indicated understanding.    ZOHRA Dorman is a 64 y.o. female who presents for follow up of Agoraphobia with Panic Disorder. Client reported setting an additional intellectual/communication boundary with her  recently. Client elaborated that there has been a few instances of her  telling her about plans at the last minute or after the fact. Client requested her  inform her earlier, especially if he wants her to be able to attend. Client transitioned to a more pressing concern, and disclosed her  has not been filing their taxes for the past 4-5 years. Client stated she does not know why he is doing this, and has had several confrontations with him regarding this issue, without any success. Client and therapist discussed various methods of addressing this issue. Therapist strongly encouraged client to work with a CPA to remedy the situation. Client and therapist discussed setting a deadline with her  while she awaits reprints of documentation, essentially giving him a short amount of time to begin the filing process before she files individually. Client is uncertain if she will do this, but stated she will consider it.    Previous Plan:  Client to monitor for mind reading, fortune telling, and should thinking, set boundaries to create comfortable space for communication      MENTAL STATUS EXAM  Mood was anxious with anxious

## 2024-06-26 ENCOUNTER — TELEMEDICINE (OUTPATIENT)
Dept: BEHAVIORAL/MENTAL HEALTH CLINIC | Age: 64
End: 2024-06-26
Payer: COMMERCIAL

## 2024-06-26 DIAGNOSIS — F40.01 AGORAPHOBIA WITH PANIC DISORDER: Primary | ICD-10-CM

## 2024-06-26 PROCEDURE — 90832 PSYTX W PT 30 MINUTES: CPT | Performed by: COUNSELOR

## 2024-06-26 ASSESSMENT — PATIENT HEALTH QUESTIONNAIRE - PHQ9
10. IF YOU CHECKED OFF ANY PROBLEMS, HOW DIFFICULT HAVE THESE PROBLEMS MADE IT FOR YOU TO DO YOUR WORK, TAKE CARE OF THINGS AT HOME, OR GET ALONG WITH OTHER PEOPLE: SOMEWHAT DIFFICULT
SUM OF ALL RESPONSES TO PHQ QUESTIONS 1-9: 5
SUM OF ALL RESPONSES TO PHQ QUESTIONS 1-9: 5
5. POOR APPETITE OR OVEREATING: NOT AT ALL
3. TROUBLE FALLING OR STAYING ASLEEP: NEARLY EVERY DAY
7. TROUBLE CONCENTRATING ON THINGS, SUCH AS READING THE NEWSPAPER OR WATCHING TELEVISION: NOT AT ALL
SUM OF ALL RESPONSES TO PHQ9 QUESTIONS 1 & 2: 0
SUM OF ALL RESPONSES TO PHQ QUESTIONS 1-9: 5
6. FEELING BAD ABOUT YOURSELF - OR THAT YOU ARE A FAILURE OR HAVE LET YOURSELF OR YOUR FAMILY DOWN: SEVERAL DAYS
SUM OF ALL RESPONSES TO PHQ QUESTIONS 1-9: 5
2. FEELING DOWN, DEPRESSED OR HOPELESS: NOT AT ALL
8. MOVING OR SPEAKING SO SLOWLY THAT OTHER PEOPLE COULD HAVE NOTICED. OR THE OPPOSITE, BEING SO FIGETY OR RESTLESS THAT YOU HAVE BEEN MOVING AROUND A LOT MORE THAN USUAL: NOT AT ALL
1. LITTLE INTEREST OR PLEASURE IN DOING THINGS: NOT AT ALL
4. FEELING TIRED OR HAVING LITTLE ENERGY: SEVERAL DAYS
9. THOUGHTS THAT YOU WOULD BE BETTER OFF DEAD, OR OF HURTING YOURSELF: NOT AT ALL

## 2024-06-26 NOTE — PROGRESS NOTES
ADULT BEHAVIORAL HEALTH FOLLOW UP  Tom Munguia [facility]      Visit Date: 6/26/2024   Time of appointment:  10:10  Time spent with Patient: 32  minutes.   This is patient's  23rd  appointment.    Reason for Consult:  Anxiety and Depression       Referring Provider/PCP:    No ref. provider found  Geovany Zavaleta, PA      Pt provided informed consent for the behavioral health program. Discussed with patient model of service to include the limits of confidentiality (i.e. abuse reporting, suicide intervention, etc.) and short-term intervention focused approach.  Pt indicated understanding.    ZOHRA Dorman is a 64 y.o. female who presents for follow up of Agoraphobia with Panic Disorder. Client reported making a medication change between sessions that she had not discussed with her PCP. Therapist strongly encouraged client to discuss this with her PCP, and client agreed to do so. Client reported a significant decrease in depression between sessions, and stated she has been out in public more regularly. Client has not taken any action in regards to the tax situation discussed at last session. Client and therapist explored barriers to engaging with the previously developed plan. Client stated she has decided to discuss the issue with her  one more time, \"but I need to get up the guts to say something to him\". Client and therapist set a time limit for herself to engage in this conversation, and client agreed upon a deadline of this Saturday evening. Client and therapist transitioned to discussing issues she has been having with providers. Client cited multiple tests she has done, but has not been contacted regarding the results. Therapist encouraged client to advocate for herself in these situations, and to make a list of the providers she needs to call with the questions she would like answered.    Previous Plan:  Client to take steps to resolve tax issue, consider setting time limited boundary with  Discharge Summary


Date of Service


Nov 30, 2017.





Discharge Summary


Admission Date:


Nov 27, 2017 at 22:24


Discharge Date:  Nov 30, 2017


Principal Diagnosis:  Aspiration Pneumonia and COPD exacerbation


Problems/Secondary Diagnoses:


Lingular and left lower lobe pneumonia/bilateral nodules, mediastinal 

lymphadenopathy, hemoptysis


Chronic respiratory failure exacerbated in the setting of aspiration pneumonia


COPD acute on chronic exacerbation


Atrial fibrillation/hypertension/chronic anticoagulation


HTN


Peripheral neuropathy


Glaucoma


Insomnia


Immunizations:  


   Have You Had Influenza Vaccine:  Unknown


   Influenza Vaccine Date:  Oct 11, 2011


   History of Tetanus Vaccine?:  Unknown


   History of Pneumococcal:  Unknown


   Pneumococcal Date:  Ej 10, 2008


   History of Hepatitis B Vaccine:  Unknown


Procedures:


CHEST ONE VIEW PORTABLE 11/27/17


IMPRESSION:


1.  Limited evaluation secondary to patient rotation and portable AP technique.


Further evaluation with chest CT recommended.


2.  Cardiomegaly.


3.  Extensive left lung opacity likely consolidation with effusion.


4.  Right paratracheal density of uncertain etiology. Lymphadenopathy not


excluded.





(CHEST) THORAX WITHOUT 11/27/17


IMPRESSION:


1.  Interval increase in left lung consolidation primarily in the lingula and


basal segments with extensive bronchial wall thickening and debris in the


airways of the left lung. This is concerning for chronic aspiration/aspiration


pneumonitis. This appearance is less typical for other diagnostic


considerations, which include an atypical infection such as mycobacterium avium


intracellulare.


2.  Scattered nodularity in the lungs bilaterally. This may represent an


infectious or inflammatory etiology, however, follow-up per [employer]


2017 recommendations below.


3.  Stable slight interval increase in mediastinal lymphadenopathy. Although


this may be reactive given the presence of inflammation/infection within the


lungs, follow-up should be obtained.





(BARIUM SWALLOW) ESOPHAGUS 11/29/17


FINDINGS: Patient initiated swallowing function well. There is evidence for side


aspiration. There is no significant cough reflex.


Remainder the esophagus is normal in course and caliber.


IMPRESSION: Aspiration within thin liquids. No evidence for a significant cough


reflex.


Consultations:


Pulmonology





Medication Reconciliation


New Medications:  


Levofloxacin (Levaquin) 500 Mg Tab


1 TAB PO Q2D for 2 Days, #2 TAB


Take 1st dose on 12/1 and the next on 12/3 to finish a 7 day course.


Prednisone Tab (Prednisone) 10 Mg Tab


10 MG PO UD for 8 Days, #20 TAB





 


Continued Medications:  


Bethanechol Chloride (Bethanechol Chloride) 50 Mg Tab


50 MG PO BID





Bimatoprost (Lumigan) 0.01 % Sol


1 DROP OPB QPM





Cholecalciferol (Vitamin D3) 2,000 Unit Cap


2000 UNITS PO DAILY for 90 Days, CAP 3 Refills





Gabapentin (Gabapentin) 600 Mg Tab


600 MG PO TID





Ipratropium Bromide (Atrovent 0.02% Soln) 2.5 Ml Nebu


1 VIAL NEB TID





Ipratropium-Albuterol (Combivent Respimat) 1 Aer Aer


2 PUFFS INH QID, INH





Metoprolol Tartrate (Metoprolol Tartrate) 100 Mg Tab


100 MG PO BID





Mometasone Furoate-Formoterol (Dulera 200/5 Mcg) 1 Aer Aer


2 PUFFS INH BID





Montelukast Sod (Montelukast Sodium) 10 Mg Tab


10 MG PO DAILY





Trazodone Hcl (Trazodone) 50 Mg Tab


50 MG PO HS PRN for Sleep, TAB





Warfarin Sodium (Warfarin Sodium) 2 Mg Tab


2 MG PO DAILY











Discharge Exam


The patient was seen and examined this morning. Pt reports feeling well today.  

He reports occasional cough with minimal production. He denies any shortness of 

breath with exertion about the room.  He denies shortness of breath at rest. No 

fever, chills or sweats.  





In regards to aspiration, I asked the patient about sleep habits. He reports 

chronically sleeping on his left side, that he's more comfortable here, and 

reports this is his "side of the bed".  He typically feels food get stuck but 

only with things like toast or meat where he needs to take a drink.  Results of 

barium study were discussed with him and speech therapy plans to see him early 

this afternoon.  





ROS: 10 point ROS reviewed and otherwise negative. 





PE:


General Appearance:  WD/WN, no apparent distress, + obese


Eyes:  PERRL, EOMI, no icterus


ENT:  hearing grossly normal, pharynx normal, MMM


Neck:  supple, no JVD


Respiratory/Chest:  chest non-tender, no accessory muscle use, + pertinent 

finding (on 2 L during exam, +inspiratory and expiratory wheeze minimal in the 

left lobes. Good aeration in the right.  No crackles or rales.)


Cardiovascular:  regular rate, rhythm, no murmur


Abdomen:  normal bowel sounds, non tender, soft, nondistended.


Extremities:  non-tender, no pedal edema, + chronic venous stasis changes 

bilaterally


Neurologic/Psychiatric:  alert, oriented x 3, mood normal


Skin:  normal color, warm/dry





Hospital Course


History of Present Illness


Source:  patient, hospital records


The patient is a 79-year-old male who is referred to the emergency department 

by his PCP today for worsening shortness of breath, productive cough with blood 

tinged sputum, fatigue and wheezing that began yesterday, and did not improve 

with 2 nebulizer treatments at that office today.  He is usually on 2 L of 

oxygen at home, and he had increased it to 3 L without significant improvement 

symptoms.  He has a previous history of being admitted for pneumonia in 2016.  

He did get his flu shot this year.  He has not had any recent travels or known 

sick exposures.





Physical Exam:


The patient is awake, alert and oriented 3, normocephalic and atraumatic, 

lying in bed and in no acute distress.


HEENT--PERRL, EOMI, mucous membranes  and oropharynx dry.


Neck--supple, no JVD or bruits, thyroid normal, trachea midline, no adenopathy.


Heart--normal S1 and S2, no extra beats, no murmurs, rubs or gallops.


Lungs--coarse breath sounds bilaterally, worse on left, no respiratory distress

, no accessory muscle use.


Abdomen--normal bowel sounds and soft, nontender and nondistended, no hernias 

or masses,  no organomegaly.


Extremities--no cyanosis, clubbing or edema. There are good distal pulses b/l.


Dermatologic--normal skin turgor, normal color, warm and dry, no abnormal lymph 

nodes, no rash.


Neurologic--cranial nerves II through XII grossly intact.


Rheumatologic--normal range of motion.


Psychiatric--normal affect.








Hospital Course:


This is a79 yo M admitted with aspiration pneumonia and COPD exacerbation: 





Lingular and left lower lobe pneumonia/bilateral nodules, mediastinal 

lymphadenopathy, hemoptysis


Chronic respiratory failure exacerbated in the setting of pneumonia, found to 

be aspiration pneumonia


COPD


- Pt was initially treated with vanc, cefepime and levaquin while in the 

hospital - antibiotics were downgraded to levaquin alone and to be continued at 

the time of discharge x 2 more doses to complete a 7 day course. 


- Barium swallow study indicated aspiration with thin liquids, and poor gag 

reflex.  Pt chronically sleeps on his left side, and has hx of surgical 

resection in the left lobe which likely potentiates infection to occur in this 

area. 


- Pulmonology consulted - follows with Dr. Abraham.


- Speech evaluated on 11/30 and the patient did not require alteration in food 

consistency or thickened liquids. 


- During admission discussion re bronchoscopy was brought up by Pulmonology - 

pt does NOT want this study, he would prefer to follow up as an outpatient in 3 

weeks with Dr. Abrhaam and have his routinely scheduled PFTs.  Pulm agrees and 

does not see need for emergent bronch. 


- Guaifenesin extended release 600 mg PO BID - sputum culture was collected but 

only showed normal pharyngeal growth.


- Xopenex with Atrovent nebulizer Q6H while awake and Q2H prn


- Solu-Medrol was used and pt was transitioned to a prednisone taper at time of 

discharge - 40 mg x 2 days, and decreasing by 10 mg every 2 days until gone.  

Total 8 day course. 


- Nasal cannula 3 L oxygen titrated to keep pulse ox greater than or equal to 92

%. - Wears 2 L at baseline. 


- Held Dulera during admission - resume upon discharge


- Continue montelukast





Atrial fibrillation/hypertension/chronic anticoagulation


- Continue metoprolol tartrate 100 mg PO BID


- INR therapeutic at 2.8 on day of dc


- Warfarin 2 mg by mouth daily, follow PT/INR.





HTN


- BP stable


- Continue metoprolol as above





Peripheral neuropathy


- Continue gabapentin 600 mg by mouth 3 times a day.





Glaucoma


- Continue Lumigan





Insomnia


- Continue trazodone when necessary





DVT ppx: coumadin





CODE STATUS: FULL CODE





Disposition: From home, discharge home today


Total Time Spent:  Greater than 30 minutes


This includes examination of the patient, discharge planning, medication 

reconciliation, and communication with other providers.





Discharge Instructions


Please refer to the electronic Patient Visit Report (Discharge Instructions) 

for additional information.





Follow-Up


Follow up with your Primary Care Provider within 1 week. 


Follow up with Pulmonology within 3 weeks as already scheduled





Additional Copies To


Roshan Chavez M.D.

## 2024-07-15 ENCOUNTER — OFFICE VISIT (OUTPATIENT)
Dept: NEUROLOGY | Age: 64
End: 2024-07-15
Payer: COMMERCIAL

## 2024-07-15 VITALS
HEIGHT: 62 IN | SYSTOLIC BLOOD PRESSURE: 104 MMHG | BODY MASS INDEX: 28.98 KG/M2 | DIASTOLIC BLOOD PRESSURE: 69 MMHG | WEIGHT: 157.5 LBS | HEART RATE: 106 BPM

## 2024-07-15 DIAGNOSIS — M54.2 NECK PAIN: ICD-10-CM

## 2024-07-15 DIAGNOSIS — M47.812 CERVICAL SPONDYLOSIS WITHOUT MYELOPATHY: ICD-10-CM

## 2024-07-15 DIAGNOSIS — G44.209 MIXED COMMON MIGRAINE AND MUSCLE CONTRACTION HEADACHE: Primary | ICD-10-CM

## 2024-07-15 DIAGNOSIS — G43.009 MIXED COMMON MIGRAINE AND MUSCLE CONTRACTION HEADACHE: Primary | ICD-10-CM

## 2024-07-15 PROCEDURE — 99214 OFFICE O/P EST MOD 30 MIN: CPT

## 2024-07-15 PROCEDURE — 3078F DIAST BP <80 MM HG: CPT

## 2024-07-15 PROCEDURE — 3074F SYST BP LT 130 MM HG: CPT

## 2024-07-16 ENCOUNTER — TELEMEDICINE (OUTPATIENT)
Dept: BEHAVIORAL/MENTAL HEALTH CLINIC | Age: 64
End: 2024-07-16
Payer: COMMERCIAL

## 2024-07-16 DIAGNOSIS — F40.01 AGORAPHOBIA WITH PANIC DISORDER: Primary | ICD-10-CM

## 2024-07-16 PROCEDURE — 90837 PSYTX W PT 60 MINUTES: CPT | Performed by: COUNSELOR

## 2024-07-16 ASSESSMENT — PATIENT HEALTH QUESTIONNAIRE - PHQ9
SUM OF ALL RESPONSES TO PHQ9 QUESTIONS 1 & 2: 0
2. FEELING DOWN, DEPRESSED OR HOPELESS: NOT AT ALL
5. POOR APPETITE OR OVEREATING: SEVERAL DAYS
SUM OF ALL RESPONSES TO PHQ QUESTIONS 1-9: 6
3. TROUBLE FALLING OR STAYING ASLEEP: NEARLY EVERY DAY
SUM OF ALL RESPONSES TO PHQ QUESTIONS 1-9: 6
1. LITTLE INTEREST OR PLEASURE IN DOING THINGS: NOT AT ALL
9. THOUGHTS THAT YOU WOULD BE BETTER OFF DEAD, OR OF HURTING YOURSELF: NOT AT ALL
8. MOVING OR SPEAKING SO SLOWLY THAT OTHER PEOPLE COULD HAVE NOTICED. OR THE OPPOSITE, BEING SO FIGETY OR RESTLESS THAT YOU HAVE BEEN MOVING AROUND A LOT MORE THAN USUAL: NOT AT ALL
4. FEELING TIRED OR HAVING LITTLE ENERGY: MORE THAN HALF THE DAYS
6. FEELING BAD ABOUT YOURSELF - OR THAT YOU ARE A FAILURE OR HAVE LET YOURSELF OR YOUR FAMILY DOWN: NOT AT ALL
7. TROUBLE CONCENTRATING ON THINGS, SUCH AS READING THE NEWSPAPER OR WATCHING TELEVISION: NOT AT ALL
10. IF YOU CHECKED OFF ANY PROBLEMS, HOW DIFFICULT HAVE THESE PROBLEMS MADE IT FOR YOU TO DO YOUR WORK, TAKE CARE OF THINGS AT HOME, OR GET ALONG WITH OTHER PEOPLE: NOT DIFFICULT AT ALL
SUM OF ALL RESPONSES TO PHQ QUESTIONS 1-9: 6
SUM OF ALL RESPONSES TO PHQ QUESTIONS 1-9: 6

## 2024-07-16 ASSESSMENT — ENCOUNTER SYMPTOMS
NAUSEA: 0
RHINORRHEA: 0
ABDOMINAL PAIN: 0
COUGH: 0
VOMITING: 0
SHORTNESS OF BREATH: 0
SORE THROAT: 0

## 2024-07-16 NOTE — PROGRESS NOTES
ADULT BEHAVIORAL HEALTH FOLLOW UP  Tom Munguia [facility]      Visit Date: 7/16/2024   Time of appointment:  9:08  Time spent with Patient: 53  minutes.   This is patient's  24th  appointment.    Reason for Consult:  Anxiety and Depression       Referring Provider/PCP:    No ref. provider found  Geovany Zavaleta, PA      Pt provided informed consent for the behavioral health program. Discussed with patient model of service to include the limits of confidentiality (i.e. abuse reporting, suicide intervention, etc.) and short-term intervention focused approach.  Pt indicated understanding.    ZOHRA Dorman is a 64 y.o. female who presents for follow up of Agoraphobia with Panic Disorder. Client stated she has been very busy recently, but denies feeling any increased stress. Client did not address the tax issue with her , stated that he had been out of town for work and she had been extremely busy recently. Client also stated that she has been considering long term or respite care for her son, but has not engaged actively in seeking it. Client acknowledged both of these issues are stressors that she thinks of often. Client and therapist explored barriers to change, and client identified she would feel responsible if her  became upset. Therapist highlighted the internal control fallacy in this statement, and utilized CBT techniques to challenge it. Client also disclosed a concern that her  would try to debate this with her. Therapist reiterated that this would be setting a boundary with a specific time limit only, and that she is not required to validate or explain her decision. Therapist also reminded client that she does not have to communicate her intention to file for taxes, but had previously stated she wanted to do so to avoid possible harm to the relationship.    Previous Plan:  Client to engage in conversation with  by agreed upon time limit, will advocate to her providers and contact

## 2024-08-06 ENCOUNTER — TELEMEDICINE (OUTPATIENT)
Dept: BEHAVIORAL/MENTAL HEALTH CLINIC | Age: 64
End: 2024-08-06
Payer: COMMERCIAL

## 2024-08-06 DIAGNOSIS — F40.01 AGORAPHOBIA WITH PANIC DISORDER: Primary | ICD-10-CM

## 2024-08-06 PROCEDURE — 90834 PSYTX W PT 45 MINUTES: CPT | Performed by: COUNSELOR

## 2024-08-06 ASSESSMENT — PATIENT HEALTH QUESTIONNAIRE - PHQ9
SUM OF ALL RESPONSES TO PHQ QUESTIONS 1-9: 4
7. TROUBLE CONCENTRATING ON THINGS, SUCH AS READING THE NEWSPAPER OR WATCHING TELEVISION: NOT AT ALL
6. FEELING BAD ABOUT YOURSELF - OR THAT YOU ARE A FAILURE OR HAVE LET YOURSELF OR YOUR FAMILY DOWN: NOT AT ALL
SUM OF ALL RESPONSES TO PHQ9 QUESTIONS 1 & 2: 0
5. POOR APPETITE OR OVEREATING: NOT AT ALL
8. MOVING OR SPEAKING SO SLOWLY THAT OTHER PEOPLE COULD HAVE NOTICED. OR THE OPPOSITE, BEING SO FIGETY OR RESTLESS THAT YOU HAVE BEEN MOVING AROUND A LOT MORE THAN USUAL: NOT AT ALL
10. IF YOU CHECKED OFF ANY PROBLEMS, HOW DIFFICULT HAVE THESE PROBLEMS MADE IT FOR YOU TO DO YOUR WORK, TAKE CARE OF THINGS AT HOME, OR GET ALONG WITH OTHER PEOPLE: NOT DIFFICULT AT ALL
4. FEELING TIRED OR HAVING LITTLE ENERGY: MORE THAN HALF THE DAYS
9. THOUGHTS THAT YOU WOULD BE BETTER OFF DEAD, OR OF HURTING YOURSELF: NOT AT ALL
2. FEELING DOWN, DEPRESSED OR HOPELESS: NOT AT ALL
SUM OF ALL RESPONSES TO PHQ QUESTIONS 1-9: 4
SUM OF ALL RESPONSES TO PHQ QUESTIONS 1-9: 4
3. TROUBLE FALLING OR STAYING ASLEEP: MORE THAN HALF THE DAYS
SUM OF ALL RESPONSES TO PHQ QUESTIONS 1-9: 4
1. LITTLE INTEREST OR PLEASURE IN DOING THINGS: NOT AT ALL

## 2024-08-06 NOTE — PROGRESS NOTES
ADULT BEHAVIORAL HEALTH FOLLOW UP  [facility] Munguia [facility]      Visit Date: 8/6/2024   Time of appointment:  11:15  Time spent with Patient: 45  minutes.   This is patient's  25th  appointment.    Reason for Consult:  Anxiety and Depression       Referring Provider/PCP:    No ref. provider found  Geovany Zavaleta, PA      Pt provided informed consent for the behavioral health program. Discussed with patient model of service to include the limits of confidentiality (i.e. abuse reporting, suicide intervention, etc.) and short-term intervention focused approach.  Pt indicated understanding.    ZOHRA Dorman is a 64 y.o. female who presents for follow up of Agoraphobia with Panic Disorder. Client stated she has been spending more time with her daughter who she was previously having little contact with. Client elaborated that this has improved over the past month, including seeing for lunch, going shopping, and watching her grandchild. Client stated she does not interact with her daughter's boyfriend, and does not discuss their relationship when they interact. Client stated she is generally grateful for the increased interaction, and is focused on maintaining and improving this connection. Client stated she is choosing to value her time with her daughter, and has set an internal boundary with herself that she will not address or interact with her boyfriend. Client stated she also set a 2 week time limited boundary with her  regarding taking care of their tax issues. Client stated she did not feel bad or guilty for addressing this as she feared she would, and stated \"I'm not going to feel bad about it, he's the one that started it.\" Therapist praised client for setting this boundary with her , and highlighted how she did not engage with the internal control fallacy that she did previously. Client and therapist discussed how she will continue to address this situation. Client will take steps to collect the

## 2024-08-26 ENCOUNTER — TELEMEDICINE (OUTPATIENT)
Dept: BEHAVIORAL/MENTAL HEALTH CLINIC | Age: 64
End: 2024-08-26
Payer: COMMERCIAL

## 2024-08-26 DIAGNOSIS — F40.01 AGORAPHOBIA WITH PANIC DISORDER: Primary | ICD-10-CM

## 2024-08-26 PROCEDURE — 90837 PSYTX W PT 60 MINUTES: CPT | Performed by: COUNSELOR

## 2024-08-26 ASSESSMENT — PATIENT HEALTH QUESTIONNAIRE - PHQ9
7. TROUBLE CONCENTRATING ON THINGS, SUCH AS READING THE NEWSPAPER OR WATCHING TELEVISION: NOT AT ALL
SUM OF ALL RESPONSES TO PHQ QUESTIONS 1-9: 7
9. THOUGHTS THAT YOU WOULD BE BETTER OFF DEAD, OR OF HURTING YOURSELF: NOT AT ALL
1. LITTLE INTEREST OR PLEASURE IN DOING THINGS: MORE THAN HALF THE DAYS
6. FEELING BAD ABOUT YOURSELF - OR THAT YOU ARE A FAILURE OR HAVE LET YOURSELF OR YOUR FAMILY DOWN: NOT AT ALL
10. IF YOU CHECKED OFF ANY PROBLEMS, HOW DIFFICULT HAVE THESE PROBLEMS MADE IT FOR YOU TO DO YOUR WORK, TAKE CARE OF THINGS AT HOME, OR GET ALONG WITH OTHER PEOPLE: VERY DIFFICULT
5. POOR APPETITE OR OVEREATING: NOT AT ALL
8. MOVING OR SPEAKING SO SLOWLY THAT OTHER PEOPLE COULD HAVE NOTICED. OR THE OPPOSITE, BEING SO FIGETY OR RESTLESS THAT YOU HAVE BEEN MOVING AROUND A LOT MORE THAN USUAL: NOT AT ALL
SUM OF ALL RESPONSES TO PHQ QUESTIONS 1-9: 7
SUM OF ALL RESPONSES TO PHQ QUESTIONS 1-9: 7
4. FEELING TIRED OR HAVING LITTLE ENERGY: MORE THAN HALF THE DAYS
SUM OF ALL RESPONSES TO PHQ QUESTIONS 1-9: 7
SUM OF ALL RESPONSES TO PHQ9 QUESTIONS 1 & 2: 2
2. FEELING DOWN, DEPRESSED OR HOPELESS: NOT AT ALL
3. TROUBLE FALLING OR STAYING ASLEEP: NEARLY EVERY DAY

## 2024-08-26 NOTE — PROGRESS NOTES
ADULT BEHAVIORAL HEALTH FOLLOW UP  [facility] Munguia [facility]      Visit Date: 8/26/2024   Time of appointment:  11:02  Time spent with Patient: 53  minutes.   This is patient's  26th  appointment.    Reason for Consult:  Anxiety and Depression       Referring Provider/PCP:    No ref. provider found  Geovany Zavaleta, PA      Pt provided informed consent for the behavioral health program. Discussed with patient model of service to include the limits of confidentiality (i.e. abuse reporting, suicide intervention, etc.) and short-term intervention focused approach.  Pt indicated understanding.    ZOHRA Dorman is a 64 y.o. female who presents for follow up of Agoraphobia with Panic Disorder. Client reported multiple recent stressors including illness, being stuck in the home, and changes in her son's benefits, continued issues with taxes, and a possible loss of her health insurance. Client reported feeling overwhelmed by these issues, but is focused on the health insurance primarily. Client elaborated that her  has been informed that he will be required to provide proof of marriage for continued coverage for her benefits, and a tax return is what has been requested. Client stated the initial conversation threatened to escalate into an argument, so she ended the interaction abruptly. Client has not attempted to address this with him again, but feels she needs to. Client stated she does not currently feel ready to have this conversation as she is still agitated and is concerned she will \"fly off the handle\". Client and therapist discussed methods of returning to baseline mental and physical functioning. Client reported that getting out of the house again, interacting with select supports, and developing a plan for approaching her  will help her to prepare for a difficult interaction. Therapist utilized motivational interviewing techniques to assess client's motivation and readiness for change, and to increase  all        1/17/2023     3:17 PM 1/16/2023    11:59 AM 1/10/2023     2:13 PM 12/29/2020    11:00 AM 5/3/2019    11:00 AM   PETE 7 SCORE   PETE-7 Total Score 13 17 17     PETE-7 Total Score    17 7     Interpretation of PETE-7 score: 5-9 = mild anxiety, 10-14 = moderate anxiety, 15+ = severe anxiety. Recommend referral to behavioral health for scores 10 or greater.      DIAGNOSIS  Lakia was seen today for anxiety and depression.    Diagnoses and all orders for this visit:    Agoraphobia with panic disorder      INTERVENTION  Practiced assertive communication, Discussed and set plan for behavioral activation, Trained in improving communication skills, Provided education, Motivational Interviewing to increase patient confidence and compliance with adhering to behavioral change plan, Motivational Interviewing to determine importance and readiness for change, and Discussed potential barriers to change explored boundaries    PLAN  Client to engage in preparation for difficult interactions    INTERACTIVE COMPLEXITY  Is interactive complexity present?  No  Reason:      Additional Supporting Information:  N/A     Electronically signed by KHLOE Young on 8/26/2024 at 11:02 AM    Lakia GIL Mcneil, was evaluated through a synchronous (real-time) audio-video encounter. The patient (or guardian if applicable) is aware that this is a billable service, which includes applicable co-pays. This Virtual Visit was conducted with patient's (and/or legal guardian's) consent. Patient identification was verified, and a caregiver was present when appropriate.   The patient was located at Home: [address] [place] [id number]  Provider was located at Facility (Appt Dept): [id number] [id number]       Total time spent for this encounter:  53    --KHLOE Young on 8/26/2024 at 11:02 AM    An electronic signature was used to authenticate this note.

## 2024-09-16 ENCOUNTER — TELEMEDICINE (OUTPATIENT)
Dept: BEHAVIORAL/MENTAL HEALTH CLINIC | Age: 64
End: 2024-09-16
Payer: COMMERCIAL

## 2024-09-16 DIAGNOSIS — F40.01 AGORAPHOBIA WITH PANIC DISORDER: Primary | ICD-10-CM

## 2024-09-16 PROCEDURE — 90837 PSYTX W PT 60 MINUTES: CPT | Performed by: COUNSELOR

## 2024-09-16 ASSESSMENT — PATIENT HEALTH QUESTIONNAIRE - PHQ9
6. FEELING BAD ABOUT YOURSELF - OR THAT YOU ARE A FAILURE OR HAVE LET YOURSELF OR YOUR FAMILY DOWN: NOT AT ALL
SUM OF ALL RESPONSES TO PHQ QUESTIONS 1-9: 6
SUM OF ALL RESPONSES TO PHQ9 QUESTIONS 1 & 2: 0
4. FEELING TIRED OR HAVING LITTLE ENERGY: MORE THAN HALF THE DAYS
9. THOUGHTS THAT YOU WOULD BE BETTER OFF DEAD, OR OF HURTING YOURSELF: NOT AT ALL
5. POOR APPETITE OR OVEREATING: NOT AT ALL
3. TROUBLE FALLING OR STAYING ASLEEP: NEARLY EVERY DAY
SUM OF ALL RESPONSES TO PHQ QUESTIONS 1-9: 6
10. IF YOU CHECKED OFF ANY PROBLEMS, HOW DIFFICULT HAVE THESE PROBLEMS MADE IT FOR YOU TO DO YOUR WORK, TAKE CARE OF THINGS AT HOME, OR GET ALONG WITH OTHER PEOPLE: NOT DIFFICULT AT ALL
SUM OF ALL RESPONSES TO PHQ QUESTIONS 1-9: 6
1. LITTLE INTEREST OR PLEASURE IN DOING THINGS: NOT AT ALL
7. TROUBLE CONCENTRATING ON THINGS, SUCH AS READING THE NEWSPAPER OR WATCHING TELEVISION: SEVERAL DAYS
SUM OF ALL RESPONSES TO PHQ QUESTIONS 1-9: 6
8. MOVING OR SPEAKING SO SLOWLY THAT OTHER PEOPLE COULD HAVE NOTICED. OR THE OPPOSITE, BEING SO FIGETY OR RESTLESS THAT YOU HAVE BEEN MOVING AROUND A LOT MORE THAN USUAL: NOT AT ALL
2. FEELING DOWN, DEPRESSED OR HOPELESS: NOT AT ALL

## 2024-10-07 ENCOUNTER — TELEMEDICINE (OUTPATIENT)
Dept: BEHAVIORAL/MENTAL HEALTH CLINIC | Age: 64
End: 2024-10-07
Payer: COMMERCIAL

## 2024-10-07 DIAGNOSIS — F40.01 AGORAPHOBIA WITH PANIC DISORDER: Primary | ICD-10-CM

## 2024-10-07 PROCEDURE — 90837 PSYTX W PT 60 MINUTES: CPT | Performed by: COUNSELOR

## 2024-10-07 ASSESSMENT — PATIENT HEALTH QUESTIONNAIRE - PHQ9
4. FEELING TIRED OR HAVING LITTLE ENERGY: SEVERAL DAYS
SUM OF ALL RESPONSES TO PHQ QUESTIONS 1-9: 4
9. THOUGHTS THAT YOU WOULD BE BETTER OFF DEAD, OR OF HURTING YOURSELF: NOT AT ALL
10. IF YOU CHECKED OFF ANY PROBLEMS, HOW DIFFICULT HAVE THESE PROBLEMS MADE IT FOR YOU TO DO YOUR WORK, TAKE CARE OF THINGS AT HOME, OR GET ALONG WITH OTHER PEOPLE: SOMEWHAT DIFFICULT
7. TROUBLE CONCENTRATING ON THINGS, SUCH AS READING THE NEWSPAPER OR WATCHING TELEVISION: SEVERAL DAYS
SUM OF ALL RESPONSES TO PHQ QUESTIONS 1-9: 4
SUM OF ALL RESPONSES TO PHQ9 QUESTIONS 1 & 2: 1
2. FEELING DOWN, DEPRESSED OR HOPELESS: NOT AT ALL
SUM OF ALL RESPONSES TO PHQ QUESTIONS 1-9: 4
3. TROUBLE FALLING OR STAYING ASLEEP: SEVERAL DAYS
8. MOVING OR SPEAKING SO SLOWLY THAT OTHER PEOPLE COULD HAVE NOTICED. OR THE OPPOSITE, BEING SO FIGETY OR RESTLESS THAT YOU HAVE BEEN MOVING AROUND A LOT MORE THAN USUAL: NOT AT ALL
1. LITTLE INTEREST OR PLEASURE IN DOING THINGS: SEVERAL DAYS
5. POOR APPETITE OR OVEREATING: NOT AT ALL
SUM OF ALL RESPONSES TO PHQ QUESTIONS 1-9: 4
6. FEELING BAD ABOUT YOURSELF - OR THAT YOU ARE A FAILURE OR HAVE LET YOURSELF OR YOUR FAMILY DOWN: NOT AT ALL

## 2024-10-07 NOTE — PROGRESS NOTES
ADULT BEHAVIORAL HEALTH FOLLOW UP  Tom Munguia [facility]      Visit Date: 10/7/2024   Time of appointment:  11:02  Time spent with Patient: 56 minutes.   This is patient's  28th  appointment.    Reason for Consult:  Anxiety and Depression       Referring Provider/PCP:    No ref. provider found  Geovany Zavaleta, PA      Pt provided informed consent for the behavioral health program. Discussed with patient model of service to include the limits of confidentiality (i.e. abuse reporting, suicide intervention, etc.) and short-term intervention focused approach.  Pt indicated understanding.    ZOHRA Dorman is a 64 y.o. female who presents for follow up of Agoraphobia with Panic Disorder. Client reported having a significant panic attack once recently between sessions. Client stated she would not leave the home unaccompanied for several days afterwards, but recently took a short trip to the store by alone. Client was initially unable to identify what may have triggered this response, but upon further exploration, identified her son's behavior has been more challenging recently, and her ability to cope with this is less than usual. Client is also feeling limited in care options for her son recently. This has caused disagreements between her and her  who wants her to attend an event with him. Client stated she could ask her other children to watch him, but feels this would be, \"interrupting their lives\". Therapist noted the intensity of this statement as a form of maximizing, and utilized CBT techniques to challenge this belief. This opened a larger conversation regarding client's reluctance to ask questions or seek assistance from others. Client stated that it is \"easier\" for her to not ask, than it would be to seek help and possibly be rejected. Therapist requested client to consider the possible benefits of asking for help.    Previous Plan:  Client to engage in preparation for difficult

## 2024-10-08 DIAGNOSIS — F41.9 ANXIETY: ICD-10-CM

## 2024-10-08 DIAGNOSIS — F41.1 GAD (GENERALIZED ANXIETY DISORDER): ICD-10-CM

## 2024-10-08 RX ORDER — ESCITALOPRAM OXALATE 5 MG/1
5 TABLET ORAL DAILY
Qty: 30 TABLET | Refills: 5 | Status: SHIPPED | OUTPATIENT
Start: 2024-10-08

## 2024-10-09 DIAGNOSIS — F32.A DEPRESSION, UNSPECIFIED DEPRESSION TYPE: ICD-10-CM

## 2024-10-09 RX ORDER — BUPROPION HYDROCHLORIDE 150 MG/1
150 TABLET ORAL EVERY MORNING
Qty: 90 TABLET | Refills: 3 | Status: SHIPPED | OUTPATIENT
Start: 2024-10-09

## 2024-10-16 ENCOUNTER — OFFICE VISIT (OUTPATIENT)
Dept: PRIMARY CARE CLINIC | Age: 64
End: 2024-10-16
Payer: COMMERCIAL

## 2024-10-16 VITALS
HEART RATE: 89 BPM | DIASTOLIC BLOOD PRESSURE: 80 MMHG | OXYGEN SATURATION: 96 % | HEIGHT: 62 IN | WEIGHT: 157 LBS | BODY MASS INDEX: 28.89 KG/M2 | SYSTOLIC BLOOD PRESSURE: 132 MMHG

## 2024-10-16 DIAGNOSIS — J44.9 CHRONIC OBSTRUCTIVE PULMONARY DISEASE, UNSPECIFIED COPD TYPE (HCC): ICD-10-CM

## 2024-10-16 DIAGNOSIS — Z85.850 HISTORY OF THYROID CANCER: ICD-10-CM

## 2024-10-16 DIAGNOSIS — E89.0 POSTOPERATIVE HYPOTHYROIDISM: ICD-10-CM

## 2024-10-16 DIAGNOSIS — I10 ESSENTIAL HYPERTENSION: ICD-10-CM

## 2024-10-16 DIAGNOSIS — M54.2 NECK PAIN: ICD-10-CM

## 2024-10-16 DIAGNOSIS — R11.0 NAUSEA: ICD-10-CM

## 2024-10-16 DIAGNOSIS — I21.4 NSTEMI (NON-ST ELEVATED MYOCARDIAL INFARCTION) (HCC): ICD-10-CM

## 2024-10-16 DIAGNOSIS — F33.1 MODERATE EPISODE OF RECURRENT MAJOR DEPRESSIVE DISORDER (HCC): ICD-10-CM

## 2024-10-16 DIAGNOSIS — Z12.11 SCREENING FOR MALIGNANT NEOPLASM OF COLON: ICD-10-CM

## 2024-10-16 DIAGNOSIS — Z13.6 SCREENING FOR CARDIOVASCULAR CONDITION: ICD-10-CM

## 2024-10-16 DIAGNOSIS — F33.0 MILD EPISODE OF RECURRENT MAJOR DEPRESSIVE DISORDER (HCC): ICD-10-CM

## 2024-10-16 DIAGNOSIS — I47.10 PAROXYSMAL SUPRAVENTRICULAR TACHYCARDIA (HCC): ICD-10-CM

## 2024-10-16 DIAGNOSIS — F41.1 GAD (GENERALIZED ANXIETY DISORDER): ICD-10-CM

## 2024-10-16 DIAGNOSIS — F32.A DEPRESSION, UNSPECIFIED DEPRESSION TYPE: ICD-10-CM

## 2024-10-16 DIAGNOSIS — F41.9 ANXIETY: Primary | ICD-10-CM

## 2024-10-16 DIAGNOSIS — E03.9 HYPOTHYROIDISM, UNSPECIFIED TYPE: ICD-10-CM

## 2024-10-16 DIAGNOSIS — E55.9 VITAMIN D DEFICIENCY: ICD-10-CM

## 2024-10-16 DIAGNOSIS — M35.01 SJOGREN'S SYNDROME WITH KERATOCONJUNCTIVITIS SICCA (HCC): ICD-10-CM

## 2024-10-16 DIAGNOSIS — F40.01 AGORAPHOBIA WITH PANIC ATTACKS: ICD-10-CM

## 2024-10-16 DIAGNOSIS — Z87.891 PERSONAL HISTORY OF TOBACCO USE, PRESENTING HAZARDS TO HEALTH: ICD-10-CM

## 2024-10-16 PROCEDURE — 3079F DIAST BP 80-89 MM HG: CPT | Performed by: PHYSICIAN ASSISTANT

## 2024-10-16 PROCEDURE — 99214 OFFICE O/P EST MOD 30 MIN: CPT | Performed by: PHYSICIAN ASSISTANT

## 2024-10-16 PROCEDURE — 99406 BEHAV CHNG SMOKING 3-10 MIN: CPT | Performed by: PHYSICIAN ASSISTANT

## 2024-10-16 PROCEDURE — 3075F SYST BP GE 130 - 139MM HG: CPT | Performed by: PHYSICIAN ASSISTANT

## 2024-10-16 PROCEDURE — G0446 INTENS BEHAVE THER CARDIO DX: HCPCS | Performed by: PHYSICIAN ASSISTANT

## 2024-10-16 RX ORDER — BUSPIRONE HYDROCHLORIDE 10 MG/1
10 TABLET ORAL 3 TIMES DAILY
Qty: 270 TABLET | Refills: 3 | Status: SHIPPED | OUTPATIENT
Start: 2024-10-16

## 2024-10-16 RX ORDER — LISINOPRIL 40 MG/1
40 TABLET ORAL DAILY
Qty: 90 TABLET | Refills: 3 | Status: SHIPPED | OUTPATIENT
Start: 2024-10-16

## 2024-10-16 RX ORDER — ATORVASTATIN CALCIUM 40 MG/1
40 TABLET, FILM COATED ORAL NIGHTLY
Qty: 90 TABLET | Refills: 3 | Status: SHIPPED | OUTPATIENT
Start: 2024-10-16

## 2024-10-16 RX ORDER — METOPROLOL SUCCINATE 25 MG/1
25 TABLET, EXTENDED RELEASE ORAL NIGHTLY
Qty: 90 TABLET | Refills: 1 | Status: SHIPPED | OUTPATIENT
Start: 2024-10-16

## 2024-10-16 RX ORDER — MELOXICAM 7.5 MG/1
7.5 TABLET ORAL DAILY PRN
Qty: 90 TABLET | Refills: 0 | Status: SHIPPED | OUTPATIENT
Start: 2024-10-16

## 2024-10-16 RX ORDER — FAMOTIDINE 20 MG/1
20 TABLET, FILM COATED ORAL 2 TIMES DAILY
Qty: 180 TABLET | Refills: 5 | Status: SHIPPED | OUTPATIENT
Start: 2024-10-16

## 2024-10-16 RX ORDER — ESCITALOPRAM OXALATE 5 MG/1
5 TABLET ORAL DAILY
Qty: 90 TABLET | Refills: 1 | Status: SHIPPED | OUTPATIENT
Start: 2024-10-16

## 2024-10-16 RX ORDER — BUPROPION HYDROCHLORIDE 150 MG/1
150 TABLET ORAL EVERY MORNING
Qty: 90 TABLET | Refills: 3 | Status: SHIPPED | OUTPATIENT
Start: 2024-10-16

## 2024-10-16 SDOH — ECONOMIC STABILITY: FOOD INSECURITY: WITHIN THE PAST 12 MONTHS, THE FOOD YOU BOUGHT JUST DIDN'T LAST AND YOU DIDN'T HAVE MONEY TO GET MORE.: NEVER TRUE

## 2024-10-16 SDOH — ECONOMIC STABILITY: FOOD INSECURITY: WITHIN THE PAST 12 MONTHS, YOU WORRIED THAT YOUR FOOD WOULD RUN OUT BEFORE YOU GOT MONEY TO BUY MORE.: NEVER TRUE

## 2024-10-16 SDOH — ECONOMIC STABILITY: INCOME INSECURITY: HOW HARD IS IT FOR YOU TO PAY FOR THE VERY BASICS LIKE FOOD, HOUSING, MEDICAL CARE, AND HEATING?: NOT HARD AT ALL

## 2024-10-16 ASSESSMENT — PATIENT HEALTH QUESTIONNAIRE - PHQ9
8. MOVING OR SPEAKING SO SLOWLY THAT OTHER PEOPLE COULD HAVE NOTICED. OR THE OPPOSITE, BEING SO FIGETY OR RESTLESS THAT YOU HAVE BEEN MOVING AROUND A LOT MORE THAN USUAL: NOT AT ALL
SUM OF ALL RESPONSES TO PHQ QUESTIONS 1-9: 0
5. POOR APPETITE OR OVEREATING: NOT AT ALL
3. TROUBLE FALLING OR STAYING ASLEEP: NOT AT ALL
10. IF YOU CHECKED OFF ANY PROBLEMS, HOW DIFFICULT HAVE THESE PROBLEMS MADE IT FOR YOU TO DO YOUR WORK, TAKE CARE OF THINGS AT HOME, OR GET ALONG WITH OTHER PEOPLE: NOT DIFFICULT AT ALL
6. FEELING BAD ABOUT YOURSELF - OR THAT YOU ARE A FAILURE OR HAVE LET YOURSELF OR YOUR FAMILY DOWN: NOT AT ALL
2. FEELING DOWN, DEPRESSED OR HOPELESS: NOT AT ALL
SUM OF ALL RESPONSES TO PHQ9 QUESTIONS 1 & 2: 0
1. LITTLE INTEREST OR PLEASURE IN DOING THINGS: NOT AT ALL
7. TROUBLE CONCENTRATING ON THINGS, SUCH AS READING THE NEWSPAPER OR WATCHING TELEVISION: NOT AT ALL
SUM OF ALL RESPONSES TO PHQ QUESTIONS 1-9: 0
SUM OF ALL RESPONSES TO PHQ QUESTIONS 1-9: 0
9. THOUGHTS THAT YOU WOULD BE BETTER OFF DEAD, OR OF HURTING YOURSELF: NOT AT ALL
DEPRESSION UNABLE TO ASSESS: FUNCTIONAL CAPACITY MOTIVATION LIMITS ACCURACY
SUM OF ALL RESPONSES TO PHQ QUESTIONS 1-9: 0
4. FEELING TIRED OR HAVING LITTLE ENERGY: NOT AT ALL

## 2024-10-16 ASSESSMENT — ENCOUNTER SYMPTOMS
ABDOMINAL DISTENTION: 0
CONSTIPATION: 0
COUGH: 0
SORE THROAT: 0
CHEST TIGHTNESS: 0
DIARRHEA: 0
ABDOMINAL PAIN: 0
SHORTNESS OF BREATH: 0

## 2024-10-16 NOTE — PROGRESS NOTES
rheumatology      Past Surgical History:   Procedure Laterality Date    BUNIONECTOMY      CARDIAC PROCEDURE N/A 2023    rachel / Coronary angiography / rm 511 performed by Elizabeth Raza MD at [facility] CARDIAC CATH LAB     SECTION      LT x3    CYSTOSCOPY Left 3/8/2024    CYSTOSCOPY URETEROSCOPY HOLMIUM LASER WITH STENT INSERTION LEFT performed by Bhaskar Kemp MD at [facility] OR    HERNIA REPAIR      THYROIDECTOMY      cancer    TUBAL LIGATION      UMBILICAL HERNIA REPAIR         Family History   Problem Relation Age of Onset    Rheum Arthritis Mother     Hypertension Mother     Arthritis Mother     Cancer Mother     Vision Loss Mother     Mult Sclerosis Father     Diabetes Father     Heart Attack Father         x3    Hearing Loss Father     Arthritis Sister     Rheum Arthritis Sister     Miscarriages / Stillbirths Sister     Diabetes Maternal Grandmother     Cancer Maternal Grandmother         unsure of type    Cancer Paternal Grandfather         unsure of type    Diabetes Paternal Grandfather        Social History     Tobacco Use    Smoking status: Every Day     Current packs/day: 0.00     Average packs/day: 1 pack/day for 30.0 years (30.0 ttl pk-yrs)     Types: Cigarettes     Start date: 1993     Last attempt to quit: 2023     Years since quittin.0    Smokeless tobacco: Never    Tobacco comments:     started smoking at 30 yo, cutting down now 1/2 PPD or less   Substance Use Topics    Alcohol use: No      Current Outpatient Medications   Medication Sig Dispense Refill    meloxicam (MOBIC) 7.5 MG tablet Take 1 tablet by mouth daily as needed for Pain (Neck pain) 90 tablet 0    nicotine polacrilex (NICORETTE) 2 MG gum Take 1 each by mouth as needed for Smoking cessation 110 each 3    metoprolol succinate (TOPROL XL) 25 MG extended release tablet Take 1 tablet by mouth nightly 90 tablet 1    lisinopril (PRINIVIL;ZESTRIL) 40 MG tablet Take 1 tablet by mouth daily 90 tablet 3    famotidine

## 2024-10-28 ENCOUNTER — TELEMEDICINE (OUTPATIENT)
Dept: BEHAVIORAL/MENTAL HEALTH CLINIC | Age: 64
End: 2024-10-28
Payer: COMMERCIAL

## 2024-10-28 DIAGNOSIS — F40.01 AGORAPHOBIA WITH PANIC DISORDER: Primary | ICD-10-CM

## 2024-10-28 PROCEDURE — 90837 PSYTX W PT 60 MINUTES: CPT | Performed by: COUNSELOR

## 2024-10-28 ASSESSMENT — PATIENT HEALTH QUESTIONNAIRE - PHQ9
2. FEELING DOWN, DEPRESSED OR HOPELESS: NOT AT ALL
SUM OF ALL RESPONSES TO PHQ QUESTIONS 1-9: 4
SUM OF ALL RESPONSES TO PHQ QUESTIONS 1-9: 4
6. FEELING BAD ABOUT YOURSELF - OR THAT YOU ARE A FAILURE OR HAVE LET YOURSELF OR YOUR FAMILY DOWN: NOT AT ALL
3. TROUBLE FALLING OR STAYING ASLEEP: NEARLY EVERY DAY
9. THOUGHTS THAT YOU WOULD BE BETTER OFF DEAD, OR OF HURTING YOURSELF: NOT AT ALL
SUM OF ALL RESPONSES TO PHQ QUESTIONS 1-9: 4
SUM OF ALL RESPONSES TO PHQ9 QUESTIONS 1 & 2: 0
10. IF YOU CHECKED OFF ANY PROBLEMS, HOW DIFFICULT HAVE THESE PROBLEMS MADE IT FOR YOU TO DO YOUR WORK, TAKE CARE OF THINGS AT HOME, OR GET ALONG WITH OTHER PEOPLE: SOMEWHAT DIFFICULT
4. FEELING TIRED OR HAVING LITTLE ENERGY: SEVERAL DAYS
5. POOR APPETITE OR OVEREATING: NOT AT ALL
8. MOVING OR SPEAKING SO SLOWLY THAT OTHER PEOPLE COULD HAVE NOTICED. OR THE OPPOSITE, BEING SO FIGETY OR RESTLESS THAT YOU HAVE BEEN MOVING AROUND A LOT MORE THAN USUAL: NOT AT ALL
7. TROUBLE CONCENTRATING ON THINGS, SUCH AS READING THE NEWSPAPER OR WATCHING TELEVISION: NOT AT ALL
SUM OF ALL RESPONSES TO PHQ QUESTIONS 1-9: 4
1. LITTLE INTEREST OR PLEASURE IN DOING THINGS: NOT AT ALL

## 2024-10-28 NOTE — PROGRESS NOTES
ADULT BEHAVIORAL HEALTH FOLLOW UP  [facility]      Visit Date: 10/28/2024   Time of appointment:  11:06  Time spent with Patient: 53 minutes.   This is patient's  29th  appointment.    Reason for Consult:  Anxiety and Depression       Referring Provider/PCP:    No ref. provider found  Geovany Zavaleta, PA      Pt provided informed consent for the behavioral health program. Discussed with patient model of service to include the limits of confidentiality (i.e. abuse reporting, suicide intervention, etc.) and short-term intervention focused approach.  Pt indicated understanding.    ZOHRA Dorman is a 64 y.o. female who presents for follow up of Agoraphobia with Panic Disorder. Client stated she has been out of the house more recently, including a day trip to [place]. Client reported a brief period of initial anxiety, that quickly resolved after getting on the road. Client reported a new concern. Client stated that within the past year she has made a retail purchase of some kind nearly every day for the past year. Client reported feeling she will not be satisfied if she does not make a purchase, being frustrated if she drives somewhere without going into a store, and having minimal success with efforts to spend less. Client voiced interest in working to reduce her purchasing habits immediately, but identified it may be difficult for her to comply with a reduction in days she is allowed to shop as she is beginning her [place] shopping. Client and therapist explored possible limits to set, and what limits client feels she could have success with. Client agreed to a $500.00 limit for each person on her list, and stated that this would not create any financial difficulties for her. Currently, client stated she is spending approximately $50.00 a week on herself, and agreed to a $30.00 a week limit. Therapist highlighted that, if client has spent her weekly fund and bought all [employer] gifts, she cannot make

## 2024-11-18 ENCOUNTER — TELEMEDICINE (OUTPATIENT)
Dept: BEHAVIORAL/MENTAL HEALTH CLINIC | Age: 64
End: 2024-11-18
Payer: COMMERCIAL

## 2024-11-18 DIAGNOSIS — F40.01 AGORAPHOBIA WITH PANIC DISORDER: Primary | ICD-10-CM

## 2024-11-18 PROCEDURE — 90834 PSYTX W PT 45 MINUTES: CPT | Performed by: COUNSELOR

## 2024-11-18 ASSESSMENT — PATIENT HEALTH QUESTIONNAIRE - PHQ9
SUM OF ALL RESPONSES TO PHQ QUESTIONS 1-9: 3
4. FEELING TIRED OR HAVING LITTLE ENERGY: MORE THAN HALF THE DAYS
3. TROUBLE FALLING OR STAYING ASLEEP: SEVERAL DAYS
7. TROUBLE CONCENTRATING ON THINGS, SUCH AS READING THE NEWSPAPER OR WATCHING TELEVISION: NOT AT ALL
8. MOVING OR SPEAKING SO SLOWLY THAT OTHER PEOPLE COULD HAVE NOTICED. OR THE OPPOSITE, BEING SO FIGETY OR RESTLESS THAT YOU HAVE BEEN MOVING AROUND A LOT MORE THAN USUAL: NOT AT ALL
9. THOUGHTS THAT YOU WOULD BE BETTER OFF DEAD, OR OF HURTING YOURSELF: NOT AT ALL
2. FEELING DOWN, DEPRESSED OR HOPELESS: NOT AT ALL
SUM OF ALL RESPONSES TO PHQ9 QUESTIONS 1 & 2: 0
SUM OF ALL RESPONSES TO PHQ QUESTIONS 1-9: 3
5. POOR APPETITE OR OVEREATING: NOT AT ALL
10. IF YOU CHECKED OFF ANY PROBLEMS, HOW DIFFICULT HAVE THESE PROBLEMS MADE IT FOR YOU TO DO YOUR WORK, TAKE CARE OF THINGS AT HOME, OR GET ALONG WITH OTHER PEOPLE: SOMEWHAT DIFFICULT
SUM OF ALL RESPONSES TO PHQ QUESTIONS 1-9: 3
SUM OF ALL RESPONSES TO PHQ QUESTIONS 1-9: 3
1. LITTLE INTEREST OR PLEASURE IN DOING THINGS: NOT AT ALL

## 2024-11-18 NOTE — PROGRESS NOTES
ADULT BEHAVIORAL HEALTH FOLLOW UP  Tom Munguia [facility]      Visit Date: 11/18/2024   Time of appointment:  11:12  Time spent with Patient: 4 minutes.   This is patient's  30th  appointment.    Reason for Consult:  Anxiety and Depression       Referring Provider/PCP:    No ref. provider found  Geovany Zavaleta, PA      Pt provided informed consent for the behavioral health program. Discussed with patient model of service to include the limits of confidentiality (i.e. abuse reporting, suicide intervention, etc.) and short-term intervention focused approach.  Pt indicated understanding.    ZOHRA Dorman is a 64 y.o. female who presents for follow up of Agoraphobia with Panic Disorder. Client stated her mood has been mostly stable between sessions, but acknowledged some increased stress between sessions. Client elaborated that she arranged, paid for, and completed all set up and cleanup for her grandson's first birthday party. Client and therapist explored numerous boundary violations with her daughter in this situation and others. Client and therapist began to explore methods of setting boundaries with her daughter, and client stated \"It doesn't matter how I feel about it\". Therapist noted minimizing in client's statements and utilized CBT techniques to challenge these beliefs. Client also reported that she is being made to provide care for her mother-in-law for one week. Client stated this was decided without her, and there is not an option to stop this at this point. Client and therapist discussed possible boundaries to set for this situation, highlighting that she will provide appropriate care, but limiting entertaining and interpersonal engagement. Client and therapist explored expressing these boundaries to her , and highlighting this as a result that he made this choice without consulting with her and in spite of the fact that he has been told previously that she did not want to do this.     Previous

## 2024-12-09 ENCOUNTER — TELEMEDICINE (OUTPATIENT)
Dept: BEHAVIORAL/MENTAL HEALTH CLINIC | Age: 64
End: 2024-12-09
Payer: COMMERCIAL

## 2024-12-09 DIAGNOSIS — F40.01 AGORAPHOBIA WITH PANIC DISORDER: Primary | ICD-10-CM

## 2024-12-09 PROCEDURE — 90834 PSYTX W PT 45 MINUTES: CPT | Performed by: COUNSELOR

## 2024-12-09 ASSESSMENT — PATIENT HEALTH QUESTIONNAIRE - PHQ9
7. TROUBLE CONCENTRATING ON THINGS, SUCH AS READING THE NEWSPAPER OR WATCHING TELEVISION: NOT AT ALL
SUM OF ALL RESPONSES TO PHQ QUESTIONS 1-9: 5
1. LITTLE INTEREST OR PLEASURE IN DOING THINGS: SEVERAL DAYS
5. POOR APPETITE OR OVEREATING: NOT AT ALL
SUM OF ALL RESPONSES TO PHQ QUESTIONS 1-9: 5
10. IF YOU CHECKED OFF ANY PROBLEMS, HOW DIFFICULT HAVE THESE PROBLEMS MADE IT FOR YOU TO DO YOUR WORK, TAKE CARE OF THINGS AT HOME, OR GET ALONG WITH OTHER PEOPLE: SOMEWHAT DIFFICULT
2. FEELING DOWN, DEPRESSED OR HOPELESS: NOT AT ALL
SUM OF ALL RESPONSES TO PHQ9 QUESTIONS 1 & 2: 1
SUM OF ALL RESPONSES TO PHQ QUESTIONS 1-9: 5
9. THOUGHTS THAT YOU WOULD BE BETTER OFF DEAD, OR OF HURTING YOURSELF: NOT AT ALL
4. FEELING TIRED OR HAVING LITTLE ENERGY: SEVERAL DAYS
3. TROUBLE FALLING OR STAYING ASLEEP: NEARLY EVERY DAY
8. MOVING OR SPEAKING SO SLOWLY THAT OTHER PEOPLE COULD HAVE NOTICED. OR THE OPPOSITE, BEING SO FIGETY OR RESTLESS THAT YOU HAVE BEEN MOVING AROUND A LOT MORE THAN USUAL: NOT AT ALL
6. FEELING BAD ABOUT YOURSELF - OR THAT YOU ARE A FAILURE OR HAVE LET YOURSELF OR YOUR FAMILY DOWN: NOT AT ALL
SUM OF ALL RESPONSES TO PHQ QUESTIONS 1-9: 5

## 2024-12-09 NOTE — PROGRESS NOTES
ADULT BEHAVIORAL HEALTH FOLLOW UP  [facility] Munguia [facility]      Visit Date: 12/9/2024   Time of appointment:  11:18  Time spent with Patient: 45 minutes.   This is patient's  31st  appointment.    Reason for Consult:  Anxiety and Depression       Referring Provider/PCP:    No ref. provider found  Geovany Zavaleta, PA      Pt provided informed consent for the behavioral health program. Discussed with patient model of service to include the limits of confidentiality (i.e. abuse reporting, suicide intervention, etc.) and short-term intervention focused approach.  Pt indicated understanding.    ZOHRA Dorman is a 64 y.o. female who presents for follow up of Agoraphobia with Panic Disorder. Client stated her mother-in-law did stay with her for 6 days recently. Client stated she did not spend excessive time entertaining her MIL, and did not adjust the temperature in the home. Client stated she did not \"go out of her way\" for her MIL, but acknowledged this was still a significant inconvenience to her. Client attempted to discuss this situation with her , but feels this does not lead to any change. Client and therapist discussed boundaries for the holidays regarding primarily who she will celebrate with, when and where she will attend or host, and her buying practices. Client's concern is whether or not her children will be agreeable to or available for the plans that she wants, and that her 's family will attempt to push her into hosting their Fitz day event again. Therapist strongly encouraged client reiterate to her  that she will not host this event, and that if he tells him family otherwise, she will not assist in the cooking cleaning, or preparation. Client stated this will be difficult for her, but she will consider doing so.    Previous Plan:  Client to consider setting boundaries to address multiple situations      MENTAL STATUS EXAM  Mood was anxious with anxious affect.   Suicidal

## 2025-01-07 ENCOUNTER — TELEMEDICINE (OUTPATIENT)
Dept: BEHAVIORAL/MENTAL HEALTH CLINIC | Age: 65
End: 2025-01-07
Payer: COMMERCIAL

## 2025-01-07 DIAGNOSIS — F40.01 AGORAPHOBIA WITH PANIC DISORDER: Primary | ICD-10-CM

## 2025-01-07 PROCEDURE — 90837 PSYTX W PT 60 MINUTES: CPT | Performed by: COUNSELOR

## 2025-01-07 ASSESSMENT — PATIENT HEALTH QUESTIONNAIRE - PHQ9
SUM OF ALL RESPONSES TO PHQ QUESTIONS 1-9: 7
10. IF YOU CHECKED OFF ANY PROBLEMS, HOW DIFFICULT HAVE THESE PROBLEMS MADE IT FOR YOU TO DO YOUR WORK, TAKE CARE OF THINGS AT HOME, OR GET ALONG WITH OTHER PEOPLE: SOMEWHAT DIFFICULT
6. FEELING BAD ABOUT YOURSELF - OR THAT YOU ARE A FAILURE OR HAVE LET YOURSELF OR YOUR FAMILY DOWN: NOT AT ALL
SUM OF ALL RESPONSES TO PHQ9 QUESTIONS 1 & 2: 0
2. FEELING DOWN, DEPRESSED OR HOPELESS: NOT AT ALL
8. MOVING OR SPEAKING SO SLOWLY THAT OTHER PEOPLE COULD HAVE NOTICED. OR THE OPPOSITE, BEING SO FIGETY OR RESTLESS THAT YOU HAVE BEEN MOVING AROUND A LOT MORE THAN USUAL: NOT AT ALL
7. TROUBLE CONCENTRATING ON THINGS, SUCH AS READING THE NEWSPAPER OR WATCHING TELEVISION: NEARLY EVERY DAY
SUM OF ALL RESPONSES TO PHQ QUESTIONS 1-9: 7
SUM OF ALL RESPONSES TO PHQ QUESTIONS 1-9: 7
4. FEELING TIRED OR HAVING LITTLE ENERGY: SEVERAL DAYS
9. THOUGHTS THAT YOU WOULD BE BETTER OFF DEAD, OR OF HURTING YOURSELF: NOT AT ALL
5. POOR APPETITE OR OVEREATING: NOT AT ALL
1. LITTLE INTEREST OR PLEASURE IN DOING THINGS: NOT AT ALL
SUM OF ALL RESPONSES TO PHQ QUESTIONS 1-9: 7
3. TROUBLE FALLING OR STAYING ASLEEP: NEARLY EVERY DAY

## 2025-01-07 NOTE — PROGRESS NOTES
ADULT BEHAVIORAL HEALTH FOLLOW UP  Tom Munguia [facility]      Visit Date: 1/7/2025   Time of appointment:  11:04  Time spent with Patient: 54 minutes.   This is patient's  32nd  appointment.    Reason for Consult:  Anxiety and Depression       Referring Provider/PCP:    No ref. provider found  Geovany Zavaleta, PA      Pt provided informed consent for the behavioral health program. Discussed with patient model of service to include the limits of confidentiality (i.e. abuse reporting, suicide intervention, etc.) and short-term intervention focused approach.  Pt indicated understanding.    ZOHRA Dorman is a 64 y.o. female who presents for follow up of Agoraphobia with Panic Disorder. Client reported poor sleep between sessions, stated she has been up most nights over the past week till 4:00-5:00 AM, and only sleeping up to 4 hours at maximum. Therapist provided basic psychoeducation regarding sleep hygiene including: the impact of blue light and the need to reduce screen time before bed, the importance of establishing regular bed and waking times, the importance of only sleeping in bed, the benefits of relaxation and limiting sensory input prior to bedtime. Therapist focused on reducing screen time, eliminating the TV being on all night, and finding a preferred replacement light and white noise source. Previously, client has been recommended to find a small Fitz tree for a night light as this would be soothing to her. Different options for white noise or low music were also discussed. 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 Client feels her fatigue has been restricting this, and therefore wanted to focus on improving her sleep. Times for: last snacks, screens off, sensory reduction, and use of different relaxation techniques such as bilateral tapping were tentatively scheduled.      Previous Plan:  Client to set boundaries regarding [place]

## 2025-01-13 DIAGNOSIS — M54.2 NECK PAIN: ICD-10-CM

## 2025-01-14 RX ORDER — MELOXICAM 7.5 MG/1
7.5 TABLET ORAL DAILY PRN
Qty: 90 TABLET | Refills: 0 | Status: SHIPPED | OUTPATIENT
Start: 2025-01-14

## 2025-01-29 ENCOUNTER — TELEMEDICINE (OUTPATIENT)
Dept: BEHAVIORAL/MENTAL HEALTH CLINIC | Age: 65
End: 2025-01-29
Payer: COMMERCIAL

## 2025-01-29 DIAGNOSIS — F40.01 AGORAPHOBIA WITH PANIC DISORDER: Primary | ICD-10-CM

## 2025-01-29 PROCEDURE — 90837 PSYTX W PT 60 MINUTES: CPT | Performed by: COUNSELOR

## 2025-01-29 ASSESSMENT — PATIENT HEALTH QUESTIONNAIRE - PHQ9
1. LITTLE INTEREST OR PLEASURE IN DOING THINGS: MORE THAN HALF THE DAYS
6. FEELING BAD ABOUT YOURSELF - OR THAT YOU ARE A FAILURE OR HAVE LET YOURSELF OR YOUR FAMILY DOWN: NOT AT ALL
3. TROUBLE FALLING OR STAYING ASLEEP: SEVERAL DAYS
SUM OF ALL RESPONSES TO PHQ QUESTIONS 1-9: 6
7. TROUBLE CONCENTRATING ON THINGS, SUCH AS READING THE NEWSPAPER OR WATCHING TELEVISION: MORE THAN HALF THE DAYS
9. THOUGHTS THAT YOU WOULD BE BETTER OFF DEAD, OR OF HURTING YOURSELF: NOT AT ALL
SUM OF ALL RESPONSES TO PHQ9 QUESTIONS 1 & 2: 2
4. FEELING TIRED OR HAVING LITTLE ENERGY: SEVERAL DAYS
2. FEELING DOWN, DEPRESSED OR HOPELESS: NOT AT ALL
10. IF YOU CHECKED OFF ANY PROBLEMS, HOW DIFFICULT HAVE THESE PROBLEMS MADE IT FOR YOU TO DO YOUR WORK, TAKE CARE OF THINGS AT HOME, OR GET ALONG WITH OTHER PEOPLE: SOMEWHAT DIFFICULT
8. MOVING OR SPEAKING SO SLOWLY THAT OTHER PEOPLE COULD HAVE NOTICED. OR THE OPPOSITE, BEING SO FIGETY OR RESTLESS THAT YOU HAVE BEEN MOVING AROUND A LOT MORE THAN USUAL: NOT AT ALL
SUM OF ALL RESPONSES TO PHQ QUESTIONS 1-9: 6
SUM OF ALL RESPONSES TO PHQ QUESTIONS 1-9: 6
5. POOR APPETITE OR OVEREATING: NOT AT ALL
SUM OF ALL RESPONSES TO PHQ QUESTIONS 1-9: 6

## 2025-01-29 ASSESSMENT — ANXIETY QUESTIONNAIRES
4. TROUBLE RELAXING: SEVERAL DAYS
3. WORRYING TOO MUCH ABOUT DIFFERENT THINGS: MORE THAN HALF THE DAYS
2. NOT BEING ABLE TO STOP OR CONTROL WORRYING: MORE THAN HALF THE DAYS
5. BEING SO RESTLESS THAT IT IS HARD TO SIT STILL: NOT AT ALL
1. FEELING NERVOUS, ANXIOUS, OR ON EDGE: MORE THAN HALF THE DAYS
GAD7 TOTAL SCORE: 8
6. BECOMING EASILY ANNOYED OR IRRITABLE: SEVERAL DAYS
IF YOU CHECKED OFF ANY PROBLEMS ON THIS QUESTIONNAIRE, HOW DIFFICULT HAVE THESE PROBLEMS MADE IT FOR YOU TO DO YOUR WORK, TAKE CARE OF THINGS AT HOME, OR GET ALONG WITH OTHER PEOPLE: VERY DIFFICULT
7. FEELING AFRAID AS IF SOMETHING AWFUL MIGHT HAPPEN: NOT AT ALL

## 2025-02-17 ENCOUNTER — TRANSCRIBE ORDERS (OUTPATIENT)
Dept: ADMINISTRATIVE | Age: 65
End: 2025-02-17

## 2025-02-17 DIAGNOSIS — Z87.891 FORMER SMOKER: Primary | ICD-10-CM

## 2025-02-18 ENCOUNTER — TELEMEDICINE (OUTPATIENT)
Dept: BEHAVIORAL/MENTAL HEALTH CLINIC | Age: 65
End: 2025-02-18
Payer: COMMERCIAL

## 2025-02-18 DIAGNOSIS — F40.01 AGORAPHOBIA WITH PANIC DISORDER: Primary | ICD-10-CM

## 2025-02-18 PROCEDURE — 90834 PSYTX W PT 45 MINUTES: CPT | Performed by: COUNSELOR

## 2025-02-18 ASSESSMENT — ANXIETY QUESTIONNAIRES
2. NOT BEING ABLE TO STOP OR CONTROL WORRYING: MORE THAN HALF THE DAYS
4. TROUBLE RELAXING: NOT AT ALL
GAD7 TOTAL SCORE: 4
7. FEELING AFRAID AS IF SOMETHING AWFUL MIGHT HAPPEN: NOT AT ALL
6. BECOMING EASILY ANNOYED OR IRRITABLE: NOT AT ALL
3. WORRYING TOO MUCH ABOUT DIFFERENT THINGS: SEVERAL DAYS
1. FEELING NERVOUS, ANXIOUS, OR ON EDGE: SEVERAL DAYS
5. BEING SO RESTLESS THAT IT IS HARD TO SIT STILL: NOT AT ALL
IF YOU CHECKED OFF ANY PROBLEMS ON THIS QUESTIONNAIRE, HOW DIFFICULT HAVE THESE PROBLEMS MADE IT FOR YOU TO DO YOUR WORK, TAKE CARE OF THINGS AT HOME, OR GET ALONG WITH OTHER PEOPLE: SOMEWHAT DIFFICULT

## 2025-02-18 ASSESSMENT — PATIENT HEALTH QUESTIONNAIRE - PHQ9
SUM OF ALL RESPONSES TO PHQ QUESTIONS 1-9: 8
6. FEELING BAD ABOUT YOURSELF - OR THAT YOU ARE A FAILURE OR HAVE LET YOURSELF OR YOUR FAMILY DOWN: NOT AT ALL
SUM OF ALL RESPONSES TO PHQ QUESTIONS 1-9: 8
10. IF YOU CHECKED OFF ANY PROBLEMS, HOW DIFFICULT HAVE THESE PROBLEMS MADE IT FOR YOU TO DO YOUR WORK, TAKE CARE OF THINGS AT HOME, OR GET ALONG WITH OTHER PEOPLE: SOMEWHAT DIFFICULT
7. TROUBLE CONCENTRATING ON THINGS, SUCH AS READING THE NEWSPAPER OR WATCHING TELEVISION: SEVERAL DAYS
8. MOVING OR SPEAKING SO SLOWLY THAT OTHER PEOPLE COULD HAVE NOTICED. OR THE OPPOSITE, BEING SO FIGETY OR RESTLESS THAT YOU HAVE BEEN MOVING AROUND A LOT MORE THAN USUAL: NOT AT ALL
4. FEELING TIRED OR HAVING LITTLE ENERGY: NEARLY EVERY DAY
SUM OF ALL RESPONSES TO PHQ QUESTIONS 1-9: 8
9. THOUGHTS THAT YOU WOULD BE BETTER OFF DEAD, OR OF HURTING YOURSELF: NOT AT ALL
3. TROUBLE FALLING OR STAYING ASLEEP: NEARLY EVERY DAY
2. FEELING DOWN, DEPRESSED OR HOPELESS: NOT AT ALL
SUM OF ALL RESPONSES TO PHQ QUESTIONS 1-9: 8
1. LITTLE INTEREST OR PLEASURE IN DOING THINGS: SEVERAL DAYS
SUM OF ALL RESPONSES TO PHQ9 QUESTIONS 1 & 2: 1
5. POOR APPETITE OR OVEREATING: NOT AT ALL

## 2025-02-18 NOTE — PROGRESS NOTES
ADULT BEHAVIORAL HEALTH FOLLOW UP  Tom Munguia [facility]      Visit Date: 2/18/2025   Time of appointment:  11:11  Time spent with Patient: 46 minutes.   This is patient's  35th  appointment.    Reason for Consult:  Anxiety and Depression       Referring Provider/PCP:    No ref. provider found  Geovany Zavaleta, PA      Pt provided informed consent for the behavioral health program. Discussed with patient model of service to include the limits of confidentiality (i.e. abuse reporting, suicide intervention, etc.) and short-term intervention focused approach.  Pt indicated understanding.    ZOHRA Dorman is a 64 y.o. female who presents for follow up of Agoraphobia with Panic Disorder. Client reported increased difficulty leaving the home. Client reported feelings of fatigue and a lack of energy or motivation to do things. Client continues to have difficulties going out, and has been unable to leave the home unaccompanied. Client reported fears that she will \"do something stupid\", or a feeling that she should not be out I public have kept her from being able to be out on her own. Client was unable to cite what caused the resurgence in the thoughts, but but identified this began approximately one month ago. Therapist noted that client had a similar experience last year in through January and February. Client acknowledged that her symptoms are always worse after the holidays, and until spring. Client stated she does feel more capable of challenging these beliefs than she has previously, but would be able to manage with emergency medication previously. Client has not asked her current PCP about such medication as she fears she will be \"telling him how to do his job\", and \"he's going to be looking at me in a certain kind of way and not want to deal with me anymore\". Therapist noted catastrophizing, mind reading, and fortune telling in client's statements and utilized CBT techniques to challenge these beliefs.

## 2025-03-03 DIAGNOSIS — I47.10 PAROXYSMAL SUPRAVENTRICULAR TACHYCARDIA: ICD-10-CM

## 2025-03-03 RX ORDER — METOPROLOL SUCCINATE 25 MG/1
25 TABLET, EXTENDED RELEASE ORAL NIGHTLY
Qty: 90 TABLET | Refills: 1 | Status: SHIPPED | OUTPATIENT
Start: 2025-03-03 | End: 2025-03-03 | Stop reason: SDUPTHER

## 2025-03-03 RX ORDER — METOPROLOL SUCCINATE 25 MG/1
25 TABLET, EXTENDED RELEASE ORAL NIGHTLY
Qty: 90 TABLET | Refills: 1 | Status: SHIPPED | OUTPATIENT
Start: 2025-03-03

## 2025-03-11 ENCOUNTER — TELEMEDICINE (OUTPATIENT)
Dept: BEHAVIORAL/MENTAL HEALTH CLINIC | Age: 65
End: 2025-03-11
Payer: COMMERCIAL

## 2025-03-11 DIAGNOSIS — F40.01 AGORAPHOBIA WITH PANIC DISORDER: Primary | ICD-10-CM

## 2025-03-11 PROCEDURE — 90837 PSYTX W PT 60 MINUTES: CPT | Performed by: COUNSELOR

## 2025-03-11 PROCEDURE — 1123F ACP DISCUSS/DSCN MKR DOCD: CPT | Performed by: COUNSELOR

## 2025-03-11 ASSESSMENT — PATIENT HEALTH QUESTIONNAIRE - PHQ9
7. TROUBLE CONCENTRATING ON THINGS, SUCH AS READING THE NEWSPAPER OR WATCHING TELEVISION: SEVERAL DAYS
9. THOUGHTS THAT YOU WOULD BE BETTER OFF DEAD, OR OF HURTING YOURSELF: NOT AT ALL
3. TROUBLE FALLING OR STAYING ASLEEP: SEVERAL DAYS
1. LITTLE INTEREST OR PLEASURE IN DOING THINGS: SEVERAL DAYS
9. THOUGHTS THAT YOU WOULD BE BETTER OFF DEAD, OR OF HURTING YOURSELF: NOT AT ALL
SUM OF ALL RESPONSES TO PHQ QUESTIONS 1-9: 5
6. FEELING BAD ABOUT YOURSELF - OR THAT YOU ARE A FAILURE OR HAVE LET YOURSELF OR YOUR FAMILY DOWN: NOT AT ALL
7. TROUBLE CONCENTRATING ON THINGS, SUCH AS READING THE NEWSPAPER OR WATCHING TELEVISION: SEVERAL DAYS
6. FEELING BAD ABOUT YOURSELF - OR THAT YOU ARE A FAILURE OR HAVE LET YOURSELF OR YOUR FAMILY DOWN: NOT AT ALL
SUM OF ALL RESPONSES TO PHQ QUESTIONS 1-9: 5
SUM OF ALL RESPONSES TO PHQ QUESTIONS 1-9: 5
5. POOR APPETITE OR OVEREATING: NOT AT ALL
1. LITTLE INTEREST OR PLEASURE IN DOING THINGS: SEVERAL DAYS
8. MOVING OR SPEAKING SO SLOWLY THAT OTHER PEOPLE COULD HAVE NOTICED. OR THE OPPOSITE - BEING SO FIDGETY OR RESTLESS THAT YOU HAVE BEEN MOVING AROUND A LOT MORE THAN USUAL: NOT AT ALL
8. MOVING OR SPEAKING SO SLOWLY THAT OTHER PEOPLE COULD HAVE NOTICED. OR THE OPPOSITE, BEING SO FIGETY OR RESTLESS THAT YOU HAVE BEEN MOVING AROUND A LOT MORE THAN USUAL: NOT AT ALL
SUM OF ALL RESPONSES TO PHQ QUESTIONS 1-9: 5
3. TROUBLE FALLING OR STAYING ASLEEP: SEVERAL DAYS
5. POOR APPETITE OR OVEREATING: NOT AT ALL
10. IF YOU CHECKED OFF ANY PROBLEMS, HOW DIFFICULT HAVE THESE PROBLEMS MADE IT FOR YOU TO DO YOUR WORK, TAKE CARE OF THINGS AT HOME, OR GET ALONG WITH OTHER PEOPLE: SOMEWHAT DIFFICULT
2. FEELING DOWN, DEPRESSED OR HOPELESS: SEVERAL DAYS
4. FEELING TIRED OR HAVING LITTLE ENERGY: SEVERAL DAYS
4. FEELING TIRED OR HAVING LITTLE ENERGY: SEVERAL DAYS
10. IF YOU CHECKED OFF ANY PROBLEMS, HOW DIFFICULT HAVE THESE PROBLEMS MADE IT FOR YOU TO DO YOUR WORK, TAKE CARE OF THINGS AT HOME, OR GET ALONG WITH OTHER PEOPLE: SOMEWHAT DIFFICULT
2. FEELING DOWN, DEPRESSED OR HOPELESS: SEVERAL DAYS
SUM OF ALL RESPONSES TO PHQ QUESTIONS 1-9: 5

## 2025-03-11 ASSESSMENT — ANXIETY QUESTIONNAIRES
7. FEELING AFRAID AS IF SOMETHING AWFUL MIGHT HAPPEN: NOT AT ALL
2. NOT BEING ABLE TO STOP OR CONTROL WORRYING: NOT AT ALL
6. BECOMING EASILY ANNOYED OR IRRITABLE: SEVERAL DAYS
3. WORRYING TOO MUCH ABOUT DIFFERENT THINGS: SEVERAL DAYS
5. BEING SO RESTLESS THAT IT IS HARD TO SIT STILL: NOT AT ALL
1. FEELING NERVOUS, ANXIOUS, OR ON EDGE: SEVERAL DAYS
7. FEELING AFRAID AS IF SOMETHING AWFUL MIGHT HAPPEN: NOT AT ALL
2. NOT BEING ABLE TO STOP OR CONTROL WORRYING: NOT AT ALL
1. FEELING NERVOUS, ANXIOUS, OR ON EDGE: SEVERAL DAYS
5. BEING SO RESTLESS THAT IT IS HARD TO SIT STILL: NOT AT ALL
IF YOU CHECKED OFF ANY PROBLEMS ON THIS QUESTIONNAIRE, HOW DIFFICULT HAVE THESE PROBLEMS MADE IT FOR YOU TO DO YOUR WORK, TAKE CARE OF THINGS AT HOME, OR GET ALONG WITH OTHER PEOPLE: SOMEWHAT DIFFICULT
GAD7 TOTAL SCORE: 4
IF YOU CHECKED OFF ANY PROBLEMS ON THIS QUESTIONNAIRE, HOW DIFFICULT HAVE THESE PROBLEMS MADE IT FOR YOU TO DO YOUR WORK, TAKE CARE OF THINGS AT HOME, OR GET ALONG WITH OTHER PEOPLE: SOMEWHAT DIFFICULT
4. TROUBLE RELAXING: SEVERAL DAYS
6. BECOMING EASILY ANNOYED OR IRRITABLE: SEVERAL DAYS
3. WORRYING TOO MUCH ABOUT DIFFERENT THINGS: SEVERAL DAYS
4. TROUBLE RELAXING: SEVERAL DAYS

## 2025-03-11 NOTE — PROGRESS NOTES
ADULT BEHAVIORAL HEALTH FOLLOW UP  Tom Munguia [facility]      Visit Date: 3/11/2025   Time of appointment:  11:00  Time spent with Patient: 53 minutes.   This is patient's  36th  appointment.    Reason for Consult:  Anxiety, Stress, and Depression       Referring Provider/PCP:    No ref. provider found  Geovany Zavaleta PA      Pt provided informed consent for the behavioral health program. Discussed with patient model of service to include the limits of confidentiality (i.e. abuse reporting, suicide intervention, etc.) and short-term intervention focused approach.  Pt indicated understanding.    ZOHRA Dorman is a 65 y.o. female who presents for follow up of Agoraphobia with Panic Disorder. Client reported decreased symptoms between sessions, stating she has not been \"sitting around and worrying\" like she had been. Client stated she has also been out of the home several times between sessions, both with and without being accompanied by others. Client denies any significant elevations in anxiety, ruminations, or feelings of being unsafe while out. Client reported that her Thyroid levels had been tested between sessions and her medication was adjusted due to low levels. Client stated her mood began to improve approximately one week after the adjustment and continued to improve to the current level. Client additionally reported increased comfort and asserting boundaries with select family members that have been creating conflict recently. Therapist encouraged client to monitor for any abrupt emotional/behavioral changes in the future, and to contact her specialist when noted. Client and therapist transitioned to exploring early family experiences, focusing primarily on the relationship with her mother. Client expressed frustration and disappointment with the fact that she will never know the reason for her mother's treatment of her. Therapist introduced concepts of acceptance and forgiveness, with a focus on client

## 2025-04-01 ENCOUNTER — TELEMEDICINE (OUTPATIENT)
Dept: BEHAVIORAL/MENTAL HEALTH CLINIC | Age: 65
End: 2025-04-01
Payer: COMMERCIAL

## 2025-04-01 DIAGNOSIS — F40.01 AGORAPHOBIA WITH PANIC DISORDER: Primary | ICD-10-CM

## 2025-04-01 PROCEDURE — 1123F ACP DISCUSS/DSCN MKR DOCD: CPT | Performed by: COUNSELOR

## 2025-04-01 PROCEDURE — 90834 PSYTX W PT 45 MINUTES: CPT | Performed by: COUNSELOR

## 2025-04-01 ASSESSMENT — PATIENT HEALTH QUESTIONNAIRE - PHQ9: DEPRESSION UNABLE TO ASSESS: PT REFUSES

## 2025-04-01 NOTE — PROGRESS NOTES
distortions resulting from the interactions with other party. Therapist reiterated that the choice to engage in any interventions are solely the client's choice. Client stated that she sees some possible utility in such interventions, but would appreciate more time to consider it.    Previous Plan:  Client to consider possible benefits of forgiveness.      MENTAL STATUS EXAM  Mood was anxious with anxious affect.   Suicidal ideation was denied.   Homicidal ideation was denied.   Hygiene was good .  Dress was neat.   Behavior was Restless & fidgety with No observation or self-report of difficulties ambulating.   Attitude was Cooperative.  Eye-contact was fair.  Speech: rate - WNL, rhythm - WNL, volume - WNL.  Verbalizations were goal directed.  Thought processes were intact and goal-oriented without evidence of delusions, hallucinations, obsessions, or florencia; with moderate cognitive distortions.   Associations were characterized by intact cognitive processes.  Pt was oriented to person, place, time, and general circumstances;  recent:  good and remote:  good.  Insight and judgment were estimated to be fair, AEB, a fair  understanding of cyclical maladaptive patterns, and the ability to use insight to inform behavior change.   Attention span and concentration appear within functional limits  Language use and knowledge base appear within functional limits        ASSESSMENT  Lakia Mcneil presented to the appointment today for evaluation and treatment of symptoms of anxiety.  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 Lakia was in agreement with recommendations.           3/11/2025    10:44 AM 2/18/2025    11:11 AM 1/29/2025    11:15 AM 1/7/2025    11:04 AM 12/9/2024    11:21 AM 11/18/2024    11:12 AM 10/28/2024    11:07 AM   PHQ Scores   PHQ2 Score 2  1 2 0 1 0 0   PHQ9 Score 5  8 6 7 5 3 4       Patient-reported     Interpretation of Total Score

## 2025-04-14 DIAGNOSIS — M54.2 NECK PAIN: ICD-10-CM

## 2025-04-14 RX ORDER — MELOXICAM 7.5 MG/1
7.5 TABLET ORAL DAILY PRN
Qty: 90 TABLET | Refills: 0 | Status: SHIPPED | OUTPATIENT
Start: 2025-04-14

## 2025-04-22 ENCOUNTER — TELEMEDICINE (OUTPATIENT)
Dept: BEHAVIORAL/MENTAL HEALTH CLINIC | Age: 65
End: 2025-04-22
Payer: COMMERCIAL

## 2025-04-22 DIAGNOSIS — F40.01 AGORAPHOBIA WITH PANIC DISORDER: Primary | ICD-10-CM

## 2025-04-22 PROCEDURE — 90834 PSYTX W PT 45 MINUTES: CPT | Performed by: COUNSELOR

## 2025-04-22 PROCEDURE — 1123F ACP DISCUSS/DSCN MKR DOCD: CPT | Performed by: COUNSELOR

## 2025-04-22 ASSESSMENT — PATIENT HEALTH QUESTIONNAIRE - PHQ9
7. TROUBLE CONCENTRATING ON THINGS, SUCH AS READING THE NEWSPAPER OR WATCHING TELEVISION: SEVERAL DAYS
SUM OF ALL RESPONSES TO PHQ QUESTIONS 1-9: 5
6. FEELING BAD ABOUT YOURSELF - OR THAT YOU ARE A FAILURE OR HAVE LET YOURSELF OR YOUR FAMILY DOWN: NOT AT ALL
1. LITTLE INTEREST OR PLEASURE IN DOING THINGS: SEVERAL DAYS
SUM OF ALL RESPONSES TO PHQ QUESTIONS 1-9: 5
10. IF YOU CHECKED OFF ANY PROBLEMS, HOW DIFFICULT HAVE THESE PROBLEMS MADE IT FOR YOU TO DO YOUR WORK, TAKE CARE OF THINGS AT HOME, OR GET ALONG WITH OTHER PEOPLE: SOMEWHAT DIFFICULT
SUM OF ALL RESPONSES TO PHQ QUESTIONS 1-9: 5
9. THOUGHTS THAT YOU WOULD BE BETTER OFF DEAD, OR OF HURTING YOURSELF: NOT AT ALL
8. MOVING OR SPEAKING SO SLOWLY THAT OTHER PEOPLE COULD HAVE NOTICED. OR THE OPPOSITE - BEING SO FIDGETY OR RESTLESS THAT YOU HAVE BEEN MOVING AROUND A LOT MORE THAN USUAL: NOT AT ALL
2. FEELING DOWN, DEPRESSED OR HOPELESS: NOT AT ALL
8. MOVING OR SPEAKING SO SLOWLY THAT OTHER PEOPLE COULD HAVE NOTICED. OR THE OPPOSITE, BEING SO FIGETY OR RESTLESS THAT YOU HAVE BEEN MOVING AROUND A LOT MORE THAN USUAL: NOT AT ALL
4. FEELING TIRED OR HAVING LITTLE ENERGY: SEVERAL DAYS
7. TROUBLE CONCENTRATING ON THINGS, SUCH AS READING THE NEWSPAPER OR WATCHING TELEVISION: SEVERAL DAYS
9. THOUGHTS THAT YOU WOULD BE BETTER OFF DEAD, OR OF HURTING YOURSELF: NOT AT ALL
6. FEELING BAD ABOUT YOURSELF - OR THAT YOU ARE A FAILURE OR HAVE LET YOURSELF OR YOUR FAMILY DOWN: NOT AT ALL
3. TROUBLE FALLING OR STAYING ASLEEP: SEVERAL DAYS
5. POOR APPETITE OR OVEREATING: SEVERAL DAYS
1. LITTLE INTEREST OR PLEASURE IN DOING THINGS: SEVERAL DAYS
4. FEELING TIRED OR HAVING LITTLE ENERGY: SEVERAL DAYS
10. IF YOU CHECKED OFF ANY PROBLEMS, HOW DIFFICULT HAVE THESE PROBLEMS MADE IT FOR YOU TO DO YOUR WORK, TAKE CARE OF THINGS AT HOME, OR GET ALONG WITH OTHER PEOPLE: SOMEWHAT DIFFICULT
2. FEELING DOWN, DEPRESSED OR HOPELESS: NOT AT ALL
3. TROUBLE FALLING OR STAYING ASLEEP: SEVERAL DAYS
5. POOR APPETITE OR OVEREATING: SEVERAL DAYS
SUM OF ALL RESPONSES TO PHQ QUESTIONS 1-9: 5
SUM OF ALL RESPONSES TO PHQ QUESTIONS 1-9: 5

## 2025-04-22 ASSESSMENT — ANXIETY QUESTIONNAIRES
GAD7 TOTAL SCORE: 2
5. BEING SO RESTLESS THAT IT IS HARD TO SIT STILL: NOT AT ALL
IF YOU CHECKED OFF ANY PROBLEMS ON THIS QUESTIONNAIRE, HOW DIFFICULT HAVE THESE PROBLEMS MADE IT FOR YOU TO DO YOUR WORK, TAKE CARE OF THINGS AT HOME, OR GET ALONG WITH OTHER PEOPLE: SOMEWHAT DIFFICULT
IF YOU CHECKED OFF ANY PROBLEMS ON THIS QUESTIONNAIRE, HOW DIFFICULT HAVE THESE PROBLEMS MADE IT FOR YOU TO DO YOUR WORK, TAKE CARE OF THINGS AT HOME, OR GET ALONG WITH OTHER PEOPLE: SOMEWHAT DIFFICULT
2. NOT BEING ABLE TO STOP OR CONTROL WORRYING: NOT AT ALL
6. BECOMING EASILY ANNOYED OR IRRITABLE: SEVERAL DAYS
4. TROUBLE RELAXING: NOT AT ALL
7. FEELING AFRAID AS IF SOMETHING AWFUL MIGHT HAPPEN: NOT AT ALL
5. BEING SO RESTLESS THAT IT IS HARD TO SIT STILL: NOT AT ALL
6. BECOMING EASILY ANNOYED OR IRRITABLE: SEVERAL DAYS
4. TROUBLE RELAXING: NOT AT ALL
7. FEELING AFRAID AS IF SOMETHING AWFUL MIGHT HAPPEN: NOT AT ALL
3. WORRYING TOO MUCH ABOUT DIFFERENT THINGS: SEVERAL DAYS
1. FEELING NERVOUS, ANXIOUS, OR ON EDGE: NOT AT ALL
2. NOT BEING ABLE TO STOP OR CONTROL WORRYING: NOT AT ALL
3. WORRYING TOO MUCH ABOUT DIFFERENT THINGS: SEVERAL DAYS
1. FEELING NERVOUS, ANXIOUS, OR ON EDGE: NOT AT ALL

## 2025-04-22 NOTE — PROGRESS NOTES
ADULT BEHAVIORAL HEALTH FOLLOW UP  [facility]      Visit Date: 4/22/2025   Time of appointment:  11:17  Time spent with Patient: 45 minutes.   This is patient's  38th  appointment.    Reason for Consult:  Anxiety and Depression       Referring Provider/PCP:    No ref. provider found  Geovany Zavaleta, PA      Pt provided informed consent for the behavioral health program. Discussed with patient model of service to include the limits of confidentiality (i.e. abuse reporting, suicide intervention, etc.) and short-term intervention focused approach.  Pt indicated understanding.    ZOHRA Dorman is a 65 y.o. female who presents for follow up of Agoraphobia with Panic Disorder. Client reported less disruption from in-laws due to the family moving out of town recently. Client reported she has continued to do well with leaving the home and being out in public, with the exception of one time she noted she was out and became worried for a short time until she was able to calm herself by mentally repeating \"it's nothing, you're okay\". Client does still struggle with leaving the home on her own, but is able to successfully leave approximately 50% of the time. Client acknowledged this is a significant improvement compared to previous periods of being unable to leave even when accompanied. Client and therapist explored possible options for focus of future sessions and client change. Possibilities to release her anger, challenge previous authority that has established much of her negative self talk, and the possibility of seeking care options for her son were discussed. Client is resistant to these options for various reasons, often rebutting with statements that she is afraid of failure. Therapist noted fortune telling in client's statements and utilized CBT techniques to challenge these beliefs. Therapist encouraged client to explore options discussed in session and evaluate for herself if it is worth it to her to

## 2025-05-01 ENCOUNTER — HOSPITAL ENCOUNTER (OUTPATIENT)
Dept: CT IMAGING | Age: 65
Discharge: HOME | End: 2025-05-03
Payer: COMMERCIAL

## 2025-05-01 VITALS — WEIGHT: 157 LBS | BODY MASS INDEX: 28.89 KG/M2 | HEIGHT: 62 IN

## 2025-05-01 DIAGNOSIS — Z87.891 FORMER SMOKER: ICD-10-CM

## 2025-05-01 PROCEDURE — 71271 CT THORAX LUNG CANCER SCR C-: CPT

## 2025-05-02 SDOH — ECONOMIC STABILITY: FOOD INSECURITY: WITHIN THE PAST 12 MONTHS, YOU WORRIED THAT YOUR FOOD WOULD RUN OUT BEFORE YOU GOT MONEY TO BUY MORE.: NEVER TRUE

## 2025-05-02 SDOH — ECONOMIC STABILITY: FOOD INSECURITY: WITHIN THE PAST 12 MONTHS, THE FOOD YOU BOUGHT JUST DIDN'T LAST AND YOU DIDN'T HAVE MONEY TO GET MORE.: NEVER TRUE

## 2025-05-02 SDOH — ECONOMIC STABILITY: INCOME INSECURITY: IN THE LAST 12 MONTHS, WAS THERE A TIME WHEN YOU WERE NOT ABLE TO PAY THE MORTGAGE OR RENT ON TIME?: NO

## 2025-05-02 SDOH — ECONOMIC STABILITY: TRANSPORTATION INSECURITY
IN THE PAST 12 MONTHS, HAS THE LACK OF TRANSPORTATION KEPT YOU FROM MEDICAL APPOINTMENTS OR FROM GETTING MEDICATIONS?: NO

## 2025-05-02 SDOH — ECONOMIC STABILITY: TRANSPORTATION INSECURITY
IN THE PAST 12 MONTHS, HAS LACK OF TRANSPORTATION KEPT YOU FROM MEETINGS, WORK, OR FROM GETTING THINGS NEEDED FOR DAILY LIVING?: NO

## 2025-05-05 ENCOUNTER — OFFICE VISIT (OUTPATIENT)
Dept: PRIMARY CARE CLINIC | Age: 65
End: 2025-05-05
Payer: COMMERCIAL

## 2025-05-05 VITALS
SYSTOLIC BLOOD PRESSURE: 122 MMHG | WEIGHT: 157 LBS | DIASTOLIC BLOOD PRESSURE: 80 MMHG | BODY MASS INDEX: 28.89 KG/M2 | HEIGHT: 62 IN | OXYGEN SATURATION: 96 % | HEART RATE: 79 BPM

## 2025-05-05 DIAGNOSIS — F41.9 ANXIETY: ICD-10-CM

## 2025-05-05 DIAGNOSIS — E78.5 HYPERLIPIDEMIA WITH TARGET LDL LESS THAN 100: ICD-10-CM

## 2025-05-05 DIAGNOSIS — I10 ESSENTIAL (PRIMARY) HYPERTENSION: ICD-10-CM

## 2025-05-05 DIAGNOSIS — M35.01 SJOGREN'S SYNDROME WITH KERATOCONJUNCTIVITIS SICCA: ICD-10-CM

## 2025-05-05 DIAGNOSIS — E55.9 VITAMIN D DEFICIENCY: ICD-10-CM

## 2025-05-05 DIAGNOSIS — E89.0 POSTOPERATIVE HYPOTHYROIDISM: ICD-10-CM

## 2025-05-05 DIAGNOSIS — K21.9 GASTROESOPHAGEAL REFLUX DISEASE WITHOUT ESOPHAGITIS: ICD-10-CM

## 2025-05-05 DIAGNOSIS — R53.82 CHRONIC FATIGUE: ICD-10-CM

## 2025-05-05 DIAGNOSIS — Z13.1 SCREENING FOR DIABETES MELLITUS: ICD-10-CM

## 2025-05-05 DIAGNOSIS — Z85.850 HISTORY OF THYROID CANCER: Primary | ICD-10-CM

## 2025-05-05 DIAGNOSIS — K44.9 HIATAL HERNIA: ICD-10-CM

## 2025-05-05 DIAGNOSIS — F33.1 MODERATE EPISODE OF RECURRENT MAJOR DEPRESSIVE DISORDER (HCC): ICD-10-CM

## 2025-05-05 DIAGNOSIS — R73.03 PRE-DIABETES: ICD-10-CM

## 2025-05-05 DIAGNOSIS — R11.0 NAUSEA: ICD-10-CM

## 2025-05-05 PROCEDURE — 99214 OFFICE O/P EST MOD 30 MIN: CPT | Performed by: PHYSICIAN ASSISTANT

## 2025-05-05 PROCEDURE — 1123F ACP DISCUSS/DSCN MKR DOCD: CPT | Performed by: PHYSICIAN ASSISTANT

## 2025-05-05 PROCEDURE — 3079F DIAST BP 80-89 MM HG: CPT | Performed by: PHYSICIAN ASSISTANT

## 2025-05-05 PROCEDURE — 3074F SYST BP LT 130 MM HG: CPT | Performed by: PHYSICIAN ASSISTANT

## 2025-05-05 RX ORDER — PANTOPRAZOLE SODIUM 20 MG/1
20 TABLET, DELAYED RELEASE ORAL
Qty: 90 TABLET | Refills: 1 | Status: SHIPPED | OUTPATIENT
Start: 2025-05-05

## 2025-05-05 RX ORDER — ONDANSETRON 4 MG/1
4 TABLET, FILM COATED ORAL 3 TIMES DAILY PRN
Qty: 90 TABLET | Refills: 0 | Status: SHIPPED | OUTPATIENT
Start: 2025-05-05

## 2025-05-05 SDOH — ECONOMIC STABILITY: FOOD INSECURITY: WITHIN THE PAST 12 MONTHS, THE FOOD YOU BOUGHT JUST DIDN'T LAST AND YOU DIDN'T HAVE MONEY TO GET MORE.: NEVER TRUE

## 2025-05-05 SDOH — ECONOMIC STABILITY: FOOD INSECURITY: WITHIN THE PAST 12 MONTHS, YOU WORRIED THAT YOUR FOOD WOULD RUN OUT BEFORE YOU GOT MONEY TO BUY MORE.: NEVER TRUE

## 2025-05-05 ASSESSMENT — PATIENT HEALTH QUESTIONNAIRE - PHQ9
7. TROUBLE CONCENTRATING ON THINGS, SUCH AS READING THE NEWSPAPER OR WATCHING TELEVISION: NOT AT ALL
6. FEELING BAD ABOUT YOURSELF - OR THAT YOU ARE A FAILURE OR HAVE LET YOURSELF OR YOUR FAMILY DOWN: NOT AT ALL
9. THOUGHTS THAT YOU WOULD BE BETTER OFF DEAD, OR OF HURTING YOURSELF: NOT AT ALL
SUM OF ALL RESPONSES TO PHQ QUESTIONS 1-9: 0
SUM OF ALL RESPONSES TO PHQ QUESTIONS 1-9: 0
4. FEELING TIRED OR HAVING LITTLE ENERGY: NOT AT ALL
DEPRESSION UNABLE TO ASSESS: FUNCTIONAL CAPACITY MOTIVATION LIMITS ACCURACY
5. POOR APPETITE OR OVEREATING: NOT AT ALL
8. MOVING OR SPEAKING SO SLOWLY THAT OTHER PEOPLE COULD HAVE NOTICED. OR THE OPPOSITE, BEING SO FIGETY OR RESTLESS THAT YOU HAVE BEEN MOVING AROUND A LOT MORE THAN USUAL: NOT AT ALL
SUM OF ALL RESPONSES TO PHQ QUESTIONS 1-9: 0
1. LITTLE INTEREST OR PLEASURE IN DOING THINGS: NOT AT ALL
SUM OF ALL RESPONSES TO PHQ QUESTIONS 1-9: 0
3. TROUBLE FALLING OR STAYING ASLEEP: NOT AT ALL
10. IF YOU CHECKED OFF ANY PROBLEMS, HOW DIFFICULT HAVE THESE PROBLEMS MADE IT FOR YOU TO DO YOUR WORK, TAKE CARE OF THINGS AT HOME, OR GET ALONG WITH OTHER PEOPLE: NOT DIFFICULT AT ALL
2. FEELING DOWN, DEPRESSED OR HOPELESS: NOT AT ALL

## 2025-05-05 ASSESSMENT — ENCOUNTER SYMPTOMS
COUGH: 0
SHORTNESS OF BREATH: 0
CONSTIPATION: 0
SORE THROAT: 0
DIARRHEA: 0
CHEST TIGHTNESS: 0
ABDOMINAL PAIN: 0
ABDOMINAL DISTENTION: 0

## 2025-05-05 NOTE — PROGRESS NOTES
[facility]  [facility]  [id number] [address] [address]  Dept: [phone]    Lakia Mcneil is a 65 y.o. female Established patient, who presents today for her medical conditions/complaints as noted below.      Chief Complaint   Patient presents with    Medication Refill     Patient states they the following concerns with medications, labs and all testing results        HPI:     History of Present Illness  The patient presents today for a follow-up and medication check. She has a significant past medical history of major depressive disorder, chronic fatigue, Sjogren's syndrome, hyperlipidemia, anxiety, thyroid cancer, and postoperative hypothyroidism. She is currently taking metoprolol, lisinopril, famotidine, Lexapro, BuSpar, Wellbutrin, and atorvastatin. She has stopped her thyroid medication and is following with Endocrinology.    She reports experiencing severe heartburn immediately after meals, which she attributes to a recently diagnosed small hiatal hernia identified on a CT scan. She is currently managing this with Pepcid twice daily.    She also experiences neck stiffness, which induces nausea upon head movement. She is seeking refills for her Zofran prescription.    She has been experiencing back pain similar to that felt during a previous kidney stone episode. The pain persists throughout the day before subsiding.    She continues her Synthroid regimen at a dosage of 175 mcg. She has gained approximately 40 pounds since menopause and is struggling with weight loss. She has not yet undergone an A1c test.    She experiences hot flashes that induce gastrointestinal discomfort and has developed an intolerance to outdoor activities during the summer months over the past few years.    Her mood remains stable.    PAST SURGICAL HISTORY:  - Thyroid cancer surgery  -     FAMILY HISTORY  The patient mentions that everyone in her family has diabetes.      Reviewed

## 2025-05-09 ENCOUNTER — HOSPITAL ENCOUNTER (OUTPATIENT)
Age: 65
Discharge: HOME | End: 2025-05-09
Payer: COMMERCIAL

## 2025-05-09 ENCOUNTER — RESULTS FOLLOW-UP (OUTPATIENT)
Dept: PRIMARY CARE CLINIC | Age: 65
End: 2025-05-09

## 2025-05-09 DIAGNOSIS — R73.03 PRE-DIABETES: ICD-10-CM

## 2025-05-09 DIAGNOSIS — E78.5 HYPERLIPIDEMIA WITH TARGET LDL LESS THAN 100: ICD-10-CM

## 2025-05-09 DIAGNOSIS — F41.9 ANXIETY: ICD-10-CM

## 2025-05-09 DIAGNOSIS — M35.01 SJOGREN'S SYNDROME WITH KERATOCONJUNCTIVITIS SICCA: ICD-10-CM

## 2025-05-09 DIAGNOSIS — F33.1 MODERATE EPISODE OF RECURRENT MAJOR DEPRESSIVE DISORDER (HCC): ICD-10-CM

## 2025-05-09 DIAGNOSIS — R53.82 CHRONIC FATIGUE: ICD-10-CM

## 2025-05-09 DIAGNOSIS — E55.9 VITAMIN D DEFICIENCY: ICD-10-CM

## 2025-05-09 DIAGNOSIS — Z85.850 HISTORY OF THYROID CANCER: ICD-10-CM

## 2025-05-09 LAB
ALBUMIN SERPL-MCNC: 4.3 G/DL (ref 3.5–5.2)
ALP SERPL-CCNC: 102 U/L (ref 35–104)
ALT SERPL-CCNC: 21 U/L (ref 10–35)
ANION GAP SERPL CALCULATED.3IONS-SCNC: 8 MMOL/L (ref 9–16)
AST SERPL-CCNC: 20 U/L (ref 10–35)
BASOPHILS # BLD: 0.06 K/UL (ref 0–0.2)
BASOPHILS NFR BLD: 1 % (ref 0–2)
BILIRUB SERPL-MCNC: 0.4 MG/DL (ref 0–1.2)
BUN SERPL-MCNC: 13 MG/DL (ref 8–23)
CALCIUM SERPL-MCNC: 9.4 MG/DL (ref 8.6–10.4)
CHLORIDE SERPL-SCNC: 105 MMOL/L (ref 98–107)
CHOLEST SERPL-MCNC: 132 MG/DL (ref 0–199)
CHOLESTEROL/HDL RATIO: 2.8
CO2 SERPL-SCNC: 28 MMOL/L (ref 20–31)
CREAT SERPL-MCNC: 0.9 MG/DL (ref 0.7–1.2)
EOSINOPHIL # BLD: 0.17 K/UL (ref 0–0.44)
EOSINOPHILS RELATIVE PERCENT: 3 % (ref 0–4)
ERYTHROCYTE [DISTWIDTH] IN BLOOD BY AUTOMATED COUNT: 13.1 % (ref 11.5–14.9)
EST. AVERAGE GLUCOSE BLD GHB EST-MCNC: 117 MG/DL
GFR, ESTIMATED: 71 ML/MIN/1.73M2
GLUCOSE SERPL-MCNC: 102 MG/DL (ref 74–99)
HBA1C MFR BLD: 5.7 % (ref 4–6)
HCT VFR BLD AUTO: 41.7 % (ref 36–46)
HDLC SERPL-MCNC: 47 MG/DL
HGB BLD-MCNC: 14.1 G/DL (ref 12–16)
IMM GRANULOCYTES # BLD AUTO: <0.03 K/UL (ref 0–0.3)
IMM GRANULOCYTES NFR BLD: 0 %
LDLC SERPL CALC-MCNC: 64 MG/DL (ref 0–100)
LYMPHOCYTES NFR BLD: 1.9 K/UL (ref 1.1–3.7)
LYMPHOCYTES RELATIVE PERCENT: 31 % (ref 24–44)
MCH RBC QN AUTO: 30.7 PG (ref 26–34)
MCHC RBC AUTO-ENTMCNC: 33.8 G/DL (ref 31–37)
MCV RBC AUTO: 90.8 FL (ref 80–100)
MONOCYTES NFR BLD: 0.54 K/UL (ref 0.1–1.2)
MONOCYTES NFR BLD: 9 % (ref 3–12)
NEUTROPHILS NFR BLD: 56 % (ref 36–66)
NEUTS SEG NFR BLD: 3.36 K/UL (ref 1.5–8.1)
NRBC BLD-RTO: 0 PER 100 WBC
PLATELET # BLD AUTO: 265 K/UL (ref 150–450)
PMV BLD AUTO: 9.9 FL (ref 8–13.5)
POTASSIUM SERPL-SCNC: 4.5 MMOL/L (ref 3.7–5.3)
PROT SERPL-MCNC: 7.5 G/DL (ref 6.6–8.7)
RBC # BLD AUTO: 4.59 M/UL (ref 3.95–5.11)
SODIUM SERPL-SCNC: 141 MMOL/L (ref 136–145)
TRIGL SERPL-MCNC: 103 MG/DL (ref 0–149)
WBC OTHER # BLD: 6.1 K/UL (ref 3.5–11)

## 2025-05-09 PROCEDURE — 36415 COLL VENOUS BLD VENIPUNCTURE: CPT

## 2025-05-09 PROCEDURE — 80053 COMPREHEN METABOLIC PANEL: CPT

## 2025-05-09 PROCEDURE — 85025 COMPLETE CBC W/AUTO DIFF WBC: CPT

## 2025-05-09 PROCEDURE — 83036 HEMOGLOBIN GLYCOSYLATED A1C: CPT

## 2025-05-09 PROCEDURE — 80061 LIPID PANEL: CPT

## 2025-05-12 ASSESSMENT — ANXIETY QUESTIONNAIRES
6. BECOMING EASILY ANNOYED OR IRRITABLE: NOT AT ALL
3. WORRYING TOO MUCH ABOUT DIFFERENT THINGS: NOT AT ALL
GAD7 TOTAL SCORE: 0
5. BEING SO RESTLESS THAT IT IS HARD TO SIT STILL: NOT AT ALL
6. BECOMING EASILY ANNOYED OR IRRITABLE: NOT AT ALL
7. FEELING AFRAID AS IF SOMETHING AWFUL MIGHT HAPPEN: NOT AT ALL
1. FEELING NERVOUS, ANXIOUS, OR ON EDGE: NOT AT ALL
IF YOU CHECKED OFF ANY PROBLEMS ON THIS QUESTIONNAIRE, HOW DIFFICULT HAVE THESE PROBLEMS MADE IT FOR YOU TO DO YOUR WORK, TAKE CARE OF THINGS AT HOME, OR GET ALONG WITH OTHER PEOPLE: NOT DIFFICULT AT ALL
1. FEELING NERVOUS, ANXIOUS, OR ON EDGE: NOT AT ALL
2. NOT BEING ABLE TO STOP OR CONTROL WORRYING: NOT AT ALL
2. NOT BEING ABLE TO STOP OR CONTROL WORRYING: NOT AT ALL
7. FEELING AFRAID AS IF SOMETHING AWFUL MIGHT HAPPEN: NOT AT ALL
IF YOU CHECKED OFF ANY PROBLEMS ON THIS QUESTIONNAIRE, HOW DIFFICULT HAVE THESE PROBLEMS MADE IT FOR YOU TO DO YOUR WORK, TAKE CARE OF THINGS AT HOME, OR GET ALONG WITH OTHER PEOPLE: NOT DIFFICULT AT ALL
4. TROUBLE RELAXING: NOT AT ALL
4. TROUBLE RELAXING: NOT AT ALL
5. BEING SO RESTLESS THAT IT IS HARD TO SIT STILL: NOT AT ALL
3. WORRYING TOO MUCH ABOUT DIFFERENT THINGS: NOT AT ALL

## 2025-05-12 ASSESSMENT — PATIENT HEALTH QUESTIONNAIRE - PHQ9
3. TROUBLE FALLING OR STAYING ASLEEP: SEVERAL DAYS
SUM OF ALL RESPONSES TO PHQ QUESTIONS 1-9: 2
1. LITTLE INTEREST OR PLEASURE IN DOING THINGS: NOT AT ALL
5. POOR APPETITE OR OVEREATING: NOT AT ALL
4. FEELING TIRED OR HAVING LITTLE ENERGY: SEVERAL DAYS
7. TROUBLE CONCENTRATING ON THINGS, SUCH AS READING THE NEWSPAPER OR WATCHING TELEVISION: NOT AT ALL
7. TROUBLE CONCENTRATING ON THINGS, SUCH AS READING THE NEWSPAPER OR WATCHING TELEVISION: NOT AT ALL
2. FEELING DOWN, DEPRESSED OR HOPELESS: NOT AT ALL
8. MOVING OR SPEAKING SO SLOWLY THAT OTHER PEOPLE COULD HAVE NOTICED. OR THE OPPOSITE, BEING SO FIGETY OR RESTLESS THAT YOU HAVE BEEN MOVING AROUND A LOT MORE THAN USUAL: NOT AT ALL
SUM OF ALL RESPONSES TO PHQ QUESTIONS 1-9: 2
9. THOUGHTS THAT YOU WOULD BE BETTER OFF DEAD, OR OF HURTING YOURSELF: NOT AT ALL
10. IF YOU CHECKED OFF ANY PROBLEMS, HOW DIFFICULT HAVE THESE PROBLEMS MADE IT FOR YOU TO DO YOUR WORK, TAKE CARE OF THINGS AT HOME, OR GET ALONG WITH OTHER PEOPLE: NOT DIFFICULT AT ALL
1. LITTLE INTEREST OR PLEASURE IN DOING THINGS: NOT AT ALL
6. FEELING BAD ABOUT YOURSELF - OR THAT YOU ARE A FAILURE OR HAVE LET YOURSELF OR YOUR FAMILY DOWN: NOT AT ALL
SUM OF ALL RESPONSES TO PHQ QUESTIONS 1-9: 2
SUM OF ALL RESPONSES TO PHQ QUESTIONS 1-9: 2
4. FEELING TIRED OR HAVING LITTLE ENERGY: SEVERAL DAYS
SUM OF ALL RESPONSES TO PHQ QUESTIONS 1-9: 2
8. MOVING OR SPEAKING SO SLOWLY THAT OTHER PEOPLE COULD HAVE NOTICED. OR THE OPPOSITE - BEING SO FIDGETY OR RESTLESS THAT YOU HAVE BEEN MOVING AROUND A LOT MORE THAN USUAL: NOT AT ALL
3. TROUBLE FALLING OR STAYING ASLEEP: SEVERAL DAYS
6. FEELING BAD ABOUT YOURSELF - OR THAT YOU ARE A FAILURE OR HAVE LET YOURSELF OR YOUR FAMILY DOWN: NOT AT ALL
5. POOR APPETITE OR OVEREATING: NOT AT ALL
10. IF YOU CHECKED OFF ANY PROBLEMS, HOW DIFFICULT HAVE THESE PROBLEMS MADE IT FOR YOU TO DO YOUR WORK, TAKE CARE OF THINGS AT HOME, OR GET ALONG WITH OTHER PEOPLE: NOT DIFFICULT AT ALL
2. FEELING DOWN, DEPRESSED OR HOPELESS: NOT AT ALL
9. THOUGHTS THAT YOU WOULD BE BETTER OFF DEAD, OR OF HURTING YOURSELF: NOT AT ALL

## 2025-05-13 ENCOUNTER — TELEMEDICINE (OUTPATIENT)
Dept: BEHAVIORAL/MENTAL HEALTH CLINIC | Age: 65
End: 2025-05-13
Payer: COMMERCIAL

## 2025-05-13 DIAGNOSIS — F40.01 AGORAPHOBIA WITH PANIC DISORDER: Primary | ICD-10-CM

## 2025-05-13 PROCEDURE — 1123F ACP DISCUSS/DSCN MKR DOCD: CPT | Performed by: COUNSELOR

## 2025-05-13 PROCEDURE — 90834 PSYTX W PT 45 MINUTES: CPT | Performed by: COUNSELOR

## 2025-06-03 ENCOUNTER — TELEMEDICINE (OUTPATIENT)
Dept: BEHAVIORAL/MENTAL HEALTH CLINIC | Age: 65
End: 2025-06-03

## 2025-06-03 DIAGNOSIS — F40.01 AGORAPHOBIA WITH PANIC DISORDER: Primary | ICD-10-CM

## 2025-06-03 ASSESSMENT — PATIENT HEALTH QUESTIONNAIRE - PHQ9
SUM OF ALL RESPONSES TO PHQ QUESTIONS 1-9: 6
6. FEELING BAD ABOUT YOURSELF - OR THAT YOU ARE A FAILURE OR HAVE LET YOURSELF OR YOUR FAMILY DOWN: NOT AT ALL
SUM OF ALL RESPONSES TO PHQ QUESTIONS 1-9: 6
3. TROUBLE FALLING OR STAYING ASLEEP: SEVERAL DAYS
2. FEELING DOWN, DEPRESSED OR HOPELESS: SEVERAL DAYS
SUM OF ALL RESPONSES TO PHQ QUESTIONS 1-9: 6
5. POOR APPETITE OR OVEREATING: SEVERAL DAYS
3. TROUBLE FALLING OR STAYING ASLEEP: SEVERAL DAYS
8. MOVING OR SPEAKING SO SLOWLY THAT OTHER PEOPLE COULD HAVE NOTICED. OR THE OPPOSITE, BEING SO FIGETY OR RESTLESS THAT YOU HAVE BEEN MOVING AROUND A LOT MORE THAN USUAL: NOT AT ALL
9. THOUGHTS THAT YOU WOULD BE BETTER OFF DEAD, OR OF HURTING YOURSELF: NOT AT ALL
1. LITTLE INTEREST OR PLEASURE IN DOING THINGS: SEVERAL DAYS
9. THOUGHTS THAT YOU WOULD BE BETTER OFF DEAD, OR OF HURTING YOURSELF: NOT AT ALL
4. FEELING TIRED OR HAVING LITTLE ENERGY: SEVERAL DAYS
6. FEELING BAD ABOUT YOURSELF - OR THAT YOU ARE A FAILURE OR HAVE LET YOURSELF OR YOUR FAMILY DOWN: NOT AT ALL
8. MOVING OR SPEAKING SO SLOWLY THAT OTHER PEOPLE COULD HAVE NOTICED. OR THE OPPOSITE - BEING SO FIDGETY OR RESTLESS THAT YOU HAVE BEEN MOVING AROUND A LOT MORE THAN USUAL: NOT AT ALL
10. IF YOU CHECKED OFF ANY PROBLEMS, HOW DIFFICULT HAVE THESE PROBLEMS MADE IT FOR YOU TO DO YOUR WORK, TAKE CARE OF THINGS AT HOME, OR GET ALONG WITH OTHER PEOPLE: SOMEWHAT DIFFICULT
SUM OF ALL RESPONSES TO PHQ QUESTIONS 1-9: 6
10. IF YOU CHECKED OFF ANY PROBLEMS, HOW DIFFICULT HAVE THESE PROBLEMS MADE IT FOR YOU TO DO YOUR WORK, TAKE CARE OF THINGS AT HOME, OR GET ALONG WITH OTHER PEOPLE: SOMEWHAT DIFFICULT
1. LITTLE INTEREST OR PLEASURE IN DOING THINGS: SEVERAL DAYS
7. TROUBLE CONCENTRATING ON THINGS, SUCH AS READING THE NEWSPAPER OR WATCHING TELEVISION: SEVERAL DAYS
SUM OF ALL RESPONSES TO PHQ QUESTIONS 1-9: 6
2. FEELING DOWN, DEPRESSED OR HOPELESS: SEVERAL DAYS
4. FEELING TIRED OR HAVING LITTLE ENERGY: SEVERAL DAYS
5. POOR APPETITE OR OVEREATING: SEVERAL DAYS
7. TROUBLE CONCENTRATING ON THINGS, SUCH AS READING THE NEWSPAPER OR WATCHING TELEVISION: SEVERAL DAYS

## 2025-06-03 ASSESSMENT — ANXIETY QUESTIONNAIRES
6. BECOMING EASILY ANNOYED OR IRRITABLE: SEVERAL DAYS
5. BEING SO RESTLESS THAT IT IS HARD TO SIT STILL: NOT AT ALL
GAD7 TOTAL SCORE: 4
3. WORRYING TOO MUCH ABOUT DIFFERENT THINGS: NOT AT ALL
4. TROUBLE RELAXING: SEVERAL DAYS
2. NOT BEING ABLE TO STOP OR CONTROL WORRYING: SEVERAL DAYS
1. FEELING NERVOUS, ANXIOUS, OR ON EDGE: SEVERAL DAYS
7. FEELING AFRAID AS IF SOMETHING AWFUL MIGHT HAPPEN: NOT AT ALL
5. BEING SO RESTLESS THAT IT IS HARD TO SIT STILL: NOT AT ALL
4. TROUBLE RELAXING: SEVERAL DAYS
IF YOU CHECKED OFF ANY PROBLEMS ON THIS QUESTIONNAIRE, HOW DIFFICULT HAVE THESE PROBLEMS MADE IT FOR YOU TO DO YOUR WORK, TAKE CARE OF THINGS AT HOME, OR GET ALONG WITH OTHER PEOPLE: SOMEWHAT DIFFICULT
2. NOT BEING ABLE TO STOP OR CONTROL WORRYING: SEVERAL DAYS
1. FEELING NERVOUS, ANXIOUS, OR ON EDGE: SEVERAL DAYS
IF YOU CHECKED OFF ANY PROBLEMS ON THIS QUESTIONNAIRE, HOW DIFFICULT HAVE THESE PROBLEMS MADE IT FOR YOU TO DO YOUR WORK, TAKE CARE OF THINGS AT HOME, OR GET ALONG WITH OTHER PEOPLE: SOMEWHAT DIFFICULT
7. FEELING AFRAID AS IF SOMETHING AWFUL MIGHT HAPPEN: NOT AT ALL
6. BECOMING EASILY ANNOYED OR IRRITABLE: SEVERAL DAYS
3. WORRYING TOO MUCH ABOUT DIFFERENT THINGS: NOT AT ALL

## 2025-06-03 NOTE — PROGRESS NOTES
Spoke briefly to client who stated she wanted to reschedule but was uncertain she could day of session. Client is currently caring for her mother-in-law in addition to her son that was recently hospitalized after a fall. Client stated she is tired but managing well, just does not have time for session currently. Therapist rescheduled client who thanked them for their assistance.

## 2025-06-07 ASSESSMENT — ANXIETY QUESTIONNAIRES
5. BEING SO RESTLESS THAT IT IS HARD TO SIT STILL: NOT AT ALL
IF YOU CHECKED OFF ANY PROBLEMS ON THIS QUESTIONNAIRE, HOW DIFFICULT HAVE THESE PROBLEMS MADE IT FOR YOU TO DO YOUR WORK, TAKE CARE OF THINGS AT HOME, OR GET ALONG WITH OTHER PEOPLE: SOMEWHAT DIFFICULT
3. WORRYING TOO MUCH ABOUT DIFFERENT THINGS: SEVERAL DAYS
3. WORRYING TOO MUCH ABOUT DIFFERENT THINGS: SEVERAL DAYS
1. FEELING NERVOUS, ANXIOUS, OR ON EDGE: SEVERAL DAYS
GAD7 TOTAL SCORE: 4
4. TROUBLE RELAXING: SEVERAL DAYS
7. FEELING AFRAID AS IF SOMETHING AWFUL MIGHT HAPPEN: NOT AT ALL
5. BEING SO RESTLESS THAT IT IS HARD TO SIT STILL: NOT AT ALL
4. TROUBLE RELAXING: SEVERAL DAYS
1. FEELING NERVOUS, ANXIOUS, OR ON EDGE: SEVERAL DAYS
2. NOT BEING ABLE TO STOP OR CONTROL WORRYING: NOT AT ALL
2. NOT BEING ABLE TO STOP OR CONTROL WORRYING: NOT AT ALL
IF YOU CHECKED OFF ANY PROBLEMS ON THIS QUESTIONNAIRE, HOW DIFFICULT HAVE THESE PROBLEMS MADE IT FOR YOU TO DO YOUR WORK, TAKE CARE OF THINGS AT HOME, OR GET ALONG WITH OTHER PEOPLE: SOMEWHAT DIFFICULT
7. FEELING AFRAID AS IF SOMETHING AWFUL MIGHT HAPPEN: NOT AT ALL
6. BECOMING EASILY ANNOYED OR IRRITABLE: SEVERAL DAYS
6. BECOMING EASILY ANNOYED OR IRRITABLE: SEVERAL DAYS

## 2025-06-10 ENCOUNTER — TELEMEDICINE (OUTPATIENT)
Dept: BEHAVIORAL/MENTAL HEALTH CLINIC | Age: 65
End: 2025-06-10
Payer: COMMERCIAL

## 2025-06-10 ENCOUNTER — OFFICE VISIT (OUTPATIENT)
Dept: PRIMARY CARE CLINIC | Age: 65
End: 2025-06-10
Payer: COMMERCIAL

## 2025-06-10 ENCOUNTER — TELEPHONE (OUTPATIENT)
Dept: PRIMARY CARE CLINIC | Age: 65
End: 2025-06-10

## 2025-06-10 VITALS
WEIGHT: 160.2 LBS | BODY MASS INDEX: 29.48 KG/M2 | SYSTOLIC BLOOD PRESSURE: 142 MMHG | OXYGEN SATURATION: 96 % | DIASTOLIC BLOOD PRESSURE: 88 MMHG | HEART RATE: 80 BPM | HEIGHT: 62 IN

## 2025-06-10 DIAGNOSIS — L30.4 INTERTRIGO: ICD-10-CM

## 2025-06-10 DIAGNOSIS — Z12.31 ENCOUNTER FOR SCREENING MAMMOGRAM FOR BREAST CANCER: ICD-10-CM

## 2025-06-10 DIAGNOSIS — N30.00 ACUTE CYSTITIS WITHOUT HEMATURIA: ICD-10-CM

## 2025-06-10 DIAGNOSIS — Z78.0 POSTMENOPAUSE: ICD-10-CM

## 2025-06-10 DIAGNOSIS — N95.1 HOT FLASHES, MENOPAUSAL: ICD-10-CM

## 2025-06-10 DIAGNOSIS — Z01.419 ENCOUNTER FOR GYNECOLOGICAL EXAMINATION: Primary | ICD-10-CM

## 2025-06-10 DIAGNOSIS — F40.01 AGORAPHOBIA WITH PANIC DISORDER: Primary | ICD-10-CM

## 2025-06-10 DIAGNOSIS — R82.90 ABNORMAL URINE ODOR: ICD-10-CM

## 2025-06-10 LAB
BILIRUBIN, POC: NEGATIVE
BLOOD URINE, POC: NORMAL
CLARITY, POC: NORMAL
COLOR, POC: NORMAL
GLUCOSE URINE, POC: NEGATIVE MG/DL
KETONES, POC: NEGATIVE MG/DL
LEUKOCYTE EST, POC: NORMAL
NITRITE, POC: POSITIVE
PH, POC: 6.5
PROTEIN, POC: NEGATIVE MG/DL
SPECIFIC GRAVITY, POC: 1.02
UROBILINOGEN, POC: NORMAL MG/DL

## 2025-06-10 PROCEDURE — 1123F ACP DISCUSS/DSCN MKR DOCD: CPT | Performed by: COUNSELOR

## 2025-06-10 PROCEDURE — 3079F DIAST BP 80-89 MM HG: CPT | Performed by: PHYSICIAN ASSISTANT

## 2025-06-10 PROCEDURE — 90837 PSYTX W PT 60 MINUTES: CPT | Performed by: COUNSELOR

## 2025-06-10 PROCEDURE — 81003 URINALYSIS AUTO W/O SCOPE: CPT | Performed by: PHYSICIAN ASSISTANT

## 2025-06-10 PROCEDURE — 3077F SYST BP >= 140 MM HG: CPT | Performed by: PHYSICIAN ASSISTANT

## 2025-06-10 PROCEDURE — 99397 PER PM REEVAL EST PAT 65+ YR: CPT | Performed by: PHYSICIAN ASSISTANT

## 2025-06-10 PROCEDURE — 99213 OFFICE O/P EST LOW 20 MIN: CPT | Performed by: PHYSICIAN ASSISTANT

## 2025-06-10 RX ORDER — DESONIDE 0.5 MG/G
CREAM TOPICAL
Qty: 30 G | Refills: 0 | Status: SHIPPED | OUTPATIENT
Start: 2025-06-10

## 2025-06-10 RX ORDER — FEZOLINETANT 45 MG/1
45 TABLET, FILM COATED ORAL DAILY
Qty: 30 TABLET | Refills: 5 | Status: SHIPPED | OUTPATIENT
Start: 2025-06-10 | End: 2025-06-10 | Stop reason: SDUPTHER

## 2025-06-10 RX ORDER — LEVOTHYROXINE SODIUM 137 UG/1
TABLET ORAL
COMMUNITY
Start: 2025-05-24

## 2025-06-10 RX ORDER — ACETAMINOPHEN 160 MG
2000 TABLET,DISINTEGRATING ORAL DAILY
COMMUNITY

## 2025-06-10 RX ORDER — CEPHALEXIN 500 MG/1
500 CAPSULE ORAL 4 TIMES DAILY
Qty: 28 CAPSULE | Refills: 0 | Status: SHIPPED | OUTPATIENT
Start: 2025-06-10 | End: 2025-06-17

## 2025-06-10 ASSESSMENT — PATIENT HEALTH QUESTIONNAIRE - PHQ9
5. POOR APPETITE OR OVEREATING: SEVERAL DAYS
6. FEELING BAD ABOUT YOURSELF - OR THAT YOU ARE A FAILURE OR HAVE LET YOURSELF OR YOUR FAMILY DOWN: NOT AT ALL
3. TROUBLE FALLING OR STAYING ASLEEP: SEVERAL DAYS
5. POOR APPETITE OR OVEREATING: SEVERAL DAYS
SUM OF ALL RESPONSES TO PHQ QUESTIONS 1-9: 6
7. TROUBLE CONCENTRATING ON THINGS, SUCH AS READING THE NEWSPAPER OR WATCHING TELEVISION: SEVERAL DAYS
7. TROUBLE CONCENTRATING ON THINGS, SUCH AS READING THE NEWSPAPER OR WATCHING TELEVISION: SEVERAL DAYS
SUM OF ALL RESPONSES TO PHQ QUESTIONS 1-9: 6
SUM OF ALL RESPONSES TO PHQ QUESTIONS 1-9: 6
2. FEELING DOWN, DEPRESSED OR HOPELESS: SEVERAL DAYS
4. FEELING TIRED OR HAVING LITTLE ENERGY: SEVERAL DAYS
9. THOUGHTS THAT YOU WOULD BE BETTER OFF DEAD, OR OF HURTING YOURSELF: NOT AT ALL
6. FEELING BAD ABOUT YOURSELF - OR THAT YOU ARE A FAILURE OR HAVE LET YOURSELF OR YOUR FAMILY DOWN: NOT AT ALL
1. LITTLE INTEREST OR PLEASURE IN DOING THINGS: SEVERAL DAYS
8. MOVING OR SPEAKING SO SLOWLY THAT OTHER PEOPLE COULD HAVE NOTICED. OR THE OPPOSITE, BEING SO FIGETY OR RESTLESS THAT YOU HAVE BEEN MOVING AROUND A LOT MORE THAN USUAL: NOT AT ALL
10. IF YOU CHECKED OFF ANY PROBLEMS, HOW DIFFICULT HAVE THESE PROBLEMS MADE IT FOR YOU TO DO YOUR WORK, TAKE CARE OF THINGS AT HOME, OR GET ALONG WITH OTHER PEOPLE: SOMEWHAT DIFFICULT
SUM OF ALL RESPONSES TO PHQ QUESTIONS 1-9: 6
9. THOUGHTS THAT YOU WOULD BE BETTER OFF DEAD, OR OF HURTING YOURSELF: NOT AT ALL
SUM OF ALL RESPONSES TO PHQ QUESTIONS 1-9: 6
10. IF YOU CHECKED OFF ANY PROBLEMS, HOW DIFFICULT HAVE THESE PROBLEMS MADE IT FOR YOU TO DO YOUR WORK, TAKE CARE OF THINGS AT HOME, OR GET ALONG WITH OTHER PEOPLE: SOMEWHAT DIFFICULT
4. FEELING TIRED OR HAVING LITTLE ENERGY: SEVERAL DAYS
1. LITTLE INTEREST OR PLEASURE IN DOING THINGS: SEVERAL DAYS
3. TROUBLE FALLING OR STAYING ASLEEP: SEVERAL DAYS
2. FEELING DOWN, DEPRESSED OR HOPELESS: SEVERAL DAYS
8. MOVING OR SPEAKING SO SLOWLY THAT OTHER PEOPLE COULD HAVE NOTICED. OR THE OPPOSITE - BEING SO FIDGETY OR RESTLESS THAT YOU HAVE BEEN MOVING AROUND A LOT MORE THAN USUAL: NOT AT ALL

## 2025-06-10 ASSESSMENT — ENCOUNTER SYMPTOMS
BACK PAIN: 0
ABDOMINAL PAIN: 0

## 2025-06-10 NOTE — PROGRESS NOTES
directed.  Thought processes were intact and goal-oriented without evidence of delusions, hallucinations, obsessions, or florencia; with moderate cognitive distortions.   Associations were characterized by intact cognitive processes.  Pt was oriented to person, place, time, and general circumstances;  recent:  good and remote:  good.  Insight and judgment were estimated to be fair, AEB, a fair  understanding of cyclical maladaptive patterns, and the ability to use insight to inform behavior change.   Attention span and concentration appear within functional limits  Language use and knowledge base appear within functional limits        ASSESSMENT  Lakia Mcneil presented to the appointment today for evaluation and treatment of symptoms of anxiety.  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 Lakia was in agreement with recommendations.           6/10/2025    10:38 AM 6/3/2025    10:46 AM 5/12/2025     9:41 AM 5/5/2025     1:29 PM 4/22/2025    10:47 AM 3/11/2025    10:44 AM 2/18/2025    11:11 AM   PHQ Scores   PHQ2 Score 2  2  0  0 1  2  1   PHQ9 Score 6  6  2  0 5  5  8       Patient-reported     Interpretation of Total Score Depression Severity: 1-4 = Minimal depression, 5-9 = Mild depression, 10-14 = Moderate depression, 15-19 = Moderately severe depression, 20-27 = Severe depression    How often pt has had thoughts of death or hurting self (if PHQ positive for depression):  (Patient-Rptd) Not at all        6/7/2025     9:50 AM 6/3/2025    10:44 AM 5/12/2025     9:40 AM 4/22/2025    10:46 AM 3/11/2025    10:43 AM 2/18/2025    11:13 AM 1/29/2025    11:18 AM   PETE 7 SCORE   PETE-7 Total Score 4  4  0  2  4  4 8       Patient-reported     Interpretation of PETE-7 score: 5-9 = mild anxiety, 10-14 = moderate anxiety, 15+ = severe anxiety. Recommend referral to behavioral health for scores 10 or greater.      DIAGNOSIS  Lakia was seen today for depression, anxiety

## 2025-06-10 NOTE — PROGRESS NOTES
1.575 m (5' 2.01\")   Wt 72.7 kg (160 lb 3.2 oz)   LMP 06/04/2015 Comment: g:3 p:3  SpO2 96%   BMI 29.29 kg/m²   Physical Exam  Vitals reviewed. Exam conducted with a chaperone present.   Constitutional:       Appearance: Normal appearance.   Chest:   Breasts:     Breasts are symmetrical.      Right: No inverted nipple, mass, nipple discharge, skin change or tenderness.      Left: No inverted nipple, mass, nipple discharge, skin change or tenderness.   Genitourinary:     Exam position: Supine.      Pubic Area: No rash.       Labia:         Right: No rash, tenderness or lesion.         Left: No rash, tenderness or lesion.       Urethra: No prolapse or urethral swelling.      Vagina: Normal.      Cervix: Normal.      Uterus: Normal.       Adnexa: Right adnexa normal and left adnexa normal.        Left: No tenderness.     Lymphadenopathy:      Upper Body:      Right upper body: No axillary or pectoral adenopathy.      Left upper body: No axillary or pectoral adenopathy.   Neurological:      Mental Status: She is alert.         Assessment/Plan:     1. Encounter for gynecological examination  2. Encounter for screening mammogram for breast cancer  -     [facility] JEANNIE DIGITAL SCREEN BILATERAL; Future  3. Postmenopause  -     DEXA BONE DENSITY 2 SITES; Future  4. Abnormal urine odor  -     POCT Urinalysis No Micro (Auto)  5. Acute cystitis without hematuria  -     POCT Urinalysis No Micro (Auto)  -     cephALEXin (KEFLEX) 500 MG capsule; Take 1 capsule by mouth 4 times daily for 7 days, Disp-28 capsule, R-0Normal  -     Culture, Urine; Future  6. Hot flashes, menopausal  -     fezolinetant (VEOZAH) 45 MG TABS; Take 45 mg by mouth daily, Disp-30 tablet, R-5Normal  7. Intertrigo  -     desonide (DESOWEN) 0.05 % cream; Apply topically 2 times daily to rash between breasts for not more than 2 weeks., Disp-30 g, R-0, Normal        Pap specimen taken  Reviewed self breast exam.    Safe Sex.   Discussed menopause symptoms and

## 2025-06-11 ENCOUNTER — HOSPITAL ENCOUNTER (OUTPATIENT)
Age: 65
Setting detail: SPECIMEN
Discharge: HOME | End: 2025-06-11

## 2025-06-11 DIAGNOSIS — N30.00 ACUTE CYSTITIS WITHOUT HEMATURIA: ICD-10-CM

## 2025-06-12 LAB
HPV I/H RISK 4 DNA CVX QL NAA+PROBE: NOT DETECTED
HPV SAMPLE: NORMAL
HPV, INTERPRETATION: NORMAL
HPV16 DNA CVX QL NAA+PROBE: NOT DETECTED
HPV18 DNA CVX QL NAA+PROBE: NOT DETECTED
SPECIMEN DESCRIPTION: NORMAL

## 2025-06-12 RX ORDER — FEZOLINETANT 45 MG/1
45 TABLET, FILM COATED ORAL DAILY
Qty: 14 TABLET | Refills: 0 | Status: SHIPPED | COMMUNITY
Start: 2025-06-12

## 2025-06-13 ENCOUNTER — RESULTS FOLLOW-UP (OUTPATIENT)
Dept: PRIMARY CARE CLINIC | Age: 65
End: 2025-06-13

## 2025-06-13 LAB
MICROORGANISM SPEC CULT: ABNORMAL
SERVICE CMNT-IMP: ABNORMAL
SPECIMEN DESCRIPTION: ABNORMAL

## 2025-06-16 LAB — CYTOLOGY REPORT: NORMAL

## 2025-07-03 DIAGNOSIS — I21.4 NSTEMI (NON-ST ELEVATED MYOCARDIAL INFARCTION) (HCC): ICD-10-CM

## 2025-07-03 RX ORDER — ATORVASTATIN CALCIUM 40 MG/1
40 TABLET, FILM COATED ORAL NIGHTLY
Qty: 90 TABLET | Refills: 3 | Status: SHIPPED | OUTPATIENT
Start: 2025-07-03

## 2025-07-05 DIAGNOSIS — F41.1 GAD (GENERALIZED ANXIETY DISORDER): ICD-10-CM

## 2025-07-05 DIAGNOSIS — F41.9 ANXIETY: ICD-10-CM

## 2025-07-07 RX ORDER — ESCITALOPRAM OXALATE 5 MG/1
5 TABLET ORAL DAILY
Qty: 90 TABLET | Refills: 1 | Status: SHIPPED | OUTPATIENT
Start: 2025-07-07

## 2025-07-23 ENCOUNTER — TRANSCRIBE ORDERS (OUTPATIENT)
Dept: ADMINISTRATIVE | Age: 65
End: 2025-07-23

## 2025-07-23 DIAGNOSIS — J44.9 CHRONIC OBSTRUCTIVE PULMONARY DISEASE, UNSPECIFIED COPD TYPE (HCC): Primary | ICD-10-CM

## 2025-08-12 ENCOUNTER — HOSPITAL ENCOUNTER (OUTPATIENT)
Dept: PULMONOLOGY | Age: 65
Discharge: HOME | End: 2025-08-12
Attending: INTERNAL MEDICINE
Payer: COMMERCIAL

## 2025-08-12 LAB
EXPIRATORY TIME-POST: NORMAL
EXPIRATORY TIME: NORMAL
FEF 25-75 %CHNG: NORMAL
FEF 25-75 POST %PRED: NORMAL
FEF 25-75% %PRED-PRE: NORMAL
FEF 25-75% PRED: NORMAL
FEF 25-75-POST: NORMAL
FEF 25-75-PRE: NORMAL
FEV1 %PRED-POST: NORMAL
FEV1 %PRED-PRE: NORMAL
FEV1 PRED: NORMAL
FEV1-POST: NORMAL
FEV1-PRE: NORMAL
FEV1/FVC %PRED-POST: NORMAL
FEV1/FVC %PRED-PRE: NORMAL
FEV1/FVC PRED: NORMAL
FEV1/FVC-POST: NORMAL
FEV1/FVC-PRE: NORMAL
FVC %PRED-POST: NORMAL
FVC %PRED-PRE: NORMAL
FVC PRED: NORMAL
FVC-POST: NORMAL
FVC-PRE: NORMAL
PEF %PRED-POST: NORMAL
PEF %PRED-PRE: NORMAL
PEF PRED: NORMAL
PEF%CHNG: NORMAL
PEF-POST: NORMAL
PEF-PRE: NORMAL

## 2025-08-12 PROCEDURE — 94060 EVALUATION OF WHEEZING: CPT

## 2025-08-12 PROCEDURE — 6370000000 HC RX 637 (ALT 250 FOR IP): Performed by: INTERNAL MEDICINE

## 2025-08-12 RX ORDER — ALBUTEROL SULFATE 90 UG/1
2 INHALANT RESPIRATORY (INHALATION) ONCE
Status: COMPLETED | OUTPATIENT
Start: 2025-08-12 | End: 2025-08-12

## 2025-08-12 RX ADMIN — ALBUTEROL SULFATE 2 PUFF: 90 AEROSOL, METERED RESPIRATORY (INHALATION) at 16:26

## 2025-08-20 ENCOUNTER — TRANSCRIBE ORDERS (OUTPATIENT)
Dept: ADMINISTRATIVE | Age: 65
End: 2025-08-20

## 2025-08-20 DIAGNOSIS — Z87.891 PERSONAL HISTORY OF TOBACCO USE: Primary | ICD-10-CM

## 2025-08-20 DIAGNOSIS — F17.200 CURRENT SMOKER: ICD-10-CM

## 2025-08-27 DIAGNOSIS — I10 ESSENTIAL HYPERTENSION: ICD-10-CM

## 2025-08-27 RX ORDER — LISINOPRIL 40 MG/1
40 TABLET ORAL DAILY
Qty: 90 TABLET | Refills: 3 | Status: SHIPPED | OUTPATIENT
Start: 2025-08-27

## (undated) DEVICE — SOLUTION IRRIG 3000ML 0.9% SOD CHL USP UROMATIC PLAS CONT

## (undated) DEVICE — FIBER LASER HOLM 11046] FORTEC MEDICAL INC]

## (undated) DEVICE — SOLUTION IRRIG 1000ML STRL H2O USP PLAS POUR BTL

## (undated) DEVICE — ST CHARLES CYSTO: Brand: MEDLINE INDUSTRIES, INC.

## (undated) DEVICE — GLIDESHEATH SLENDER STAINLESS STEEL KIT: Brand: GLIDESHEATH SLENDER

## (undated) DEVICE — GUIDEWIRE URO L150CM DIA .035IN STIFF NIT HYDRPHL STR TIP

## (undated) DEVICE — GUIDEWIRE 35/260/FC/PTFE/3J: Brand: GUIDEWIRE

## (undated) DEVICE — ANGIOGRAPHIC CATHETER: Brand: EXPO™

## (undated) DEVICE — BAND COMPR L24CM REG CLR PLAS HEMSTAT EXT HK AND LOOP RETEN

## (undated) DEVICE — SINGLE ACTION PUMPING SYSTEM

## (undated) DEVICE — SURGICAL PROCEDURE TRAY CRD CATH SVMMC

## (undated) DEVICE — GLOVE ORANGE PI 7 1/2   MSG9075

## (undated) DEVICE — STRIP,CLOSURE,WOUND,MEDI-STRIP,1/2X4: Brand: MEDLINE